# Patient Record
Sex: FEMALE | Race: WHITE | NOT HISPANIC OR LATINO | Employment: OTHER | URBAN - METROPOLITAN AREA
[De-identification: names, ages, dates, MRNs, and addresses within clinical notes are randomized per-mention and may not be internally consistent; named-entity substitution may affect disease eponyms.]

---

## 2017-02-21 ENCOUNTER — HOSPITAL ENCOUNTER (EMERGENCY)
Facility: HOSPITAL | Age: 22
Discharge: HOME | End: 2017-02-21
Attending: EMERGENCY MEDICINE
Payer: MEDICARE

## 2017-02-21 VITALS
SYSTOLIC BLOOD PRESSURE: 147 MMHG | HEART RATE: 86 BPM | OXYGEN SATURATION: 97 % | BODY MASS INDEX: 48.49 KG/M2 | TEMPERATURE: 98 F | WEIGHT: 284 LBS | DIASTOLIC BLOOD PRESSURE: 82 MMHG | HEIGHT: 64 IN | RESPIRATION RATE: 18 BRPM

## 2017-02-21 DIAGNOSIS — R51.9 INTRACTABLE HEADACHE, UNSPECIFIED CHRONICITY PATTERN, UNSPECIFIED HEADACHE TYPE: Primary | ICD-10-CM

## 2017-02-21 PROCEDURE — 99284 EMERGENCY DEPT VISIT MOD MDM: CPT | Mod: ,,, | Performed by: EMERGENCY MEDICINE

## 2017-02-21 PROCEDURE — 99283 EMERGENCY DEPT VISIT LOW MDM: CPT

## 2017-02-21 RX ORDER — LURASIDONE HYDROCHLORIDE 80 MG/1
80 TABLET, FILM COATED ORAL 2 TIMES DAILY
COMMUNITY
End: 2017-10-12

## 2017-02-21 NOTE — ED AVS SNAPSHOT
[facility]  [address]  [facility] [address]               Mary Myles   2017  5:41 PM   ED    Description:  Female : 1995   Department:  [facility] [address]           Your Care was Coordinated By:     Provider Role From To    Juanita Le MD Attending Provider 17 [id number] --    Nora Lopes NP Nurse Practitioner 17 --      Reason for Visit     Migraine           Diagnoses this Visit        Comments    Intractable headache, unspecified chronicity pattern, unspecified headache type    -  Primary       ED Disposition     None           To Do List           Follow-up Information     Follow up with [facility] - Neurology. Schedule an appointment as soon as possible for a visit in 2 days.    Specialty:  Neurology    Why:  Follow up in the neurology clinic within 2 days for further evaluation and treatment. Return to the ER for any changes, worsening or concerns. Continue your medications as prescribed.    Contact information:    [address]  [facility] [address] [phone]    Additional information:    7th Floor - Clinic Tower [facility]     [facility] Nurse Care [facility] -  Assistance  Registered nurses in the [facility] provide clinical advisement, health education, appointment booking, and other advisory services.  Call for this free service at [phone].             Medications           Message regarding Medications     Verify the changes and/or additions to your medication regime listed below are the same as discussed with your clinician today.  If any of these changes or additions are incorrect, please notify your healthcare provider.             Verify that the below list of medications is an accurate representation of the medications you are currently taking.  If none reported, the list may be blank. If incorrect, please contact your healthcare provider. Carry this  "list with you in case of emergency.           Current Medications     lithium (ESKALITH) 300 MG capsule Take 600 mg by mouth 3 (three) times daily.     lurasidone (LATUDA) 80 mg Tab tablet Take 80 mg by mouth once daily.    melatonin 10 mg Tab Take by mouth.    trazodone (DESYREL) 150 MG tablet Take 200 mg by mouth every evening.     benztropine (COGENTIN) 1 MG tablet Take 1 mg by mouth 2 (two) times daily.     haloperidol (HALDOL) 5 MG tablet Take 5 mg by mouth every evening.    haloperidol decanoate (HALDOL DECANOATE) 50 mg/mL injection Inject 100 mg into the muscle every 28 days.    ibuprofen (ADVIL,MOTRIN) 800 MG tablet Take 1 tablet (800 mg total) by mouth every 8 (eight) hours as needed for Pain.           Clinical Reference Information           Your Vitals Were     BP Pulse Temp Resp Height Weight    147/82 86 97.9 °F (36.6 °C) (Oral) 18 5' 4" (1.626 m) 128.8 kg (284 lb)    Last Period SpO2 BMI          02/15/2017 (Exact Date) 97% 48.75 kg/m2        Allergies as of 2/21/2017     No Known Allergies      Immunizations Administered on Date of Encounter - 2/21/2017     None      ED Micro, Lab, POCT     None      ED Imaging Orders     None        Discharge Instructions         Headache, Unspecified    A number of things can cause headaches. The cause of your headache isnt clear. But it doesnt seem to be a sign of any serious illness.  You could have a tension headache or a migraine headache.  Stress can cause a tension headache. This can happen if you tense the muscles of your shoulders, neck, and scalp without knowing it. If this stress lasts long enough, you may develop a tension headache.  It is not clear why migraines occur, but certain things called" triggers" can raise the risk of having a migraine attack. Migraine triggers may include emotional stress or depression, or by hormone changes during the menstrual cycle. Other triggers include birth control pills and other medicines, alcohol or caffeine, foods " with tyramine (such as aged cheese, wine), eyestrain, weather changes, missed meals, and lack of sleep or oversleeping.  Other causes of headache include:  · Viral illness with high fever  · Head injury with concussion  · Sinus, ear, or throat infection  · Dental pain and jaw joint (TMJ) pain  More serious but less common causes of headache include stroke, brain hemorrhage, brain tumor, meningitis, and encephalitis.  Home care  Follow these tips when taking care of yourself at home:  · Dont drive yourself home if you were given pain medicine for your headache. Instead, have someone else drive you home. Try to sleep when you get home. You should feel much better when you wake up.  · Apply heat to the back of your neck to ease a neck muscle spasm. Take care of a migraine headache by putting an ice pack on your forehead or at the base of your skull.  · If you have nausea or vomiting, eat a light diet until your headache eases.  · If you have a migraine headache, use sunglasses when in the daylight or around bright indoor lighting until your symptoms get better. Bright glaring light can make this type of headache worse.  Follow-up care  Follow up with your healthcare provider, or as advised. Talk with your provider if you have frequent headaches. He or she can help figure out a treatment plan. By knowing the earliest signs of headache, and starting treatment right away, you may be able to stop the pain yourself.  When to seek medical advice  Call your healthcare provider right away if any of these occur:  · Your head pain suddenly gets worse after sexual intercourse or strenuous activity  · Your head pain doesnt get better within 24 hours  · You arent able to keep liquids down (repeated vomiting)  · Fever of 100.4ºF (38ºC) or higher, or as directed by your healthcare provider  · Stiff neck  · Extreme drowsiness, confusion, or fainting  · Dizziness or dizziness with spinning sensation (vertigo)  · Weakness in an arm  or leg or one side of your face  · You have trouble talking or seeing  Date Last Reviewed: 8/1/2016  © 2732-2403 [employer]. [address] [place] [address], [place] [address]. All rights reserved. This information is not intended as a substitute for professional medical care. Always follow your healthcare professional's instructions.          [facility] Sign-Up     Activating your [facility] account is as easy as 1-2-3!     1) Visit [email or web address], select Sign Up Now, enter this activation code and your date of birth, then select Next.  [email or web address]  Expires: 4/7/2017  6:36 PM      2) Create a username and password to use when you visit [facility] in the future and select a security question in case you lose your password and select Next.    3) Enter your e-mail address and click Sign Up!    Additional Information  If you have questions, please e-mail [email or web address] or call [phone] to talk to our [facility] staff. Remember, [facility] is NOT to be used for urgent needs. For medical emergencies, dial 911.         Smoking Cessation     If you would like to quit smoking:   You may be eligible for free services if you are a [place] resident and started smoking cigarettes before September 1, 1988.  Call the Smoking Cessation Trust ([facility]) toll free at (279) 256-4628 or (508) 700-0163.   Call 1-800-QUIT-NOW if you do not meet the above criteria.             [facility] complies with applicable Federal civil rights laws and does not discriminate on the basis of race, color, national origin, age, disability, or sex.        Language Assistance Services     ATTENTION: Language assistance services are available, free of charge. Please call [phone].      ATENCIÓN: Si habla kwesi, tiene a sewell disposición servicios gratuitos de asistencia lingüística. Llame al [phone].     CHÚ Ý: N?u b?n nói Ti?ng Vi?t, có các d?ch v? h? tr? ngôn ng? mi?n phí dành cho b?n. G?i s?  [phone].

## 2017-02-22 NOTE — ED PROVIDER NOTES
Encounter Date: 2/21/2017       History     Chief Complaint   Patient presents with    Migraine     for 6 weeks     Review of patient's allergies indicates:  No Known Allergies  HPI Comments: This is a 21 y.o. Female with a past medical history significant for borderline personality disorder, schizoaffective disorder, depression, ADD, chronic headaches, fibromyalgia and PCOS who presents to the ED today for evaluation of a headache. She reports a generalized headache that originates across her forehead and has been constant and sharp for the past 6 weeks. It is associated with intermittent nausea and vomiting, intermittent photophobia and intermittent tinnitus. She denies blurred vision. Denies abdominal pain or diarrhea. Denies head trauma. Denies neck stiffness or fever. She denies weakness, dizziness, numbness, speech difficulty or trouble swallowing.  Reports that she was seen in the ED in [place] last week and was discharged home with rx for phenergan and fioricet which she states is not helping her symptoms. She saw her PCP today who advised that she come to the ED to rule out psuedo tumor cerebri. She denies any other problems or concerns at this time.                         The history is provided by the patient.     Past Medical History   Diagnosis Date    ADD (attention deficit disorder)     Borderline personality disorder     Chronic headache     Depression     Fibromyalgia     GERD (gastroesophageal reflux disease)     PCOS (polycystic ovarian syndrome)     Schizoaffective disorder      No past medical history pertinent negatives.  Past Surgical History   Procedure Laterality Date    Tonsillectomy      [place] tooth extraction       Family History   Problem Relation Age of Onset    Family history unknown: Yes     Social History   Substance Use Topics    Smoking status: Current Every Day Smoker     Packs/day: 0.50     Types: Cigarettes    Smokeless tobacco: Never Used    Alcohol use No      Review of Systems   Constitutional: Negative for chills and fever.   HENT: Negative for congestion and sinus pressure.    Eyes: Negative for visual disturbance. Positive for photophobia  Respiratory: Negative for cough, chest tightness and shortness of breath.    Cardiovascular: Negative for chest pain.   Gastrointestinal: Negative for abdominal pain. Negative for diarrhea. Positive for nausea and vomiting.  Genitourinary: Negative for flank pain. Negative for dysuria.  Musculoskeletal: Negative for arthralgias and myalgias.   Skin: Negative for rash and wound.   Neurological: Negative for dizziness. Negative for weakness and numbness. Positive for headache. Negative for head trauma.  Psychiatric/Behavioral: Negative for confusion.  Denies suicidal or homicidal ideation.    Physical Exam   Initial Vitals   BP Pulse Resp Temp SpO2   02/21/17 1611 02/21/17 1611 02/21/17 1611 02/21/17 1611 02/21/17 1611   147/82 86 18 97.9 °F (36.6 °C) 97 %     Physical Exam   Nursing note and vitals reviewed.  Constitutional: Patient appears well-developed and well-nourished. Not diaphoretic. No distress.   HENT:   Head: Normocephalic and atraumatic. No sinus tenderness. Normal oropharynx.   Eyes: Conjunctivae are normal. No scleral icterus. PERRL. Normal EOMs.  Neck: Normal range of motion. Neck supple. No cervical adenopathy. No nuchal rigidity.  Cardiovascular: Normal rate, regular rhythm and normal heart sounds.   Pulmonary/Chest: Breath sounds normal. No respiratory distress.  Abdominal: The abdomen is soft, obese and non distended. Normal bowel sounds in all 4 quadrants. There is no tenderness to palpation. No rebound or guarding.   Musculoskeletal: Normal range of motion. No edema or tenderness.   Neurological: Alert and oriented to person, place, and time. Normal strength. No sensory deficit.   Skin: Skin is warm and dry. No rash noted. No erythema. No pallor.   Psychiatric: Normal mood and affect. Thought content normal.      ED Course   Procedures  Labs Reviewed - No data to display          Medical Decision Making:   Initial Assessment:   21 y.o. female with a psychiatric history presenting with a 6 week history of constant sharp generalized headache that is associated with nausea, vomiting, photophobia and intermittent tinnitus.  She is currently taking Fioricet and Phenergan per the emergency department in [place] with little improvement.  She was sent to the emergency department by her primary care provider to rule out pseudotumor cerebri.  She denies visual disturbance.  She has been seen in this emergency department previously for headaches in the past.  She is afebrile, nontoxic and in no acute distress.  She has normal neurologic exam.  I think that she is stable for discharge home with follow-up in the neurology clinic.  Return precautions discussed at length with the patient and her family.              Attending Attestation:     Physician Attestation Statement for NP/PA:   I have conducted a face to face encounter with this patient in addition to the NP/PA, due to Medical Complexity    Other NP/PA Attestation Additions:    History of Present Illness: 22 yo f, complicated psych hx, here with HA x 6 weeks, constant, with some tinnitus, no visual changes, no neck pain, + n/v, no fever.  Pt referred from internal medicine for r/o pseudotumor cerebri.  On review of medical records, however, pt with previous HA visits x 2 to ED.  Her neuro exam is completely normal. VA being documented by nurses. Seems more likely that these are chronic HAs x years and without acute neurologic changes, think it would safe for pt to have a semi-urgent eval by neurology as outpatient, including MRI and/or LP if they feel this is necessary.  Pt and family comfortable with this plan.                   ED Course     Clinical Impression:   The encounter diagnosis was Intractable headache, unspecified chronicity pattern, unspecified headache type.           Nora Lopes, NP  02/22/17 0145

## 2017-02-22 NOTE — DISCHARGE INSTRUCTIONS
"  Headache, Unspecified    A number of things can cause headaches. The cause of your headache isnt clear. But it doesnt seem to be a sign of any serious illness.  You could have a tension headache or a migraine headache.  Stress can cause a tension headache. This can happen if you tense the muscles of your shoulders, neck, and scalp without knowing it. If this stress lasts long enough, you may develop a tension headache.  It is not clear why migraines occur, but certain things called" triggers" can raise the risk of having a migraine attack. Migraine triggers may include emotional stress or depression, or by hormone changes during the menstrual cycle. Other triggers include birth control pills and other medicines, alcohol or caffeine, foods with tyramine (such as aged cheese, wine), eyestrain, weather changes, missed meals, and lack of sleep or oversleeping.  Other causes of headache include:  · Viral illness with high fever  · Head injury with concussion  · Sinus, ear, or throat infection  · Dental pain and jaw joint (TMJ) pain  More serious but less common causes of headache include stroke, brain hemorrhage, brain tumor, meningitis, and encephalitis.  Home care  Follow these tips when taking care of yourself at home:  · Dont drive yourself home if you were given pain medicine for your headache. Instead, have someone else drive you home. Try to sleep when you get home. You should feel much better when you wake up.  · Apply heat to the back of your neck to ease a neck muscle spasm. Take care of a migraine headache by putting an ice pack on your forehead or at the base of your skull.  · If you have nausea or vomiting, eat a light diet until your headache eases.  · If you have a migraine headache, use sunglasses when in the daylight or around bright indoor lighting until your symptoms get better. Bright glaring light can make this type of headache worse.  Follow-up care  Follow up with your healthcare provider, or " as advised. Talk with your provider if you have frequent headaches. He or she can help figure out a treatment plan. By knowing the earliest signs of headache, and starting treatment right away, you may be able to stop the pain yourself.  When to seek medical advice  Call your healthcare provider right away if any of these occur:  · Your head pain suddenly gets worse after sexual intercourse or strenuous activity  · Your head pain doesnt get better within 24 hours  · You arent able to keep liquids down (repeated vomiting)  · Fever of 100.4ºF (38ºC) or higher, or as directed by your healthcare provider  · Stiff neck  · Extreme drowsiness, confusion, or fainting  · Dizziness or dizziness with spinning sensation (vertigo)  · Weakness in an arm or leg or one side of your face  · You have trouble talking or seeing  Date Last Reviewed: 8/1/2016  © 6723-7256 [employer]. [address] [place] [address], [place] [address]. All rights reserved. This information is not intended as a substitute for professional medical care. Always follow your healthcare professional's instructions.

## 2017-02-28 ENCOUNTER — HOSPITAL ENCOUNTER (EMERGENCY)
Facility: HOSPITAL | Age: 22
Discharge: HOME | End: 2017-02-28
Attending: EMERGENCY MEDICINE
Payer: MEDICARE

## 2017-02-28 VITALS
SYSTOLIC BLOOD PRESSURE: 115 MMHG | HEIGHT: 64 IN | WEIGHT: 277.31 LBS | HEART RATE: 74 BPM | RESPIRATION RATE: 18 BRPM | BODY MASS INDEX: 47.34 KG/M2 | TEMPERATURE: 99 F | DIASTOLIC BLOOD PRESSURE: 66 MMHG | OXYGEN SATURATION: 98 %

## 2017-02-28 DIAGNOSIS — G93.2 ELEVATED INTRACRANIAL PRESSURE: Primary | ICD-10-CM

## 2017-02-28 DIAGNOSIS — R51.9 INTRACTABLE HEADACHE, UNSPECIFIED CHRONICITY PATTERN, UNSPECIFIED HEADACHE TYPE: ICD-10-CM

## 2017-02-28 LAB
ALBUMIN SERPL BCP-MCNC: 3.9 G/DL
ALP SERPL-CCNC: 77 U/L
ALT SERPL W/O P-5'-P-CCNC: 228 U/L
ANION GAP SERPL CALC-SCNC: 13 MMOL/L
AST SERPL-CCNC: 286 U/L
B-HCG UR QL: NEGATIVE
BASOPHILS # BLD AUTO: 0.1 K/UL
BASOPHILS NFR BLD: 0.6 %
BILIRUB SERPL-MCNC: 0.8 MG/DL
BUN SERPL-MCNC: 5 MG/DL
CALCIUM SERPL-MCNC: 10.8 MG/DL
CHLORIDE SERPL-SCNC: 103 MMOL/L
CLARITY CSF: CLEAR
CO2 SERPL-SCNC: 20 MMOL/L
COLOR CSF: COLORLESS
CREAT SERPL-MCNC: 0.7 MG/DL
CTP QC/QA: YES
DIFFERENTIAL METHOD: ABNORMAL
EOSINOPHIL # BLD AUTO: 0.4 K/UL
EOSINOPHIL NFR BLD: 3.1 %
ERYTHROCYTE [DISTWIDTH] IN BLOOD BY AUTOMATED COUNT: 16.1 %
EST. GFR  (AFRICAN AMERICAN): >60 ML/MIN/1.73 M^2
EST. GFR  (NON AFRICAN AMERICAN): >60 ML/MIN/1.73 M^2
GLUCOSE CSF-MCNC: 62 MG/DL
GLUCOSE SERPL-MCNC: 94 MG/DL
HCT VFR BLD AUTO: 42 %
HGB BLD-MCNC: 13.6 G/DL
INR PPP: 1
LITHIUM SERPL-SCNC: 1 MMOL/L
LYMPHOCYTES # BLD AUTO: 2.1 K/UL
LYMPHOCYTES NFR BLD: 17.4 %
MCH RBC QN AUTO: 27.3 PG
MCHC RBC AUTO-ENTMCNC: 32.5 %
MCV RBC AUTO: 84 FL
MONOCYTES # BLD AUTO: 0.7 K/UL
MONOCYTES NFR BLD: 5.5 %
NEUTROPHILS # BLD AUTO: 9 K/UL
NEUTROPHILS NFR BLD: 73.4 %
PLATELET # BLD AUTO: 447 K/UL
PMV BLD AUTO: 8 FL
POTASSIUM SERPL-SCNC: 4.3 MMOL/L
PROT CSF-MCNC: 24 MG/DL
PROT SERPL-MCNC: 7.9 G/DL
PROTHROMBIN TIME: 10.7 SEC
RBC # BLD AUTO: 5.01 M/UL
RBC # CSF: 1 /CU MM
SODIUM SERPL-SCNC: 136 MMOL/L
SPECIMEN VOL CSF: 10 ML
WBC # BLD AUTO: 12.2 K/UL
WBC # CSF: 0 /CU MM

## 2017-02-28 PROCEDURE — 63600175 PHARM REV CODE 636 W HCPCS: Performed by: EMERGENCY MEDICINE

## 2017-02-28 PROCEDURE — 99285 EMERGENCY DEPT VISIT HI MDM: CPT | Mod: 25

## 2017-02-28 PROCEDURE — 99000 SPECIMEN HANDLING OFFICE-LAB: CPT

## 2017-02-28 PROCEDURE — 96376 TX/PRO/DX INJ SAME DRUG ADON: CPT

## 2017-02-28 PROCEDURE — 89051 BODY FLUID CELL COUNT: CPT

## 2017-02-28 PROCEDURE — 80178 ASSAY OF LITHIUM: CPT

## 2017-02-28 PROCEDURE — 25000003 PHARM REV CODE 250: Performed by: EMERGENCY MEDICINE

## 2017-02-28 PROCEDURE — 85610 PROTHROMBIN TIME: CPT

## 2017-02-28 PROCEDURE — 81025 URINE PREGNANCY TEST: CPT | Performed by: EMERGENCY MEDICINE

## 2017-02-28 PROCEDURE — 36415 COLL VENOUS BLD VENIPUNCTURE: CPT

## 2017-02-28 PROCEDURE — 87070 CULTURE OTHR SPECIMN AEROBIC: CPT

## 2017-02-28 PROCEDURE — 96374 THER/PROPH/DIAG INJ IV PUSH: CPT

## 2017-02-28 PROCEDURE — 84157 ASSAY OF PROTEIN OTHER: CPT

## 2017-02-28 PROCEDURE — 85025 COMPLETE CBC W/AUTO DIFF WBC: CPT

## 2017-02-28 PROCEDURE — 87205 SMEAR GRAM STAIN: CPT

## 2017-02-28 PROCEDURE — 82945 GLUCOSE OTHER FLUID: CPT

## 2017-02-28 PROCEDURE — 80053 COMPREHEN METABOLIC PANEL: CPT

## 2017-02-28 RX ORDER — ONDANSETRON HYDROCHLORIDE 4 MG/5ML
4 SOLUTION ORAL ONCE
Status: COMPLETED | OUTPATIENT
Start: 2017-02-28 | End: 2017-02-28

## 2017-02-28 RX ORDER — ONDANSETRON 4 MG/1
4 TABLET, ORALLY DISINTEGRATING ORAL
Status: COMPLETED | OUTPATIENT
Start: 2017-02-28 | End: 2017-02-28

## 2017-02-28 RX ORDER — LORAZEPAM 1 MG/1
1 TABLET ORAL EVERY 12 HOURS PRN
COMMUNITY
End: 2017-03-14

## 2017-02-28 RX ORDER — BUTALBITAL, ACETAMINOPHEN AND CAFFEINE 50; 325; 40 MG/1; MG/1; MG/1
1 TABLET ORAL EVERY 4 HOURS PRN
COMMUNITY
End: 2017-03-15

## 2017-02-28 RX ORDER — MORPHINE SULFATE 2 MG/ML
6 INJECTION, SOLUTION INTRAMUSCULAR; INTRAVENOUS
Status: COMPLETED | OUTPATIENT
Start: 2017-02-28 | End: 2017-02-28

## 2017-02-28 RX ORDER — NORETHINDRONE ACETATE AND ETHINYL ESTRADIOL 1.5-30(21)
1 KIT ORAL DAILY
COMMUNITY
End: 2017-03-14

## 2017-02-28 RX ORDER — MORPHINE SULFATE 4 MG/ML
8 INJECTION, SOLUTION INTRAMUSCULAR; INTRAVENOUS
Status: COMPLETED | OUTPATIENT
Start: 2017-02-28 | End: 2017-02-28

## 2017-02-28 RX ADMIN — MORPHINE SULFATE 6 MG: 2 INJECTION, SOLUTION INTRAMUSCULAR; INTRAVENOUS at 05:02

## 2017-02-28 RX ADMIN — MORPHINE SULFATE 8 MG: 4 INJECTION INTRAVENOUS at 02:02

## 2017-02-28 RX ADMIN — Medication 4 MG: at 03:02

## 2017-02-28 RX ADMIN — ONDANSETRON 4 MG: 4 TABLET, ORALLY DISINTEGRATING ORAL at 02:02

## 2017-02-28 NOTE — ED AVS SNAPSHOT
[facility] [address] [place] [address]               Mary Myles   2017  1:21 PM   ED    Description:  Female : 1995   Department:  [facility]           Your Care was Coordinated By:     Provider Role From To    Hawk Kilgore MD Attending Provider 17 1331 --      Reason for Visit     Headache     Emesis           Diagnoses this Visit        Comments    Elevated intracranial pressure    -  Primary     Intractable headache, unspecified chronicity pattern, unspecified headache type           ED Disposition     None           To Do List           Follow-up Information     Schedule an appointment as soon as possible for a visit with [facility] - Neurology.    Specialty:  Neurology    Contact information:    [address]  [facility] [address] [phone]    Additional information:    7th Floor - [facility] [place]        Follow up with [facility].    Specialty:  Emergency Medicine    Why:  If symptoms worsen    Contact information:    [address] [place] [id number] [phone]      [facility]     [facility] - 24/7 Assistance  Registered nurses in the [facility] provide clinical advisement, health education, appointment booking, and other advisory services.  Call for this free service at [phone].             Medications           Message regarding Medications     Verify the changes and/or additions to your medication regime listed below are the same as discussed with your clinician today.  If any of these changes or additions are incorrect, please notify your healthcare provider.        These medications were administered today        Dose Freq    morphine injection 8 mg 8 mg ED 1 Time    Sig: Inject 2 mLs (8 mg total) into the vein ED 1 Time.    Class: Normal    Route: Intravenous    ondansetron disintegrating tablet 4 mg 4  mg ED 1 Time    Sig: Take 1 tablet (4 mg total) by mouth ED 1 Time.    Class: Normal    Route: Oral    ondansetron 4 mg/5 mL solution 4 mg 4 mg Once    Sig: Take 5 mLs (4 mg total) by mouth once.    Class: Normal    Route: Oral    morphine injection 6 mg 6 mg ED 1 Time    Sig: Inject 3 mLs (6 mg total) into the vein ED 1 Time.    Class: Normal    Route: Intravenous      STOP taking these medications     benztropine (COGENTIN) 1 MG tablet Take 1 mg by mouth 2 (two) times daily.     haloperidol (HALDOL) 5 MG tablet Take 5 mg by mouth every evening.    haloperidol decanoate (HALDOL DECANOATE) 50 mg/mL injection Inject 100 mg into the muscle every 28 days.           Verify that the below list of medications is an accurate representation of the medications you are currently taking.  If none reported, the list may be blank. If incorrect, please contact your healthcare provider. Carry this list with you in case of emergency.           Current Medications     butalbital-acetaminophen-caffeine -40 mg (FIORICET, ESGIC) -40 mg per tablet Take 1 tablet by mouth every 4 (four) hours as needed for Pain.    ibuprofen (ADVIL,MOTRIN) 800 MG tablet Take 1 tablet (800 mg total) by mouth every 8 (eight) hours as needed for Pain.    lithium (ESKALITH) 300 MG capsule Take 600 mg by mouth 2 (two) times daily.     lorazepam (ATIVAN) 1 MG tablet Take 1 mg by mouth every 12 (twelve) hours as needed for Anxiety.    lurasidone (LATUDA) 80 mg Tab tablet Take 80 mg by mouth every evening.     melatonin 10 mg Tab Take by mouth.    norethindrone-ethinyl estradiol-iron (MICROGESTIN FE1.5/30) 1.5 mg-30 mcg (21)/75 mg (7) tablet Take 1 tablet by mouth once daily.    trazodone (DESYREL) 150 MG tablet Take 200 mg by mouth every evening.            Clinical Reference Information           Your Vitals Were     BP                   115/66           Allergies as of 2/28/2017     No Known Allergies      Immunizations Administered on Date of  "Encounter - 2/28/2017     None      ED Micro, Lab, POCT     Start Ordered       Status Ordering Provider    02/28/17 1737 02/28/17 1737  CSF culture  Once      In process     02/28/17 1401 02/28/17 1400  Glucose, CSF (Tube 1)  STAT      Final result     02/28/17 1401 02/28/17 1400  Protein, CSF (Tube 1)  STAT      Final result     02/28/17 1401 02/28/17 1400    Once,   Status:  Canceled      Canceled     02/28/17 1401 02/28/17 1400  CSF cell count with differential (Tube 4)  Once      Final result     02/28/17 1401 02/28/17 1400  Freeze and Hold,   Once      Collected     02/28/17 1346 02/28/17 1346  Complete Blood Count (CBC)  STAT      Final result     02/28/17 1346 02/28/17 1346  Comprehensive Metabolic Panel (CMP)  STAT      Final result     02/28/17 1346 02/28/17 1346  Protime-INR  STAT      Final result     02/28/17 1346 02/28/17 1346  Lithium level  Once      Final result     02/28/17 1346 02/28/17 1346  POCT urine pregnancy  Once      Final result       ED Imaging Orders     Start Ordered       Status Ordering Provider    02/28/17 1551 02/28/17 1550  FL Lumbar Puncture (xpd)  1 time imaging      Final result     02/28/17 1441 02/28/17 1441    1 time imaging,   Status:  Canceled      Canceled     02/28/17 1349 02/28/17 1349    1 time imaging,   Status:  Canceled      Canceled     02/28/17 1346 02/28/17 1346  CT Head Without Contrast  1 time imaging      Final result         Discharge Instructions         Headache, Unspecified    A number of things can cause headaches. The cause of your headache isnt clear. But it doesnt seem to be a sign of any serious illness.  You could have a tension headache or a migraine headache.  Stress can cause a tension headache. This can happen if you tense the muscles of your shoulders, neck, and scalp without knowing it. If this stress lasts long enough, you may develop a tension headache.  It is not clear why migraines occur, but certain things called" triggers" can raise " the risk of having a migraine attack. Migraine triggers may include emotional stress or depression, or by hormone changes during the menstrual cycle. Other triggers include birth control pills and other medicines, alcohol or caffeine, foods with tyramine (such as aged cheese, wine), eyestrain, weather changes, missed meals, and lack of sleep or oversleeping.  Other causes of headache include:  · Viral illness with high fever  · Head injury with concussion  · Sinus, ear, or throat infection  · Dental pain and jaw joint (TMJ) pain  More serious but less common causes of headache include stroke, brain hemorrhage, brain tumor, meningitis, and encephalitis.  Home care  Follow these tips when taking care of yourself at home:  · Dont drive yourself home if you were given pain medicine for your headache. Instead, have someone else drive you home. Try to sleep when you get home. You should feel much better when you wake up.  · Apply heat to the back of your neck to ease a neck muscle spasm. Take care of a migraine headache by putting an ice pack on your forehead or at the base of your skull.  · If you have nausea or vomiting, eat a light diet until your headache eases.  · If you have a migraine headache, use sunglasses when in the daylight or around bright indoor lighting until your symptoms get better. Bright glaring light can make this type of headache worse.  Follow-up care  Follow up with your healthcare provider, or as advised. Talk with your provider if you have frequent headaches. He or she can help figure out a treatment plan. By knowing the earliest signs of headache, and starting treatment right away, you may be able to stop the pain yourself.  When to seek medical advice  Call your healthcare provider right away if any of these occur:  · Your head pain suddenly gets worse after sexual intercourse or strenuous activity  · Your head pain doesnt get better within 24 hours  · You arent able to keep liquids down  (repeated vomiting)  · Fever of 100.4ºF (38ºC) or higher, or as directed by your healthcare provider  · Stiff neck  · Extreme drowsiness, confusion, or fainting  · Dizziness or dizziness with spinning sensation (vertigo)  · Weakness in an arm or leg or one side of your face  · You have trouble talking or seeing  Date Last Reviewed: 8/1/2016  © 6115-2955 [employer]. [address] [place] [address], [place] [address]. All rights reserved. This information is not intended as a substitute for professional medical care. Always follow your healthcare professional's instructions.          Your Scheduled Appointments     Mar 09, 2017 11:00 AM CST   New Patient with TASHIA Aleman - Neurology ([address] )    [address]  [facility] [phone]              [facility] Sign-Up     Activating your [facility] account is as easy as 1-2-3!     1) Visit [email or web address], select Sign Up Now, enter this activation code and your date of birth, then select Next.  [email or web address]  Expires: 4/7/2017  6:36 PM      2) Create a username and password to use when you visit [facility] in the future and select a security question in case you lose your password and select Next.    3) Enter your e-mail address and click Sign Up!    Additional Information  If you have questions, please e-mail [email or web address] or call [phone] to talk to our [facility] staff. Remember, [facility] is NOT to be used for urgent needs. For medical emergencies, dial 911.         Smoking Cessation     If you would like to quit smoking:   You may be eligible for free services if you are a [place] resident and started smoking cigarettes before September 1, 1988.  Call the Smoking Cessation Trust (SCT) toll free at (286) 996-7099 or (599) 070-1357.   Call 6-800-QUIT-NOW if you do not meet the above criteria.             [facility] complies with applicable Federal civil rights laws and does  not discriminate on the basis of race, color, national origin, age, disability, or sex.        Language Assistance Services     ATTENTION: Language assistance services are available, free of charge. Please call [phone].      ATENCIÓN: Si yashira orosco, tiene a sewell disposición servicios gratuitos de asistencia lingüística. Llame al [phone].     CHÚ Ý: N?u b?n nói Ti?ng Vi?t, có các d?ch v? h? tr? ngôn ng? mi?n phí dành cho b?n. G?i s? [phone].

## 2017-02-28 NOTE — ED NOTES
Pt recently hospitalized for lithium toxicity. Pt reports being seen at multiple hospitals for ha and n/v but without any results or diagnosis.

## 2017-02-28 NOTE — ED PROVIDER NOTES
"Encounter Date: 2/28/2017    SCRIBE #1 NOTE: I, Landen Espinoza, am scribing for, and in the presence of, Dr. Kilgore.       History     Chief Complaint   Patient presents with    Headache     x 7 weeks. D/c'd from [facility] hospitalization for same on 2/25/17.    Emesis     x 7 weeks     Review of patient's allergies indicates:  No Known Allergies  HPI Comments: 02/28/2017  1:40 PM     Chief Complaint: Headache      The patient is a 21 y.o. female with a PMHx of ADD; borderline personality disorder; chronic headache; depression; fibromyalgia; GERD; PCOS; and schizoaffective disorder who is presenting with a persistent migraine headache for the past 7 wks. Pt took Fioricet for her headache and Ativan for nausea today, without any alleviation. She reported a hx of headaches for the past 17 yrs, but her normal headaches last about 1-2 wks. Associated symptoms of no appetite, bilateral eye pain, nausea, vomiting, dizziness, and sleep disturbance due to pain. Pt reported that she cannot keep any food or fluids down. Pt recently seen at [facility] for lithium blood toxicity and pt reported that "they got it back to normal." She stated that her PCP thought she had a pseudotumor and wanted the pt to f/u at [facility]. Pt denied any recent imaging of her head. No chance of pregnancy. Pt has a past surgical history that includes Tonsillectomy and [place] tooth extraction.      The history is provided by the patient.     Past Medical History:   Diagnosis Date    ADD (attention deficit disorder)     Borderline personality disorder     Chronic headache     Depression     Fibromyalgia     GERD (gastroesophageal reflux disease)     PCOS (polycystic ovarian syndrome)     Schizoaffective disorder      Past Surgical History:   Procedure Laterality Date    TONSILLECTOMY      WISDOM TOOTH EXTRACTION       Family History   Problem Relation Age of Onset    Family history unknown: Yes     Social History   Substance Use Topics    Smoking " status: Current Every Day Smoker     Packs/day: 0.50     Types: Cigarettes    Smokeless tobacco: Never Used    Alcohol use No     Review of Systems   Constitutional: Positive for appetite change (No appetite.). Negative for fever.   HENT: Negative for sore throat.    Eyes: Positive for pain (Bilateral eye pain.).   Respiratory: Negative for shortness of breath.    Cardiovascular: Negative for chest pain.   Gastrointestinal: Positive for nausea and vomiting.   Genitourinary: Negative for dysuria.   Musculoskeletal: Negative for back pain.   Skin: Negative for rash.   Neurological: Positive for dizziness and headaches.   Hematological: Does not bruise/bleed easily.   Psychiatric/Behavioral: Positive for sleep disturbance (Sleep disturbance due to pain.). Negative for confusion.       Physical Exam   Initial Vitals   BP Pulse Resp Temp SpO2   02/28/17 1227 02/28/17 1227 02/28/17 1227 02/28/17 1227 02/28/17 1227   136/97 95 16 98.7 °F (37.1 °C) 97 %     Physical Exam    Nursing note and vitals reviewed.  Constitutional:   Obese habitus.   HENT:   Head: Normocephalic and atraumatic.   Mouth/Throat: Oropharynx is clear and moist.   Eyes:   Papilledema cannot be visualized secondary to pt noncompliance with exam.   Neck: Neck supple.   Cardiovascular: Normal rate, regular rhythm, normal heart sounds and intact distal pulses. Exam reveals no gallop and no friction rub.    No murmur heard.  Pulmonary/Chest: Breath sounds normal. She has no wheezes. She has no rhonchi. She has no rales.   Abdominal: Soft. She exhibits no distension. There is no tenderness.   Musculoskeletal: Normal range of motion.   Neurological: She is alert and oriented to person, place, and time.   Non-focal neurological exam.   Skin: No rash noted. No erythema.   Psychiatric: She has a normal mood and affect.         ED Course   Procedures  Labs Reviewed   CBC W/ AUTO DIFFERENTIAL - Abnormal; Notable for the following:        Result Value    RDW 16.1  (*)     Platelets 447 (*)     MPV 8.0 (*)     Gran # 9.0 (*)     Gran% 73.4 (*)     Lymph% 17.4 (*)     All other components within normal limits   COMPREHENSIVE METABOLIC PANEL - Abnormal; Notable for the following:     CO2 20 (*)     BUN, Bld 5 (*)     Calcium 10.8 (*)      (*)      (*)     All other components within normal limits   CSF CELL COUNT WITH DIFFERENTIAL - Abnormal; Notable for the following:     RBC, CSF 1 (*)     All other components within normal limits   CULTURE, CSF  (INCLUDES STAIN)   PROTIME-INR   LITHIUM LEVEL   GLUCOSE, CSF   PROTEIN, CSF   FREEZE AND HOLD -    POCT URINE PREGNANCY             Medical Decision Making:   Initial Assessment:   Patient was interviewed and examined and found to be in some distress secondary to her pain.  She has a nonfocal neurologic exam at this time.  IV was established and she was provided multiple doses of analgesic medication.  Her history, habitus, chronic headache and transient visual symptoms appear reminiscent to symptoms associated with idiopathic intracranial hypertension.  I did speak with the on-call interventional radiologist, Dr Graves, regarding therapeutic and diagnostic lumbar puncture which he did agree to perform.  Differential Diagnosis:   DDX includes, but are not limited to acute intracranial bleed, idiopathic intracranial hypertension, complex migraine syndrome, tension headache, CNS infection, dehydration, lithium toxicity  ED Management:  After speaking with the interventional radiologist, he did confirm that the patient had an elevated opening pressure of 26 cm of water which she drained to 13 cm of water.  She was educated to go home and rest and follow-up with her neurologist with these findings as soon as possible.  She was initially asked to return to the emergency department for any new, concerning, or worsening symptoms.  Patient agreeable with plan for follow-up and she was discharged in stable condition with a  friend as a .            Scribe Attestation:   Scribe #1: I performed the above scribed service and the documentation accurately describes the services I performed. I attest to the accuracy of the note.    Attending Attestation:           Physician Attestation for Scribe:  Physician Attestation Statement for Scribe #1: I, Dr. Kilgore, reviewed documentation, as scribed by Landen Espinoza in my presence, and it is both accurate and complete.                 ED Course     Clinical Impression:   The primary encounter diagnosis was Elevated intracranial pressure. A diagnosis of Intractable headache, unspecified chronicity pattern, unspecified headache type was also pertinent to this visit.      Disposition:   Disposition: Discharged  Condition: Stable       Hawk Kilgore MD  03/01/17 0680

## 2017-02-28 NOTE — H&P
[facility]  History & Physical    Subjective:      Chief Complaint: headache/nausea x 7 weeks    Mary Myles is a 21 y.o. female.    Past Medical History:   Diagnosis Date    ADD (attention deficit disorder)     Borderline personality disorder     Chronic headache     Depression     Fibromyalgia     GERD (gastroesophageal reflux disease)     PCOS (polycystic ovarian syndrome)     Schizoaffective disorder      Past Surgical History:   Procedure Laterality Date    TONSILLECTOMY      WISDOM TOOTH EXTRACTION       Family History   Problem Relation Age of Onset    Family history unknown: Yes     Social History   Substance Use Topics    Smoking status: Current Every Day Smoker     Packs/day: 0.50     Types: Cigarettes    Smokeless tobacco: Never Used    Alcohol use No         (Not in a hospital admission)  Review of patient's allergies indicates:  No Known Allergies     ROS  Headache, nausea x 7 weeks    Objective:      Vital Signs (Most Recent)  Temp: 98.7 °F (37.1 °C) (02/28/17 1227)  Pulse: 82 (02/28/17 1432)  Resp: 18 (02/28/17 1432)  BP: (!) 151/80 (02/28/17 1432)  SpO2: 99 % (02/28/17 1432)    Vital Signs Range (Last 24H):  Temp:  [98.7 °F (37.1 °C)]   Pulse:  [82-95]   Resp:  [16-18]   BP: (136-151)/(80-97)   SpO2:  [97 %-99 %]     Physical Exam     Pt alert and oriented  Lungs CTAB  Heart: RRR  Musc: moves all extremities well      Assessment:      20 yo with chronic headache and nausea of unclear etiology.     Plan:    Fluoroscopically guided lumbar puncture.

## 2017-03-01 NOTE — DISCHARGE INSTRUCTIONS
"  Headache, Unspecified    A number of things can cause headaches. The cause of your headache isnt clear. But it doesnt seem to be a sign of any serious illness.  You could have a tension headache or a migraine headache.  Stress can cause a tension headache. This can happen if you tense the muscles of your shoulders, neck, and scalp without knowing it. If this stress lasts long enough, you may develop a tension headache.  It is not clear why migraines occur, but certain things called" triggers" can raise the risk of having a migraine attack. Migraine triggers may include emotional stress or depression, or by hormone changes during the menstrual cycle. Other triggers include birth control pills and other medicines, alcohol or caffeine, foods with tyramine (such as aged cheese, wine), eyestrain, weather changes, missed meals, and lack of sleep or oversleeping.  Other causes of headache include:  · Viral illness with high fever  · Head injury with concussion  · Sinus, ear, or throat infection  · Dental pain and jaw joint (TMJ) pain  More serious but less common causes of headache include stroke, brain hemorrhage, brain tumor, meningitis, and encephalitis.  Home care  Follow these tips when taking care of yourself at home:  · Dont drive yourself home if you were given pain medicine for your headache. Instead, have someone else drive you home. Try to sleep when you get home. You should feel much better when you wake up.  · Apply heat to the back of your neck to ease a neck muscle spasm. Take care of a migraine headache by putting an ice pack on your forehead or at the base of your skull.  · If you have nausea or vomiting, eat a light diet until your headache eases.  · If you have a migraine headache, use sunglasses when in the daylight or around bright indoor lighting until your symptoms get better. Bright glaring light can make this type of headache worse.  Follow-up care  Follow up with your healthcare provider, or " as advised. Talk with your provider if you have frequent headaches. He or she can help figure out a treatment plan. By knowing the earliest signs of headache, and starting treatment right away, you may be able to stop the pain yourself.  When to seek medical advice  Call your healthcare provider right away if any of these occur:  · Your head pain suddenly gets worse after sexual intercourse or strenuous activity  · Your head pain doesnt get better within 24 hours  · You arent able to keep liquids down (repeated vomiting)  · Fever of 100.4ºF (38ºC) or higher, or as directed by your healthcare provider  · Stiff neck  · Extreme drowsiness, confusion, or fainting  · Dizziness or dizziness with spinning sensation (vertigo)  · Weakness in an arm or leg or one side of your face  · You have trouble talking or seeing  Date Last Reviewed: 8/1/2016  © 3996-5256 [employer]. [address] [place] [address], [place] [address]. All rights reserved. This information is not intended as a substitute for professional medical care. Always follow your healthcare professional's instructions.

## 2017-03-01 NOTE — OP NOTE
Radiology Post-Procedure Note    Pre Op Diagnosis: Headache  Post Op Diagnosis: Same    Procedure: LP fluoro guided    Procedure performed by: Ely  Written Informed Consent Obtained: Yes  Specimen Removed: YES   Estimated Blood Loss: Minimal    Findings:   L2-3 level right paramedian.  9 ML clear CSF obtained.  Opening pressure 26 cm H20, closing 13 cm H20    Patient tolerated procedure well.    @SIG@

## 2017-03-06 LAB
CMV SPEC QL SHELL VIAL CULT: NO GROWTH
GRAM STN SPEC: NORMAL
GRAM STN SPEC: NORMAL

## 2017-03-09 ENCOUNTER — OFFICE VISIT (OUTPATIENT)
Dept: NEUROLOGY | Facility: CLINIC | Age: 22
End: 2017-03-09
Payer: MEDICARE

## 2017-03-09 VITALS
HEIGHT: 64 IN | SYSTOLIC BLOOD PRESSURE: 132 MMHG | HEART RATE: 84 BPM | BODY MASS INDEX: 48.03 KG/M2 | DIASTOLIC BLOOD PRESSURE: 89 MMHG | WEIGHT: 281.31 LBS

## 2017-03-09 DIAGNOSIS — R51.9 CHRONIC INTRACTABLE HEADACHE, UNSPECIFIED HEADACHE TYPE: ICD-10-CM

## 2017-03-09 DIAGNOSIS — G93.2 IIH (IDIOPATHIC INTRACRANIAL HYPERTENSION): Primary | ICD-10-CM

## 2017-03-09 DIAGNOSIS — Z72.0 TOBACCO ABUSE: ICD-10-CM

## 2017-03-09 DIAGNOSIS — H53.9 VISION CHANGES: ICD-10-CM

## 2017-03-09 DIAGNOSIS — Z79.899 LITHIUM USE: ICD-10-CM

## 2017-03-09 DIAGNOSIS — G89.29 CHRONIC INTRACTABLE HEADACHE, UNSPECIFIED HEADACHE TYPE: ICD-10-CM

## 2017-03-09 PROCEDURE — 99205 OFFICE O/P NEW HI 60 MIN: CPT | Mod: 25,S$PBB,, | Performed by: PHYSICIAN ASSISTANT

## 2017-03-09 PROCEDURE — 99999 PR PBB SHADOW E&M-EST. PATIENT-LVL IV: CPT | Mod: PBBFAC,,, | Performed by: PHYSICIAN ASSISTANT

## 2017-03-09 PROCEDURE — 64400 NJX AA&/STRD TRIGEMINAL NRV: CPT | Mod: 50,S$PBB,, | Performed by: PHYSICIAN ASSISTANT

## 2017-03-09 PROCEDURE — 99214 OFFICE O/P EST MOD 30 MIN: CPT | Mod: PBBFAC | Performed by: PHYSICIAN ASSISTANT

## 2017-03-09 PROCEDURE — 64400 NJX AA&/STRD TRIGEMINAL NRV: CPT | Mod: PBBFAC | Performed by: PHYSICIAN ASSISTANT

## 2017-03-09 RX ORDER — LIDOCAINE HYDROCHLORIDE 20 MG/ML
2 INJECTION, SOLUTION INFILTRATION; PERINEURAL
Status: COMPLETED | OUTPATIENT
Start: 2017-03-09 | End: 2017-03-09

## 2017-03-09 RX ADMIN — LIDOCAINE HYDROCHLORIDE 2 ML: 20 INJECTION, SOLUTION INFILTRATION; PERINEURAL at 12:03

## 2017-03-09 NOTE — PROGRESS NOTES
"[facility], Department of Neurology   [address] [place] [address]  Phone:[phone]  Fax: [phone]  New Patient Consultation    Patient Name: Mary Myles  : 1995  MRN:  [id number]    3/9/2017     chief complaint: Consult    PCP: Haley Cohn DO.    Assessment:     ICD-10-CM ICD-9-CM   1. IIH (idiopathic intracranial hypertension) G93.2 348.2   2. Lithium use Z79.899 V58.69   3. Chronic intractable headache, unspecified headache type R51 784.0   4. Vision changes H53.9 368.9   5. Tobacco abuse Z72.0 305.1   6. Hx of bipolar disorder with ED visits from chart review with SI (though currently not experiencing). Hx of PCOS         Scholarly reading on the internet:  "LILI. 1985 May 17;253(19):8039-70.  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  Lalo RF, Rylan ALFARO, Anshu NOE.  Abstract  Three patients were initially seen with headache, blurred vision, and papilledema while taking lithium carbonate for their respective bipolar affective disorder. A diagnosis of pseudotumor cerebri was made in each case when a thorough evaluation revealed only elevated intracranial pressure. Two of the patients had complete resolution of their symptoms and papilledema after discontinuing use of the drug. Increased intracranial pressure with papilledema persisted in the third patient when she failed to adjust psychiatrically, necessitating continuance of the lithium carbonate therapy. A history of lithium carbonate ingestion should be sought in patients with the syndrome of pseudotumor cerebri. All patients receiving this drug should have a regular funduscopic examination."      As pseudotumour cerebri is idiopathic (generally), and she has answered negative to most of the known causes of this condition (sans lithium, as above), and as she states she will "not come off lithium, ever" there is little I can offer from a HA standpoint (diamox, topamax and lasix can all interact with " lithium). She voiced understanding.     Plan:   Discussed realistic goals of care with patient at length. Discussed medication options, need for lifestyle adjustment. Discussed treatment will take time. Goal will be to reduce frequency/intensity/quantity of HA, not to be completely HA free. Gave copy of S triggers for migraine informational sheet, and discussed clinic's non narcotic policy re: HA. Patient voiced understanding and agreement.            -will have patient track HA, discussed terrance for smart phone           -will have patient work on lifestyle, I gave her information about tobacco cessation courses through [facility].            -If worsening or focal changes, we could consider MRI brain,--- noted her CT of the head was recent and reviewed (as below)            -discussed potential for teratogenicity with treatment, if her family planning status should change, discussed I'd like her to let office know immediately. She voiced understanding.     IIH was discussed at length including potential causes and treatments. D/t her use of lithium, I am limited to what I can do to help her. We discussed she may be a candidate for a shunt, I described a bit about the procedure and goals of such, but stated ultimately I would like her to discuss this is a neurosurgeon as they would be the expert. She agreed, order placed.     I did put in a referral to neuro ophthalmology for further monitoring of her condition, to which she agreed.     TNB in office    I will defer management of lithium to her mental health provider     All test results as well as any necessary instructions were reviewed and discussed with patient.    Review:  Orders Placed This Encounter    Ambulatory Referral to Neurosurgery    Ambulatory Referral to Ophthalmology     Orders Placed This Encounter   Procedures    Ambulatory Referral to Neurosurgery    Ambulatory Referral to Ophthalmology     Patient to return to PCP/other specialists for all  "other problems  Patient to continue on all medications as Rx'd  RTO- PRN, can be followed by my colleagues if needed d/t my departure from the practice. She voiced agreement.   The patient indicates understanding of these issues and agrees to the plan.    HPI:   Mary Myles is a 21 y.o. right handed, female. She presents accompanied by her brother for HA.     States no real HA hx (sans occasional HA), until about a month ago. Was having blurred vision, colour vision changes, tinnitus, and pain along her frontalis, retroorbital and periorbital. Was sent by PCP to the ED, LP done under floro (reviewed notes from Dr. Graves 2/28/2017 "L2-3 level right paramedian. 9 ML clear CSF obtained. Opening pressure 26 cm H20, closing 13 cm H20   Patient tolerated procedure well.") and per patient she described being on her side when the LP was performed. When asked about her PMhx, states been on Li for 4 years, has had toxicity once but seems "this is the only thing helps my bipolar, and I have tried a lot of medication." She is followed by Dr. Schumacher at [facility] on [facility].   When asked, never been on steroids, tetracycline products, never had chemotherapy or transplant medications, denies high levels of vitamin A intake (e.g., meals of animal liver), denies acne treatments, denies pregnancy, denies illicit substance use or any other factor. She is taking fioricet or other pain medications on a near daily basis. States she took fioricet just before coming to the office.     States she has daily blurred vision with changing visual fields/rotating vision loss coupled with eye pain and transient diplopia, colour vision changes, tinnitus and photophobia coupled with eye pain. She is currently stating 8/10 pain. She is having trouble sleeping.     She also has a Hx of SI (denies this currently) and FMG.       ED notes reviewed (19 ED visits since 2012).     Medication Reconciliation:   Current " Outpatient Prescriptions   Medication Sig Dispense Refill    butalbital-acetaminophen-caffeine -40 mg (FIORICET, ESGIC) -40 mg per tablet Take 1 tablet by mouth every 4 (four) hours as needed for Pain.      ibuprofen (ADVIL,MOTRIN) 800 MG tablet Take 1 tablet (800 mg total) by mouth every 8 (eight) hours as needed for Pain. 30 tablet 0    lithium (ESKALITH) 300 MG capsule Take 600 mg by mouth 2 (two) times daily.       lorazepam (ATIVAN) 1 MG tablet Take 1 mg by mouth every 12 (twelve) hours as needed for Anxiety.      lurasidone (LATUDA) 80 mg Tab tablet Take 80 mg by mouth every evening.       melatonin 10 mg Tab Take by mouth.      norethindrone-ethinyl estradiol-iron (MICROGESTIN FE1.5/30) 1.5 mg-30 mcg (21)/75 mg (7) tablet Take 1 tablet by mouth once daily.      trazodone (DESYREL) 150 MG tablet Take 200 mg by mouth every evening.        No current facility-administered medications for this visit.      Review of patient's allergies indicates:  No Known Allergies    PMHx:IIH , FMG, PCOS   Past Medical History:   Diagnosis Date    ADD (attention deficit disorder)     Borderline personality disorder     Chronic headache     Depression     Fibromyalgia     GERD (gastroesophageal reflux disease)     PCOS (polycystic ovarian syndrome)     Schizoaffective disorder      Past Surgical History:   Procedure Laterality Date    TONSILLECTOMY      WISDOM TOOTH EXTRACTION         Fhx:  Family History   Problem Relation Age of Onset    Family history unknown: Yes       Shx: 1/2 ppd tobacco, no etoh, no longer cannabis, not driving   Social History     Social History    Marital status: Single     Spouse name: N/A    Number of children: N/A    Years of education: N/A     Occupational History    Not on file.     Social History Main Topics    Smoking status: Current Every Day Smoker     Packs/day: 0.50     Types: Cigarettes    Smokeless tobacco: Never Used    Alcohol use No    Drug use: No       Comment: patient states she quit mojo 3 months ago and marijuana 2 months ago    Sexual activity: No     Other Topics Concern    Not on file     Social History Narrative         Labs:   Results for HUBER GONZALEZ (MRN [id number]) as of 3/9/2017 12:19   Ref. Range 2015 23:09 2015 23:48 2015 00:02 3/21/2016 01:31 3/21/2016 01:39   Alcohol, Medical, Serum Latest Ref Range: <10 mg/dL   <10 <10    Acetaminophen (Tylenol), Serum Latest Ref Range: 10.0 - 20.0 ug/mL   <3.0 (L) <3.0 (L)    Benzodiazepines Unknown Presumptive Positive Negative   Negative   Methadone metabolites Unknown Negative Negative   Negative   Phencyclidine Unknown Negative Negative   Negative   Cocaine (Metab.) Unknown Negative Negative   Negative   Opiate Scrn, Ur Unknown Negative Negative   Negative   Barbiturate Screen, Ur Unknown Presumptive Positive Negative   Negative   Amphetamine Screen, Ur Unknown Negative Negative   Negative   Marijuana (THC) Metabolite Unknown Negative Negative   Negative   Results for HUBER GONZALEZ (MRN [id number]) as of 3/9/2017 12:19   Ref. Range 2017 16:53   Color, CSF Latest Ref Range: Colorless  Colorless   Heme Aliquot Latest Units: mL 10.0   Appearance, CSF Latest Ref Range: Clear  Clear   WBC, CSF Latest Ref Range: 0 - 5 /cu mm 0   RBC, CSF Latest Ref Range: 0 /cu mm 1 (A)   Glucose, CSF Latest Ref Range: 40 - 70 mg/dL 62   Protein, CSF Latest Ref Range: 15 - 40 mg/dL 24     Results for HUBER GONZALEZ (MRN [id number]) as of 3/9/2017 12:19   Ref. Range 2017 14:03   Preg Test, Ur Latest Ref Range: Negative  Negative     Results for HUBER GONZALEZ (MRN [id number]) as of 3/9/2017 12:19   Ref. Range 2015 23:05 2015 00:02 2016 03:38 2017 14:02   Lithium Lvl Latest Ref Range: 0.6 - 1.2 mmol/L 0.5 (L) 0.8 1.0 1.0     No glaring cause of IIH, SMJ PA-C 2017    Imagin2017 CT head (report):   1.  No acute intracranial abnormalities seen.      Note: I have  "independently reviewed any/all imaging/labs/tests and agree with the report (s) as documented.  Any discrepancies will be as noted/demarcated by free text.  JACQUE LAO 3/9/2017            ROS:   Review Of Systems (questions asked, positive or additions in BOLD)  Gen: Weight change, fatigue/malaise, pyrexia   HEENT: Tinnitus, headache,  blurred vision, eye pain, diplopia, photophobia,  nose bleeds, congestion, sore throat, jaw pain, scalp pain, neck stiffness   Card: Palpitations, CP   Pulm: SOB, cough       GI: N/V/D/C, incontinence, hematemesis, hematochezia    : incontinence, hematuria       M/S: Neck pain, myalgia, back pain, joint pain, falls    Neuro: PER HPI   PSY:  depression, anxiety, trouble in sleep           EXAM:   /89  Pulse 84  Ht 5' 4" (1.626 m)  Wt 127.6 kg (281 lb 4.9 oz)  LMP 02/15/2017 (Exact Date)  BMI 48.29 kg/m2   GEN:  Appears in discomfort  MS: kyphotic at baseline   HEENT: NC/AT, Frontalis was TTP, temporalis was TTP nares patent, dentition appropriate  CVS: RRR, no MRG  LUNGS: CTAB, no accessory muscles used for respiration   EXTREM:  2+ radial pulse bilat, no edema present.    NEURO:  Mental Status:  Awake, alert and appropriately oriented to time, place, and person.  Normal attention and concentration.  Speech is fluent and appropriate language function. Anxious    Cranial Nerves:   She asked me to defer use of ophthalmoscope and torch d/t photophobia. Extraocular movements are intact and without nystagmus.  Visual fields with central vision intact states having some trouble with peripheral vision bitemporally.  Colour vision change per patient, "LEFT eye colours are darker".  Facial movement is symmetric.  Facial sensation is intact however "less sensitive" in R V1-V3.  Hearing is normal. Uvula in midline. DROM of neck in all (6) directions, shoulder shrug symmetrical. Tongue in midline without fasiculation.     Motor:  RUE:appropriate against gravity and medium force as " tested 5-/5              LUE: appropriate against gravity and medium force as tested 5-/5              RLE:appropriate against gravity and medium force as tested 5-/5              LLE: appropriate against gravity and medium force as tested 5-/5  Mild asterixis noted bilat UE   strength and finger extension strength was strong bilat  No resting tremor     Sensory:  RUE  intact light touch, proprioception and temperature  LUE intact light touch, proprioception and temperature    RLE intact light touch and temperature  LLE intact light touch and temperature      DTR's:                                            R              L  biceps 2+ 2+        brachioradialis 2+ 2+   Knee jerk 2+ 2+     Coordination:  FTN-WNL.    Gait and Stance: Normal manner of stance and gait function testing.         Procedure note:   Nerve Block ( Trigeminal Nerve/Temporal Auricular Nerve CPT: 46282): After informed consent was obtained (asked office staff to scan into EMR), the patient was asked to remain in a sitting position.The area was prepped using alcohol swabs. 2% lidocaine (2.0 cc)  was drawn up utilizing a 22 gauge needle. A 30 gauge needle was used for the procedure. Three Temperoauricular locations were palpated on the RIGHT side of the head and 0.2 ccs injected into 3 separate sites (1 near the top of the ear and 2 along the parietal region of the head). 2 supraglabellar areas were palpated on the RIGHT, approx 5-6 mm above the orbital ridge and then 5-6 mm lateral along the orbital ridge. 0.2 cc per site for a total of 0.4 cc given. This was repeated all on the LEFT for a total of 1 full cc (5 injections, 0.2 cc a piece). The patient tolerated the procedure well with no active bleeding, erythema, or discharge.  The patient was assessed and allowed to leave after ten minutes.  Findings from repeat exam (10 mins after procedure) showed:    Gen: NAD  Derm: no drainage  Neuro: AOx3, FROM of all extremities, OOC without help    Attending available     Attending, Dr. Michele MD was available during today's encounter. Any change to plan along with cosign to appear in the EMR.         This document has been electronically signed by Brandyn Roberts MPA, PA-C on 3/9/2017, 11:00 AM. I have personally typed this message using the EMR.       CC: Haley Cohn, DO

## 2017-03-14 ENCOUNTER — HOSPITAL ENCOUNTER (EMERGENCY)
Facility: HOSPITAL | Age: 22
Discharge: HOME | End: 2017-03-15
Attending: EMERGENCY MEDICINE
Payer: MEDICARE

## 2017-03-14 ENCOUNTER — TELEPHONE (OUTPATIENT)
Dept: NEUROLOGY | Facility: CLINIC | Age: 22
End: 2017-03-14

## 2017-03-14 VITALS
BODY MASS INDEX: 48.14 KG/M2 | RESPIRATION RATE: 16 BRPM | HEART RATE: 99 BPM | OXYGEN SATURATION: 98 % | DIASTOLIC BLOOD PRESSURE: 92 MMHG | WEIGHT: 282 LBS | HEIGHT: 64 IN | TEMPERATURE: 98 F | SYSTOLIC BLOOD PRESSURE: 169 MMHG

## 2017-03-14 DIAGNOSIS — R51.9 HEADACHE, UNSPECIFIED HEADACHE TYPE: Primary | ICD-10-CM

## 2017-03-14 DIAGNOSIS — R19.7 DIARRHEA, UNSPECIFIED TYPE: ICD-10-CM

## 2017-03-14 DIAGNOSIS — R11.2 NON-INTRACTABLE VOMITING WITH NAUSEA, UNSPECIFIED VOMITING TYPE: ICD-10-CM

## 2017-03-14 PROCEDURE — 99283 EMERGENCY DEPT VISIT LOW MDM: CPT | Mod: 25

## 2017-03-14 NOTE — TELEPHONE ENCOUNTER
----- Message from Moira Kincaid MA sent at 3/13/2017  3:37 PM CDT -----  Contact: Chrissy anthony [facility] [phone] or [employer] @ [phone]      ----- Message -----     From: Ramiro Sloan     Sent: 3/13/2017   3:30 PM       To: [facility]jandra Ahumada Staff    Caller is requesting a return call about a medication change, pls call

## 2017-03-14 NOTE — TELEPHONE ENCOUNTER
Called and spoke to Chrissy GASPAR's nurse, Nicolle. Discussed patient is on Lithium, I can't write medications for her IIH. This is why I am sending her to neurosurgery to discuss a shunt. I also mentioned if the patient signs a letter and wants her records faxed, we will be happy to provider this to Chrissy GASPAR. She voiced agreement. I gave my office call back number if there are further questions.     JACQUE LAO  03/14/2017

## 2017-03-14 NOTE — ED AVS SNAPSHOT
[facility] [address] [place] [address]               Mary Myles   3/14/2017 11:56 PM   ED    Description:  Female : 1995   Department:  [facility]           Your Care was Coordinated By:     Provider Role From To    Dereck Ghotra MD Attending Provider 03/15/17 0012 --      Reason for Visit     Headache           Diagnoses this Visit        Comments    Headache, unspecified headache type    -  Primary     Non-intractable vomiting with nausea, unspecified vomiting type         Diarrhea, unspecified type           ED Disposition     ED Disposition Condition Comment    Discharge             To Do List           Follow-up Information     Follow up with [facility] - Neurology.    Specialty:  Neurology    [facility]:    [address] [facility] [address] [phone]    Additional information:    7th Floor - [facility] [place]        Follow up with Willie Dumont MD On 3/21/2017.    Specialties:  Neurosurgery, Spine Surgery    Why:  1:00pm    Contact information:    [address]  [place] [address]  [phone]          Follow up with [facility].    Specialty:  Emergency Medicine    Why:  If symptoms worsen    Contact information:    [address] [place] [address] [phone]       These Medications        Disp Refills Start End    butalbital-acetaminophen-caffeine -40 mg (FIORICET, ESGIC) -40 mg per tablet 12 tablet 0 3/15/2017     Take 1 tablet by mouth every 4 (four) hours as needed for Pain. - Oral    Pharmacy: [facility] [address] [place] [address] AT [facility] Ph #: [phone]       ondansetron (ZOFRAN-ODT) 8 MG TbDL 20 tablet 0 3/15/2017     Take 1 tablet (8 mg total) by mouth every 8 (eight) hours as needed. - Oral    Pharmacy: [facility] [address] AT [address]ONT  [facility] #: [phone]         [facility] On Call     [facility] On Call Nurse Care Line - 24/7 Assistance  Registered nurses in the [facility] provide clinical advisement, health education, appointment booking, and other advisory services.  Call for this free service at [phone].             Medications           Message regarding Medications     Verify the changes and/or additions to your medication regime listed below are the same as discussed with your clinician today.  If any of these changes or additions are incorrect, please notify your healthcare provider.        START taking these NEW medications        Refills    ondansetron (ZOFRAN-ODT) 8 MG TbDL 0    Sig: Take 1 tablet (8 mg total) by mouth every 8 (eight) hours as needed.    Class: Print    Route: Oral      These medications were administered today        Dose Freq    ondansetron disintegrating tablet 8 mg 8 mg ED 1 Time    Sig: Take 2 tablets (8 mg total) by mouth ED 1 Time.    Class: Normal    Route: Oral    butalbital-acetaminophen-caffeine -40 mg per tablet 1 tablet 1 tablet ED 1 Time    Sig: Take 1 tablet by mouth ED 1 Time.    Class: Normal    Route: Oral      STOP taking these medications     norethindrone-ethinyl estradiol-iron (MICROGESTIN FE1.5/30) 1.5 mg-30 mcg (21)/75 mg (7) tablet Take 1 tablet by mouth once daily.    lorazepam (ATIVAN) 1 MG tablet Take 1 mg by mouth every 12 (twelve) hours as needed for Anxiety.           Verify that the below list of medications is an accurate representation of the medications you are currently taking.  If none reported, the list may be blank. If incorrect, please contact your healthcare provider. Carry this list with you in case of emergency.           Current Medications     lithium (ESKALITH) 300 MG capsule Take 600 mg by mouth 2 (two) times daily.     lurasidone (LATUDA) 80 mg Tab tablet Take 80 mg by mouth every evening.     melatonin 10 mg Tab Take by mouth.     "trazodone (DESYREL) 150 MG tablet Take 200 mg by mouth every evening.     butalbital-acetaminophen-caffeine -40 mg (FIORICET, ESGIC) -40 mg per tablet Take 1 tablet by mouth every 4 (four) hours as needed for Pain.    ibuprofen (ADVIL,MOTRIN) 800 MG tablet Take 1 tablet (800 mg total) by mouth every 8 (eight) hours as needed for Pain.    ondansetron (ZOFRAN-ODT) 8 MG TbDL Take 1 tablet (8 mg total) by mouth every 8 (eight) hours as needed.           Clinical Reference Information           Your Vitals Were     BP Pulse Temp Resp Height Weight    169/92 (BP Location: Right arm, Patient Position: Sitting) 99 98.4 °F (36.9 °C) (Oral) 16 5' 4" (1.626 m) 127.9 kg (282 lb)    Last Period SpO2 BMI          02/15/2017 (Exact Date) 98% 48.41 kg/m2        Allergies as of 3/15/2017     No Known Allergies      Immunizations Administered on Date of Encounter - 3/15/2017     None      ED Micro, Lab, POCT     Start Ordered       Status Ordering Provider    03/15/17 0105 03/15/17 0107  CBC auto differential  STAT      Final result     03/15/17 0105 03/15/17 0107  Comprehensive metabolic panel  STAT      Final result     03/15/17 0000 03/14/17 2359  POCT urine pregnancy  Once      Final result       ED Imaging Orders     None        Discharge Instructions         How to Control Nausea and Vomiting     Taken before meals, medicines can help ease nausea.    Nausea is feeling that you need to throw up. Throwing up occurs when your body forces food that is in your stomach out through your mouth. Nausea and vomiting are symptoms that are caused by many things. They can happen when a condition or disease, medicine, medical treatment, or a poisonous substance affects the area in your brain that controls vomiting. Some conditions or diseases can cause nausea, abdominal pain or cramps, and vomiting. The symptoms can be mild and go away by themselves. Other symptoms can be serious. You will need to see your healthcare provider for " "these.  Nausea and vomiting are common. They can be caused by many things. These include:  · "Stomach flu" (gastroenteritis)  · Food poisoning  · Stomach pain (gastritis)  · Blockages  They can also be caused by a head injury, an infection in the brain or inside the ear, or migraines. Other common causes of nausea and vomiting include:  · Brain tumor  · Brain bruise  · Motion sickness  · Drugs. These include alcohol, pain medicines such as morphine, and cancer medicines.  · Toxins. These are poisonous things like plants or liquids that are swallowed by accident.  · Advanced types of cancer  · Movement problems (psychogenic problems)  · Extra pressure in the fluid that surrounds the brain and spinal cord (elevated intracranial pressure)     Nausea and vomiting are also common side effects of chemotherapy and radiation therapy. Side effects happen when treatment changes some normal cells as well as cancer cells. In this case, the cells lining your stomach and the part of your brain that controls vomiting are affected. Other more serious causes of vomiting may be hard to find early in the illness.     When to seek medical advice  Call your healthcare provider right away if you have the following:  · Nausea or vomiting that lasts 24 hours or more  · Trouble keeping fluids down   Medicines can help  Nausea or vomiting can often be prevented or controlled with medicines (antiemetics). Your doctor may give you antiemetics before or after treatment if you are getting chemotherapy or other medical treatments that cause nausea or vomiting.  Eating tips  · If you have medicines to control nausea, take them before meals as directed.  · Avoid fatty or greasy foods while nauseated.  · Eat small meals slowly throughout the day.  · Ask someone to sit with you while you eat to keep you from thinking about feeling nauseated.  · Eat foods at room temperature or colder to avoid strong smells.  · Eat dry foods, such as toast, crackers, " or pretzels. Also eat cool, light foods, such as applesauce, and bland foods, such as oatmeal or skinned chicken.   Other ways to feel better  · Get a little fresh air. Take a short walk.  · Talk to a friend, listen to music, or watch TV.  · Take a few deep, slow breaths.  · Eat by candlelight or in surroundings that you find relaxing.  · Use a technique, such as guided imagery, to help you relax. Imagine yourself in a beautiful, restful scene. Or daydream about the place youd most like to be.  Date Last Reviewed: 1/6/2016  © 0068-7500 [employer]. [address] [place] [address], [place] [address]. All rights reserved. This information is not intended as a substitute for professional medical care. Always follow your healthcare professional's instructions.          Self-Care for Headaches  Most headaches aren't serious and can be relieved with self-care. But some headaches may be a sign of another health problem like eye trouble or high blood pressure. To find the best treatment, learn what kind of headaches you get. For tension headaches, self-care will usually help. To treat migraines, ask your healthcare provider for advice. It is also possible to get both tension and migraine headaches. Self-care involves relieving the pain and avoiding headache triggers if you can.    Ways to reduce pain and tension  Try these steps:  · Apply a cold compress or ice pack to the pain site.  · Drink fluids. If nausea makes it hard to drink, try sucking on ice.  · Rest. Protect yourself from bright light and loud noises.  · Calm your emotions by imagining a peaceful scene.  · Massage tight neck, shoulder, and head muscles.  · To relax muscles, soak in a hot bath or use a hot shower.  Use medicines  Aspirin or aspirin substitutes, such as ibuprofen and acetaminophen, can relieve headache. Remember: Never give aspirin to anyone 18 years old or younger because of the risk of developing Reye syndrome. Use pain medicines only  "when necessary.  Track your headaches  Keeping a headache diary can help you and your healthcare provider identify what's causing your headaches:  · Note when each headache happens.  · Identify your activities and the foods you've eaten 6 to 8 hours before the headache began.  · Look for any trends or "triggers."  Signs of tension headache  Any of the following can be signs:  · Dull pain or feeling of pressure in a tight band around your head  · Pain in your neck or shoulders  · Headache without a definite beginning or end  · Headache after an activity such as driving or working on a computer  Signs of migraine  Any of the following can be signs:  · Throbbing pain on one or both sides of your head  · Nausea or vomiting  · Extreme sensitivity to light, sound, and smells  · Bright spots, flashes, or other visual changes  · Pain or nausea so severe that you can't continue your daily activities  Call your healthcare provider   If you have any of the following symptoms, contact your healthcare provider:  · A headache that lingers after a recent injury or bump to the head.  · A fever with a stiff neck or pain when you bend your head toward your chest.  · A headache along with slurred speech, changes in your vision, or numbness or weakness in your arms or legs.  · A headache for longer than 3 days.  · Frequent headaches, especially in the morning.  · Headaches with seizures   · Seek immediate medical attention if you have a headache that you would call "the worst headache you have ever had."   Date Last Reviewed: 10/4/2015  © 6392-3708 [employer]. [address] [place] [address], [place] [address]. All rights reserved. This information is not intended as a substitute for professional medical care. Always follow your healthcare professional's instructions.          Your Scheduled Appointments     Mar 15, 2017  1:00 PM CDT   Consult with MD [facility] - Ophthalmology ([address] )    [address]  [facility] [address]   [phone]            Mar 21, 2017  1:00 PM CDT   Consult - Neurosurgery with MD [facility] - Neurosurgery [place] ([address] [address] )    [address]  [facility] [address]   [phone]              [facility] Sign-Up     Activating your [facility] account is as easy as 1-2-3!     1) Visit [email or web address], select Sign Up Now, enter this activation code and your date of birth, then select Next.  [email or web address]  Expires: 4/7/2017  7:36 PM      2) Create a username and password to use when you visit [facility] in the future and select a security question in case you lose your password and select Next.    3) Enter your e-mail address and click Sign Up!    Additional Information  If you have questions, please e-mail [email or web address] or call [phone] to talk to our [facility] staff. Remember, [employer] is NOT to be used for urgent needs. For medical emergencies, dial 911.          [facility] complies with applicable Federal civil rights laws and does not discriminate on the basis of race, color, national origin, age, disability, or sex.        Language Assistance Services     ATTENTION: Language assistance services are available, free of charge. Please call [phone].      ATENCIÓN: Si habla español, tiene a sewell disposición servicios gratuitos de asistencia lingüística. Llame al [phone].     [facility] Ý: N?u b?n nói Ti?ng Vi?t, có các d?ch v? h? tr? ngôn ng? mi?n phí dành cho b?n. G?i s? [phone].

## 2017-03-15 ENCOUNTER — INITIAL CONSULT (OUTPATIENT)
Dept: OPHTHALMOLOGY | Facility: CLINIC | Age: 22
End: 2017-03-15
Payer: MEDICARE

## 2017-03-15 ENCOUNTER — CLINICAL SUPPORT (OUTPATIENT)
Dept: OPHTHALMOLOGY | Facility: CLINIC | Age: 22
End: 2017-03-15
Payer: MEDICARE

## 2017-03-15 DIAGNOSIS — H53.433 ARCUATE SCOTOMA OF BOTH EYES: ICD-10-CM

## 2017-03-15 DIAGNOSIS — G93.2 BENIGN INTRACRANIAL HYPERTENSION: ICD-10-CM

## 2017-03-15 LAB
ALBUMIN SERPL BCP-MCNC: 3.6 G/DL
ALP SERPL-CCNC: 64 U/L
ALT SERPL W/O P-5'-P-CCNC: 165 U/L
ANION GAP SERPL CALC-SCNC: 11 MMOL/L
AST SERPL-CCNC: 157 U/L
B-HCG UR QL: NEGATIVE
BASOPHILS # BLD AUTO: 0.1 K/UL
BASOPHILS NFR BLD: 0.8 %
BILIRUB SERPL-MCNC: 0.3 MG/DL
BUN SERPL-MCNC: 9 MG/DL
CALCIUM SERPL-MCNC: 10 MG/DL
CHLORIDE SERPL-SCNC: 103 MMOL/L
CO2 SERPL-SCNC: 23 MMOL/L
CREAT SERPL-MCNC: 0.7 MG/DL
CTP QC/QA: YES
DIFFERENTIAL METHOD: ABNORMAL
EOSINOPHIL # BLD AUTO: 0.5 K/UL
EOSINOPHIL NFR BLD: 4 %
ERYTHROCYTE [DISTWIDTH] IN BLOOD BY AUTOMATED COUNT: 15.2 %
EST. GFR  (AFRICAN AMERICAN): >60 ML/MIN/1.73 M^2
EST. GFR  (NON AFRICAN AMERICAN): >60 ML/MIN/1.73 M^2
GLUCOSE SERPL-MCNC: 141 MG/DL
HCT VFR BLD AUTO: 38.2 %
HGB BLD-MCNC: 13.1 G/DL
LYMPHOCYTES # BLD AUTO: 2.9 K/UL
LYMPHOCYTES NFR BLD: 23.1 %
MCH RBC QN AUTO: 28.8 PG
MCHC RBC AUTO-ENTMCNC: 34.3 %
MCV RBC AUTO: 84 FL
MONOCYTES # BLD AUTO: 0.9 K/UL
MONOCYTES NFR BLD: 7 %
NEUTROPHILS # BLD AUTO: 8 K/UL
NEUTROPHILS NFR BLD: 65.1 %
PLATELET # BLD AUTO: 405 K/UL
PMV BLD AUTO: 8.2 FL
POTASSIUM SERPL-SCNC: 3.8 MMOL/L
PROT SERPL-MCNC: 7.4 G/DL
RBC # BLD AUTO: 4.54 M/UL
SODIUM SERPL-SCNC: 137 MMOL/L
WBC # BLD AUTO: 12.4 K/UL

## 2017-03-15 PROCEDURE — 80053 COMPREHEN METABOLIC PANEL: CPT

## 2017-03-15 PROCEDURE — 25000003 PHARM REV CODE 250: Performed by: EMERGENCY MEDICINE

## 2017-03-15 PROCEDURE — 99999 PR PBB SHADOW E&M-EST. PATIENT-LVL II: CPT | Mod: PBBFAC,,, | Performed by: OPHTHALMOLOGY

## 2017-03-15 PROCEDURE — 36415 COLL VENOUS BLD VENIPUNCTURE: CPT

## 2017-03-15 PROCEDURE — 81025 URINE PREGNANCY TEST: CPT | Performed by: PHYSICIAN ASSISTANT

## 2017-03-15 PROCEDURE — 92083 EXTENDED VISUAL FIELD XM: CPT | Mod: 26,S$PBB,, | Performed by: OPHTHALMOLOGY

## 2017-03-15 PROCEDURE — 85025 COMPLETE CBC W/AUTO DIFF WBC: CPT

## 2017-03-15 PROCEDURE — 92004 COMPRE OPH EXAM NEW PT 1/>: CPT | Mod: S$PBB,,, | Performed by: OPHTHALMOLOGY

## 2017-03-15 RX ORDER — ONDANSETRON 8 MG/1
8 TABLET, ORALLY DISINTEGRATING ORAL EVERY 8 HOURS PRN
Qty: 20 TABLET | Refills: 0 | Status: SHIPPED | OUTPATIENT
Start: 2017-03-15 | End: 2017-08-08

## 2017-03-15 RX ORDER — ONDANSETRON 4 MG/1
8 TABLET, ORALLY DISINTEGRATING ORAL
Status: COMPLETED | OUTPATIENT
Start: 2017-03-15 | End: 2017-03-15

## 2017-03-15 RX ORDER — BUTALBITAL, ACETAMINOPHEN AND CAFFEINE 50; 325; 40 MG/1; MG/1; MG/1
1 TABLET ORAL EVERY 4 HOURS PRN
Qty: 12 TABLET | Refills: 0 | Status: SHIPPED | OUTPATIENT
Start: 2017-03-15 | End: 2017-05-01

## 2017-03-15 RX ORDER — BUTALBITAL, ACETAMINOPHEN AND CAFFEINE 50; 325; 40 MG/1; MG/1; MG/1
1 TABLET ORAL
Status: COMPLETED | OUTPATIENT
Start: 2017-03-15 | End: 2017-03-15

## 2017-03-15 RX ADMIN — BUTALBITAL, ACETAMINOPHEN, AND CAFFEINE 1 TABLET: 50; 325; 40 TABLET ORAL at 01:03

## 2017-03-15 RX ADMIN — ONDANSETRON 8 MG: 4 TABLET, ORALLY DISINTEGRATING ORAL at 01:03

## 2017-03-15 NOTE — MR AVS SNAPSHOT
[facility] - Ophthalmology  [address]  [facility] [address]  Phone: [phone]  Fax: [phone]                  Mary Myles   3/15/2017 1:00 PM   Initial consult    Description:  Female : 1995   Provider:  Alonso Diaz MD   Department:  [facility] - Ophthalmology           Reason for Visit     Concerns About Ocular Health           Diagnoses this Visit        Comments    Benign intracranial hypertension         Arcuate scotoma of both eyes                To Do List           Future Appointments        Provider Department Dept Phone    3/21/2017 1:00 PM Willie Dumont MD [facility] - Neurosurgery [place] [phone]      Goals (5 Years of Data)     None      Follow-Up and Disposition     Return in about 2 months (around 5/15/2017) for Exam and HVF.      [facility]     [facility] Care [facility] -  Assistance  Registered nurses in the [facility] provide clinical advisement, health education, appointment booking, and other advisory services.  Call for this free service at [phone].             Medications           Message regarding Medications     Verify the changes and/or additions to your medication regime listed below are the same as discussed with your clinician today.  If any of these changes or additions are incorrect, please notify your healthcare provider.             Verify that the below list of medications is an accurate representation of the medications you are currently taking.  If none reported, the list may be blank. If incorrect, please contact your healthcare provider. Carry this list with you in case of emergency.           Current Medications     butalbital-acetaminophen-caffeine -40 mg (FIORICET, ESGIC) -40 mg per tablet Take 1 tablet by mouth every 4 (four) hours as needed for Pain.    ibuprofen (ADVIL,MOTRIN) 800 MG tablet Take 1 tablet (800 mg total) by mouth every 8 (eight) hours as needed for Pain.    JOLIVETTE  0.35 mg tablet TK 1 T PO QD    lithium (ESKALITH) 300 MG capsule Take 600 mg by mouth 2 (two) times daily.     lurasidone (LATUDA) 80 mg Tab tablet Take 80 mg by mouth every evening.     melatonin 10 mg Tab Take by mouth.    ondansetron (ZOFRAN-ODT) 8 MG TbDL Take 1 tablet (8 mg total) by mouth every 8 (eight) hours as needed.    trazodone (DESYREL) 150 MG tablet Take 200 mg by mouth every evening.            Clinical Reference Information           Your Vitals Were     Last Period                   02/15/2017 (Exact Date)           Allergies as of 3/15/2017     No Known Allergies      Immunizations Administered on Date of Encounter - 3/15/2017     None      Orders Placed During Today's Visit      Normal Orders This Visit    Wu Visual Field - OU - Extended - Both Eyes       [facility] Sign-Up     Activating your [facility] account is as easy as 1-2-3!     1) Visit [email or web address], select Sign Up Now, enter this activation code and your date of birth, then select Next.  [email or web address]  Expires: 4/7/2017  7:36 PM      2) Create a username and password to use when you visit [facility] in the future and select a security question in case you lose your password and select Next.    3) Enter your e-mail address and click Sign Up!    Additional Information  If you have questions, please e-mail [email or web address] or call [phone] to talk to our [facility] staff. Remember, [facility] is NOT to be used for urgent needs. For medical emergencies, dial 911.         Instructions    Repeat exam and visual field testing in two months.       Smoking Cessation     If you would like to quit smoking:   You may be eligible for free services if you are a [place] resident and started smoking cigarettes before September 1, 1988.  Call the Smoking Cessation Trust (SCT) toll free at (194) 196-9257 or (358) 332-0629.   Call 5-800-QUIT-NOW if you do not meet the above criteria.            Language Assistance Services      ATTENTION: Language assistance services are available, free of charge. Please call [phone].      ATENCIÓN: Si habla arelyañol, tiene a sewell disposición servicios gratuitos de asistencia lingüística. Llame al [phone].     CHÚ Ý: N?u b?n nói Ti?ng Vi?t, có các d?ch v? h? tr? ngôn ng? mi?n phí dành cho b?n. G?i s? [phone].         [facility] complies with applicable Federal civil rights laws and does not discriminate on the basis of race, color, national origin, age, disability, or sex.

## 2017-03-15 NOTE — PROGRESS NOTES
HPI     Concerns About Ocular Health    Additional comments: IIH(idiopathic intracranial hypertension)           Comments   Referred by Dr.Richard SHOBHA Bautista  Dx w/pseudotumor cerebri recently. Hx of migraines since a child.   States she has daily blurred vision with color changes and double vision.   Recently last pm sharp pain behind the head.  10 on pain scale. OU light sensitivity x 10 weeks  Review HVF    I have personally interviewed the patient, reviewed the history and   examined the patient and agree with the technician's exam.    Intracranial pressure was 25 cm water at recent LP.       Last edited by Alonso Diaz MD on 3/15/2017  2:26 PM. (History)        ROS     Positive for: Gastrointestinal, Neurological, Musculoskeletal    Negative for: Constitutional, Skin, Genitourinary, HENT, Endocrine,   Cardiovascular, Eyes, Respiratory, Psychiatric, Allergic/Imm, Heme/Lymph    Last edited by Alonso Diaz MD on 3/15/2017  2:15 PM. (History)        Assessment /Plan     For exam results, see Encounter Report.    Benign intracranial hypertension  -     Wu Visual Field - OU - Extended - Both Eyes    Arcuate scotoma of both eyes      Ms. Myles does not have papilledema at this time. Her opening pressure of 26 cm water was minimally elevated. She is scheduled for the shunt procedure on March 21, 2016. I will repeat her exam and visual fields a month after her surgery.

## 2017-03-15 NOTE — ED PROVIDER NOTES
"Encounter Date: 3/14/2017    SCRIBE #1 NOTE: I, Landen Espinoza, am scribing for, and in the presence of, Dr. Ghotra.       History     Chief Complaint   Patient presents with    Headache     sharp pain to right side of head with vomiting     Review of patient's allergies indicates:  No Known Allergies  HPI Comments: 03/15/2017  12:59 AM     Chief Complaint: Headache      The patient is a 21 y.o. female with a PMHx of ADD; borderline personality disorder; chronic headache; depression; fibromyalgia; GERD; PCOS; pseudotumor cerebri syndrome; and schizoaffective disorder who is presenting with an acute onset of an intermittent headache that started 2 hrs ago. Pt described her headache as a sharp pain in right side of her head and as "a pulse." She also stated that her current pain is not similar to her migraine headaches. Associated symptoms of nausea and 4 episodes of vomiting. Pt also c/o diarrhea that started today. She denied taking Diamox because her neurologist told her that she cannot take it with her current Lithium prescription. Pt scheduled to have a shunt placed on 3/21/2017. No fever. No visual disturbances today. No abd pain. No recent international travel. Pt denied drinking any lake or stream water. Pt has a past surgical history that includes Tonsillectomy and [place] tooth extraction.      The history is provided by the patient.     Past Medical History:   Diagnosis Date    ADD (attention deficit disorder)     Borderline personality disorder     Chronic headache     Depression     Fibromyalgia     GERD (gastroesophageal reflux disease)     PCOS (polycystic ovarian syndrome)     Pseudotumor cerebri syndrome     Schizoaffective disorder      Past Surgical History:   Procedure Laterality Date    TONSILLECTOMY      WISDOM TOOTH EXTRACTION       Family History   Problem Relation Age of Onset    Family history unknown: Yes     Social History   Substance Use Topics    Smoking status: Current Every " Day Smoker     Packs/day: 0.50     Types: Cigarettes    Smokeless tobacco: Never Used    Alcohol use No     Review of Systems   Constitutional: Negative for fever.   HENT: Negative for sore throat.    Eyes: Negative for visual disturbance (No visual disturbance today.).   Respiratory: Negative for shortness of breath.    Cardiovascular: Negative for chest pain.   Gastrointestinal: Positive for diarrhea, nausea and vomiting (4 episodes of vomiting.). Negative for abdominal pain.   Genitourinary: Negative for dysuria.   Musculoskeletal: Negative for back pain.   Skin: Negative for rash.   Neurological: Positive for headaches. Negative for weakness.   Hematological: Does not bruise/bleed easily.   Psychiatric/Behavioral: Negative for confusion.       Physical Exam   Initial Vitals   BP Pulse Resp Temp SpO2   03/14/17 2350 03/14/17 2350 03/14/17 2350 03/14/17 2350 03/14/17 2350   169/92 99 16 98.4 °F (36.9 °C) 98 %     Physical Exam    Nursing note and vitals reviewed.  Constitutional: She appears well-developed.   HENT:   Head: Normocephalic and atraumatic.   Eyes: Conjunctivae and EOM are normal. Pupils are equal, round, and reactive to light.   No papilledema appreciated on funduscopic examination.   Neck: Normal range of motion. Neck supple.   Cardiovascular: Normal rate, regular rhythm, normal heart sounds and intact distal pulses. Exam reveals no gallop and no friction rub.    No murmur heard.  Pulmonary/Chest: Breath sounds normal. She has no wheezes. She has no rhonchi. She has no rales.   Abdominal: Soft. She exhibits no distension. There is no tenderness.   Musculoskeletal: Normal range of motion.   Neurological: She is alert and oriented to person, place, and time. She has normal strength. She displays normal reflexes. No cranial nerve deficit or sensory deficit.   Normal finger to nose.  No nystagmus.  Normal gait.   Skin: No rash noted. No erythema.   Psychiatric: She has a normal mood and affect.          ED Course   Procedures  Labs Reviewed   CBC W/ AUTO DIFFERENTIAL - Abnormal; Notable for the following:        Result Value    RDW 15.2 (*)     Platelets 405 (*)     MPV 8.2 (*)     Gran # 8.0 (*)     All other components within normal limits   COMPREHENSIVE METABOLIC PANEL - Abnormal; Notable for the following:     Glucose 141 (*)      (*)      (*)     All other components within normal limits   POCT URINE PREGNANCY             Medical Decision Making:   History:   Old Medical Records: I decided to obtain old medical records.  Initial Assessment:   21-year-old woman with a history of pseudotumor cerebri presents for evaluation of sharp pains in her posterior head and episodes of nausea, emesis and diarrhea.  She endorses intermittent diplopia which is not acute and chronic intermittent blurred vision but no acute worsening of her vision.  I do not think she has impending visual loss.  Neurological exam was unremarkable.  She is afebrile and nontoxic appearing.  No meningismus.  She continues to have mild elevation in her transaminases with normal renal function.  Review of her previous records show evaluation by neurology and patient was not a candidate for diuretic therapy since she was on lithium and does not want to come off her lithium.  She has been referred to neurosurgery, Dr. Dumont for evaluation of shunt placement.  I do not think she needs emergent LP at this time.  Her nausea resolved with Zofran and she is tolerating oral intake.  Her headache is significantly improved with Fioricet in the ED.  We'll give her a small prescription for Zofran and Fioricet.  Return precautions were discussed.  She has close follow-up established.            Scribe Attestation:   Scribe #1: I performed the above scribed service and the documentation accurately describes the services I performed. I attest to the accuracy of the note.    Attending Attestation:           Physician Attestation for  Scribe:  Physician Attestation Statement for Scribe #1: I, Dr. Ghotra, reviewed documentation, as scribed by Landen Espinoza in my presence, and it is both accurate and complete.           Future Appointments  Date Time Provider Department Center   3/21/2017 1:00 PM Willie Dumont MD [facility] NEUROSC [facility]   5/15/2017 2:00 PM PERIMETRY, [facility] OPHTHAL [facility]   5/15/2017 2:30 PM Alonso Diaz MD [facility] OPHTHAL [facility]             ED Course     Clinical Impression:   The primary encounter diagnosis was Headache, unspecified headache type. Diagnoses of Non-intractable vomiting with nausea, unspecified vomiting type and Diarrhea, unspecified type were also pertinent to this visit.          Dereck Ghotra MD  03/15/17 1751

## 2017-03-15 NOTE — LETTER
March 15, 2017      Mitesh Bautista MD  [address]  [facility] [address]           [facility] Ophthalmology  [address]  [facility] [address]  Phone: [phone]  Fax: [phone]          Patient: Mary Myles   MR Number: [id number]   YOB: 1995   Date of Visit: 3/15/2017       Dear Dr. Mitesh Bautista:    Thank you for referring Mary Myles to me for evaluation. Attached you will find relevant portions of my assessment and plan of care.    If you have questions, please do not hesitate to call me. I look forward to following Mary Myles along with you.    Sincerely,    Alonso Diaz MD    Enclosure  CC:  Haley Cohn, DO    If you would like to receive this communication electronically, please contact [email or web address] or [phone] to request more information on [employer] Link access.    For providers and/or their staff who would like to refer a patient to [facility], please contact us through our one-stop-shop provider referral line, [facility], at [phone].    If you feel you have received this communication in error or would no longer like to receive these types of communications, please e-mail [email or web address]

## 2017-03-15 NOTE — DISCHARGE INSTRUCTIONS
"  How to Control Nausea and Vomiting     Taken before meals, medicines can help ease nausea.    Nausea is feeling that you need to throw up. Throwing up occurs when your body forces food that is in your stomach out through your mouth. Nausea and vomiting are symptoms that are caused by many things. They can happen when a condition or disease, medicine, medical treatment, or a poisonous substance affects the area in your brain that controls vomiting. Some conditions or diseases can cause nausea, abdominal pain or cramps, and vomiting. The symptoms can be mild and go away by themselves. Other symptoms can be serious. You will need to see your healthcare provider for these.  Nausea and vomiting are common. They can be caused by many things. These include:  · "Stomach flu" (gastroenteritis)  · Food poisoning  · Stomach pain (gastritis)  · Blockages  They can also be caused by a head injury, an infection in the brain or inside the ear, or migraines. Other common causes of nausea and vomiting include:  · Brain tumor  · Brain bruise  · Motion sickness  · Drugs. These include alcohol, pain medicines such as morphine, and cancer medicines.  · Toxins. These are poisonous things like plants or liquids that are swallowed by accident.  · Advanced types of cancer  · Movement problems (psychogenic problems)  · Extra pressure in the fluid that surrounds the brain and spinal cord (elevated intracranial pressure)     Nausea and vomiting are also common side effects of chemotherapy and radiation therapy. Side effects happen when treatment changes some normal cells as well as cancer cells. In this case, the cells lining your stomach and the part of your brain that controls vomiting are affected. Other more serious causes of vomiting may be hard to find early in the illness.     When to seek medical advice  Call your healthcare provider right away if you have the following:  · Nausea or vomiting that lasts 24 hours or more  · Trouble " keeping fluids down   Medicines can help  Nausea or vomiting can often be prevented or controlled with medicines (antiemetics). Your doctor may give you antiemetics before or after treatment if you are getting chemotherapy or other medical treatments that cause nausea or vomiting.  Eating tips  · If you have medicines to control nausea, take them before meals as directed.  · Avoid fatty or greasy foods while nauseated.  · Eat small meals slowly throughout the day.  · Ask someone to sit with you while you eat to keep you from thinking about feeling nauseated.  · Eat foods at room temperature or colder to avoid strong smells.  · Eat dry foods, such as toast, crackers, or pretzels. Also eat cool, light foods, such as applesauce, and bland foods, such as oatmeal or skinned chicken.   Other ways to feel better  · Get a little fresh air. Take a short walk.  · Talk to a friend, listen to music, or watch TV.  · Take a few deep, slow breaths.  · Eat by candlelight or in surroundings that you find relaxing.  · Use a technique, such as guided imagery, to help you relax. Imagine yourself in a beautiful, restful scene. Or daydream about the place youd most like to be.  Date Last Reviewed: 1/6/2016 © 2000-2016 [employer]. [address] [place] [address], [place] [address]. All rights reserved. This information is not intended as a substitute for professional medical care. Always follow your healthcare professional's instructions.          Self-Care for Headaches  Most headaches aren't serious and can be relieved with self-care. But some headaches may be a sign of another health problem like eye trouble or high blood pressure. To find the best treatment, learn what kind of headaches you get. For tension headaches, self-care will usually help. To treat migraines, ask your healthcare provider for advice. It is also possible to get both tension and migraine headaches. Self-care involves relieving the pain and avoiding  "headache triggers if you can.    Ways to reduce pain and tension  Try these steps:  · Apply a cold compress or ice pack to the pain site.  · Drink fluids. If nausea makes it hard to drink, try sucking on ice.  · Rest. Protect yourself from bright light and loud noises.  · Calm your emotions by imagining a peaceful scene.  · Massage tight neck, shoulder, and head muscles.  · To relax muscles, soak in a hot bath or use a hot shower.  Use medicines  Aspirin or aspirin substitutes, such as ibuprofen and acetaminophen, can relieve headache. Remember: Never give aspirin to anyone 18 years old or younger because of the risk of developing Reye syndrome. Use pain medicines only when necessary.  Track your headaches  Keeping a headache diary can help you and your healthcare provider identify what's causing your headaches:  · Note when each headache happens.  · Identify your activities and the foods you've eaten 6 to 8 hours before the headache began.  · Look for any trends or "triggers."  Signs of tension headache  Any of the following can be signs:  · Dull pain or feeling of pressure in a tight band around your head  · Pain in your neck or shoulders  · Headache without a definite beginning or end  · Headache after an activity such as driving or working on a computer  Signs of migraine  Any of the following can be signs:  · Throbbing pain on one or both sides of your head  · Nausea or vomiting  · Extreme sensitivity to light, sound, and smells  · Bright spots, flashes, or other visual changes  · Pain or nausea so severe that you can't continue your daily activities  Call your healthcare provider   If you have any of the following symptoms, contact your healthcare provider:  · A headache that lingers after a recent injury or bump to the head.  · A fever with a stiff neck or pain when you bend your head toward your chest.  · A headache along with slurred speech, changes in your vision, or numbness or weakness in your arms or " "legs.  · A headache for longer than 3 days.  · Frequent headaches, especially in the morning.  · Headaches with seizures   · Seek immediate medical attention if you have a headache that you would call "the worst headache you have ever had."   Date Last Reviewed: 10/4/2015  © 2495-3852 [employer]. [address] [place] [address], [place] [address]. All rights reserved. This information is not intended as a substitute for professional medical care. Always follow your healthcare professional's instructions.        "

## 2017-03-17 ENCOUNTER — TELEPHONE (OUTPATIENT)
Dept: NEUROSURGERY | Facility: CLINIC | Age: 22
End: 2017-03-17

## 2017-03-21 ENCOUNTER — OFFICE VISIT (OUTPATIENT)
Dept: NEUROSURGERY | Facility: CLINIC | Age: 22
End: 2017-03-21
Payer: MEDICARE

## 2017-03-21 DIAGNOSIS — G93.2 BENIGN INTRACRANIAL HYPERTENSION: Primary | ICD-10-CM

## 2017-03-21 DIAGNOSIS — E66.9 OBESITY PEDS (BMI >=95 PERCENTILE): ICD-10-CM

## 2017-03-21 PROCEDURE — 99204 OFFICE O/P NEW MOD 45 MIN: CPT | Mod: S$PBB,,, | Performed by: NEUROLOGICAL SURGERY

## 2017-03-21 PROCEDURE — 99213 OFFICE O/P EST LOW 20 MIN: CPT | Mod: PBBFAC | Performed by: NEUROLOGICAL SURGERY

## 2017-03-21 PROCEDURE — 99999 PR PBB SHADOW E&M-EST. PATIENT-LVL III: CPT | Mod: PBBFAC,,, | Performed by: NEUROLOGICAL SURGERY

## 2017-03-21 NOTE — PATIENT INSTRUCTIONS
She has only minimally elevated opening intracranial pressure at  26 cm water and did not receive relief from the lumbar puncture. Based on this, she is not a good candidate for a  shunt. I recommend that she follow up with ELLIOT Pablo or someone else with neurology for medical management of her headaches. I have also stressed that importance of weight loss to reduce the signs and symptoms of pseudotumor cerebri. I will refer the patient to Bariatric Medicine. She should follow up with me for increasing intracranial pressures or once she receives relief of her symptoms after a lumbar puncture.

## 2017-03-21 NOTE — LETTER
March 22, 2017      Mitesh Bautista MD  [address]  [facility] [address]           [facility]  [address]  [facility] [address]  Phone: [phone]          Patient: Mary Myles   MR Number: [id number]   YOB: 1995   Date of Visit: 3/21/2017       Dear Dr. Mitesh Bautista:    Thank you for referring Mary Myles to me for evaluation. Attached you will find relevant portions of my assessment and plan of care.    If you have questions, please do not hesitate to call me. I look forward to following Mary Myles along with you.    Sincerely,    Willie Dumont MD    Enclosure  CC:  No Recipients    If you would like to receive this communication electronically, please contact [email or web address] or [phone] to request more information on [employer] Link access.    For providers and/or their staff who would like to refer a patient to [facility], please contact us through our one-stop-shop provider referral line, [facility], at [phone].    If you feel you have received this communication in error or would no longer like to receive these types of communications, please e-mail [email or web address]

## 2017-03-21 NOTE — PROGRESS NOTES
Subjective:    I, Judy Rodriguez, am scribing for, and in the presence of, Dr. Willie Dumont.     Patient ID: Mary Myles is a 21 y.o. female.    Chief Complaint: Follow-up    HPI This is a 21-year-old female with benign intracranial hypertension who presents today for consultation. She reports recent diagnosis of pseudotumor cerebri on 2/28/17 in the emergency department with a lumbar puncture. She did not experience relief of the headaches after the LP. The pt has been seen by Dr. Diaz since the ER visit. She reports history of long-standing headaches. She has suffered with these since she was 4 years old. She is followed by ELLIOT Pablo, who manages her headaches. She is on lithium and is unable to start on migraine medicine.    Review of Systems   Constitutional: Negative for activity change, fatigue and fever.   HENT: Negative for facial swelling.    Eyes: Negative.    Respiratory: Negative.    Cardiovascular: Negative.    Gastrointestinal: Negative for diarrhea, nausea and vomiting.   Genitourinary: Negative.    Musculoskeletal: Negative for back pain, joint swelling and myalgias.   Neurological: Positive for headaches. Negative for seizures, weakness and numbness.   Psychiatric/Behavioral: Negative.        Past Medical History:   Diagnosis Date    ADD (attention deficit disorder)     Borderline personality disorder     Chronic headache     Depression     Fibromyalgia     GERD (gastroesophageal reflux disease)     PCOS (polycystic ovarian syndrome)     Pseudotumor cerebri syndrome     Schizoaffective disorder      Objective:     [facility] 02/15/2017 (Exact Date)  Physical Exam   Constitutional: She is oriented to person, place, and time. She appears well-developed and well-nourished.   HENT:   Head: Normocephalic and atraumatic.   Eyes: Pupils are equal, round, and reactive to light.   Neck: Neck supple.   Pulmonary/Chest: Effort normal.   Musculoskeletal: She exhibits no edema.   Neurological:  She is alert and oriented to person, place, and time. No cranial nerve deficit. She displays a negative Romberg sign. GCS eye subscore is 4. GCS verbal subscore is 5. GCS motor subscore is 6.   Skin: Skin is warm, dry and intact.   Psychiatric: She has a normal mood and affect.         I, Dr. Willie Dumont, personally performed the services described in this documentation as scribed by Judy Rodriguez in my presence, and it is both accurate and complete.  Assessment:       1. Benign intracranial hypertension    2. Obesity peds (BMI >=95 percentile)        Plan:   She has only minimally elevated opening intracranial pressure at  26 cm water and did not receive relief from the lumbar puncture. Based on this, she is not a good candidate for a  shunt. I recommend that she follow up with ELLIOT Pablo or someone else with neurology for medical management of her headaches. I have also stressed that importance of weight loss to reduce the signs and symptoms of pseudotumor cerebri. I will refer the patient to Bariatric Medicine. She should follow up with me for increasing intracranial pressures or once she receives relief of her symptoms after a lumbar puncture.

## 2017-03-21 NOTE — MR AVS SNAPSHOT
[facility]  [address]  [facility] [address]  Phone: [phone]                  Mary Myles   3/21/2017 9:00 AM   Office Visit    Description:  Female : 1995   Provider:  Willie Dumont MD   Department:  [facility]           Reason for Visit     Follow-up           Diagnoses this Visit        Comments    Benign intracranial hypertension    -  Primary     Obesity peds (BMI >=95 percentile)                To Do List           Future Appointments        Provider Department Dept Phone    5/15/2017 2:00 PM PERIMETRY, HUMP[facility] [phone]    5/15/2017 2:30 PM Alonso Diaz MD [facility] [phone]      Goals (5 Years of Data)     None      [facility]     [facility] Nurse Care [facility] -  Assistance  Registered nurses in the [facility] Call Center provide clinical advisement, health education, appointment booking, and other advisory services.  Call for this free service at [phone].             Medications           Message regarding Medications     Verify the changes and/or additions to your medication regime listed below are the same as discussed with your clinician today.  If any of these changes or additions are incorrect, please notify your healthcare provider.             Verify that the below list of medications is an accurate representation of the medications you are currently taking.  If none reported, the list may be blank. If incorrect, please contact your healthcare provider. Carry this list with you in case of emergency.           Current Medications     butalbital-acetaminophen-caffeine -40 mg (FIORICET, ESGIC) -40 mg per tablet Take 1 tablet by mouth every 4 (four) hours as needed for Pain.    ibuprofen (ADVIL,MOTRIN) 800 MG tablet Take 1 tablet (800 mg total) by mouth every 8 (eight) hours as needed for Pain.    JOLIVETTE 0.35 mg tablet TK 1 T PO QD    lithium  (ESKALITH) 300 MG capsule Take 600 mg by mouth 2 (two) times daily.     lurasidone (LATUDA) 80 mg Tab tablet Take 80 mg by mouth every evening.     melatonin 10 mg Tab Take by mouth.    ondansetron (ZOFRAN-ODT) 8 MG TbDL Take 1 tablet (8 mg total) by mouth every 8 (eight) hours as needed.    trazodone (DESYREL) 150 MG tablet Take 200 mg by mouth every evening.            Clinical Reference Information           Your Vitals Were     Last Period                   02/15/2017 (Exact Date)           Allergies as of 3/21/2017     No Known Allergies      Immunizations Administered on Date of Encounter - 3/21/2017     None      Orders Placed During Today's Visit      Normal Orders This Visit    Ambulatory consult to Bariatric Medicine       [facility] Sign-Up     Activating your [facility] account is as easy as 1-2-3!     1) Visit [email or web address], select Sign Up Now, enter this activation code and your date of birth, then select Next.  [email or web address]  Expires: 4/7/2017  7:36 PM      2) Create a username and password to use when you visit [facility] in the future and select a security question in case you lose your password and select Next.    3) Enter your e-mail address and click Sign Up!    Additional Information  If you have questions, please e-mail [email or web address] or call [phone] to talk to our [facility] staff. Remember, [facility] is NOT to be used for urgent needs. For medical emergencies, dial 911.         Instructions    She has only minimally elevated opening intracranial pressure at  26 cm water and did not receive relief from the lumbar puncture. Based on this, she is not a good candidate for a  shunt. I recommend that she follow up with ELLIOT Pablo or someone else with neurology for medical management of her headaches. I have also stressed that importance of weight loss to reduce the signs and symptoms of pseudotumor cerebri. I will refer the patient to Bariatric Medicine. She should follow up  with me for increasing intracranial pressures or once she receives relief of her symptoms after a lumbar puncture.       Smoking Cessation     If you would like to quit smoking:   You may be eligible for free services if you are a [place] resident and started smoking cigarettes before September 1, 1988.  Call the Smoking Cessation Trust (SCT) toll free at (009) 906-2540 or (132) 853-6559.   Call 1-800-QUIT-NOW if you do not meet the above criteria.            Language Assistance Services     ATTENTION: Language assistance services are available, free of charge. Please call [phone].      ATENCIÓN: Si habla español, tiene a sewell disposición servicios gratuitos de asistencia lingüística. Llame al [phone].     CHÚ Ý: N?u b?n nói Ti?ng Vi?t, có các d?ch v? h? tr? ngôn ng? mi?n phí dành cho b?n. G?i s? [phone].         [facility] complies with applicable Federal civil rights laws and does not discriminate on the basis of race, color, national origin, age, disability, or sex.

## 2017-03-27 ENCOUNTER — INITIAL CONSULT (OUTPATIENT)
Dept: BARIATRICS | Facility: CLINIC | Age: 22
End: 2017-03-27
Payer: MEDICARE

## 2017-03-27 VITALS
SYSTOLIC BLOOD PRESSURE: 130 MMHG | BODY MASS INDEX: 47.16 KG/M2 | DIASTOLIC BLOOD PRESSURE: 86 MMHG | HEIGHT: 65 IN | HEART RATE: 72 BPM | WEIGHT: 283.06 LBS

## 2017-03-27 DIAGNOSIS — M79.7 FIBROMYALGIA: ICD-10-CM

## 2017-03-27 DIAGNOSIS — G93.2 PSEUDOTUMOR CEREBRI SYNDROME: ICD-10-CM

## 2017-03-27 DIAGNOSIS — K21.9 GASTROESOPHAGEAL REFLUX DISEASE, ESOPHAGITIS PRESENCE NOT SPECIFIED: ICD-10-CM

## 2017-03-27 DIAGNOSIS — E66.01 MORBID OBESITY WITH BMI OF 40.0-44.9, ADULT: Primary | ICD-10-CM

## 2017-03-27 DIAGNOSIS — F32.A DEPRESSION, UNSPECIFIED DEPRESSION TYPE: ICD-10-CM

## 2017-03-27 DIAGNOSIS — R51.9 CHRONIC NONINTRACTABLE HEADACHE, UNSPECIFIED HEADACHE TYPE: ICD-10-CM

## 2017-03-27 DIAGNOSIS — F25.1 SCHIZOAFFECTIVE DISORDER, DEPRESSIVE TYPE: ICD-10-CM

## 2017-03-27 DIAGNOSIS — G89.29 CHRONIC NONINTRACTABLE HEADACHE, UNSPECIFIED HEADACHE TYPE: ICD-10-CM

## 2017-03-27 DIAGNOSIS — E28.2 PCOS (POLYCYSTIC OVARIAN SYNDROME): ICD-10-CM

## 2017-03-27 PROCEDURE — 99999 PR PBB SHADOW E&M-EST. PATIENT-LVL III: CPT | Mod: PBBFAC,,, | Performed by: INTERNAL MEDICINE

## 2017-03-27 PROCEDURE — 99215 OFFICE O/P EST HI 40 MIN: CPT | Mod: S$PBB,,, | Performed by: INTERNAL MEDICINE

## 2017-03-27 PROCEDURE — 99213 OFFICE O/P EST LOW 20 MIN: CPT | Mod: PBBFAC | Performed by: INTERNAL MEDICINE

## 2017-03-27 RX ORDER — METFORMIN HYDROCHLORIDE 500 MG/1
500 TABLET ORAL 2 TIMES DAILY WITH MEALS
Qty: 180 TABLET | Refills: 0 | Status: SHIPPED | OUTPATIENT
Start: 2017-03-27 | End: 2017-05-08 | Stop reason: SDUPTHER

## 2017-03-27 NOTE — LETTER
March 27, 2017      Willie Dumont MD  [address]  [facility] [address]           [facility]  [address]  [facility] [address]  Phone: [phone]  Fax: [phone]          Patient: Mary Myles   MR Number: [id number]   YOB: 1995   Date of Visit: 3/27/2017       Dear Dr. Willie Dumont:    Thank you for referring Mary Myles to me for evaluation. Attached you will find relevant portions of my assessment and plan of care.    If you have questions, please do not hesitate to call me. I look forward to following Mary Myles along with you.    Sincerely,    Valerie Ferrell MD    Enclosure  CC:  No Recipients    If you would like to receive this communication electronically, please contact [email or web address] or [phone] to request more information on [email or web address] Link access.    For providers and/or their staff who would like to refer a patient to [facility], please contact us through our one-stop-shop provider referral line, [facility] [place], at [phone].    If you feel you have received this communication in error or would no longer like to receive these types of communications, please e-mail [email or web address]

## 2017-03-27 NOTE — PROGRESS NOTES
Subjective:       Patient ID: Mary Myles is a 21 y.o. female.    Chief Complaint: Consult    HPI Comments: Current attempts at weight loss: New pt to me,Referred by Willie Dumont MD  [address] [place] [address]   Patient Active Problem List:     Benign intracranial hypertension     Arcuate scotoma of both eyes     Schizoaffective disorder     Pseudotumor cerebri syndrome     PCOS (polycystic ovarian syndrome)     GERD (gastroesophageal reflux disease)     Fibromyalgia     Depression     Chronic headache     Borderline personality disorder     ADD (attention deficit disorder)    States trying to eat healthier. Stretching one time a week.     Previous diet attempts: Tried to be vegan once. Did not stick with it too long.     Heaviest weight: 297#    Lightest weight: 230# Has gained weight gradually. Has been obese since she was a child.     Goal weight: 180#    History of medication for loss:  Denies        Typical eating patterns:   Breakfast:  Yogurt- greek yogurt, flavored. Cereal- WG Cereal with soy milk    Lunch: Usually skips.     Dinner: Pt cooks. Pork chop, chicken, ribs, steak with pasta and frozen vegetables    Snacks: Rare-chocolate or granola bar. Easter candy yesterday     Beverages: Water, Soy milk, cranberry juice, occasional sprite. Diet chocolate soda.      Willingness to change: 8-9/10    EKG:Normal sinus rhythm  Normal ECG  When compared with ECG of 23-MAR-2015 14:46,  No significant change was found      BMR: 2029      Review of Systems   Constitutional: Positive for fatigue. Negative for chills and fever.        Has not felt well rested since she was a kid.    Respiratory: Negative for apnea and shortness of breath.         + snores. No Hx psg   Cardiovascular: Positive for leg swelling. Negative for chest pain.   Gastrointestinal: Positive for constipation and diarrhea.        + constant GERD even with PPI   Genitourinary: Negative for difficulty urinating.        On OCPs  "  Musculoskeletal: Positive for arthralgias and back pain.   Neurological: Positive for light-headedness and headaches. Negative for dizziness.   Psychiatric/Behavioral: Positive for dysphoric mood and hallucinations. The patient is nervous/anxious.         Sees Dr. Deb Schumacher.        Objective:     /86  Pulse 72  Ht 5' 4.5" (1.638 m)  Wt 128.4 kg (283 lb 1.1 oz)  LMP 02/15/2017 (Exact Date)  BMI 47.84 kg/m2    Physical Exam   Constitutional: She is oriented to person, place, and time. She appears well-developed. No distress.   Morbidly obese     HENT:   Head: Normocephalic and atraumatic.   Mouth/Throat: No oropharyngeal exudate.   Eyes: EOM are normal. Pupils are equal, round, and reactive to light. No scleral icterus.   Neck: Normal range of motion. Neck supple. No thyromegaly present.   Cardiovascular: Normal rate and normal heart sounds.  Exam reveals no gallop and no friction rub.    No murmur heard.  Pulmonary/Chest: Effort normal and breath sounds normal. No respiratory distress. She has no wheezes.   Abdominal: Soft. Bowel sounds are normal. She exhibits no distension. There is no tenderness.   Musculoskeletal: Normal range of motion. She exhibits no edema.   Lymphadenopathy:     She has no cervical adenopathy.   Neurological: She is alert and oriented to person, place, and time. No cranial nerve deficit.   Skin: Skin is warm and dry. No erythema.   + hirsute     Psychiatric: She has a normal mood and affect. Her behavior is normal. Judgment normal.   Vitals reviewed.      Assessment:       1. Morbid obesity with BMI of 40.0-44.9, adult    2. Pseudotumor cerebri syndrome    3. PCOS (polycystic ovarian syndrome)    4. Gastroesophageal reflux disease, esophagitis presence not specified    5. Fibromyalgia    6. Depression, unspecified depression type    7. Chronic nonintractable headache, unspecified headache type    8. Schizoaffective disorder, depressive type        Plan:         1. Pseudotumor " cerebri syndrome  Expect improvement with weight loss      2. PCOS (polycystic ovarian syndrome)    - metformin (GLUCOPHAGE) 500 MG tablet; Take 1 tablet (500 mg total) by mouth 2 (two) times daily with meals.  Dispense: 180 tablet; Refill: 0    3. Gastroesophageal reflux disease, esophagitis presence not specified  Expect improvement with weight loss      4. Fibromyalgia  Expect improvement with weight loss  Increase exercise    5. Depression, unspecified depression type  The current medical regimen is effective;  continue follow up with psych. Interactions checked    6. Chronic nonintractable headache, unspecified headache type  Expect improvement with weight loss      7.Schizoaffective disorder  Contraindication to weight loss surgery.     8.  Morbid obesity with BMI of 40.0-44.9, adult        Start metformin with dinner for 2 weeks, then breakfast and dinner.  Always take with food.  No Alcohol.     No soda, sweet tea, juices or lemonade    3 meals a day made up of the following:  Unlimited green vegetables, tomatoes, mushrooms, spaghetti squash, cauliflower, meat, poultry, seafood, eggs and hard cheeses.   Milk and plain yogurt  Dressings, seasonings, condiments, etc should have less than 2 g sugars.   beans or nuts can have 1 x a day.   1-2 servings of citrus fruits, berries, pineapple or melon a day (1/2 cup)  Avoid fried foods    No grains, rice, pasta, potatoes, bread    [email or web address] for recipes.     Exercise 30 min 3 times a week.      Return in about 6 weeks (

## 2017-03-27 NOTE — MR AVS SNAPSHOT
[facility] - [facility]  [address]  [facility] [address]  Phone: [phone]  Fax: [phone]                  Mary Myles   3/27/2017 8:00 AM   Initial consult    Description:  Female : 1995   Provider:  Valerie Ferrell MD   Department:  [facility] - Bariatric Surgery           Reason for Visit     Consult           Diagnoses this Visit        Comments    Pseudotumor cerebri syndrome         PCOS (polycystic ovarian syndrome)         Gastroesophageal reflux disease, esophagitis presence not specified         Fibromyalgia         Depression, unspecified depression type         Chronic nonintractable headache, unspecified headache type         Borderline personality disorder         ADD (attention deficit disorder)                To Do List           Future Appointments        Provider Department Dept Phone    2017 11:00 AM Brandyn Roberts PA-C [facility] [phone]    5/15/2017 2:00 PM PERIMETRY, LANEYPH [facility] [phone]    5/15/2017 2:30 PM Alonso Diaz MD [facility] [phone]      Goals (5 Years of Data)     None      Follow-Up and Disposition     Return in about 6 weeks (around 2017).       These Medications        Disp Refills Start End    metformin (GLUCOPHAGE) 500 MG tablet 180 tablet 0 3/27/2017 2017    Take 1 tablet (500 mg total) by mouth 2 (two) times daily with meals. - Oral    Pharmacy: [facility] [address] [place] [address] Ph #: [phone]         [facility] On [facility]     [facility] On Call Nurse Care Line -  Assistance  Registered nurses in the [facility] On Call Center provide clinical advisement, health education, appointment booking, and other advisory services.  Call for this free service at [phone].             Medications           Message regarding Medications     Verify the changes and/or additions to your medication regime  "listed below are the same as discussed with your clinician today.  If any of these changes or additions are incorrect, please notify your healthcare provider.        START taking these NEW medications        Refills    metformin (GLUCOPHAGE) 500 MG tablet 0    Sig: Take 1 tablet (500 mg total) by mouth 2 (two) times daily with meals.    Class: Normal    Route: Oral           Verify that the below list of medications is an accurate representation of the medications you are currently taking.  If none reported, the list may be blank. If incorrect, please contact your healthcare provider. Carry this list with you in case of emergency.           Current Medications     butalbital-acetaminophen-caffeine -40 mg (FIORICET, ESGIC) -40 mg per tablet Take 1 tablet by mouth every 4 (four) hours as needed for Pain.    ibuprofen (ADVIL,MOTRIN) 800 MG tablet Take 1 tablet (800 mg total) by mouth every 8 (eight) hours as needed for Pain.    JOLIVETTE 0.35 mg tablet TK 1 T PO QD    lithium (ESKALITH) 300 MG capsule Take 600 mg by mouth 2 (two) times daily.     lurasidone (LATUDA) 80 mg Tab tablet Take 80 mg by mouth every evening.     melatonin 10 mg Tab Take by mouth.    ondansetron (ZOFRAN-ODT) 8 MG TbDL Take 1 tablet (8 mg total) by mouth every 8 (eight) hours as needed.    trazodone (DESYREL) 150 MG tablet Take 200 mg by mouth every evening.     metformin (GLUCOPHAGE) 500 MG tablet Take 1 tablet (500 mg total) by mouth 2 (two) times daily with meals.           Clinical Reference Information           Your Vitals Were     BP Pulse Height Weight Last Period BMI    130/86 72 5' 4.5" (1.638 m) 128.4 kg (283 lb 1.1 oz) 02/15/2017 (Exact Date) 47.84 kg/m2      Blood Pressure          Most Recent Value    BP  130/86      Allergies as of 3/27/2017     No Known Allergies      Immunizations Administered on Date of Encounter - 3/27/2017     None      [facility] Sign-Up     Activating your [facility] account is as easy as 1-2-3! "     1) Visit [email or web address], select Sign Up Now, enter this activation code and your date of birth, then select Next.  [email or web address]  Expires: 4/7/2017  7:36 PM      2) Create a username and password to use when you visit [facility] in the future and select a security question in case you lose your password and select Next.    3) Enter your e-mail address and click Sign Up!    Additional Information  If you have questions, please e-mail [email or web address] or call [phone] to talk to our [facility] staff. Remember, [facility] is NOT to be used for urgent needs. For medical emergencies, dial 911.         Instructions    Start metformin with dinner for 2 weeks, then breakfast and dinner.  Always take with food.  No Alcohol.     No soda, sweet tea, juices or lemonade    3 meals a day made up of the following:  Unlimited green vegetables, tomatoes, mushrooms, spaghetti squash, cauliflower, meat, poultry, seafood, eggs and hard cheeses.   Milk and plain yogurt  Dressings, seasonings, condiments, etc should have less than 2 g sugars.   beans or nuts can have 1 x a day.   1-2 servings of citrus fruits, berries, pineapple or melon a day (1/2 cup)  Avoid fried foods    No grains, rice, pasta, potatoes, bread    [email or web address] for recipes.     Exercise 30 min 3 times a week.      Return in about 6 weeks (around 5/8/2017).         Smoking Cessation     If you would like to quit smoking:   You may be eligible for free services if you are a [place] resident and started smoking cigarettes before September 1, 1988.  Call the Smoking Cessation Trust (SCT) toll free at (134) 727-7603 or (815) 842-5266.   Call 1-800-QUIT-NOW if you do not meet the above criteria.            Language Assistance Services     ATTENTION: Language assistance services are available, free of charge. Please call [phone].      ATENCIÓN: Si habla español, tiene a sewell disposición servicios gratuitos de asistencia lingüística. Llame al  [phone].     PRISCILLA Ý: N?u b?n nói Ti?ng Vi?t, có các d?ch v? h? tr? ngôn ng? mi?n phí dành cho b?n. G?i s? [phone].         [facility] complies with applicable Federal civil rights laws and does not discriminate on the basis of race, color, national origin, age, disability, or sex.

## 2017-03-27 NOTE — PATIENT INSTRUCTIONS
Start metformin with dinner for 2 weeks, then breakfast and dinner.  Always take with food.  No Alcohol.     No soda, sweet tea, juices or lemonade    3 meals a day made up of the following:  Unlimited green vegetables, tomatoes, mushrooms, spaghetti squash, cauliflower, meat, poultry, seafood, eggs and hard cheeses.   Milk and plain yogurt  Dressings, seasonings, condiments, etc should have less than 2 g sugars.   beans or nuts can have 1 x a day.   1-2 servings of citrus fruits, berries, pineapple or melon a day (1/2 cup)  Avoid fried foods    No grains, rice, pasta, potatoes, bread    [email or web address] for recipes.     Exercise 30 min 3 times a week.      Return in about 6 weeks (around 5/8/2017).

## 2017-05-01 ENCOUNTER — OFFICE VISIT (OUTPATIENT)
Dept: NEUROLOGY | Facility: CLINIC | Age: 22
End: 2017-05-01
Payer: MEDICARE

## 2017-05-01 VITALS
SYSTOLIC BLOOD PRESSURE: 121 MMHG | DIASTOLIC BLOOD PRESSURE: 73 MMHG | WEIGHT: 268.06 LBS | BODY MASS INDEX: 44.66 KG/M2 | HEART RATE: 72 BPM | HEIGHT: 65 IN

## 2017-05-01 DIAGNOSIS — F32.A DEPRESSION, UNSPECIFIED DEPRESSION TYPE: ICD-10-CM

## 2017-05-01 DIAGNOSIS — G93.2 IIH (IDIOPATHIC INTRACRANIAL HYPERTENSION): Primary | ICD-10-CM

## 2017-05-01 DIAGNOSIS — G43.719 INTRACTABLE CHRONIC MIGRAINE WITHOUT AURA AND WITHOUT STATUS MIGRAINOSUS: ICD-10-CM

## 2017-05-01 DIAGNOSIS — G47.9 TROUBLE IN SLEEPING: ICD-10-CM

## 2017-05-01 DIAGNOSIS — R06.83 SNORING: ICD-10-CM

## 2017-05-01 DIAGNOSIS — G93.2 BENIGN INTRACRANIAL HYPERTENSION: ICD-10-CM

## 2017-05-01 DIAGNOSIS — E66.01 OBESITY, MORBID, BMI 40.0-49.9: ICD-10-CM

## 2017-05-01 DIAGNOSIS — Z79.899 LITHIUM USE: ICD-10-CM

## 2017-05-01 PROCEDURE — 99215 OFFICE O/P EST HI 40 MIN: CPT | Mod: S$PBB,,, | Performed by: NURSE PRACTITIONER

## 2017-05-01 PROCEDURE — 99213 OFFICE O/P EST LOW 20 MIN: CPT | Mod: PBBFAC | Performed by: NURSE PRACTITIONER

## 2017-05-01 PROCEDURE — 99999 PR PBB SHADOW E&M-EST. PATIENT-LVL III: CPT | Mod: PBBFAC,,, | Performed by: NURSE PRACTITIONER

## 2017-05-01 RX ORDER — PERMETHRIN 50 MG/G
CREAM TOPICAL
Refills: 0 | COMMUNITY
Start: 2017-04-25 | End: 2017-05-01 | Stop reason: ALTCHOICE

## 2017-05-01 RX ORDER — HYDROXYZINE PAMOATE 50 MG/1
CAPSULE ORAL
Refills: 0 | COMMUNITY
Start: 2017-04-25 | End: 2017-05-01 | Stop reason: ALTCHOICE

## 2017-05-01 RX ORDER — AMOXICILLIN AND CLAVULANATE POTASSIUM 875; 125 MG/1; MG/1
TABLET, FILM COATED ORAL
Refills: 0 | COMMUNITY
Start: 2017-04-22 | End: 2017-05-01 | Stop reason: ALTCHOICE

## 2017-05-01 RX ORDER — ERYTHROMYCIN 5 MG/G
OINTMENT OPHTHALMIC
Refills: 0 | COMMUNITY
Start: 2017-04-15 | End: 2017-05-01 | Stop reason: ALTCHOICE

## 2017-05-01 NOTE — PROGRESS NOTES
"  Subjective:    Patient ID: Mary Myles   MRN: [id number]  Date: 05/01/2017    Referred By: Dr. Haley Cohn    Chief Complaint: Follow-up and Headache     History of Present Illness:   21 y.o. female with history of bipolar disorder, migraines, and pseudotumor cerebri diagnosed in February 2017 based on elevated opening pressure from LP. She presents with her brother, who provides some history, for headache management.      She is a poor historian, brother provides more clear details of migraine history.     States she began having migraines when she was 4 years old and has been suffering from them ever since.  These migraines usually lasted 1-2 weeks and then would gradually subside.  Currently she has been experiencing a constant headache "for the last 20 weeks", states she has not experienced one minute without pain in the last 20 weeks, even feels pain in her sleep.  Pain is bitemporal, in her neck, crown of her head and middle of forehead and rated anywhere from a 6-10 out of 10 and gets to a 10 about twice per week.    3/15 - Dr. Alonso Diaz (Neurophthalmology) - who did not visualize papilledema on exam   3/21 - Consult with Dr. Dumont - referred her to bariatric medicine because opening pressure was only minimally elevated at 26 cm water and she did not receive relief from lumbar puncture.  3/27 - Initial visit with Bariatric medicine- started on Metformin and given an exercise & diet regimen to follow  Over the last month, states the symptoms associated with her migraine have gotten better, most notably nausea, vomiting and double vision, but the head pain has not subsided.  Does not feel with weight loss or metformin her migraine has gotten better, brother present states it has gotten better because she is not vomiting any more.  Endorses double vision and vomiting stopped about 1 month ago (corresponds with timing of start of metformin).      Associated symptoms - nausea, vomiting, photophobia, " phonophobia, blurred vision, double vision (went away last month), neck pain, shoulder pain      Aggravating - lights, menstrual cycle, caffeine, when its about to rain      Alleviating - excedrin     Sleep- sometimes good, sometimes bad, last night only slept 2 hours.  She does snore    Treatments Tried and Response  TNB - no help  Lamictal - unsure  Topiramate - unsure  Gabapentin- helped in past  Depakote - unsure  Fioricet - no help  Vistaril - no help  Ibuprofen - no help  Trazodone - doesn't really help migraine, helps her sleep  Excedrin - makes headache tolerable    Family Hx of Migraines (mother, brother), aneurysms, brain tumors     Social History  Etoh - no  Smoke - cigarettes, daily 0.5 ppd   Recreational Drug Use - no (used to smoke Marijuana and Mojo)    Current Medications:    Current Outpatient Prescriptions:     ibuprofen (ADVIL,MOTRIN) 800 MG tablet, Take 1 tablet (800 mg total) by mouth every 8 (eight) hours as needed for Pain., Disp: 30 tablet, Rfl: 0    JOLIVETTE 0.35 mg tablet, TK 1 T PO QD, Disp: , Rfl: 11    lithium (ESKALITH) 300 MG capsule, Take 600 mg by mouth 2 (two) times daily. , Disp: , Rfl:     lurasidone (LATUDA) 80 mg Tab tablet, Take 80 mg by mouth 2 (two) times daily. , Disp: , Rfl:     melatonin 10 mg Tab, Take 5 mg by mouth. , Disp: , Rfl:     ondansetron (ZOFRAN-ODT) 8 MG TbDL, Take 1 tablet (8 mg total) by mouth every 8 (eight) hours as needed., Disp: 20 tablet, Rfl: 0    trazodone (DESYREL) 150 MG tablet, Take 200 mg by mouth every evening. , Disp: , Rfl:     metformin (GLUCOPHAGE) 500 MG tablet, Take 1 tablet (500 mg total) by mouth 2 (two) times daily with meals., Disp: 180 tablet, Rfl: 0    Review of Systems   Review of Systems   Constitutional: Positive for activity change, appetite change and fatigue. Negative for chills, diaphoresis and fever.   HENT: Positive for tinnitus. Negative for congestion, ear pain, facial swelling, hearing loss, rhinorrhea, sinus  "pressure and voice change.    Eyes: Positive for photophobia, pain (especially when she is in bright lights) and visual disturbance (double and blurred vision especially in periphery). Negative for discharge and redness.   Respiratory: Negative for chest tightness and shortness of breath.    Cardiovascular: Negative for chest pain and palpitations.   Gastrointestinal: Positive for nausea. Negative for vomiting (stopped one month ago).   Genitourinary: Negative for difficulty urinating and dysuria.   Musculoskeletal: Positive for neck pain and neck stiffness. Negative for back pain, gait problem and myalgias.   Skin: Negative for pallor.   Allergic/Immunologic: Negative for immunocompromised state.   Neurological: Positive for headaches. Negative for dizziness, syncope, facial asymmetry, speech difficulty, weakness, light-headedness and numbness.   Psychiatric/Behavioral: Positive for decreased concentration and sleep disturbance. Negative for agitation, behavioral problems, confusion, dysphoric mood and hallucinations. The patient is not nervous/anxious and is not hyperactive.      Objective:    Vitals:  /73  Pulse 72  Ht 5' 4.5" (1.638 m)  Wt 121.6 kg (268 lb 1.3 oz)  BMI 45.31 kg/m2    Physical Exam     Constitutional:   She appears well-developed and well-nourished.   HENT:  NC/AT, Frontalis was TTP, temporalis was TTP. Oral and nasal mucosa intact  Eyes: Conjunctivae and EOM are normal. Pupils are equal, round, and reactive to light   Neck: Neck supple. No carotid bruit heard bilaterally.  Cardiovascular: Normal rate and normal heart sounds. No murmur heard.  Pulmonary/Chest: Effort normal and breath sounds normal  Musculoskeletal: Kyphotic  Skin: Skin is warm and dry.     Neuro exam:    Mental status:  The patient is awake, alert, and oriented to person, place and time.  Normal attention and concentration.  Speech is fluent.  Language appropriate.    Anxious    Cranial Nerves:  She requested I defer " "opthalmoscope exam d/t increased sensitivity to light.  Extraocular movements are intact and without nystagmus.    Central vision intact, endorses difficulty with peripheral vision.  No color vision change per patient.  Facial movement is symmetric.  Facial sensation is intact.    Hearing is normal.   Uvula in midline. Tongue in midline without fasiculation  FROM of neck in all (6) directions, shoulder shrug symmetrical.    Motor:  Normal muscle bulk and symmetry. No fasciculations were noted. No pronator drift    strength strong bilaterally  RUE:appropriate against gravity and medium force as tested 5/5  LUE: appropriate against gravity and medium force as tested 5/5  RLE:appropriate against gravity and medium force as tested 5/5              LLE: appropriate against gravity and medium force as tested 5/5    Reflexes:  Right Brachioradialis 2+  Left Brachioradialis 2+  Right Biceps 2+  Left Biceps 2+  Right Patellar 2+  Left Patellar 2+    Sensory:   Light touch - intact   Temperature - intact    Gait:   Romberg - negative   Normal gait  Tandem, Heel, and Toe walk - normal     Review of Data:   Labs:  17- Lithium Level, CSF Glucose, CSF Protein, CSF cell count with differential - unremarkable  3/15/17 - CBC with elevated platelet count, CMP elevated glucose and LFTs     Imagin17 - CT Head without contrast- Radiologist Impression- "No acute intracranial abnormalities seen" Electronically signed by: Rut Graves MD.  Date 17. Time: 14:38    Note: I have independently reviewed any/all imaging/labs/tests and agree with the report (s) as documented.  Any discrepancies will be as noted/demarcated by free text.  DIANA NP 2017      Assessment & Plan   Mary Myles is a 21 y.o. female with history of IIH, bipolar depression, PCOS, and chronic migraine.  One month ago was seen by Bariatric medicine who started her on metformin and gave her a diet and exercise regimen.  Although she states she " cheats on her diet and exercise, she has lost 15 pounds since then and feels the symptoms associated with her migraine (double vision and vomiting) have gotten better.  Encouraged Mary to continue following this regimen.  IIH diagnosed based on slightly elevated opening pressure on LP, however no papilledema seen and she did not receive any relief from the lumbar puncture.  Will order an MRI of Brain and repeat LP.    Mary has many risk factors for MALCOLM including obesity, smoker, memory disturbances, snores, and poor sleep at night.  Would like her to see Sleep medicine for a potential sleep study.   She is currently on lithium for bipolar depression, managed by Psychiatrist, which complicates treatment of migraines as many medication options have drug interactions with lithium.  Would recommend starting a low dose of Topiramate 25 mg to be titrated to 25mg BID or Gabapentin to help with migraine pain.  She is also requesting a mild sedative for the MRI, instructed her to contact psychiatrist regarding a prescription to enable her to complete MRI.  For medication management of migraines, will defer to psychiatry.     Diagnoses and all orders for this visit:    IIH (idiopathic intracranial hypertension)  -     MRI Brain Without Contrast; Future  -     FL Lumbar Puncture (xpd); Future  -     CSF cell count with differential; Future  -     Glucose, CSF; Future  -     Protein, CSF; Future  -     CULTURE, CSF  (INCLUDES STAIN); Future    Intractable chronic migraine without aura and without status migrainosus  -     MRI Brain Without Contrast; Future  -     FL Lumbar Puncture (xpd); Future  -     CSF cell count with differential; Future  -     Glucose, CSF; Future  -     Protein, CSF; Future  -     CULTURE, CSF  (INCLUDES STAIN); Future  Patient instructed to contact psychiatrist regarding medication management for migraines as well as mild sedative for MRI Brain.  Recommendations include Topamax 25mg titrated to 25mg  BID or Gabapentin, but will defer due to possibly interactions for lithium.    Lithium use    Snoring  -     Ambulatory consult to Sleep Disorders    Trouble in sleeping  -     Ambulatory consult to Sleep Disorders    Depression, unspecified depression type    - defer to Psychiatry     Obesity, morbid, BMI 40.0-49.9  - Continue diet and exercise regimen  - Continue regular follow-up with Bariatric medicine    I have discussed realistic goals of care with patient at length as well as medication options, and need for lifestyle adjustment. I have explained that treatment will take time. We have agreed that the goal will be to reduce frequency/intensity/quantity of HA, not to be completely HA free. I have explained my non narcotic policy regarding headache treatment.    Patient to track frequency of headaches.  Patient agreeable to work on lifestyle adjustments.    All questions and concerns addressed.  Patient verbalizes understanding and is agreeable to the plan.     Dr. Houston was directly involved in this patient's care.  He was briefed, then directly conducted an independent H&P based on the above findings. His input to follow through the EMR.     Counseling:  More than 50% of the 45 minute encounter was spent face to face counseling the patient regarding current status and future plan of care as well as side of the medications. All questions were answered to patient's satisfaction    CC: Haley Cohn DO , Deb Schumacher, CHASITY Power, FNP-C  [facility]  [phone]    CC: Haley Cohn DO

## 2017-05-01 NOTE — MR AVS SNAPSHOT
[facility]rology  [address]  [facility] [address]  Phone: [phone]  Fax: [phone]                  Mary Myles   2017 1:00 PM   Office Visit    Description:  Female : 1995   Provider:  SEVERIANO Diaz   Department:  [facility]           Reason for Visit     Follow-up           Diagnoses this Visit        Comments    Intractable chronic migraine without aura and without status migrainosus    -  Primary     Benign intracranial hypertension         Lithium use         Snoring         Trouble in sleeping                To Do List           Future Appointments        Provider Department Dept Phone    2017 1:00 PM [facility] MRI1 [address] LIMIT [facility] [phone]    2017 1:30 PM Valerie Ferrell MD [facility] - Bariatric Surgery [phone]    5/15/2017 2:00 PM PERIMETRY, [facility] [phone]    5/15/2017 2:30 PM Alonso Diaz MD [facility] - Ophthalmology [phone]    6/15/2017 1:20 PM Violet Dee MD [facility] [phone]      Goals (5 Years of Data)     None      Follow-Up and Disposition     Return in about 4 weeks (around 2017).      [facility]     [facility] Nurse Care Line - 24/ Assistance  Unless otherwise directed by your provider, please contact [facility], our nurse care line that is available for / assistance.     Registered nurses in the [facility] provide: appointment scheduling, clinical advisement, health education, and other advisory services.  Call: [phone] (toll free)               Medications           Message regarding Medications     Verify the changes and/or additions to your medication regime listed below are the same as discussed with your clinician today.  If any of these changes or additions are incorrect, please notify your healthcare provider.        STOP taking these medications     amoxicillin-clavulanate  "875-125mg (AUGMENTIN) 875-125 mg per tablet TK 1 T PO  Q 12 H FOR 10 DAYS    butalbital-acetaminophen-caffeine -40 mg (FIORICET, ESGIC) -40 mg per tablet Take 1 tablet by mouth every 4 (four) hours as needed for Pain.    erythromycin (ROMYCIN) ophthalmic ointment APPLY 1 INCH RIBBON TO AFFECTED EYE Q 8 H    hydrOXYzine pamoate (VISTARIL) 50 MG Cap TK 1 C PO QID PRF ITCHING    permethrin (ELIMITE) 5 % cream APPLY ONE TIME AS DIRECTED THEN WASH OFF AFTER 12 H           Verify that the below list of medications is an accurate representation of the medications you are currently taking.  If none reported, the list may be blank. If incorrect, please contact your healthcare provider. Carry this list with you in case of emergency.           Current Medications     ibuprofen (ADVIL,MOTRIN) 800 MG tablet Take 1 tablet (800 mg total) by mouth every 8 (eight) hours as needed for Pain.    JOLIVETTE 0.35 mg tablet TK 1 T PO QD    lithium (ESKALITH) 300 MG capsule Take 600 mg by mouth 2 (two) times daily.     lurasidone (LATUDA) 80 mg Tab tablet Take 80 mg by mouth 2 (two) times daily.     melatonin 10 mg Tab Take 5 mg by mouth.     ondansetron (ZOFRAN-ODT) 8 MG TbDL Take 1 tablet (8 mg total) by mouth every 8 (eight) hours as needed.    trazodone (DESYREL) 150 MG tablet Take 200 mg by mouth every evening.     metformin (GLUCOPHAGE) 500 MG tablet Take 1 tablet (500 mg total) by mouth 2 (two) times daily with meals.           Clinical Reference Information           Your Vitals Were     BP Pulse Height Weight BMI    121/73 72 5' 4.5" (1.638 m) 121.6 kg (268 lb 1.3 oz) 45.31 kg/m2      Blood Pressure          Most Recent Value    BP  121/73      Allergies as of 5/1/2017     No Known Allergies      Immunizations Administered on Date of Encounter - 5/1/2017     None      Orders Placed During Today's Visit      Normal Orders This Visit    Ambulatory consult to Sleep Disorders     Future Labs/Procedures Expected by Expires "    MRI Brain Without Contrast  5/1/2017 5/1/2018      [facility] Sign-Up     Activating your [facility] account is as easy as 1-2-3!     1) Visit [email or web address], select Sign Up Now, enter this activation code and your date of birth, then select Next.  [email or web address]  Expires: 6/15/2017  2:21 PM      2) Create a username and password to use when you visit [facility] in the future and select a security question in case you lose your password and select Next.    3) Enter your e-mail address and click Sign Up!    Additional Information  If you have questions, please e-mail [email or web address] or call [phone] to talk to our [facility] staff. Remember, [facility] is NOT to be used for urgent needs. For medical emergencies, dial 911.         Smoking Cessation     If you would like to quit smoking:   You may be eligible for free services if you are a [place] resident and started smoking cigarettes before September 1, 1988.  Call the Smoking Cessation Trust ([facility]) toll free at [phone] or [phone].   Call 2-099-QUIT-NOW if you do not meet the above criteria.   Contact us via email: [email or web address]   View our website for more information: [email or web address]        Language Assistance Services     ATTENTION: Language assistance services are available, free of charge. Please call [phone].      ATENCIÓN: Si habla español, tiene a sewell disposición servicios gratuitos de asistencia lingüística. Llame al [phone].     CHÚ Ý: N?u b?n nói Ti?ng Vi?t, có các d?ch v? h? tr? ngôn ng? mi?n phí dành cho b?n. G?i s? [phone].         [facility] complies with applicable Federal civil rights laws and does not discriminate on the basis of race, color, national origin, age, disability, or sex.

## 2017-05-01 NOTE — LETTER
May 3, 2017      Haley Cohn DO  [address] [facility] [address]           [facility]  [address]  [place] [address]  Phone: [phone]  Fax: [phone]          Patient: Mary Myles   MR Number: [id number]   YOB: 1995   Date of Visit: 5/1/2017       Dear Dr. Haley Cohn:    Thank you for referring Mary Myles to me for evaluation. Attached you will find relevant portions of my assessment and plan of care.    If you have questions, please do not hesitate to call me. I look forward to following Mary Myles along with you.    Sincerely,    Terri Power, [facility]    Enclosure  CC:  No Recipients    If you would like to receive this communication electronically, please contact [email or web address] or [phone] to request more information on [facility] access.    For providers and/or their staff who would like to refer a patient to [facility], please contact us through our one-stop-shop provider referral line, [facility], at [phone].    If you feel you have received this communication in error or would no longer like to receive these types of communications, please e-mail [email or web address]

## 2017-05-03 ENCOUNTER — TELEPHONE (OUTPATIENT)
Dept: NEUROLOGY | Facility: CLINIC | Age: 22
End: 2017-05-03

## 2017-05-03 NOTE — TELEPHONE ENCOUNTER
Spoke to Pt she states her psychiatrist is out of the office for two weeks she states she will try the MRI without meds.        --- Message from SEVERIANO Diaz sent at 5/3/2017  1:46 PM CDT -----  Contact: Self [phone]      ----- Message -----     From: Moira Kincaid MA     Sent: 5/3/2017   1:27 PM       To: SEVERIANO Diaz        ----- Message -----     From: Ramiro Sloan     Sent: 5/2/2017   1:54 PM       To: [facility] Staff    Patient is requesting a return call about her MRI sedation, pls call

## 2017-05-05 ENCOUNTER — HOSPITAL ENCOUNTER (OUTPATIENT)
Dept: RADIOLOGY | Facility: HOSPITAL | Age: 22
Discharge: HOME | End: 2017-05-05
Attending: NURSE PRACTITIONER
Payer: MEDICARE

## 2017-05-05 DIAGNOSIS — G43.719 INTRACTABLE CHRONIC MIGRAINE WITHOUT AURA AND WITHOUT STATUS MIGRAINOSUS: ICD-10-CM

## 2017-05-05 DIAGNOSIS — G93.2 BENIGN INTRACRANIAL HYPERTENSION: ICD-10-CM

## 2017-05-05 PROCEDURE — 70551 MRI BRAIN STEM W/O DYE: CPT | Mod: 26,,, | Performed by: RADIOLOGY

## 2017-05-05 PROCEDURE — 70551 MRI BRAIN STEM W/O DYE: CPT | Mod: TC

## 2017-05-08 ENCOUNTER — OFFICE VISIT (OUTPATIENT)
Dept: BARIATRICS | Facility: CLINIC | Age: 22
End: 2017-05-08
Payer: MEDICARE

## 2017-05-08 VITALS
WEIGHT: 264.56 LBS | DIASTOLIC BLOOD PRESSURE: 80 MMHG | HEART RATE: 76 BPM | BODY MASS INDEX: 44.08 KG/M2 | SYSTOLIC BLOOD PRESSURE: 118 MMHG | HEIGHT: 65 IN

## 2017-05-08 DIAGNOSIS — E28.2 PCOS (POLYCYSTIC OVARIAN SYNDROME): ICD-10-CM

## 2017-05-08 DIAGNOSIS — G93.2 PSEUDOTUMOR CEREBRI SYNDROME: Primary | ICD-10-CM

## 2017-05-08 DIAGNOSIS — E66.01 MORBID OBESITY WITH BMI OF 40.0-44.9, ADULT: ICD-10-CM

## 2017-05-08 PROCEDURE — 99999 PR PBB SHADOW E&M-EST. PATIENT-LVL III: CPT | Mod: PBBFAC,,, | Performed by: INTERNAL MEDICINE

## 2017-05-08 PROCEDURE — 99213 OFFICE O/P EST LOW 20 MIN: CPT | Mod: PBBFAC | Performed by: INTERNAL MEDICINE

## 2017-05-08 PROCEDURE — 99213 OFFICE O/P EST LOW 20 MIN: CPT | Mod: S$PBB,,, | Performed by: INTERNAL MEDICINE

## 2017-05-08 RX ORDER — METFORMIN HYDROCHLORIDE 500 MG/1
500 TABLET ORAL 2 TIMES DAILY WITH MEALS
Qty: 180 TABLET | Refills: 0 | Status: SHIPPED | OUTPATIENT
Start: 2017-05-08 | End: 2017-07-05 | Stop reason: SDUPTHER

## 2017-05-08 NOTE — PROGRESS NOTES
"Subjective:       Patient ID: Mary Myles is a 21 y.o. female.    Chief Complaint: Follow-up    HPI Comments: Pt here today for follow up. She has lost 19 lbs. Started metformin with LCHF diet. Walking up to 2 miles at a time. For a week she was depressed and was not eating on plan.  States it was not out of hunger. She does sometimes, feel hungry or has cravings, but has been able to resist          Review of Systems   Constitutional: Positive for fatigue. Negative for chills and fever.        Has not felt well rested since she was a kid.    Respiratory: Negative for apnea and shortness of breath.         + snores. No Hx psg   Cardiovascular: Positive for leg swelling. Negative for chest pain.   Gastrointestinal: Positive for constipation and diarrhea.        + constant GERD even with PPI   Genitourinary: Negative for difficulty urinating.        On OCPs   Musculoskeletal: Positive for arthralgias and back pain.   Neurological: Positive for light-headedness and headaches. Negative for dizziness.   Psychiatric/Behavioral: Positive for dysphoric mood and hallucinations. The patient is nervous/anxious.         Sees Dr. Deb Schumacher.        Objective:     /80  Pulse 76  Ht 5' 4.5" (1.638 m)  Wt 120 kg (264 lb 8.8 oz)  BMI 44.71 kg/m2    Physical Exam   Constitutional: She is oriented to person, place, and time. She appears well-developed. No distress.   Morbidly obese     HENT:   Head: Normocephalic and atraumatic.   Eyes: EOM are normal. Pupils are equal, round, and reactive to light. No scleral icterus.   Neck: Normal range of motion. Neck supple.   Cardiovascular: Normal rate.    Pulmonary/Chest: Effort normal.   Abdominal: Soft. Bowel sounds are normal.   Musculoskeletal: Normal range of motion. She exhibits no edema.   Neurological: She is alert and oriented to person, place, and time. No cranial nerve deficit.   Skin: Skin is warm and dry. No erythema.   + hirsute     Psychiatric: She has a normal " mood and affect. Her behavior is normal. Judgment normal.   Vitals reviewed.      Assessment:       1. Pseudotumor cerebri syndrome    2. PCOS (polycystic ovarian syndrome)        Plan:         1. Pseudotumor cerebri syndrome  Expect improvement with weight loss  She has upcoming eye exam.     2. PCOS (polycystic ovarian syndrome)  The current medical regimen is effective;  continue present plan and medications.   - metformin (GLUCOPHAGE) 500 MG tablet; Take 1 tablet (500 mg total) by mouth 2 (two) times daily with meals.  Dispense: 180 tablet; Refill: 0    3. Morbid obesity with BMI of 40.0-44.9, adult  Doing well.  Discussed developing new, healthier coping mechanisms.     No soda, sweet tea, juices or lemonade    3 meals a day made up of the following:  Unlimited green vegetables, tomatoes, mushrooms, spaghetti squash, cauliflower, meat, poultry, seafood, eggs and hard cheeses.   Milk and plain yogurt  Dressings, seasonings, condiments, etc should have less than 2 g sugars.   beans or nuts can have 1 x a day.   1-2 servings of citrus fruits, berries, pineapple or melon a day (1/2 cup)  Avoid fried foods    No grains, rice, pasta, potatoes, bread    [email or web address].dietdoctor.com for recipes.     Exercise 30 min 3 times a week.      Return in about 6 weeks (

## 2017-05-08 NOTE — MR AVS SNAPSHOT
[facility] - Bariatric Surgery  [address]  [facility] [address]  Phone: [phone]  Fax: [phone]                  Mary Myles   2017 1:30 PM   Office Visit    Description:  Female : 1995   Provider:  Valerie Ferrell MD   Department:  [facility] - Bariatric Surgery           Reason for Visit     Follow-up           Diagnoses this Visit        Comments    Pseudotumor cerebri syndrome    -  Primary     PCOS (polycystic ovarian syndrome)         Morbid obesity with BMI of 40.0-44.9, adult                To Do List           Future Appointments        Provider Department Dept Phone    5/15/2017 2:00 PM PERIMETRY, HUMPH [facility] Ophthalmology [phone]    5/15/2017 2:30 PM Alonso Diaz MD [facility] Ophthalmology [phone]    2017 2:00 PM Terri Power, FNP [facility] Neurology [phone]    6/15/2017 1:20 PM Violet Dee MD [facility] [place] [facility] [phone]    2017 1:30 PM Valerie Ferrell MD [facility] Bariatric Surgery [phone]      Goals (5 Years of Data)     None      Follow-Up and Disposition     Return in about 6 weeks (around 2017).       These Medications        Disp Refills Start End    metformin (GLUCOPHAGE) 500 MG tablet 180 tablet 0 2017    Take 1 tablet (500 mg total) by mouth 2 (two) times daily with meals. - Oral    Pharmacy: [facility] [address] [place] [address] Ph #: [phone]         [facility] On Call     [facility] On Call Nurse Care Line -  Assistance  Unless otherwise directed by your provider, please contact [facility], our nurse care line that is available for  assistance.     Registered nurses in the [facility] provide: appointment scheduling, clinical advisement, health education, and other advisory services.  Call: [phone] (toll free)               Medications           Message regarding Medications      "Verify the changes and/or additions to your medication regime listed below are the same as discussed with your clinician today.  If any of these changes or additions are incorrect, please notify your healthcare provider.             Verify that the below list of medications is an accurate representation of the medications you are currently taking.  If none reported, the list may be blank. If incorrect, please contact your healthcare provider. Carry this list with you in case of emergency.           Current Medications     ibuprofen (ADVIL,MOTRIN) 800 MG tablet Take 1 tablet (800 mg total) by mouth every 8 (eight) hours as needed for Pain.    JOLIVETTE 0.35 mg tablet TK 1 T PO QD    lithium (ESKALITH) 300 MG capsule Take 600 mg by mouth 2 (two) times daily.     lurasidone (LATUDA) 80 mg Tab tablet Take 80 mg by mouth 2 (two) times daily.     melatonin 10 mg Tab Take 5 mg by mouth.     metformin (GLUCOPHAGE) 500 MG tablet Take 1 tablet (500 mg total) by mouth 2 (two) times daily with meals.    ondansetron (ZOFRAN-ODT) 8 MG TbDL Take 1 tablet (8 mg total) by mouth every 8 (eight) hours as needed.    trazodone (DESYREL) 150 MG tablet Take 200 mg by mouth every evening.            Clinical Reference Information           Your Vitals Were     BP Pulse Height Weight BMI    118/80 76 5' 4.5" (1.638 m) 120 kg (264 lb 8.8 oz) 44.71 kg/m2      Blood Pressure          Most Recent Value    BP  118/80      Allergies as of 5/8/2017     No Known Allergies      Immunizations Administered on Date of Encounter - 5/8/2017     None      [facility] Sign-Up     Activating your [facility] account is as easy as 1-2-3!     1) Visit [email or web address], select Sign Up Now, enter this activation code and your date of birth, then select Next.  [email or web address]  Expires: 6/15/2017  2:21 PM      2) Create a username and password to use when you visit [facility] in the future and select a security question in case you lose your password and select " Next.    3) Enter your e-mail address and click Sign Up!    Additional Information  If you have questions, please e-mail [email or web address] or call [phone] to talk to our [facility] staff. Remember, [facility] is NOT to be used for urgent needs. For medical emergencies, dial 911.         Instructions    3 meals a day made up of the following:  Unlimited green vegetables, tomatoes, mushrooms, spaghetti squash, cauliflower, meat, poultry, seafood, eggs and hard cheeses.   Milk and plain yogurt  Dressings, seasonings, condiments, etc should have less than 2 g sugars.   beans or nuts can have 1 x a day.   1-2 servings of citrus fruits, berries, pineapple or melon a day (1/2 cup)  Avoid fried foods    No grains, rice, pasta, potatoes or bread    No soda, sweet tea, juices or lemonade    [email or web address] for recipes.    Meal Ideas  *Recipes and products available at [email or web address].com      Breakfast: (15-20g protein)    - Egg white omelet: 2 egg whites or ½ cup Egg Beaters. (Optional proteins: cheese, shrimp, black beans, chicken, sliced turkey) (Optional veggies: tomatoes, salsa, spinach, mushrooms, onions, green peppers, or small slice avocado)     - Egg and sausage: 1 egg or ¼ cup Egg Beaters (any variety), with 1 segun or 2 links of Turkey sausage or Veggie breakfast sausage ([place] or Ahometo)    - Crust-less breakfast quiche: To make a glass pie dish, mix 4oz part skim Ricotta, 1 cup skim milk, and 2 eggs as your base. Add protein: shredded cheese, sliced lean ham or turkey, turkey villalpando/sausage. Add veggies: tomato, onion, green onion, mushroom, green pepper, spinach, etc.    - Yogurt parfait: Mix 1 - 6oz container Dannon Light N Fit vanilla yogurt, with ¼ cup Kashi Go Lean cereal    - Cottage cheese and fruit: ½ cup part-skim cottage cheese or ricotta cheese topped with fresh fruit or sugar free preserves     - Hanna Medrano's Uzma Egg custard* (add 2 Tbsp instant coffee granules to make  Cappuccino Custard*)    - Hi-Protein café latte (skim milk, decaf coffee, 1 scoop protein powder). Optional to add Sugar free syrup or extract flavoring.    Lunch: (20-30g protein)    - ½ cup Black bean soup (Homemade or Progresso), with ¼ cup shredded low-fat cheese. Top with chopped tomato or fresh salsa.     - Lean deli turkey breast and low-fat sliced cheese, mustard or light pemberton to moisten, rolled up together, or wrapped in a Beto lettuce leaf    - Chicken salad made from dinner leftovers, moisten with low-fat salad dressing or light pemberton. Also try leftover salmon, shrimp, tuna or boiled eggs. Serve ½ cup over dark green salad    - Fat-free canned refried beans, topped with ¼ cup shredded low-fat cheese. Top with chopped tomato or fresh salsa.     - Greek salad: Top mixed greens with 1-2oz grilled chicken, tomatoes, red onions, 2-3 kalamata olives, and sprinkle lightly with feta cheese. Spritz with Balsamic vinegar to taste.     - Crust-less lunch quiche: To make a glass pie dish, mix 4oz part skim Ricotta, 1 cup skim milk, and 2 eggs as your base. Add protein: shredded cheese, sliced lean ham or turkey, shrimp, chicken. Add veggies: tomato, onion, green onion, mushroom, green pepper, spinach, artichoke, broccoli, etc.    - Pizza bake: tomato sauce, low-fat shredded mozzarella and turkey pepperoni or [place] villalpando. Add any veggies.    - Cucumber crab bites: Spread ¼ cup crab dip (lump crabmeat + light cream cheese and green onions) over sliced cucumber.     - Chicken with light spinach and artichoke dip*: Puree in : 6oz cooked and drained spinach, 2 cloves garlic, 1 can cannelloni beans, ½ cup chopped green onions, 1 can drained artichoke hearts (not marinated in oil), lemon juice and basil. Mix in 2oz chopped up chicken.    Supper: (20-30g protein)    - Serve grilled fish over dark green salad tossed with low-fat dressing, served with grilled asparagus davidson     - Rotisserie chicken salad:  served with sliced strawberries, walnuts, fat-free feta cheese crumbles and 1 tbsp Zuritas Own Light Raspberry [place] Vinaigrette    - Shrimp cocktail: Dip cold boiled shrimp in homemade low-sugar cocktail sauce (1/2 cup Florence One Carb ketchup, 2 tbsp horseradish, 1/4 tsp hot sauce, 1 tsp Worcestershire sauce, 1 tbsp freshly-squeezed lemon juice). Serve with dark green salad, walnuts, and crumbled blue cheese drizzled with olive oil and Balsamic vinegar    - Tuna Melt: Spread tuna salad onto 2 thick slices of tomato. Top with low-fat cheese and broil until cheese is melted. May also be made with chicken salad of shrimp salad. [place] with different types of cheeses.    - Homemade low-fat Chili using extra lean ground beef or ground turkey. Top with shredded cheese and salsa as desired. May add dollop fat-free sour cream if desired    - Dinner Omelet with shrimp or chicken and onion, green peppers and chives.    - No noodle lasagna: Use sliced zucchini or eggplant in place of noodles.  Layer with part skim ricotta cheese and low sugar meat sauce (use very lean ground beef or ground turkey).    - Mexican chicken bake: Bake chunks of chicken breast or thigh with taco seasoning, Pace brand enchilada sauce, green onions and low-fat cheese. Serve with ¼ cup black beans or fat free refried beans topped with chopped tomatoes or salsa.    - Manuel frozen meatballs, simmered in Classico Marinara sauce. Different flavors of salsa or spaghetti sauce create different dishes! Sprinkle with parmesan cheese. Serve with grilled or steamed veggies, or a dark green salad.    - Simmer boneless skinless chicken thigh chunks in Classico Marinara sauce or roasted salsa until tender with chopped onion, bell pepper, garlic, mushrooms, spinach, etc.     - Hamburger, without the bun, dressed the way you like. Served with grilled or steamed veggies.    - Eggplant parmesan: Bake slices of eggplant at 350 degrees for 15 minutes. Layer  tomato sauce, sliced eggplant and low-fat mozzarella cheese in a baking dish and cover with foil. Bake 30-40 more minutes or until bubbly. Uncover and bake at 400 degrees for about 15 more minutes, or until top is slightly crisp.    - Fish tacos: grilled/baked white fish, wrapped in Beto lettuce leaf, topped with salsa, shredded low-fat cheese, and light coleslaw.    Snacks: (100-200 calories; >5g protein)    - 1 low-fat cheese stick with 8 cherry tomatoes or 1 serving fresh fruit  - 4 thin slices fat-free turkey breast and 1 slice low-fat cheese  - 4 thin slices fat-free honey ham with wedge of melon  - 1/4 cup unsalted nuts with ½ cup fruit  - 6-oz container Dannon Light n Fit vanilla yogurt, topped with 1oz unsalted nuts         - apple, celery or baby carrots spread with 2 Tbsp natural peanut butter or almond butter   - apple slices with 1 oz slice low-fat cheese  - celery, cucumber, bell pepper or baby carrots dipped in ¼ cup hummus bean spread or light spinach and artichoke dip (*recipe in lunch section)  - 100 calorie bag microwave light popcorn with 3 tbsp grated parmesan cheese  - Geronimo Links Beef Steak - 14g protein! (similar to beef jerky)  - 2 wedges [place] - Light Herb & Garlic Cheese with sliced cucumber or green bell pepper  - 1/2 cup low-fat cottage cheese with ¼ cup fruit or ¼ cup salsa  - RTD Protein drinks: Atkins, Low Carb Slim Fast, EAS light, Muscle Milk Light, etc.  - Homemade Protein drinks: GNC Soy95, Isopure, Nectar, UNJURY, Whey Gourmet, etc. Mix 1 scoop powder with 8oz skim/1% milk or light soymilk.  - Protein bars: Atkins, EAS, Pure Protein, Think Thin, Detour, etc. Must have 0-4 grams sugar - Read the label.    Takeout Options: No more than twice/week  Deli - Salads (no pasta or rice), meats, cheeses. Roasted chicken. Lox (salmon)    Mexican - Platters which don't include tortillas, chips, or rice. Go easy on the beans. Example: Fajitas without the tortillas. Ask the   not to bring chips to the table if they are too tempting.    Greek - Meat or fish and vegetable, but no bread or rice. Including hummus, baba ganoush, etc, is OK. Most sit-down Greek restaurants can provide you with cucumber slices for dipping instead of ede bread.    Fast Food (Avoid as much as possible) - Salads (no croutons and limit salad dressing to 2 tbsp), grilled chicken sandwich without the bun and ask for no pemberton. Chiquitas low fat chili or Taco Bell pintos and cheese.    BBQ - The meats are fine if you ask for sauces on the side, but most of the traditional side dishes are loaded with carbs. Sang slaw, baked beans and BBQ sauce are typically made with sugar.    Chinese - Nothing deep-fried, no rice or noodles. Many Chinese sauces have starch and sugar in them, so you'll have to use your judgement. If you find that these sauces trigger cravings, or cause Dumping, you can ask for the sauce to be made without sugar or just use soy sauce.  Return in about 6 weeks            Smoking Cessation     If you would like to quit smoking:   You may be eligible for free services if you are a [place] resident and started smoking cigarettes before September 1, 1988.  Call the Smoking Cessation Trust ([facility]) toll free at (588) 918-7152 or (020) 112-6736.   Call 1-800-QUIT-NOW if you do not meet the above criteria.   Contact us via email: [email or web address]   View our website for more information: [email or web address]        Language Assistance Services     ATTENTION: Language assistance services are available, free of charge. Please call [phone].      ATENCIÓN: Si habla español, tiene a sewell disposición servicios gratuitos de asistencia lingüística. Llame al [phone].     CHÚ Ý: N?u b?n nói Ti?ng Vi?t, có các d?ch v? h? tr? ngôn ng? mi?n phí dành cho b?n. G?i s? [phone].         [facility] complies with applicable Federal civil rights laws and does not discriminate on the  basis of race, color, national origin, age, disability, or sex.

## 2017-05-15 ENCOUNTER — CLINICAL SUPPORT (OUTPATIENT)
Dept: OPHTHALMOLOGY | Facility: CLINIC | Age: 22
End: 2017-05-15
Payer: MEDICARE

## 2017-05-15 ENCOUNTER — OFFICE VISIT (OUTPATIENT)
Dept: OPHTHALMOLOGY | Facility: CLINIC | Age: 22
End: 2017-05-15
Payer: MEDICARE

## 2017-05-15 DIAGNOSIS — H53.433 ARCUATE SCOTOMA OF BOTH EYES: ICD-10-CM

## 2017-05-15 DIAGNOSIS — G93.2 BENIGN INTRACRANIAL HYPERTENSION: Primary | ICD-10-CM

## 2017-05-15 PROCEDURE — 99999 PR PBB SHADOW E&M-EST. PATIENT-LVL II: CPT | Mod: PBBFAC,,, | Performed by: OPHTHALMOLOGY

## 2017-05-15 PROCEDURE — 99213 OFFICE O/P EST LOW 20 MIN: CPT | Mod: S$PBB,,, | Performed by: OPHTHALMOLOGY

## 2017-05-15 PROCEDURE — 99212 OFFICE O/P EST SF 10 MIN: CPT | Mod: PBBFAC | Performed by: OPHTHALMOLOGY

## 2017-05-15 PROCEDURE — 92083 EXTENDED VISUAL FIELD XM: CPT | Mod: PBBFAC | Performed by: OPHTHALMOLOGY

## 2017-05-15 RX ORDER — LORAZEPAM 1 MG/1
TABLET ORAL
Refills: 0 | COMMUNITY
Start: 2017-05-04 | End: 2017-08-08

## 2017-05-15 NOTE — PROGRESS NOTES
HPI     Concerns About Ocular Health    Additional comments: Benign intracranial hypertension            Comments   DLS:03/15/2017 Emily  Patient here for 1 month follow up and Review HVF  Pt states headaches has improved some, but not much. OU vision blurry.   Seeing double vision(top and bottom)sometimes.  Pt was a candidate for  shunt. Have to lost weight, 19 pounds lost thus   far     I have personally interviewed the patient, reviewed the history and   examined the patient and agree with the technician's &/or resident's exam,   assessment and plan.       Last edited by Alonso Diaz MD on 5/15/2017  3:13 PM. (History)        ROS     Positive for: Gastrointestinal, Neurological, Musculoskeletal, Endocrine    Negative for: Constitutional, Skin, Genitourinary, HENT, Cardiovascular,   Eyes, Respiratory, Psychiatric, Allergic/Imm, Heme/Lymph    Last edited by Alonso Diaz MD on 5/15/2017  3:13 PM. (History)        Assessment /Plan     For exam results, see Encounter Report.    Benign intracranial hypertension  -     Wu Visual Field - OU - Extended - Both Eyes    Arcuate scotoma of both eyes  -     Wu Visual Field - OU - Extended - Both Eyes      Ms. Myles is doing well. I provided a prescription for eyeglasses. I will repeat her exam and visual field testing in three months.

## 2017-05-15 NOTE — MR AVS SNAPSHOT
[facility]  [address]  [facility] [address]  Phone: [phone]  Fax: [phone]                  Mary Myles   5/15/2017 2:30 PM   Office Visit    Description:  Female : 1995   Provider:  Alonso Diaz MD   Department:  [facility]           Reason for Visit     Concerns About Ocular Health           Diagnoses this Visit        Comments    Benign intracranial hypertension    -  Primary     Arcuate scotoma of both eyes                To Do List           Future Appointments        Provider Department Dept Phone    2017 2:00 PM [facility]rology [phone]    6/15/2017 1:20 PM Violet Dee MD [facility] - Sleep Clinic [phone]    2017 1:30 PM MD [facility] - Bariatric Surgery [phone]      Goals (5 Years of Data)     None      Follow-Up and Disposition     Return in about 3 months (around 8/15/2017) for Exam and HVF.      [facility]     [facility] Nurse Care Line -  Assistance  Unless otherwise directed by your provider, please contact [facility], our nurse care line that is available for  assistance.     Registered nurses in the [facility] Center provide: appointment scheduling, clinical advisement, health education, and other advisory services.  Call: [phone] (toll free)               Medications           Message regarding Medications     Verify the changes and/or additions to your medication regime listed below are the same as discussed with your clinician today.  If any of these changes or additions are incorrect, please notify your healthcare provider.             Verify that the below list of medications is an accurate representation of the medications you are currently taking.  If none reported, the list may be blank. If incorrect, please contact your healthcare provider. Carry this list with you in case of emergency.           Current  Medications     ibuprofen (ADVIL,MOTRIN) 800 MG tablet Take 1 tablet (800 mg total) by mouth every 8 (eight) hours as needed for Pain.    JOLIVETTE 0.35 mg tablet TK 1 T PO QD    lithium (ESKALITH) 300 MG capsule Take 600 mg by mouth 2 (two) times daily.     lorazepam (ATIVAN) 1 MG tablet TK 1 T PO 30 MINUTES BEFORE MRI FOR ANXIETY    lurasidone (LATUDA) 80 mg Tab tablet Take 80 mg by mouth 2 (two) times daily.     melatonin 10 mg Tab Take 5 mg by mouth.     metformin (GLUCOPHAGE) 500 MG tablet Take 1 tablet (500 mg total) by mouth 2 (two) times daily with meals.    ondansetron (ZOFRAN-ODT) 8 MG TbDL Take 1 tablet (8 mg total) by mouth every 8 (eight) hours as needed.    trazodone (DESYREL) 150 MG tablet Take 200 mg by mouth every evening.            Clinical Reference Information           Allergies as of 5/15/2017     No Known Allergies      Immunizations Administered on Date of Encounter - 5/15/2017     None      Orders Placed During Today's Visit      Normal Orders This Visit    Wu Visual Field - OU - Extended - Both Eyes       [facility] Sign-Up     Activating your [facility] account is as easy as 1-2-3!     1) Visit [email or web address], select Sign Up Now, enter this activation code and your date of birth, then select Next.  [email or web address]  Expires: 6/15/2017  2:21 PM      2) Create a username and password to use when you visit [facility] in the future and select a security question in case you lose your password and select Next.    3) Enter your e-mail address and click Sign Up!    Additional Information  If you have questions, please e-mail [email or web address] or call [phone] to talk to our [facility] staff. Remember, [facility] is NOT to be used for urgent needs. For medical emergencies, dial 911.         Instructions    Repeat exam and visual field testing in three months.  Prescription for eyeglasses.       Smoking Cessation     If you would like to quit smoking:   You may be eligible for free  services if you are a [place] resident and started smoking cigarettes before September 1, 1988.  Call the Smoking Cessation Trust ([facility]) toll free at (029) 258-3212 or (114) 640-5949.   Call 1-800-QUIT-NOW if you do not meet the above criteria.   Contact us via email: [email or web address]   View our website for more information: [email or web address]        Language Assistance Services     ATTENTION: Language assistance services are available, free of charge. Please call [phone].      ATENCIÓN: Si habla español, tiene a sewell disposición servicios gratuitos de asistencia lingüística. Llame al [phone].     CHÚ Ý: N?u b?n nói Ti?ng Vi?t, có các d?ch v? h? tr? ngôn ng? mi?n phí dành cho b?n. G?i s? [phone].         [facility] complies with applicable Federal civil rights laws and does not discriminate on the basis of race, color, national origin, age, disability, or sex.

## 2017-06-02 ENCOUNTER — OFFICE VISIT (OUTPATIENT)
Dept: NEUROLOGY | Facility: CLINIC | Age: 22
End: 2017-06-02
Payer: MEDICARE

## 2017-06-02 VITALS
SYSTOLIC BLOOD PRESSURE: 123 MMHG | BODY MASS INDEX: 44.11 KG/M2 | WEIGHT: 264.75 LBS | HEIGHT: 65 IN | HEART RATE: 76 BPM | DIASTOLIC BLOOD PRESSURE: 86 MMHG

## 2017-06-02 DIAGNOSIS — F60.3 BORDERLINE PERSONALITY DISORDER: ICD-10-CM

## 2017-06-02 DIAGNOSIS — H53.9 VISION CHANGES: ICD-10-CM

## 2017-06-02 DIAGNOSIS — Z72.0 TOBACCO ABUSE: ICD-10-CM

## 2017-06-02 DIAGNOSIS — G93.2 BENIGN INTRACRANIAL HYPERTENSION: ICD-10-CM

## 2017-06-02 DIAGNOSIS — Z79.899 LITHIUM USE: ICD-10-CM

## 2017-06-02 DIAGNOSIS — G89.29 CHRONIC INTRACTABLE HEADACHE, UNSPECIFIED HEADACHE TYPE: Primary | ICD-10-CM

## 2017-06-02 DIAGNOSIS — E66.01 MORBID OBESITY WITH BMI OF 40.0-44.9, ADULT: ICD-10-CM

## 2017-06-02 DIAGNOSIS — R51.9 CHRONIC INTRACTABLE HEADACHE, UNSPECIFIED HEADACHE TYPE: Primary | ICD-10-CM

## 2017-06-02 PROCEDURE — 99213 OFFICE O/P EST LOW 20 MIN: CPT | Mod: S$PBB,,, | Performed by: NURSE PRACTITIONER

## 2017-06-02 PROCEDURE — 99999 PR PBB SHADOW E&M-EST. PATIENT-LVL III: CPT | Mod: PBBFAC,,, | Performed by: NURSE PRACTITIONER

## 2017-06-02 PROCEDURE — 99213 OFFICE O/P EST LOW 20 MIN: CPT | Mod: PBBFAC | Performed by: NURSE PRACTITIONER

## 2017-06-02 RX ORDER — TOPIRAMATE 25 MG/1
25 TABLET ORAL 2 TIMES DAILY
COMMUNITY
End: 2018-01-30 | Stop reason: ALTCHOICE

## 2017-06-02 NOTE — PROGRESS NOTES
"[facility], Department of Neurology  [facility] [place] [address]  Phone # [phone]  Fax # [phone]    Subjective:    Patient ID: Mary Myles is a 21 y.o. female.    Chief Complaint: Headache    HPI:  Mary Myles is a 22 yo female with a history of Bipolar depression, suicide attempts, suspected IIH, obesity, and migraines presents to clinic with her brother for follow-up of headaches.    History is provided by Mary and her brother    6/2/17 - Interval History:  - Was started on Topamax 25 mg daily two weeks ago by Psychiatry Deb Schumacher NP ,has noticed a slight decrease in the intensity of her migraines since starting medication   - Had a 6 day psychiatric hospital admission due to suicidal thoughts because her relationship with her fiance ended, states she "wanted to cut" but was stopped by her brother and brought to the ED   Denies SI/HI at this time   - Next appt with Deb Schumacher NP 6/19/17   - States she is still having migraines all day, everyday, but has an occasional break in pains  - She is continued to exercise, states she has been doing a lot of walking    - Admits she has been cheating on her diet quite a lot recently   - Seen by Dr. Diaz on 5/15 - prescription given for eyeglasses - is nearsighted    Has not found a place to bring her glasses prescription that will accept her insurances (has both medicare and medicaid) - everywhere she has gone has been too expensive   - MRI Brain done - unremarkable   - LP not done yet, she does not want to have it done as this was very uncomfortable for her the last time   - Has continued regular follow-up with Bariatric medicine   - Has an appt with Sleep Medicine in 2 weeks     Treatments Tried and Response  TNB - no help  Lamictal - unsure  Topiramate - unsure  Gabapentin- helped in past  Depakote - unsure  Fioricet - no help  Vistaril - no help  Ibuprofen - no help  Trazodone - doesn't really help migraine, helps " "her sleep  Excedrin - makes headache tolerable  Topamax 25 mg Qd - slightly decrease in intensity of migraines      5/1/17 - Recommendations at last OV:  - MRI Brain W/O Contrast  - Repeat LP    - opening pressure in left lateral position   - CSF for cell count, glucose, protein, and a culture   - Referral to Sleep medicine for possible MALCOLM  - Continue regular follow-up with Bariatric medicine as weight loss will help with IIH   - Medication Management for Migraines deferred to psychiatry as she is prescribed a high dose of lithium for her bipolar depression and many migraine medications may cause potential drug interactions with lithium. Recommendations included - Slow titration of Topamax 25 mg to 25 mg BID or Gabapentin    5/1/17 - Initial HPI  21 y.o. female with history of bipolar disorder, migraines, and pseudotumor cerebri diagnosed in February 2017 based on elevated opening pressure from LP. She presents with her brother, who provides some history, for headache management.    She is a poor historian, brother provides more clear details of migraine history.   States she began having migraines when she was 4 years old and has been suffering from them ever since.  These migraines usually lasted 1-2 weeks and then would gradually subside.  Currently she has been experiencing a constant headache "for the last 20 weeks", states she has not experienced one minute without pain in the last 20 weeks, even feels pain in her sleep.  Pain is bitemporal, in her neck, crown of her head and middle of forehead and rated anywhere from a 6-10 out of 10 and gets to a 10 about twice per week.  3/15 - Dr. Alonso Diaz (Neurophthalmology) - who did not visualize papilledema on exam   3/21 - Consult with Dr. Dumont - referred her to bariatric medicine because opening pressure was only minimally elevated at 26 cm water and she did not receive relief from lumbar puncture.  3/27 - Initial visit with Bariatric medicine- started on " Metformin and given an exercise & diet regimen to follow  Over the last month, states the symptoms associated with her migraine have gotten better, most notably nausea, vomiting and double vision, but the head pain has not subsided.  Does not feel with weight loss or metformin her migraine has gotten better, brother present states it has gotten better because she is not vomiting any more.  Endorses double vision and vomiting stopped about 1 month ago (corresponds with timing of start of metformin).    Associated symptoms - nausea, vomiting, photophobia, phonophobia, blurred vision, double vision (went away last month), neck pain, shoulder pain    Aggravating - lights, menstrual cycle, caffeine, when its about to rain    Alleviating - excedrin   Sleep- sometimes good, sometimes bad, last night only slept 2 hours.  She does snore    Family Hx of Migraines (mother, brother), aneurysms, brain tumors     Social History  Etoh - no  Smoke - cigarettes, daily 0.5 ppd   Recreational Drug Use - no (used to smoke Marijuana and Mojo)    Current Medications:    topiramate (TOPAMAX) 25 MG tablet, Take 25 mg by mouth 2 (two) times daily., Disp: , Rfl:     ibuprofen (ADVIL,MOTRIN) 800 MG tablet, Take 1 tablet (800 mg total) by mouth every 8 (eight) hours as needed for Pain., Disp: 30 tablet, Rfl: 0    JOLIVETTE 0.35 mg tablet, TK 1 T PO QD, Disp: , Rfl: 11    lithium (ESKALITH) 300 MG capsule, Take 600 mg by mouth 2 (two) times daily. , Disp: , Rfl:     lorazepam (ATIVAN) 1 MG tablet, TK 1 T PO 30 MINUTES BEFORE MRI FOR ANXIETY, Disp: , Rfl: 0    lurasidone (LATUDA) 80 mg Tab tablet, Take 80 mg by mouth 2 (two) times daily. , Disp: , Rfl:     melatonin 10 mg Tab, Take 5 mg by mouth. , Disp: , Rfl:     metformin (GLUCOPHAGE) 500 MG tablet, Take 1 tablet (500 mg total) by mouth 2 (two) times daily with meals., Disp: 180 tablet, Rfl: 0    ondansetron (ZOFRAN-ODT) 8 MG TbDL, Take 1 tablet (8 mg total) by mouth every 8 (eight)  "hours as needed., Disp: 20 tablet, Rfl: 0    trazodone (DESYREL) 150 MG tablet, Take 200 mg by mouth every evening. , Disp: , Rfl:     Objective:    Vitals:  /86 (BP Location: Right arm, Patient Position: Sitting)   Pulse 76   Ht 5' 4.5" (1.638 m)   Wt 120.1 kg (264 lb 12.4 oz)   BMI 44.75 kg/m²     Physical Exam:  Patient is awake, alert and oriented to person, place and time.  Moves all 4 extremities against gravity.  Muscle strength symmetric bilaterally in all extremities.  Gait and station within normal limits.  Cranial Nerves II through XII without focal deficit.     Review of Data:   Labs:  17- Lithium Level, CSF Glucose, CSF Protein, CSF cell count with differential - unremarkable  3/15/17 - CBC with elevated platelet count, CMP elevated glucose and LFTs     Imagin17 - MRI Brain W/O Contrast  Radiologist Impression - "Normal brain MRI.Electronically signed by: Mitesh Contreras MD. Date: 17. Time: 14:59"     17 - CT Head without contrast-   Radiologist Impression- "No acute intracranial abnormalities seen" Electronically signed by: Rut Graves MD.  Date 17. Time: 14:38    Note: I have independently reviewed any/all imaging/labs/tests and agree with the report (s) as documented.  Any discrepancies will be as noted/demarcated by free text.  CHERYL MARIN 2017    Assessment:    1. Chronic intractable headache, unspecified headache type    2. Benign intracranial hypertension    3. Vision changes    4. Morbid obesity with BMI of 40.0-44.9, adult    5. Borderline personality disorder    6. Lithium use    7. Tobacco abuse      Plan:  - Continue Topamax 25 mg daily, defer to Psychiatry for further titration or adjustments of meds for migraines   - Excedrin migraine as needed for rescue   - She does not want to have LP that was ordered at last visit completed as this was very uncomfortable for her the last time   As her headaches have gotten slightly better since starting " Topamax, will cancel repeat LP   Discussed should headaches worsen or change in anyway, we will have to repeat the LP which she is agreeable to   - Strongly encouraged Mary get her glasses prescription filled    Recommended she call the number on the back of her insurance card to ask where she can bring a prescription for reading glasses to be filled near her that accepts her insurance  - Continue regular f/up with Dr. Diaz in Opthalmology   - Continue regular exercise   - Stressed importance of healthy diet as when she initially eliminated grains from her diet she had significant improvement in the intensity of her migraines (used to vomit with each HA, no longer does since before mentioned diet change)  - Continue regular follow-up with psychiatry   - Appt to be seen in Sleep Medicine clinic in 2 weeks  - Encouraged Mary to slowly begin cutting back on cigarettes, states she is not ready to stop or cut back on smoking  - Start tracking headaches   - Discussed goals of therapy are to decrease the frequency, intensity, and duration of headaches  - Follow up in 3 months or sooner if needed     Discussed potential for teratogenicity with treatment, patient understands if her family planning status should change she will contact office immediately and we will safely adjust medications as needed.   The patient verbalizes understanding and agreement with the treatment plan. Questions were sought and answered to her stated verbal satisfaction.    Dr. Houston was available during today's encounter. Any change to plan will appear in the EMR     CC: Haley Cohn DO & CHASITY Fitzgerald, FNP-C  [facility] [place]   [phone]      Focused examination was undertaken today. Over 50% of the 20 minute visit time was spent reviewing results from diagnostic studies, counseling regarding her symptoms, and building her treatment plan based on the combination of results and symptoms. Risks,  benefits and alternatives were discussed at great length. Questions were sought and answered to her stated verbal satisfaction.

## 2017-06-15 ENCOUNTER — OFFICE VISIT (OUTPATIENT)
Dept: SLEEP MEDICINE | Facility: CLINIC | Age: 22
End: 2017-06-15
Payer: MEDICARE

## 2017-06-15 VITALS
DIASTOLIC BLOOD PRESSURE: 77 MMHG | HEART RATE: 75 BPM | HEIGHT: 65 IN | SYSTOLIC BLOOD PRESSURE: 124 MMHG | WEIGHT: 270.06 LBS | BODY MASS INDEX: 45 KG/M2

## 2017-06-15 DIAGNOSIS — G47.30 SLEEP APNEA, UNSPECIFIED TYPE: Primary | ICD-10-CM

## 2017-06-15 PROCEDURE — 99213 OFFICE O/P EST LOW 20 MIN: CPT | Mod: PBBFAC | Performed by: PSYCHIATRY & NEUROLOGY

## 2017-06-15 PROCEDURE — 99204 OFFICE O/P NEW MOD 45 MIN: CPT | Mod: S$PBB,,, | Performed by: PSYCHIATRY & NEUROLOGY

## 2017-06-15 PROCEDURE — 99999 PR PBB SHADOW E&M-EST. PATIENT-LVL III: CPT | Mod: PBBFAC,,, | Performed by: PSYCHIATRY & NEUROLOGY

## 2017-06-15 NOTE — PROGRESS NOTES
Mary Myles  was seen at the request of  eTrri Power FNP for sleep evaluation.    06/15/2017 INITIAL HISTORY OF PRESENT ILLNESS:  Mary Myles is a 21 y.o. female is here to be evaluated for a sleep disorder.       CHIEF COMPLAINT:      The patient's complaints include excessive daytime sleepiness, excessive daytime fatigue, snoring and interrupted sleep; restless sleep  since  Many years ago. Not sure of gasping/cholking/apneas..    Reports  dry mouth and sore throat  Reports nasal congestion   Reports  morning headaches  Reports  interrupted sleep  Reports frequent leg movements  Reports symptoms concerning for parasomnia- nightmares - for many years now - dreaming of being trapped.     The ESS ([place] Sleepiness Score) taken on initial visit is 8 /24    The patient never had tonsillectomy, adenoidectomy or UPPP      SLEEP ROUTINE AND LIFESTYLE 06/15/2017 :    Occupation:on disability    Bed partner: pamela - sound sleeper     Time to bed - wake up time on a workday : 11 pm to 11 AM  Sleep onset latency: 10-15 min  Disruptions or awakenings: 1-2  Time to fall back into sleep: 10-15 min   Perceived sleep quality: 3/5  Perceived total sleep time:  12  hours.  Daytime naps: 0    Exercise routine: no  Caffeine:  Coffee   Day -not after 6 PM, cokes  Coke 0   Taking Melatonin - still does not feel rested     PREVIOUS SLEEP STUDIES:     none      DME:       PAST MEDICAL HISTORY:    Active Ambulatory Problems     Diagnosis Date Noted    Benign intracranial hypertension 03/15/2017    Schizoaffective disorder     Pseudotumor cerebri syndrome     PCOS (polycystic ovarian syndrome)     GERD (gastroesophageal reflux disease)     Fibromyalgia     Depression     Chronic headache     Borderline personality disorder     ADD (attention deficit disorder)      Resolved Ambulatory Problems     Diagnosis Date Noted    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     Past Medical History:   Diagnosis Date     ADD (attention deficit disorder)     Borderline personality disorder     Chronic headache     Depression     Fibromyalgia     GERD (gastroesophageal reflux disease)     PCOS (polycystic ovarian syndrome)     Pseudotumor cerebri syndrome     Schizoaffective disorder                 PAST SURGICAL HISTORY:    Past Surgical History:   Procedure Laterality Date    TONSILLECTOMY      WISDOM TOOTH EXTRACTION           FAMILY HISTORY:                Family History   Problem Relation Age of Onset    Seizures Mother     Migraines Mother     Migraines Brother        SOCIAL HISTORY:          Tobacco:   History   Smoking Status    Current Every Day Smoker    Packs/day: 0.50    Types: Cigarettes   Smokeless Tobacco    Never Used       alcohol use:    History   Alcohol Use No                   ALLERGIES:  Review of patient's allergies indicates:  No Known Allergies    CURRENT MEDICATIONS:    Current Outpatient Prescriptions   Medication Sig Dispense Refill    ibuprofen (ADVIL,MOTRIN) 800 MG tablet Take 1 tablet (800 mg total) by mouth every 8 (eight) hours as needed for Pain. 30 tablet 0    JOLIVETTE 0.35 mg tablet TK 1 T PO QD  11    lithium (ESKALITH) 300 MG capsule Take 600 mg by mouth 2 (two) times daily.       lorazepam (ATIVAN) 1 MG tablet TK 1 T PO 30 MINUTES BEFORE MRI FOR ANXIETY  0    lurasidone (LATUDA) 80 mg Tab tablet Take 80 mg by mouth 2 (two) times daily.       melatonin 10 mg Tab Take 5 mg by mouth.       ondansetron (ZOFRAN-ODT) 8 MG TbDL Take 1 tablet (8 mg total) by mouth every 8 (eight) hours as needed. 20 tablet 0    topiramate (TOPAMAX) 25 MG tablet Take 25 mg by mouth 2 (two) times daily.      trazodone (DESYREL) 150 MG tablet Take 200 mg by mouth every evening.       metformin (GLUCOPHAGE) 500 MG tablet Take 1 tablet (500 mg total) by mouth 2 (two) times daily with meals. 180 tablet 0     No current facility-administered medications for this visit.                       REVIEW OF  "SYSTEMS:   Sleep related symptoms as per HPI    reports weight gain - lost 20 lbs  Denies dyspnea  Denies palpitations  Reports acid reflux - not on meds  Denies polyuria  Denies  mood diturbance  Denies  anemia  Denies  muscle pain  Denies  Gait imbalance    Otherwise, a balance of 10 systems reviewed is negative.    PHYSICAL EXAM:  /77 (BP Location: Left arm, Patient Position: Sitting, BP Method: Automatic)   Pulse 75   Ht 5' 4.5" (1.638 m)   Wt 122.5 kg (270 lb 1 oz)   BMI 45.64 kg/m²   GENERAL: Normal development, well groomed.  HEENT:   HEENT:  Conjunctivae are non-erythematous; Pupils equal, round, and reactive to light; Nose is symmetrical; Nasal mucosa is pink and moist; Septum is midline; Inferior turbinates are normal; Nasal airflow is diminished on the right; Posterior pharynx is pink; Modified Mallampati:III-IV; Posterior palate is low; Tonsils not visualized; Uvula is wide and elongated;Tongue is enlarged; Dentition is fair; No TMJ tenderness; Jaw opening and protrusion without click and without discomfort.  NECK: Supple. Neck circumference is 16 inches. No thyromegaly. No palpable nodes.     SKIN: On face and neck: No abrasions, no rashes, no lesions.  No subcutaneous nodules are palpable.  RESPIRATORY: Chest is clear to auscultation.  Normal chest expansion and non-labored breathing at rest.  CARDIOVASCULAR: Normal S1, S2.  No murmurs, gallops or rubs. No carotid bruits bilaterally.  No edema. No clubbing. No cyanosis.    NEURO: Oriented to time, place and person. Normal attention span and concentration. Gait normal.    PSYCH: Affect is full. Mood is normal  MUSCULOSKELETAL: Moves 4 extremities. Gait normal.         Using [facility]: no      ASSESSMENT:    1. Sleep Apnea NEC. The patient symptomatically has  excessive daytime sleepiness, snoring,  witnessed breathing pauses, excessive daytime fatigue, gasping for air in sleep and interrupted sleep  with exam findings of "a crowded oral " airway and elevated body mass index. The patient has medical co-morbidities of headache, fibromyalgia and ADD,  which can be worsened by MALCOLM. This warrants further investigation for possible obstructive sleep apnea.          PLAN:    Diagnostic: Polysomnogram in lab. The nature of this procedure and its indication was discussed with the patient. she would  like to come discuss PSG results.    Weight loss strategies were discussed in detail           More than 25 minutes of this 45 minutes visit was spent in counseling: during our discussion today, we talked about the etiology of  MALCOLM as well as the potential ramifications of untreated sleep apnea, which could include daytime sleepiness, hypertension, heart disease and/or stroke.  We discussed potential treatment options, which could include weight loss, body positioning, continuous positive airway pressure (CPAP), or referral for surgical consideration. Meanwhile, she  is urged to avoid supine sleep, weight gain and alcoholic beverages since all of these can worsen MALCOLM.     Precautions: The patient was advised to abstain from driving should he feel sleepy or drowsy.    Follow up: MD/NP  after the sleep study has been completed.     Thank you for allowing me the opportunity to participate in the care of your patient.    This visit summary will be sent to referring provider via inbasket

## 2017-06-15 NOTE — PATIENT INSTRUCTIONS
SLEEP LAB ([facility] or [facility]) will contact you to schedulethe sleep study. Their number is [phone] (ext 1). Please call them if you do not hear from them in 10 business days from now.  The [facility] is located on 7th floor of the [facility]; [place] lab is located in [facility].    SLEEP CLINIC (my assistant) will call you when the sleep study results are ready - if you have not heard from us by 2 weeks from the date of the study, please call [phone] (ext 2) or you can use [facility] to contact me.    You are advised to abstain from driving should you feel sleepy or drowsy.

## 2017-06-19 ENCOUNTER — TELEPHONE (OUTPATIENT)
Dept: SLEEP MEDICINE | Facility: OTHER | Age: 22
End: 2017-06-19

## 2017-06-20 ENCOUNTER — OFFICE VISIT (OUTPATIENT)
Dept: BARIATRICS | Facility: CLINIC | Age: 22
End: 2017-06-20
Payer: MEDICARE

## 2017-06-20 VITALS
BODY MASS INDEX: 43.27 KG/M2 | DIASTOLIC BLOOD PRESSURE: 76 MMHG | WEIGHT: 259.69 LBS | SYSTOLIC BLOOD PRESSURE: 110 MMHG | HEART RATE: 76 BPM | HEIGHT: 65 IN

## 2017-06-20 DIAGNOSIS — E66.01 MORBID OBESITY WITH BMI OF 40.0-44.9, ADULT: Primary | ICD-10-CM

## 2017-06-20 PROCEDURE — 99213 OFFICE O/P EST LOW 20 MIN: CPT | Mod: PBBFAC | Performed by: INTERNAL MEDICINE

## 2017-06-20 PROCEDURE — 99213 OFFICE O/P EST LOW 20 MIN: CPT | Mod: S$PBB,,, | Performed by: INTERNAL MEDICINE

## 2017-06-20 PROCEDURE — 99999 PR PBB SHADOW E&M-EST. PATIENT-LVL III: CPT | Mod: PBBFAC,,, | Performed by: INTERNAL MEDICINE

## 2017-06-20 NOTE — PATIENT INSTRUCTIONS
"No soda, sweet tea, juices or lemonade    3 meals a day made up of the following:  Unlimited green vegetables, tomatoes, mushrooms, spaghetti squash, cauliflower, meat, poultry, seafood, eggs and hard cheeses.   Milk and plain yogurt  Dressings, seasonings, condiments, etc should have less than 2 g sugars.   beans or nuts can have 1 x a day.   1-2 servings of citrus fruits, berries, pineapple or melon a day (1/2 cup)  Avoid fried foods    No grains, rice, pasta, potatoes, bread    [email or web address].dietdoctor.[employer] for recipes.     Exercise 30 min 5 times a week. Add some type of resistance training 2-3 days a week. These can be body weight exercises, light weight or elastic bands. [employer] and [employer] are great sources for free work out plans and videos. Search "home workout without equipment beginners"        Return in about 6-8 weeks     Meal Idea  *Recipes and products available at www.bariatriceating.com      Breakfast: (15-20g protein)    - Egg white omelet: 2 egg whites or ½ cup Egg Beaters. (Optional proteins: cheese, shrimp, black beans, chicken, sliced turkey) (Optional veggies: tomatoes, salsa, spinach, mushrooms, onions, green peppers, or small slice avocado)     - Egg and sausage: 1 egg or ¼ cup Egg Beaters (any variety), with 1 segun or 2 links of Turkey sausage or Veggie breakfast sausage (iyzico or Smartdate)    - Crust-less breakfast quiche: To make a glass pie dish, mix 4oz part skim Ricotta, 1 cup skim milk, and 2 eggs as your base. Add protein: shredded cheese, sliced lean ham or turkey, turkey villalpando/sausage. Add veggies: tomato, onion, green onion, mushroom, green pepper, spinach, etc.    - Yogurt parfait: Mix 1 - 6oz container Dannon Light N Fit vanilla yogurt, with ¼ cup Kashi Go Lean cereal    - Cottage cheese and fruit: ½ cup part-skim cottage cheese or ricotta cheese topped with fresh fruit or sugar free preserves     - Hanna Medrano's Vanilla Egg custard* (add 2 Tbsp instant coffee granules to make " Cappuccino Custard*)    - Hi-Protein café latte (skim milk, decaf coffee, 1 scoop protein powder). Optional to add Sugar free syrup or extract flavoring.    Lunch: (20-30g protein)    - ½ cup Black bean soup (Homemade or Progresso), with ¼ cup shredded low-fat cheese. Top with chopped tomato or fresh salsa.     - Lean deli turkey breast and low-fat sliced cheese, mustard or light pemberton to moisten, rolled up together, or wrapped in a Beto lettuce leaf    - Chicken salad made from dinner leftovers, moisten with low-fat salad dressing or light pemberton. Also try leftover salmon, shrimp, tuna or boiled eggs. Serve ½ cup over dark green salad    - Fat-free canned refried beans, topped with ¼ cup shredded low-fat cheese. Top with chopped tomato or fresh salsa.     - Greek salad: Top mixed greens with 1-2oz grilled chicken, tomatoes, red onions, 2-3 kalamata olives, and sprinkle lightly with feta cheese. Spritz with Balsamic vinegar to taste.     - Crust-less lunch quiche: To make a glass pie dish, mix 4oz part skim Ricotta, 1 cup skim milk, and 2 eggs as your base. Add protein: shredded cheese, sliced lean ham or turkey, shrimp, chicken. Add veggies: tomato, onion, green onion, mushroom, green pepper, spinach, artichoke, broccoli, etc.    - Pizza bake: tomato sauce, low-fat shredded mozzarella and turkey pepperoni or Cross villalpando. Add any veggies.    - Cucumber crab bites: Spread ¼ cup crab dip (lump crabmeat + light cream cheese and green onions) over sliced cucumber.     - Chicken with light spinach and artichoke dip*: Puree in : 6oz cooked and drained spinach, 2 cloves garlic, 1 can cannelloni beans, ½ cup chopped green onions, 1 can drained artichoke hearts (not marinated in oil), lemon juice and basil. Mix in 2oz chopped up chicken.    Supper: (20-30g protein)    - Serve grilled fish over dark green salad tossed with low-fat dressing, served with grilled asparagus davidson     - Rotisserie chicken salad:  served with sliced strawberries, walnuts, fat-free feta cheese crumbles and 1 tbsp Zuritas Own Light Raspberry [place]amie Vinaigrette    - Shrimp cocktail: Dip cold boiled shrimp in homemade low-sugar cocktail sauce (1/2 cup Florence One Carb ketchup, 2 tbsp horseradish, 1/4 tsp hot sauce, 1 tsp Worcestershire sauce, 1 tbsp freshly-squeezed lemon juice). Serve with dark green salad, walnuts, and crumbled blue cheese drizzled with olive oil and Balsamic vinegar    - Tuna Melt: Spread tuna salad onto 2 thick slices of tomato. Top with low-fat cheese and broil until cheese is melted. May also be made with chicken salad of shrimp salad. Idana with different types of cheeses.    - Homemade low-fat Chili using extra lean ground beef or ground turkey. Top with shredded cheese and salsa as desired. May add dollop fat-free sour cream if desired    - Dinner Omelet with shrimp or chicken and onion, green peppers and chives.    - No noodle lasagna: Use sliced zucchini or eggplant in place of noodles.  Layer with part skim ricotta cheese and low sugar meat sauce (use very lean ground beef or ground turkey).    - Mexican chicken bake: Bake chunks of chicken breast or thigh with taco seasoning, Pace brand enchilada sauce, green onions and low-fat cheese. Serve with ¼ cup black beans or fat free refried beans topped with chopped tomatoes or salsa.    - Manuel frozen meatballs, simmered in Classico Marinara sauce. Different flavors of salsa or spaghetti sauce create different dishes! Sprinkle with parmesan cheese. Serve with grilled or steamed veggies, or a dark green salad.    - Simmer boneless skinless chicken thigh chunks in Classico Marinara sauce or roasted salsa until tender with chopped onion, bell pepper, garlic, mushrooms, spinach, etc.     - Hamburger, without the bun, dressed the way you like. Served with grilled or steamed veggies.    - Eggplant parmesan: Bake slices of eggplant at 350 degrees for 15 minutes. Layer  tomato sauce, sliced eggplant and low-fat mozzarella cheese in a baking dish and cover with foil. Bake 30-40 more minutes or until bubbly. Uncover and bake at 400 degrees for about 15 more minutes, or until top is slightly crisp.    - Fish tacos: grilled/baked white fish, wrapped in Beto lettuce leaf, topped with salsa, shredded low-fat cheese, and light coleslaw.    Snacks: (100-200 calories; >5g protein)    - 1 low-fat cheese stick with 8 cherry tomatoes or 1 serving fresh fruit  - 4 thin slices fat-free turkey breast and 1 slice low-fat cheese  - 4 thin slices fat-free honey ham with wedge of melon  - 1/4 cup unsalted nuts with ½ cup fruit  - 6-oz container Dannon Light n Fit vanilla yogurt, topped with 1oz unsalted nuts         - apple, celery or baby carrots spread with 2 Tbsp natural peanut butter or almond butter   - apple slices with 1 oz slice low-fat cheese  - celery, cucumber, bell pepper or baby carrots dipped in ¼ cup hummus bean spread or light spinach and artichoke dip (*recipe in lunch section)  - 100 calorie bag microwave light popcorn with 3 tbsp grated parmesan cheese  - Geronimo Links Beef Steak - 14g protein! (similar to beef jerky)  - 2 wedges Laughing Cow - Light Herb & Garlic Cheese with sliced cucumber or green bell pepper  - 1/2 cup low-fat cottage cheese with ¼ cup fruit or ¼ cup salsa  - RTD Protein drinks: Atkins, Low Carb Slim Fast, EAS light, Muscle Milk Light, etc.  - Homemade Protein drinks: GNC Soy95, Isopure, Nectar, UNJURY, Whey Gourmet, etc. Mix 1 scoop powder with 8oz skim/1% milk or light soymilk.  - Protein bars: Atkins, EAS, Pure Protein, Think Thin, Detour, etc. Must have 0-4 grams sugar - Read the label.    Takeout Options: No more than twice/week  Deli - Salads (no pasta or rice), meats, cheeses. Roasted chicken. Lox (salmon)    Mexican - Platters which don't include tortillas, chips, or rice. Go easy on the beans. Example: Fajitas without the tortillas. Ask the   not to bring chips to the table if they are too tempting.    Greek - Meat or fish and vegetable, but no bread or rice. Including hummus, baba ganoush, etc, is OK. Most sit-down Greek restaurants can provide you with cucumber slices for dipping instead of ede bread.    Fast Food (Avoid as much as possible) - Salads (no croutons and limit salad dressing to 2 tbsp), grilled chicken sandwich without the bun and ask for no pemberton. Chiquitas low fat chili or Taco Bell pintos and cheese.    BBQ - The meats are fine if you ask for sauces on the side, but most of the traditional side dishes are loaded with carbs. Sang slaw, baked beans and BBQ sauce are typically made with sugar.    Chinese - Nothing deep-fried, no rice or noodles. Many Chinese sauces have starch and sugar in them, so you'll have to use your judgement. If you find that these sauces trigger cravings, or cause Dumping, you can ask for the sauce to be made without sugar or just use soy sauce.

## 2017-06-20 NOTE — PROGRESS NOTES
"Subjective:       Patient ID: Mary Myles is a 22 y.o. female.    Chief Complaint: Follow-up    Pt here today for follow up. She has lost 5 more lbs, 24 lbs in total. Started metformin with LCHF diet. Walking up to 2 miles at a time. For a week she was depressed and was not eating on plan.  States it was not out of hunger. She does sometimes, feel hungry or has cravings, but has been able to resist. She did drink ETOH yesterday and had upset stomach. She is walking.             Review of Systems   Constitutional: Positive for fatigue. Negative for chills and fever.        Has not felt well rested since she was a kid.    Respiratory: Negative for apnea and shortness of breath.         + snores. No Hx psg   Cardiovascular: Positive for leg swelling. Negative for chest pain.   Gastrointestinal: Positive for constipation and diarrhea.        + constant GERD even with PPI   Genitourinary: Negative for difficulty urinating.        On OCPs   Musculoskeletal: Positive for arthralgias and back pain.   Neurological: Positive for light-headedness and headaches. Negative for dizziness.   Psychiatric/Behavioral: Positive for dysphoric mood and hallucinations. The patient is nervous/anxious.         Sees Dr. Deb Schumacher.        Objective:     /76   Pulse 76   Ht 5' 4.5" (1.638 m)   Wt 117.8 kg (259 lb 11.2 oz)   BMI 43.89 kg/m²     Physical Exam   Constitutional: She is oriented to person, place, and time. She appears well-developed. No distress.   Morbidly obese     HENT:   Head: Normocephalic and atraumatic.   Eyes: EOM are normal. Pupils are equal, round, and reactive to light. No scleral icterus.   Neck: Normal range of motion. Neck supple.   Cardiovascular: Normal rate.    Pulmonary/Chest: Effort normal.   Abdominal: Soft. Bowel sounds are normal.   Musculoskeletal: Normal range of motion. She exhibits no edema.   Neurological: She is alert and oriented to person, place, and time. No cranial nerve deficit. "   Skin: Skin is warm and dry. No erythema.   + hirsute     Psychiatric: She has a normal mood and affect. Her behavior is normal. Judgment normal.   Vitals reviewed.      Assessment:       1. Morbid obesity with BMI of 40.0-44.9, adult        Plan:         1.Morbid obesity with BMI of 40.0-44.9, adult  Doing well.  Discussed developing new, healthier coping mechanisms.     No soda, sweet tea, juices or lemonade    3 meals a day made up of the following:  Unlimited green vegetables, tomatoes, mushrooms, spaghetti squash, cauliflower, meat, poultry, seafood, eggs and hard cheeses.   Milk and plain yogurt  Dressings, seasonings, condiments, etc should have less than 2 g sugars.   beans or nuts can have 1 x a day.   1-2 servings of citrus fruits, berries, pineapple or melon a day (1/2 cup)  Avoid fried foods    No grains, rice, pasta, potatoes, bread    [email or web address] for recipes.     Exercise 30 min 3 times a week.      Return in about 6 weeks (

## 2017-06-24 ENCOUNTER — HOSPITAL ENCOUNTER (OUTPATIENT)
Dept: SLEEP MEDICINE | Facility: OTHER | Age: 22
Discharge: HOME | End: 2017-06-24
Attending: PSYCHIATRY & NEUROLOGY
Payer: MEDICARE

## 2017-06-24 DIAGNOSIS — G47.30 SLEEP APNEA, UNSPECIFIED TYPE: ICD-10-CM

## 2017-06-24 DIAGNOSIS — G47.33 OSA (OBSTRUCTIVE SLEEP APNEA): ICD-10-CM

## 2017-06-24 PROCEDURE — 95810 POLYSOM 6/> YRS 4/> PARAM: CPT

## 2017-06-24 PROCEDURE — 95810 POLYSOM 6/> YRS 4/> PARAM: CPT | Mod: 26,,, | Performed by: PSYCHIATRY & NEUROLOGY

## 2017-06-25 NOTE — PROGRESS NOTES
Mary Myles was here at [facility], for an overnight sleep study 06/24/2017.  Pt education/cpap explanation given prior to study.      Pt did not meet Medicare criteria for cpap.  Most significant events are noted with supine REM sleep. Soft to moderate snore also noted.      EKG- Lead 2 appears in NSR.  Low saturation observed 75%.      Technical difficulties: loss of chin signal. Leads rereferenced to maintain signal.

## 2017-07-05 DIAGNOSIS — E28.2 PCOS (POLYCYSTIC OVARIAN SYNDROME): ICD-10-CM

## 2017-07-05 NOTE — TELEPHONE ENCOUNTER
----- Message from Yaya Espinoza sent at 7/5/2017  3:41 PM CDT -----  Pt said she needs a refill on Metformin. Pt pharmacy [facility] in [place]. Pt can be reached at [phone]

## 2017-07-06 RX ORDER — METFORMIN HYDROCHLORIDE 500 MG/1
500 TABLET ORAL 2 TIMES DAILY WITH MEALS
Qty: 180 TABLET | Refills: 0 | Status: SHIPPED | OUTPATIENT
Start: 2017-07-06 | End: 2018-01-12

## 2017-07-19 ENCOUNTER — TELEPHONE (OUTPATIENT)
Dept: SLEEP MEDICINE | Facility: CLINIC | Age: 22
End: 2017-07-19

## 2017-07-19 NOTE — TELEPHONE ENCOUNTER
Please schedule for the follow up with MD / NP  to review sleep study results.  Positive for mild yet  significant   sleep apnea.    Thanks!

## 2017-07-20 ENCOUNTER — TELEPHONE (OUTPATIENT)
Dept: SLEEP MEDICINE | Facility: CLINIC | Age: 22
End: 2017-07-20

## 2017-07-20 NOTE — TELEPHONE ENCOUNTER
----- Message from Juanita Sanchez sent at 7/20/2017  8:16 AM CDT -----  Contact: HUBER GONZALEZ [[id number]]  x_  1st Request  _  2nd Request  _  3rd Request        Who: HUBER GONZALEZ [[id number]]    Why: returning call    What Number to Call Back: [phone]    When to Expect a call back: (With in 24 hours)

## 2017-08-08 ENCOUNTER — OFFICE VISIT (OUTPATIENT)
Dept: BARIATRICS | Facility: CLINIC | Age: 22
End: 2017-08-08
Payer: MEDICARE

## 2017-08-08 VITALS — WEIGHT: 254.63 LBS | BODY MASS INDEX: 43.47 KG/M2 | HEIGHT: 64 IN

## 2017-08-08 DIAGNOSIS — E66.01 MORBID OBESITY WITH BMI OF 40.0-44.9, ADULT: Primary | ICD-10-CM

## 2017-08-08 DIAGNOSIS — R10.13 DYSPEPSIA: ICD-10-CM

## 2017-08-08 PROCEDURE — 3008F BODY MASS INDEX DOCD: CPT | Mod: ,,, | Performed by: INTERNAL MEDICINE

## 2017-08-08 PROCEDURE — 99213 OFFICE O/P EST LOW 20 MIN: CPT | Mod: PBBFAC | Performed by: INTERNAL MEDICINE

## 2017-08-08 PROCEDURE — 99999 PR PBB SHADOW E&M-EST. PATIENT-LVL III: CPT | Mod: PBBFAC,,, | Performed by: INTERNAL MEDICINE

## 2017-08-08 PROCEDURE — 99213 OFFICE O/P EST LOW 20 MIN: CPT | Mod: S$PBB,,, | Performed by: INTERNAL MEDICINE

## 2017-08-08 RX ORDER — SULFAMETHOXAZOLE AND TRIMETHOPRIM 800; 160 MG/1; MG/1
TABLET ORAL
COMMUNITY
Start: 2017-08-02 | End: 2017-10-12 | Stop reason: ALTCHOICE

## 2017-08-08 RX ORDER — CARIPRAZINE 3 MG/1
4.5 CAPSULE, GELATIN COATED ORAL DAILY
COMMUNITY
Start: 2017-07-24 | End: 2018-01-30 | Stop reason: ALTCHOICE

## 2017-08-08 RX ORDER — TRAZODONE HYDROCHLORIDE 100 MG/1
TABLET ORAL
COMMUNITY
Start: 2017-07-19 | End: 2017-08-08

## 2017-08-08 RX ORDER — LITHIUM CARBONATE 600 MG/1
CAPSULE ORAL
COMMUNITY
Start: 2017-07-19 | End: 2017-10-12

## 2017-08-08 RX ORDER — LURASIDONE HYDROCHLORIDE 60 MG/1
TABLET, FILM COATED ORAL
COMMUNITY
Start: 2017-07-19 | End: 2017-08-08

## 2017-08-08 RX ORDER — BUSPIRONE HYDROCHLORIDE 10 MG/1
TABLET ORAL
COMMUNITY
Start: 2017-07-19 | End: 2018-01-20

## 2017-08-08 RX ORDER — VARENICLINE TARTRATE 0.5 (11)-1
KIT ORAL
COMMUNITY
Start: 2017-07-19 | End: 2017-08-08

## 2017-08-08 RX ORDER — MUPIROCIN 20 MG/G
OINTMENT TOPICAL
COMMUNITY
Start: 2017-08-02 | End: 2017-10-12

## 2017-08-08 RX ORDER — LURASIDONE HYDROCHLORIDE 120 MG/1
TABLET, FILM COATED ORAL
COMMUNITY
Start: 2017-06-16 | End: 2017-08-08

## 2017-08-08 RX ORDER — CLONAZEPAM 0.5 MG/1
TABLET ORAL
COMMUNITY
Start: 2017-05-22 | End: 2017-08-08

## 2017-08-08 RX ORDER — ALPRAZOLAM 0.5 MG/1
TABLET ORAL
COMMUNITY
Start: 2017-06-20 | End: 2017-10-12

## 2017-08-08 NOTE — PROGRESS NOTES
"Subjective:       Patient ID: Mary Myles is a 22 y.o. female.    Chief Complaint: Follow-up    Pt here today for follow up. She has lost 5 more lbs, 29 lbs in total. Started metformin with LCHF diet. Walking up to 2 miles at a time. Some of her psych meds have been changed. She has joined a gym. She did try a youtube work out video, but found it too difficult. She has recently quit smoking. She had some nausea before she started chantix for smoking cessation. The nausea was so bad she stopped the chantix after 2 weeks. She has quit smoking as well. The nausea has continued. She used to take tums frequently for heart burn, but has not needed that for a while.         Review of Systems   Constitutional: Positive for fatigue. Negative for chills and fever.        Has not felt well rested since she was a kid.    Respiratory: Negative for apnea and shortness of breath.         + snores. No Hx psg   Cardiovascular: Positive for leg swelling. Negative for chest pain.   Gastrointestinal: Positive for constipation, diarrhea and nausea.        Gerd improving.    Genitourinary: Negative for difficulty urinating.        On OCPs   Musculoskeletal: Positive for arthralgias and back pain.   Neurological: Positive for light-headedness and headaches. Negative for dizziness.   Psychiatric/Behavioral: Positive for dysphoric mood and hallucinations. The patient is nervous/anxious.         Sees Dr. Deb Schumacher.        Objective:     Ht 5' 4" (1.626 m)   Wt 115.5 kg (254 lb 10.1 oz)   BMI 43.71 kg/m²     Physical Exam   Constitutional: She is oriented to person, place, and time. She appears well-developed. No distress.   Morbidly obese     HENT:   Head: Normocephalic and atraumatic.   Eyes: EOM are normal. Pupils are equal, round, and reactive to light. No scleral icterus.   Neck: Normal range of motion. Neck supple.   Cardiovascular: Normal rate.    Pulmonary/Chest: Effort normal.   Abdominal: Soft. Bowel sounds are normal. "   Musculoskeletal: Normal range of motion. She exhibits no edema.   Neurological: She is alert and oriented to person, place, and time. No cranial nerve deficit.   Skin: Skin is warm and dry. No erythema.   + hirsute     Psychiatric: She has a normal mood and affect. Her behavior is normal. Judgment normal.   Vitals reviewed.      Assessment:       1. Morbid obesity with BMI of 40.0-44.9, adult    2. Dyspepsia        Plan:           1. Morbid obesity with BMI of 40.0-44.9, adult    Doing well.  Discussed doing a combination of cardio and weights, and some resources for that . APplauded pt for stopping smoking and still managing to lose some weight.   2. Dyspepsia    - ranitidine (ZANTAC) 150 MG tablet; Take 1 tablet (150 mg total) by mouth 2 (two) times daily as needed (upset stomach).  Dispense: 60 tablet; Refill: 3        Comfort food recipes given.     No soda, sweet tea, juices or lemonade    3 meals a day made up of the following:  Unlimited green vegetables, tomatoes, mushrooms, spaghetti squash, cauliflower, meat, poultry, seafood, eggs and hard cheeses.   Milk and plain yogurt  Dressings, seasonings, condiments, etc should have less than 2 g sugars.   beans or nuts can have 1 x a day.   1-2 servings of citrus fruits, berries, pineapple or melon a day (1/2 cup)  Avoid fried foods    No grains, rice, pasta, potatoes, bread    [email or web address] for recipes.     Exercise 45 min 3 times a week.      Return in about 6 weeks (

## 2017-08-08 NOTE — PATIENT INSTRUCTIONS
No soda, sweet tea, juices or lemonade    3 meals a day made up of the following:  Unlimited green vegetables, tomatoes, mushrooms, spaghetti squash, cauliflower, meat, poultry, seafood, eggs and hard cheeses.   Milk and plain yogurt  Dressings, seasonings, condiments, etc should have less than 2 g sugars.   beans or nuts can have 1 x a day.   1-2 servings of citrus fruits, berries, pineapple or melon a day (1/2 cup)  Avoid fried foods    No grains, rice, pasta, potatoes, bread    [email or web address] for recipes.     Exercise 45 min 3 times a week    Lower Carb Comfort Food Dupes      Skinny Bell Pepper Kd Boats  Yields: 18 boats  Servin boats  Calories: 145  Total Fat: 9g  Saturated Fat: 4g  Trans Fat: 0g  Cholesterol: 50mg  Sodium: 293mg  Carbohydrates: 4g  Fiber: 1g  Sugars: 2g  Protein: 13g  SmartPoints: 4     Ingredients   1 pound lean ground turkey   1 teaspoons chili powder   1 teaspoon cumin   1/2 teaspoon black pepper   1/4 teaspoon kosher or sea salt   3/4 cup salsa, no sugar added   1 cup grated cheddar cheese, reduced-fat   3 bell peppers  Directions  Remove seeds, core, and membrane from bell peppers then slice each one into 6 verticle pieces where they dip down. Set sliced bell peppers aside.  Cook ground turkey over medium-high heat, breaking up as it cooks. Cook until the turkey loses it's pink color and is cooked through. Drain off any fat.  Preheat oven to 375 degrees.  Combine cooked turkey with spices and salsa. Evenly distribute mixture into the bell pepper boats, top with cheese. Bake on a parchment lined baking sheet for 10 minutes or until cheese is melted and peppers are hot.  NOTE: If you prefer much softer bell peppers, add a few tablespoons water to the bottom of a large casserole dish, add filled nachos, cover tightly with foil and bake 15 minutes.  Remove from the oven and add additional toppings, If desired.  Optional ingredients: sliced Jalepeno peppers,  diced avocado, fat-free Greek yogurt or sour cream, or sliced green onions.    [place] Chicken Spaghetti Squash  Yields: 4 servings  Calories: 457  Total Fat: 23g  Saturated Fat: 8g  Trans Fat: 0g  Cholesterol: 201mg  Sodium: 1146mg  Carbohydrates: 19g  Fiber: 4g  Sugar: 9g  Protein: 44g  SmartPoints: 13    Ingredients   1 large spaghetti squash   1 small onion, diced   2 medium carrots, diced   2 celery stalks, diced   1 pound cooked chicken, shredded   1/2 cup hot sauce   1/4 cup Homemade Ranch dressing   1/2 teaspoon garlic powder   salt and pepper to taste   1 cup low-fat shredded cheddar cheese   2 eggs   1/4 cup green onion, chopped  Directions  Preheat oven to 400 degrees F and spray a baking sheet with cooking spray.  Slice your spaghetti squash in half lengthwise and scoop out the seeds, then spray the cut side of the squash with a little olive oil cooking spray and place cut side down on the baking pan.  Roast spaghetti squash for 30-45 minutes or until it is tender.  While the squash is cooking, sauté the onion, celery, and carrots until softened and mostly cooked through.  In a large bowl, combine the sauteed vegetables, chicken, hot sauce, ranch dressing, garlic powder, salt, pepper, eggs, and cheese.  Once the squash is cooked through, allow to cool slightly then use a fork to scrape the insides into your chicken mixture, making sure not to tear the skins.  Make sure that the spaghetti squash is well incorporated with the other ingredients, then divide the mixture between the spaghetti squash halves.  Bake at 350 degrees for 30-35 minutes, or until hot and bubbly.  Remove from the oven and garnish with the green onions and additional ranch or hot sauce if desired.    Lasagna Zucchini Boats  Servings: 8 zucchini boats  Ingredients   Report this ad    4 medium zucchini (2 1/2 lbs), sliced into halves through the length*   1 cup (8.6 oz) part-skim ricotta cheese   1 large  egg   1 1/2 Tbsp chopped fresh parsley , plus more for garnish   1 1/4 cups (5 oz) shredded mozzarella cheese   1/2 cup (2 oz) finely shredded parmesan cheese   8 oz 93% lean ground beef or lean ground turkey   4 tsp olive oil , divided   Salt and freshly ground black pepper   1 3/4 cup roasted garlic marinara sauce (low sugar)   1 Tbsp chopped fresh basil , plus more for garnish  Instructions  1. Preheat oven to 400 degrees. Using a spoon, scoop centers from zucchini while leaving a 1/4-inch rim to create boats. Set aside.  2. In a mixing bowl stir together ricotta cheese, egg and 1 1/2 Tbsp of the parsley. Season lightly with salt and pepper. Stir in 1/2 cup of the mozzarella cheese and the parmesan cheese. Set aside.  3. Heat 2 tsp of the olive oil in a large non-stick skillet over medium-high heat. Crumble beef into pan, season with salt and pepper and cook, stirring occasionally and breaking up beef when stirring, until browned (there shouldn't be any excess fat but if you happened to use a fattier beef then just drain excess rendered fat). Stir in marinara sauce and 1 Tbsp of the basil, remove from heat.  4. To assemble boats, brush both sides of of zucchini lightly with remaining 2 tsp olive oil and place in two baking pans (I used a 13 by 9 and a 9 by 9). Divide cheese mixture among zucchini spooning about 2 1/2 Tbsp into each, then spread cheese mixture into and even layer. Divide sauce among zucchini adding a few heaping spoonfuls to each. Cover baking dishes with foil and place in oven side by side and bake in preheated oven 30 minutes. Remove from oven, sprinkle tops with remaining 3/4 cup mozzarella, return to oven and bake until cheese has melted and zucchini is tender, about 5 minutes. Sprinkle tops with with fresh basil and parsley and serve warm.  5. *Look for zucchini that is wider and more uniform in width. The skinnier zucchini won't fit much filling.  6. Recipe source: Cooking  "Classy    Chicken Avocado Lime Soup  Prep Time: 15 minutes  Cook Time: 20 minutes  Ingredients   Report this ad    1 1/2 lbs boneless skinless chicken breasts*   1 Tbsp olive oil   1 cup chopped green onions (including whites, mince the whites)   2 jalapeños , seeded and minced (leave seeds if you want soup spicy, omit if you don't like heat)   2 cloves garlic , minced   4 (14.5 oz) cans low-sodium chicken broth   2 Radha tomatoes , seeded and diced   1/2 tsp ground cumin   Salt and freshly ground black pepper   1/3 cup chopped cilantro   3 Tbsp fresh lime juice   3 medium avocados , peeled, cored and diced   Tortilla chips , loreto judy cheese, sour cream for serving (optional)    Instructions  1. In a large pot heat 1 Tbsp olive oil over medium heat. Once hot, add green onions and jalapenos and saute until tender, about 2 minutes, adding garlic during last 30 seconds of sauteing. Add chicken broth, tomatoes, cumin, season with salt and pepper to taste and add chicken breasts. Bring mixture to a boil over medium-high heat. Then reduce heat to medium, cover with lid and allow to cook, stirring occasionally, until chicken has cooked through 10 - 15 minutes (cook time will vary based on thickness of chicken breasts). Reduce burner to warm heat, remove chicken from pan and let rest on a cutting board 5 minutes, then shred chicken and return to soup. Stir in cilantro and lime juice. Add avocados to soup just before serving (if you don't plan on serving the soup right away, I would recommend adding the avocados to each bowl individually, about 1/2 an avocado per serving). Serve with tortilla chips, cheese and sour cream if desired.  2. *For thicker chicken breasts, cut breasts in half through the length (thickness) of the breasts, they will cook faster and more evenly.  3. Recipe Source: adapted slightly from [facility]        CAULIFLOWER "MAC" AND CHEESE    INGREDIENTS   1 small head cauliflower cut into " small florets about 5-6 cups   1/2 small onion, diced   1 teaspoon olive oil   Kosher salt   Freshly ground black pepper   2 tablespoons parsley   1 teaspoon paprika   2 tablespoons butter   2 tablespoons flour   1 1/4 cups milk, (whole milk or coconut is best)   1/2 teaspoon granulated garlic   1 teaspoon mustard (optional)   1/2 teaspoon paprika   2 1/2 cups grated extra sharp cheddar cheese (reserve 1/2 cup)     PREPARATION  1. Preheat oven to 400°F. Place cauliflower florets on a baking sheet, mix with diced onion and oil, sprinkle with salt and pepper. Roast for 20-25 minutes tossing half way through until browned.  2. Warm the milk in the microwave, so it is just heated through (this helps prevent the cheese sauce from clumping). Heat a medium saucepan over medium med-high heat. Add 2 tablespoons butter and flour then whisk for 2 minutes (until a smooth andre is made), add milk and continue whisking until sauce thickens. Once smooth add salt and pepper and other spices. Then add the cheese reserving 1/2 cup. Mix with spatula and add cauliflower, stir gently. Now add the reserved cheese, mix just enough to evenly distribute.   4. Place mixture into a 8x8 inch greased casserole dish and cover with paprika and parsley. Bake in oven for 20 minutes until toasty and bubbly.

## 2017-08-10 ENCOUNTER — OFFICE VISIT (OUTPATIENT)
Dept: SLEEP MEDICINE | Facility: CLINIC | Age: 22
End: 2017-08-10
Payer: MEDICARE

## 2017-08-10 VITALS
WEIGHT: 251.75 LBS | DIASTOLIC BLOOD PRESSURE: 70 MMHG | HEIGHT: 64 IN | HEART RATE: 72 BPM | SYSTOLIC BLOOD PRESSURE: 100 MMHG | BODY MASS INDEX: 42.98 KG/M2

## 2017-08-10 DIAGNOSIS — G47.33 OBSTRUCTIVE SLEEP APNEA: Primary | ICD-10-CM

## 2017-08-10 PROCEDURE — 99214 OFFICE O/P EST MOD 30 MIN: CPT | Mod: S$PBB,,, | Performed by: NURSE PRACTITIONER

## 2017-08-10 PROCEDURE — 99999 PR PBB SHADOW E&M-EST. PATIENT-LVL IV: CPT | Mod: PBBFAC,,, | Performed by: NURSE PRACTITIONER

## 2017-08-10 PROCEDURE — 99214 OFFICE O/P EST MOD 30 MIN: CPT | Mod: PBBFAC | Performed by: NURSE PRACTITIONER

## 2017-08-15 ENCOUNTER — CLINICAL SUPPORT (OUTPATIENT)
Dept: OPHTHALMOLOGY | Facility: CLINIC | Age: 22
End: 2017-08-15
Payer: MEDICARE

## 2017-08-15 ENCOUNTER — OFFICE VISIT (OUTPATIENT)
Dept: OPHTHALMOLOGY | Facility: CLINIC | Age: 22
End: 2017-08-15
Payer: MEDICARE

## 2017-08-15 DIAGNOSIS — G93.2 BENIGN INTRACRANIAL HYPERTENSION: Primary | ICD-10-CM

## 2017-08-15 PROCEDURE — 99213 OFFICE O/P EST LOW 20 MIN: CPT | Mod: PBBFAC | Performed by: OPHTHALMOLOGY

## 2017-08-15 PROCEDURE — 92083 EXTENDED VISUAL FIELD XM: CPT | Mod: PBBFAC | Performed by: OPHTHALMOLOGY

## 2017-08-15 PROCEDURE — 92012 INTRM OPH EXAM EST PATIENT: CPT | Mod: S$PBB,,, | Performed by: OPHTHALMOLOGY

## 2017-08-15 PROCEDURE — 99999 PR PBB SHADOW E&M-EST. PATIENT-LVL III: CPT | Mod: PBBFAC,,, | Performed by: OPHTHALMOLOGY

## 2017-08-15 NOTE — PROGRESS NOTES
HPI     Concerns About Ocular Health    Additional comments: Benign intracranial hypertension           Comments   DLS:03/15/2017 Emily  Patient here 3 months follow up and Review HVF.  Pt states no change in vision since last visit. Pt states OU vision still   blurry.  Pain comes and goes.  Migraine today-8 on pain scale.    I have personally interviewed the patient, reviewed the history and   examined the patient and agree with the technician's exam.       Last edited by Alonso Diaz MD on 8/15/2017  2:00 PM. (History)            Assessment /Plan     For exam results, see Encounter Report.    Benign intracranial hypertension  -     Wu Visual Field - OU - Extended - Both Eyes      Ms. Myles has no papilledema at this time. I will repeat her exam and visual field testing in four months.

## 2017-08-17 ENCOUNTER — OFFICE VISIT (OUTPATIENT)
Dept: RHEUMATOLOGY | Facility: CLINIC | Age: 22
End: 2017-08-17
Payer: MEDICARE

## 2017-08-17 ENCOUNTER — HOSPITAL ENCOUNTER (OUTPATIENT)
Dept: RADIOLOGY | Facility: HOSPITAL | Age: 22
Discharge: HOME | End: 2017-08-17
Attending: INTERNAL MEDICINE
Payer: MEDICARE

## 2017-08-17 VITALS
TEMPERATURE: 99 F | HEIGHT: 64 IN | BODY MASS INDEX: 43.28 KG/M2 | SYSTOLIC BLOOD PRESSURE: 119 MMHG | WEIGHT: 253.5 LBS | DIASTOLIC BLOOD PRESSURE: 79 MMHG | HEART RATE: 79 BPM

## 2017-08-17 DIAGNOSIS — M25.50 POLYARTHRALGIA: ICD-10-CM

## 2017-08-17 DIAGNOSIS — E55.9 HYPOVITAMINOSIS D: ICD-10-CM

## 2017-08-17 DIAGNOSIS — M89.9 DISORDER OF BONE AND CARTILAGE: ICD-10-CM

## 2017-08-17 DIAGNOSIS — M94.9 DISORDER OF BONE AND CARTILAGE: ICD-10-CM

## 2017-08-17 DIAGNOSIS — M79.7 FIBROMYALGIA: ICD-10-CM

## 2017-08-17 DIAGNOSIS — R53.83 FATIGUE, UNSPECIFIED TYPE: ICD-10-CM

## 2017-08-17 DIAGNOSIS — M25.50 POLYARTHRALGIA: Primary | ICD-10-CM

## 2017-08-17 DIAGNOSIS — M79.10 MYALGIA: ICD-10-CM

## 2017-08-17 PROCEDURE — 77077 JOINT SURVEY SINGLE VIEW: CPT | Mod: TC

## 2017-08-17 PROCEDURE — 99213 OFFICE O/P EST LOW 20 MIN: CPT | Mod: PBBFAC,25,PO | Performed by: INTERNAL MEDICINE

## 2017-08-17 PROCEDURE — 99999 PR PBB SHADOW E&M-EST. PATIENT-LVL III: CPT | Mod: PBBFAC,,, | Performed by: INTERNAL MEDICINE

## 2017-08-17 PROCEDURE — 77077 JOINT SURVEY SINGLE VIEW: CPT | Mod: 26,,, | Performed by: RADIOLOGY

## 2017-08-17 PROCEDURE — 99205 OFFICE O/P NEW HI 60 MIN: CPT | Mod: S$PBB,,, | Performed by: INTERNAL MEDICINE

## 2017-08-17 PROCEDURE — 3008F BODY MASS INDEX DOCD: CPT | Mod: ,,, | Performed by: INTERNAL MEDICINE

## 2017-08-17 ASSESSMENT — ROUTINE ASSESSMENT OF PATIENT INDEX DATA (RAPID3)
PATIENT GLOBAL ASSESSMENT SCORE: 5
PAIN SCORE: 7.5
MDHAQ FUNCTION SCORE: 1.6
TOTAL RAPID3 SCORE: 5.94
PSYCHOLOGICAL DISTRESS SCORE: 9.9

## 2017-08-17 NOTE — PROGRESS NOTES
Subjective:       Patient ID: Mary Myles is a 22 y.o. female.    Chief Complaint: Fibromyalgia (Dx at [facility]. by Dr. Montemayor at age 13)    HPI 23 yo female with MALCOLM and Schizoaffective disorder is seen in consultation for pain. Follows with Dr Gonzalez She c/o myalgia and polyarthralgia for the last 10 years.  Pain is aching, intermittent, worse with mensuration, worse at the end of the day, better with rest. No joint effusion. Early morning stiffness lasts for 1 hr  Follows with Dr Schumacher from Psychiatry                                 Widespread pain index 16  Note the areas which the patient has had pain over the last week:                   Shoulder-girdle, left               Shoulder-girdle, right                         Upper arm left                       Upper arm right                         Lower arm left                       Lower arm right    Hip (buttock, trochanter) left  Hip (buttock, trochanter) right                           Upper leg, left                         Upper leg, right                           Lower leg, left                         Lower leg, right                                     Jaw, left                                   Jaw, right                                        Chest                                  Abdomen                               Upper back                              Lower back                                        Neck  Score will be from 0-19:                                         Symptom severity score 10   Fatigue   Waking Unrefreshed  Cognitive Symptoms   0 = no problem, 1=slight or mild problem 2= moderate; considerable problems often present and/or at a moderate level, 3 = severe, pervasive, continuous, life disturbing problem  For each of the 3 symptoms, indicate the level of severity over the past week using the Scale.  The symptom severity score is the sum of the severity of the 3 symptoms (fatigue, waking unrefreshed, and cognitive  "symptoms) plus the number of the following symptoms occurring during the previous 6 months:   Headaches  Pain or cramps in the lower abdomen  Depression  The final score is between 0 and 12                                          Criteria  Patient has fibromyalgia if the following 3 conditions are met:  1.  Widespread pain index greater than or equal to 7 and symptom severity score greater than or equal to 5 or widespread pain index between 3- 6, and symptom severity score greater than or equal to 9.    2.  Symptoms have been present in a similar level for at least 3 months  3.  The patient does not have a disorder that would otherwise sufficiently explain the pain    Review of Systems   Constitutional: Positive for fatigue. Negative for fever.   HENT: Negative for ear discharge and ear pain.    Eyes: Negative for pain and redness.   Respiratory: Negative for cough and shortness of breath.    Cardiovascular: Negative for chest pain and palpitations.   Gastrointestinal: Negative for abdominal distention and abdominal pain.   Genitourinary: Negative for genital sores and hematuria.   Musculoskeletal: Positive for myalgias.   Neurological: Negative for tremors and seizures.   Psychiatric/Behavioral: Negative for agitation and hallucinations.         Objective:   /79   Pulse 79   Temp 98.6 °F (37 °C)   Ht 5' 4" (1.626 m)   Wt 115 kg (253 lb 8 oz)   BMI 43.51 kg/m²      Physical Exam   Nursing note and vitals reviewed.  Constitutional: She is oriented to person, place, and time and well-developed, well-nourished, and in no distress.   HENT:   Head: Normocephalic and atraumatic.   Eyes: Conjunctivae and EOM are normal. Pupils are equal, round, and reactive to light.   Neck: Normal range of motion. Neck supple. No thyromegaly present.   Cardiovascular: Normal rate and regular rhythm.  Exam reveals no friction rub.    Pulmonary/Chest: Effort normal and breath sounds normal.   Abdominal: Soft. Bowel sounds are " normal. She exhibits no mass.   Neurological: She is alert and oriented to person, place, and time. She exhibits normal muscle tone.   Skin: Skin is warm and dry.     Psychiatric: Memory and affect normal.   Musculoskeletal: She exhibits no edema.   ROM is within normal limits in all joints including shoulders, elbows, wrists, hands, hips, knees, ankles, feet and spine  Patient is diffusely tender in almost all joints including 18 x 18 fibromyalgia tender points  No joint effusion  Strength is 5/5 in all ext, reflexes are 2+b/l               Obtain records from Dr. Gonzalez  Assessment:   Polyarthralgia- Rule out inflammatory arthritis  Diffused myalgia-Rule out myositis  Fibromyalgia WPI 16 SSS10   Fatigue-Rule out disorders of bone and cartilage/vitamin d deficiency      Plan:     Obtain records from Dr. Gonzalez  Check TALIB, SSA, RF, CCP,  Arthritis survey  Check CPK, aldolase,, 25 hydroxy Vit D   -The patient was educated on fibromyalgia.  Handout provided  -Symptoms/presentations, non-pharmacologic and pharmacologic treatments and their efficacies were reviewed.   -Non-pharmacologic approaches were stressed.   -Regular/daily exercise was especially emphasized. Strategies for success were offered.   -Cognitive behavioral therapy is very helpful  Return to clinic after lab    Addendum- Low vitamin D. Prescribed vitamin D 50,000 a week for 12 weeks    -Patient seen face to face for 60 minutes and greater than 50% spent in counseling regarding fibromyalgia and chart review   .

## 2017-08-17 NOTE — LETTER
August 18, 2017      Haley Cohn DO  [address] [facility] [address]           [facility] - Rheumatology  [address] [facility] [address]  [facility] [address]  Phone: [phone]  Fax: [phone]          Patient: Mary Myles   MR Number: [id number]   YOB: 1995   Date of Visit: 8/17/2017       Dear Dr. Haley Cohn:    Thank you for referring Mary Myles to me for evaluation. Attached you will find relevant portions of my assessment and plan of care.    If you have questions, please do not hesitate to call me. I look forward to following Mary Myles along with you.    Sincerely,    Jade Jones MD    Enclosure  CC:  No Recipients    If you would like to receive this communication electronically, please contact [email or web address] or [phone] to request more information on [employer] access.    For providers and/or their staff who would like to refer a patient to [facility], please contact us through our one-stop-shop provider referral line, [facility], at [phone].    If you feel you have received this communication in error or would no longer like to receive these types of communications, please e-mail [email or web address]

## 2017-08-18 RX ORDER — ERGOCALCIFEROL 1.25 MG/1
50000 CAPSULE ORAL
Qty: 12 CAPSULE | Refills: 0 | Status: SHIPPED | OUTPATIENT
Start: 2017-08-18 | End: 2018-01-20

## 2017-08-21 ENCOUNTER — TELEPHONE (OUTPATIENT)
Dept: RHEUMATOLOGY | Facility: CLINIC | Age: 22
End: 2017-08-21

## 2017-08-21 NOTE — TELEPHONE ENCOUNTER
Nivia,   Please let the patient know that I have reviewed results and there is no evidence of inflammatory arthritis. X rays show evidence of degenerative arthritis.  Low vitamin D. Prescribed vitamin D 50,000 a week for 12 weeks.  She should continue to follow with primary care physician for pain management.   Thanks, SS

## 2017-08-21 NOTE — TELEPHONE ENCOUNTER
----- Message from Krupa Pemberton RT sent at 8/21/2017 11:50 AM CDT -----  Contact: pt    Called pod pt , returned missed call, thanks.

## 2017-08-21 NOTE — TELEPHONE ENCOUNTER
Returned pt call. Pt inquiring on follow up appointment with Dr Jones. Nurse reviewed last note again with pt and informed to follow up with PCP for pain management. Advised to contact office if needed. Pt verbalized understanding.

## 2017-08-21 NOTE — TELEPHONE ENCOUNTER
Patient informed no evidence of inflammatory arthritis.  X ray showed degenerative arthritis.  Vitamin D level low. Prescribed Vitamin D 50,000u a week x12 weeks.  Follow up with PCP for pain management.  Voice understanding.

## 2017-08-25 ENCOUNTER — OFFICE VISIT (OUTPATIENT)
Dept: NEUROLOGY | Facility: CLINIC | Age: 22
End: 2017-08-25
Payer: MEDICARE

## 2017-08-25 VITALS
DIASTOLIC BLOOD PRESSURE: 67 MMHG | WEIGHT: 251.31 LBS | SYSTOLIC BLOOD PRESSURE: 111 MMHG | HEIGHT: 64 IN | BODY MASS INDEX: 42.9 KG/M2 | HEART RATE: 76 BPM

## 2017-08-25 DIAGNOSIS — G43.719 INTRACTABLE CHRONIC MIGRAINE WITHOUT AURA AND WITHOUT STATUS MIGRAINOSUS: Primary | ICD-10-CM

## 2017-08-25 DIAGNOSIS — Z72.0 TOBACCO USE: ICD-10-CM

## 2017-08-25 DIAGNOSIS — Z79.899 LITHIUM USE: ICD-10-CM

## 2017-08-25 DIAGNOSIS — F25.1 SCHIZOAFFECTIVE DISORDER, DEPRESSIVE TYPE: ICD-10-CM

## 2017-08-25 DIAGNOSIS — G93.2 BENIGN INTRACRANIAL HYPERTENSION: ICD-10-CM

## 2017-08-25 DIAGNOSIS — F60.3 BORDERLINE PERSONALITY DISORDER: ICD-10-CM

## 2017-08-25 PROCEDURE — 99214 OFFICE O/P EST MOD 30 MIN: CPT | Mod: S$PBB,,, | Performed by: NURSE PRACTITIONER

## 2017-08-25 PROCEDURE — 99213 OFFICE O/P EST LOW 20 MIN: CPT | Mod: PBBFAC | Performed by: NURSE PRACTITIONER

## 2017-08-25 PROCEDURE — 99999 PR PBB SHADOW E&M-EST. PATIENT-LVL III: CPT | Mod: PBBFAC,,, | Performed by: NURSE PRACTITIONER

## 2017-08-25 NOTE — PROGRESS NOTES
Established Patient   SUBJECTIVE:  Patient ID: Mary Myles is a 22 y.o. female.  Chief Complaint: Follow-up    History of Present Illness:  Mary Myles is a 22 y.o. female with history of IIH, borderline personality disorder, MALCOLM, ADD, PCOS, fibromyalgia, depression, schizoaffective disorder, migraines, and history of suicide attempt, who presents to the clinic with her significant other for follow-up of headaches.     Recommendations made at last Office Visit on 6/2/2017:  - Continue Topamax 25 mg daily, defer to Psychiatry for further titration or adjustments of meds for migraines   - Excedrin migraine as needed for rescue   - She does not want to have LP that was ordered at last visit completed as this was very uncomfortable for her the last time                     - Strongly encouraged Mary get her glasses prescription filled as it was recommended to her to wear glasses and she has not been   - Continue regular f/up with Dr. Diaz in Opthalmology   - Continue regular exercise   - Stressed importance of healthy diet as when she initially eliminated grains from her diet she had significant improvement in the intensity of her migraines (used to vomit with each HA, no longer does since before mentioned diet change)  - Continue regular follow-up with psychiatry   - Appt to be seen in Sleep Medicine clinic in 2 weeks  - Encouraged Mary to slowly begin cutting back on cigarettes, states she is not ready to stop or cut back on smoking  - Start tracking headaches   - Discussed goals of therapy are to decrease the frequency, intensity, and duration of headaches  - Follow up in 3 months or sooner if needed     08/25/2017 - Interval History:  - Seen by Dr. Diaz on 8/15 - no papilledema found   - Was seen by Sleep medicine who did find her to have mild sleep apnea - CPAP ordered  - Disclosed she has had tics since childhood, but they are becoming worse and becoming more violent, states at some point she  "was put on clonidine for them, but has since run out of the medicine and no longer takes it, was given this medication by her old PCP, she cannot remember how long she has been out of this medication, but feels this tic has been getting worse and worse over the last month.  Tic is located in her neck and she feels her head always goes to the right side during this tic, states this is how this has always been.  She is unsure why this symptom has gotten worse over the last month.  Denies change in medication within this timeframe   - Has been under increased stress recently, as she is to be tested for ASD, this testing is to be done by a psychologist in [place], she has continued regular follow-up with her Mental Health provider.     - "Headaches are bad", they are not better, is taking Topamax 25 mg BID, takes excedrin migraine at least once per day for her headaches, still has a headache all day, everyday, she feels it in her sleep (when she does sleep), however she states she has not been sleeping and when she does sleep, its not for very long   - Never got a CPAP machine, was never contacted by DME - suggested she f/up with sleep medicine or contact them with regards to getting machine  - Has now lost 30 pounds since March 6/2/17 - Interval History:  - Was started on Topamax 25 mg daily two weeks ago by Psychiatry Deb Schumacher NP ,has noticed a slight decrease in the intensity of her migraines since starting medication   - Had a 6 day psychiatric hospital admission due to suicidal thoughts because her relationship with her fiance ended, states she "wanted to cut" but was stopped by her brother and brought to the ED   Denies SI/HI at this time   - Next appt with Deb Schumacher NP 6/19/17   - States she is still having migraines all day, everyday, but has an occasional break in pains  - She is continued to exercise, states she has been doing a lot of walking    - Admits she has been cheating on her diet quite a " "lot recently   - Seen by Dr. Diaz on 5/15 - prescription given for eyeglasses - is nearsighted    Has not found a place to bring her glasses prescription that will accept her insurances (has both medicare and medicaid) - everywhere she has gone has been too expensive   - MRI Brain done - unremarkable   - LP not done yet, she does not want to have it done as this was very uncomfortable for her the last time   - Has continued regular follow-up with Bariatric medicine   - Has an appt with Sleep Medicine in 2 weeks      Recommendations at last OV on 5/1/2017:  - MRI Brain W/O Contrast  - Repeat LP    - opening pressure in left lateral position   - CSF for cell count, glucose, protein, and a culture   - Referral to Sleep medicine for possible MALCOLM  - Continue regular follow-up with Bariatric medicine as weight loss will help with IIH   - Medication Management for Migraines deferred to psychiatry as she is prescribed a high dose of lithium for her bipolar depression and many migraine medications may cause potential drug interactions with lithium. Recommendations included - Slow titration of Topamax 25 mg to 25 mg BID or Gabapentin    5/1/17 - Interval History   21 y.o. female with history of bipolar disorder, migraines, and pseudotumor cerebri diagnosed in February 2017 based on elevated opening pressure from LP. She presents with her brother, who provides some history, for headache management.    She is a poor historian, brother provides more clear details of migraine history.   States she began having migraines when she was 4 years old and has been suffering from them ever since.  These migraines usually lasted 1-2 weeks and then would gradually subside.  Currently she has been experiencing a constant headache "for the last 20 weeks", states she has not experienced one minute without pain in the last 20 weeks, even feels pain in her sleep.  Pain is bitemporal, in her neck, crown of her head and middle of forehead and " "rated anywhere from a 6-10 out of 10 and gets to a 10 about twice per week.  3/15 - Dr. Alonso Diaz (Neurophthalmology) - who did not visualize papilledema on exam   3/21 - Consult with Dr. Dumont - referred her to bariatric medicine because opening pressure was only minimally elevated at 26 cm water and she did not receive relief from lumbar puncture.  3/27 - Initial visit with Bariatric medicine- started on Metformin and given an exercise & diet regimen to follow  Over the last month, states the symptoms associated with her migraine have gotten better, most notably nausea, vomiting and double vision, but the head pain has not subsided.  Does not feel with weight loss or metformin her migraine has gotten better, brother present states it has gotten better because she is not vomiting any more.  Endorses double vision and vomiting stopped about 1 month ago (corresponds with timing of start of metformin).    Associated symptoms - nausea, vomiting, photophobia, phonophobia, blurred vision, double vision (went away last month), neck pain, shoulder pain    Aggravating - lights, menstrual cycle, caffeine, when its about to rain    Alleviating - excedrin   Sleep- sometimes good, sometimes bad, last night only slept 2 hours.  She does snore  Family Hx of Migraines (mother, brother), aneurysms, brain tumors     3/9/2017 - Initial AL[facility]O HPI per Amauri Roberts PA-C  States no real HA hx (sans occasional HA), until about a month ago. Was having blurred vision, colour vision changes, tinnitus, and pain along her frontalis, retroorbital and periorbital. Was sent by PCP to the ED, LP done under floro (reviewed notes from Dr. Graves 2/28/2017 "L2-3 level right paramedian. 9 ML clear CSF obtained. Opening pressure 26 cm H20, closing 13 cm H20   Patient tolerated procedure well.") and per patient she described being on her side when the LP was performed. When asked about her PMhx, states been on Li for 4 years, has had toxicity once but " "seems "this is the only thing helps my bipolar, and I have tried a lot of medication." She is followed by Dr. Schumacher at [facility].   When asked, never been on steroids, tetracycline products, never had chemotherapy or transplant medications, denies high levels of vitamin A intake (e.g., meals of animal liver), denies acne treatments, denies pregnancy, denies illicit substance use or any other factor. She is taking fioricet or other pain medications on a near daily basis. States she took fioricet just before coming to the office.   States she has daily blurred vision with changing visual fields/rotating vision loss coupled with eye pain and transient diplopia, colour vision changes, tinnitus and photophobia coupled with eye pain. She is currently stating 8/10 pain. She is having trouble sleeping.   She also has a Hx of SI (denies this currently) and FMG.     Treatments Tried and Response  TNB - no help  Lamictal - unsure  Topiramate - unsure  Gabapentin- helped in past  Depakote - unsure  Fioricet - no help  Vistaril - no help  Ibuprofen - no help  Trazodone - doesn't really help migraine, helps her sleep  Excedrin - makes headache tolerable  Topamax 25 mg Qd - slightly decrease in intensity of migraines     Current Medications:    alprazolam (XANAX) 0.5 MG tablet, , Disp: , Rfl:     busPIRone (BUSPAR) 10 MG tablet, , Disp: , Rfl:     ergocalciferol (ERGOCALCIFEROL) 50,000 unit Cap, Take 1 capsule (50,000 Units total) by mouth every 7 days., Disp: 12 capsule, Rfl: 0    ibuprofen (ADVIL,MOTRIN) 800 MG tablet, Take 1 tablet (800 mg total) by mouth every 8 (eight) hours as needed for Pain., Disp: 30 tablet, Rfl: 0    JOLIVETTE 0.35 mg tablet, TK 1 T PO QD, Disp: , Rfl: 11    lithium (ESKALITH) 300 MG capsule, Take 600 mg by mouth 2 (two) times daily. , Disp: , Rfl:     lithium 600 MG capsule, , Disp: , Rfl:     lurasidone (LATUDA) 80 mg Tab tablet, Take 80 mg by mouth 2 (two) " "times daily. , Disp: , Rfl:     melatonin 10 mg Tab, Take 5 mg by mouth. , Disp: , Rfl:     mupirocin (BACTROBAN) 2 % ointment, , Disp: , Rfl:     sulfamethoxazole-trimethoprim 800-160mg (BACTRIM DS) 800-160 mg Tab, , Disp: , Rfl:     topiramate (TOPAMAX) 25 MG tablet, Take 25 mg by mouth 2 (two) times daily., Disp: , Rfl:     VRAYLAR 3 mg Cap, , Disp: , Rfl:     metformin (GLUCOPHAGE) 500 MG tablet, Take 1 tablet (500 mg total) by mouth 2 (two) times daily with meals., Disp: 180 tablet, Rfl: 0    OBJECTIVE:  Vitals:  /67   Pulse 76   Ht 5' 4" (1.626 m)   Wt 114 kg (251 lb 5.2 oz)   BMI 43.14 kg/m²     Physical Exam:  Constitutional: She appears well-developed and well-nourished. She is well groomed. NAD. Obese   HENT:    Head: Normocephalic and atraumatic, oral and nasal mucosa intact.  Frontalis was NTTP, temporalis was NTTP   Eyes: Conjunctivae and EOM are normal. Pupils are equal, round, and reactive to light   Neck: Neck supple. Occiput and trapezius NTTP.     Musculoskeletal: Normal range of motion. No joint stiffness.   Skin: Skin is warm and dry.  Psychiatric: Normal mood and affect.     Neuro exam:    Mental status:  The patient is awake, alert, and oriented to person, place and time.  Language is intact and fluent  Remote and recent memory are intact  Normal attention and concentration  Mood is stable    Cranial Nerves:  Fundoscopic examination does not reveal any occult papilledema.    Pupils are equal and reactive to light.    Extraocular movements are intact and without nystagmus.    Visual fields are full to confrontation testing.   Facial movement is symmetric.  Facial sensation is intact.      Motor:  Normal muscle bulk and symmetry. No fasciculations were noted.   Tremor not apparent   No tic apparent in neck during OV  RUE:appropriate against gravity and medium force as tested 5-/5  LUE: appropriate against gravity and medium force as tested 5-/5  RLE:appropriate against gravity and " medium force as tested 5-/5              LLE: appropriate against gravity and medium force as tested 5-/5    Reflexes:  Right Brachioradialis 2+  Left Brachioradialis 2+  Right Biceps 2+  Left Biceps 2+  Right Patellar2+  Left Patellar 2+    Sensory:  RUE  intact light touch and temperature  LUE intact light touch and temperature    RLE intact light touch  LLE intact light touch    Gait Normal     Review of Data:   Notes from Amauri Roberts PA-C, Dr. Jones (Rheumatology),  Dr. Diaz, Micaela Terrazas NP (Sleep Medicine) and Bariatrics   Labs:  Results for HUBER GONZALEZ (MRN [id number]) as of 8/28/2017 23:32   Ref. Range 3/15/2017 01:28   WBC Latest Ref Range: 3.90 - 12.70 K/uL 12.40   RBC Latest Ref Range: 4.00 - 5.40 M/uL 4.54   Hemoglobin Latest Ref Range: 12.0 - 16.0 g/dL 13.1   Hematocrit Latest Ref Range: 37.0 - 48.5 % 38.2   MCV Latest Ref Range: 82 - 98 fL 84   MCH Latest Ref Range: 27.0 - 31.0 pg 28.8   MCHC Latest Ref Range: 32.0 - 36.0 % 34.3   RDW Latest Ref Range: 11.5 - 14.5 % 15.2 (H)   Platelets Latest Ref Range: 150 - 350 K/uL 405 (H)   MPV Latest Ref Range: 9.2 - 12.9 fL 8.2 (L)   Gran% Latest Ref Range: 38.0 - 73.0 % 65.1   Gran # Latest Ref Range: 1.8 - 7.7 K/uL 8.0 (H)   Lymph% Latest Ref Range: 18.0 - 48.0 % 23.1   Lymph # Latest Ref Range: 1.0 - 4.8 K/uL 2.9   Mono% Latest Ref Range: 4.0 - 15.0 % 7.0   Mono # Latest Ref Range: 0.3 - 1.0 K/uL 0.9   Eosinophil% Latest Ref Range: 0.0 - 8.0 % 4.0   Eos # Latest Ref Range: 0.0 - 0.5 K/uL 0.5   Basophil% Latest Ref Range: 0.0 - 1.9 % 0.8   Baso # Latest Ref Range: 0.00 - 0.20 K/uL 0.10   Sodium Latest Ref Range: 136 - 145 mmol/L 137   Potassium Latest Ref Range: 3.5 - 5.1 mmol/L 3.8   Chloride Latest Ref Range: 95 - 110 mmol/L 103   CO2 Latest Ref Range: 23 - 29 mmol/L 23   Anion Gap Latest Ref Range: 8 - 16 mmol/L 11   BUN, Bld Latest Ref Range: 6 - 20 mg/dL 9   Creatinine Latest Ref Range: 0.5 - 1.4 mg/dL 0.7   eGFR if non African American  Latest Ref Range: >60 mL/min/1.73 m^2 >60   eGFR if  Latest Ref Range: >60 mL/min/1.73 m^2 >60   Glucose Latest Ref Range: 70 - 110 mg/dL 141 (H)   Calcium Latest Ref Range: 8.7 - 10.5 mg/dL 10.0   Alkaline Phosphatase Latest Ref Range: 55 - 135 U/L 64   Total Protein Latest Ref Range: 6.0 - 8.4 g/dL 7.4   Albumin Latest Ref Range: 3.5 - 5.2 g/dL 3.6   Total Bilirubin Latest Ref Range: 0.1 - 1.0 mg/dL 0.3   AST Latest Ref Range: 10 - 40 U/L 157 (H)   ALT Latest Ref Range: 10 - 44 U/L 165 (H)   Differential Method Unknown Automated     Imagin2017 - MRI Brain W/O Contrast   FINDINGS: The brain and shows normal signal characteristics.  There is no mass.  The ventricles are normal in size.  There is no congenital anomaly.  The corpus callosum is well formed.  There is no hemorrhage or restricted diffusion.  The cerebellum and brainstem are normal.  The major intercranial flow voids are patent.   Impression       Normal brain MRI.  Electronically signed by: Mitesh Contreras MD  Date: 17  Time: 14:59      Note: I have independently reviewed any/all imaging/labs/tests and agree with the report (s) as documented.  Any discrepancies will be as noted/demarcated by free text.  CHERYL MARIN 2017    Focused examination was undertaken today.     ASSESSMENT:  1. Intractable chronic migraine without aura and without status migrainosus    2. Lithium use    3. Tobacco use    4. Borderline personality disorder    5. Schizoaffective disorder, depressive type    6. Benign intracranial hypertension      Medical Decision Making:  BOTOX  The patient has chronic migraines (G43.719) and suffers from headaches more than 15 days a month lasting more than 4 hours a day with no relief of symptoms despite trying multiple medications including topamax, lamictal, depakote, melatonin, trazodone. Botox treatment was approved for chronic migraines in 2010. The patient will be an ideal candidate for Botox. We  are planning for 3 treatments 3 months apart and aiming for at least 50% improvement in the symptoms. If we see no improvement after 3 treatments, we will discontinue the injections.    PLAN:  - Referral to movement clinic for evaluation of neck tic (requests appointment on [facility] as she lives in [place])  - Offered Nerve Block - refused   - Discussed given numerous co-morbid conditions which are hindering treatment options of headaches, will seek approval for Botox injections for Chronic migraine which she is agreeable to   - Discussed increasing dose of Topamax for headaches, she will discuss with her mental health provider at next appointment as this may also provide further benefit of headaches  - May consider initiation of B-blocker at next OV  - Continue regular follow-up with mental health provider  - Avoid overuse of Excedrin - no more than 2-3 times per week   - Stressed importance of good sleep hygiene, discussed sleep hygiene techniques   - Praised her for weight loss progress, and encouraged further weight loss and exercise   - Continue tracking headaches   - Discussed goals of therapy are to decrease the frequency, intensity, and duration of headaches  - Follow up in 1 month for initiation of Botox injections for Chronic migraine      Discussed potential for teratogenicity with treatment, patient understands if her family planning status should change she will contact office immediately and we will safely adjust medications as needed.     I spent 25 minutes face-to-face with the patient with >50% of the time spent with counseling and education regarding:  - results of data, diagnosis, and recommendations stated above  - future treatment options including Botox injections and nerve blocks, discussed limitations to treatment of her headaches due to use of lithium   - risks and benefits of Botox for chronic daily headache   - importance of healthy diet, regular exercise and sleep hygiene in the  treatment of headaches      The patient verbalizes understanding and agreement with the treatment plan. Questions were sought and answered to her stated verbal satisfaction.    CC: DO Terri Woods, FNP-C  [facility]   [phone]    Dr. Houston was available during today's encounter.

## 2017-08-25 NOTE — LETTER
August 28, 2017      Haley Cohn DO  [address] [facility] [address]           [facility]  [address]  [place] [address]  Phone: [phone]  Fax: [phone]          Patient: Mary Myles   MR Number: [id number]   YOB: 1995   Date of Visit: 8/25/2017       Dear Dr. Haley Cohn:    Thank you for referring Mary Myles to me for evaluation. Attached you will find relevant portions of my assessment and plan of care.    If you have questions, please do not hesitate to call me. I look forward to following Mary Myles along with you.    Sincerely,    Ivon Olivia  CC:  No Recipients    If you would like to receive this communication electronically, please contact [email or web address] or [phone] to request more information on [employer] Link access.    For providers and/or their staff who would like to refer a patient to [facility], please contact us through our one-stop-shop provider referral line, [facility], at [phone].    If you feel you have received this communication in error or would no longer like to receive these types of communications, please e-mail [email or web address]

## 2017-08-29 DIAGNOSIS — G43.709 CHRONIC MIGRAINE WITHOUT AURA, WITHOUT MENTION OF INTRACTABLE MIGRAINE WITHOUT MENTION OF STATUS MIGRAINOSUS: Primary | ICD-10-CM

## 2017-08-30 ENCOUNTER — OFFICE VISIT (OUTPATIENT)
Dept: OPTOMETRY | Facility: CLINIC | Age: 22
End: 2017-08-30

## 2017-08-30 ENCOUNTER — OFFICE VISIT (OUTPATIENT)
Dept: OPTOMETRY | Facility: CLINIC | Age: 22
End: 2017-08-30
Payer: MEDICARE

## 2017-08-30 DIAGNOSIS — H52.13 MYOPIA OF BOTH EYES WITH REGULAR ASTIGMATISM: ICD-10-CM

## 2017-08-30 DIAGNOSIS — R51.9 HEADACHE ABOVE THE EYE REGION: Primary | ICD-10-CM

## 2017-08-30 DIAGNOSIS — Z46.0 FITTING AND ADJUSTMENT OF SPECTACLES AND CONTACT LENSES: Primary | ICD-10-CM

## 2017-08-30 DIAGNOSIS — H52.223 MYOPIA OF BOTH EYES WITH REGULAR ASTIGMATISM: ICD-10-CM

## 2017-08-30 PROCEDURE — 92014 COMPRE OPH EXAM EST PT 1/>: CPT | Mod: S$GLB,,, | Performed by: OPTOMETRIST

## 2017-08-30 PROCEDURE — 99999 PR PBB SHADOW E&M-EST. PATIENT-LVL II: CPT | Mod: PBBFAC,,, | Performed by: OPTOMETRIST

## 2017-08-30 PROCEDURE — 92015 DETERMINE REFRACTIVE STATE: CPT | Mod: S$GLB,,, | Performed by: OPTOMETRIST

## 2017-08-30 PROCEDURE — 92310 CONTACT LENS FITTING OU: CPT | Mod: ,,, | Performed by: OPTOMETRIST

## 2017-08-30 NOTE — PROGRESS NOTES
HPI     Mrs. Randall is here today for a contact lens fitting.   Saw Emily for Ocular Migraines and has Neuro eval.  Frontal HAs     (+)Flashes OU (+)Floaters OU   (-)Itch, (+)tear, (+)burn, (-)Dryness. (-) OTC Drops   (-)Photophobia  (-)Glare (-)diplopia (+) headaches chronic migraines           Last edited by Heriberto Alexander, OD on 8/30/2017  2:38 PM. (History)            Assessment /Plan     For exam results, see Encounter Report.    Headache above the eye region  -Pt over-minused by 1D OU  -Possible source of frontal afternoon HAs    Myopia of both eyes with regular astigmatism  Eyeglass Final Rx     Eyeglass Final Rx       Sphere Cylinder Axis Dist VA    Right -1.75 +0.75 115 20/20    Left -2.00 +0.75 095 20/40    Type:  SVL    Expiration Date:  8/31/2018              Contact Lens Final Rx     Final Contact Lens Rx       Brand Base Curve Diameter Sphere Cylinder Addl. Specs    Right Air Optix Aqua 8.60 14.2 -1.25 Sphere Any Color    Left Air Optix Aqua 8.60 14.2 -1.75 Sphere Any Color    Expiration Date:  8/31/2018    Replacement:  Monthly    Solutions:  OptiFree PureMoist    Wearing Schedule:  Daily wear                  RTC 1 yr Cls, OMD as scheduled

## 2017-08-30 NOTE — LETTER
August 30, 2017      Alonso Diaz MD  [address]  [facility] [address]           [facility]  [address]  [facility] [address]  Phone: [phone]  Fax: [phone]          Patient: Mary Myles   MR Number: [id number]   YOB: 1995   Date of Visit: 8/30/2017       Dear Dr. Alonso Diaz:    Thank you for referring Mary Myles to me for evaluation. Attached you will find relevant portions of my assessment and plan of care.    If you have questions, please do not hesitate to call me. I look forward to following Mary Myles along with you.    Sincerely,    Heriberto Alexander, OD    Enclosure  CC:  No Recipients    If you would like to receive this communication electronically, please contact [email or web address] or [phone] to request more information on [employer] Link access.    For providers and/or their staff who would like to refer a patient to [facility], please contact us through our one-stop-shop provider referral line, [facility], at [phone].    If you feel you have received this communication in error or would no longer like to receive these types of communications, please e-mail [email or web address]

## 2017-08-30 NOTE — LETTER
August 30, 2017      Alonso Diaz MD  [address]  [facility] [address]           [facility]  [address]  [facility] [address]  Phone: [phone]  Fax: [phone]          Patient: Mary Myles   MR Number: [id number]   YOB: 1995   Date of Visit: 8/30/2017       Dear Dr. Alonso Diaz:    Thank you for referring Mary Myles to me for evaluation. Attached you will find relevant portions of my assessment and plan of care.    If you have questions, please do not hesitate to call me. I look forward to following Mary Myles along with you.    Sincerely,    Heriberto Alexander, OD    Enclosure  CC:  No Recipients    If you would like to receive this communication electronically, please contact [email or web address] or [phone] to request more information on [facility] Link access.    For providers and/or their staff who would like to refer a patient to [facility], please contact us through our one-stop-shop provider referral line, [facility], at [phone].    If you feel you have received this communication in error or would no longer like to receive these types of communications, please e-mail [email or web address]

## 2017-09-14 ENCOUNTER — TELEPHONE (OUTPATIENT)
Dept: NEUROLOGY | Facility: CLINIC | Age: 22
End: 2017-09-14

## 2017-09-20 ENCOUNTER — PROCEDURE VISIT (OUTPATIENT)
Dept: NEUROLOGY | Facility: CLINIC | Age: 22
End: 2017-09-20
Payer: MEDICARE

## 2017-09-20 VITALS
HEIGHT: 64 IN | HEART RATE: 74 BPM | DIASTOLIC BLOOD PRESSURE: 91 MMHG | SYSTOLIC BLOOD PRESSURE: 131 MMHG | WEIGHT: 239.63 LBS | BODY MASS INDEX: 40.91 KG/M2

## 2017-09-20 PROCEDURE — 64615 CHEMODENERV MUSC MIGRAINE: CPT | Mod: PBBFAC | Performed by: NURSE PRACTITIONER

## 2017-09-20 PROCEDURE — 64615 CHEMODENERV MUSC MIGRAINE: CPT | Mod: S$PBB,,, | Performed by: NURSE PRACTITIONER

## 2017-09-20 RX ADMIN — ONABOTULINUMTOXINA 200 UNITS: 100 INJECTION, POWDER, LYOPHILIZED, FOR SOLUTION INTRADERMAL; INTRAMUSCULAR at 11:09

## 2017-09-20 NOTE — PROCEDURES
SUBJECTIVE/OBJECTIVE:  Patient ID: Mary Myles  Chief Complaint: Botulinum Toxin Injection    History of Present Illness:  22 y.o. female with history of MALCOLM, Borderline personality disorder, Bipolar disorder, GERD, PCOS, Fibromyalgia, and chronic migraines, presents to clinic alone for initiation of Botox injections for Chronic migraine.     Interval History:  - Patient continues to have a headache near daily despite multiple pharmacologic therapies     Risks, Benefits, and potential side effects of Botox discussed with patient.  Alternative treatments offered.  After thorough discussed regarding the procedure, patient has decided to move forward with initiating Botox injections for Chronic Migraine.  Patient understands we will complete 3 rounds of injections, if after 3 rounds, we do not see a 50% reduction in headaches, we will discontinue Botox injections.     Current Medications:    alprazolam (XANAX) 0.5 MG tablet, , Disp: , Rfl:     busPIRone (BUSPAR) 10 MG tablet, , Disp: , Rfl:     ergocalciferol (ERGOCALCIFEROL) 50,000 unit Cap, Take 1 capsule (50,000 Units total) by mouth every 7 days., Disp: 12 capsule, Rfl: 0    ibuprofen (ADVIL,MOTRIN) 800 MG tablet, Take 1 tablet (800 mg total) by mouth every 8 (eight) hours as needed for Pain., Disp: 30 tablet, Rfl: 0    JOLIVETTE 0.35 mg tablet, TK 1 T PO QD, Disp: , Rfl: 11    lithium (ESKALITH) 300 MG capsule, Take 600 mg by mouth 2 (two) times daily. , Disp: , Rfl:     lithium 600 MG capsule, , Disp: , Rfl:     lurasidone (LATUDA) 80 mg Tab tablet, Take 80 mg by mouth 2 (two) times daily. , Disp: , Rfl:     melatonin 10 mg Tab, Take 5 mg by mouth. , Disp: , Rfl:     mupirocin (BACTROBAN) 2 % ointment, , Disp: , Rfl:     sulfamethoxazole-trimethoprim 800-160mg (BACTRIM DS) 800-160 mg Tab, , Disp: , Rfl:     topiramate (TOPAMAX) 25 MG tablet, Take 25 mg by mouth 2 (two) times daily., Disp: , Rfl:     VRAYLAR 3 mg Cap, , Disp: , Rfl:      "metformin (GLUCOPHAGE) 500 MG tablet, Take 1 tablet (500 mg total) by mouth 2 (two) times daily with meals., Disp: 180 tablet, Rfl: 0    Current Facility-Administered Medications:     onabotulinumtoxina injection 200 Units, 200 Units, Intramuscular, Q90 Days, Terri PowerSEVERIANO    Vitals:  BP (!) 131/91   Pulse 74   Ht 5' 4" (1.626 m)   Wt 108.7 kg (239 lb 10.2 oz)   BMI 41.13 kg/m²     Dates of Prior Botox Injections:  9/20/2017    BOTOX was performed as an indicated therapy for intractable chronic migraine headaches given that the patient failed more than 2 headache medications    Two patient identifiers were confirmed with the patient prior to performing this procedure. A time out to determine correct patient and and agreement on procedure performed was conducted prior to the procedure.      Botulinum Toxin Injection Procedure   Procedure: Chemical neurolysis   After risks and benefits were explained including bleeding, infection, worsening of pain, damage to the areas being injected, weakness of muscles, loss of muscle control, dysphagia if injecting the head or neck, facial droop if injecting the facial area, painful injection, allergic or other reaction to the medications being injected, and the failure of the procedure to help the problem, a signed consent was obtained.   The patient was placed in a comfortable area and the sites to be treated were identified.The area to be treated was prepped three times with alcohol and the alcohol allowed to dry. Next, a 30 gauge needle was used to inject the medication in the area to be treated.      Total Botox used: 155 Units   Botox wastage: 45 Units     Injection sites:    muscle bilaterally ( a total of 10 units divided into 2 sites)   Procerus muscle (5 units)   Frontalis muscle bilaterally (a total of 20 units divided into 4 sites)   Temporalis muscle bilaterally (a total of 40 units divided into 8 sites)   Occipitalis muscle bilaterally (a " total of 30 units divided into 6 sites)   Cervical paraspinal muscles (a total of 20 units divided into 4 sites)   Trapezius muscle bilaterally (a total of 30 units divided into 6 sites)   Complications: none   RTC for the next Botox injection: 3 months     ASSESSMENT:  1. Chronic migraine      PLAN:  - Botox administered today, she tolerated procedure well  - Continue all other medications as directed  - No refills needed at this time  - F/up in 3 months for next Botox injections or sooner if needed    All questions and concerns addressed.  Patient verbalizes understanding and is agreeable with the above stated treatment plan.      CC: DO Terri Woods, SEVERIANO-MAMIE  [facility] Department of Neurology   [phone]

## 2017-10-12 ENCOUNTER — OFFICE VISIT (OUTPATIENT)
Dept: BARIATRICS | Facility: CLINIC | Age: 22
End: 2017-10-12
Payer: MEDICARE

## 2017-10-12 VITALS
DIASTOLIC BLOOD PRESSURE: 84 MMHG | SYSTOLIC BLOOD PRESSURE: 130 MMHG | BODY MASS INDEX: 41.97 KG/M2 | HEIGHT: 64 IN | WEIGHT: 245.81 LBS | HEART RATE: 76 BPM

## 2017-10-12 DIAGNOSIS — E66.01 MORBID OBESITY WITH BMI OF 40.0-44.9, ADULT: Primary | ICD-10-CM

## 2017-10-12 PROCEDURE — 99213 OFFICE O/P EST LOW 20 MIN: CPT | Mod: S$PBB,,, | Performed by: INTERNAL MEDICINE

## 2017-10-12 PROCEDURE — 99213 OFFICE O/P EST LOW 20 MIN: CPT | Mod: PBBFAC | Performed by: INTERNAL MEDICINE

## 2017-10-12 PROCEDURE — 99999 PR PBB SHADOW E&M-EST. PATIENT-LVL III: CPT | Mod: PBBFAC,,, | Performed by: INTERNAL MEDICINE

## 2017-10-12 RX ORDER — NORETHINDRONE ACETATE AND ETHINYL ESTRADIOL AND FERROUS FUMARATE 1.5-30(21)
1 KIT ORAL DAILY
Refills: 11 | COMMUNITY
Start: 2017-09-23 | End: 2018-03-08

## 2017-10-12 NOTE — PATIENT INSTRUCTIONS
Healthy all day tips.     One meal a week have whatever you want     No soda, sweet tea, juices or lemonade    3 meals a day made up of the following:  Unlimited green vegetables, tomatoes, mushrooms, spaghetti squash, cauliflower, meat, poultry, seafood, eggs and hard cheeses.   Milk and plain yogurt  Dressings, seasonings, condiments, etc should have less than 2 g sugars.   beans or nuts can have 1 x a day.   1-2 servings of citrus fruits, berries, pineapple or melon a day (1/2 cup)  Avoid fried foods    No grains, rice, pasta, potatoes, bread    Www.dietdoctor.Red e App for recipes.     Exercise 45 min 5 times a week.        Eating well to be healthy and lose weight does not have to be hard. It also does not have to be time consuming or expensive. There a lots of ways you can work in healthy choices into your day. Many of these are easy, quick and even family friendly!    Homemade hazelnut au lait  Brew your favorite brand of hazelnut flavored coffee (I-Pulse makes a good one). Microwave 1/2 cup of milk that fits your eating plan (whole, skim or sugar-free almond milk can all work). Add half to 1 oz sugar free hazelnut syrup.     Quick and easy breakfast  1-2 boiled eggs or mini-frittatas with a tangerine. The boiled eggs and mini-frittatas can both be made ahead and last for up to 4 days in the refrigerator. Bonus if you portion them out in ready to go containers or zipper bags.     Breakfast Egg Muffins with Villalpando and Spinach  Makes 12 muffins  Ingredients    6 eggs  ¼ cup milk  ¼ teaspoon salt  2 cups grated cheddar cheese  3/4 cup spinach, cooked and drained (about 8 oz fresh spinach)  6 villalpando slices, cooked, drained of fat, and chopped  1/2 cup grated Parmesan cheese (optional)    Instructions      Preheat oven to 350 degrees. Use a regular 12-cup muffin pan. Spray the muffin pan with non-stick cooking spray.  In a large bowl, beat eggs until smooth. Add milk, salt, Cheddar cheese and mix. Stir spinach, cooked  villalpando into the egg mixture. Ladle the egg mixture into greased muffin cups ¾ full.  Top each muffin cup with grated Parmesan cheese.  Bake for 25 minutes. Remove from the oven, let the muffins cool for 30 minutes before removing them from the pan.      Be a brown bagger! When you make dinner, plan for an extra helping. When you serve your plate for dinner, serve an additional helping into a container that you can take with you the next day. If you don't have a refrigerator available during the day, an insulated lunch bag and ice packs will help you safely store you lunch.     Cold Brewed Iced tea. Fill a pitcher with 64 oz filtered water. Add either 4 regular tea bags of your choice or a large iced tea bag. Refrigerate over night then remove the tea bags. The tea will not be bitter and is super flavorful. Get creative! Try combinations like green tea and hibiscus tea or black tea with lemon zinger. Add orange or lemon slices for even more flavor.     Snack wisely. Protein filled snacks will fill you up, allowing you to get by with fewer calories. String cheese, pork skins (chicharrones), turkey pepperoni, or celery with cream cheese will all fit the bill.       Ditch the take out. Turkey tacos (with or without a low carb tortilla), burgers (without the bun), or fun stir fries are all quick and easy. The whole family will be happy, and you can save calories and money.      Orange Chicken Stir colón with asparagus   Makes 6 servings  Ingredients:    1.5 lbs boneless skinless chicken breast/tenders, diced into 1-inch pieces  1 Tbsp extra virgin olive or avocado oil, divided  2 lb asparagus, end portions trimmed and remainder diced into 1 1/2-inch pieces  1 small yellow onion, sliced into thin strips  8 oz button mushrooms, sliced  1 Tbsp peeled and finely grated fresh jesse  4 cloves garlic, minced  1/2 cup low-sodium chicken broth  Juice of 2 fresh oranges  2 Tbsp low sodium soy sauce  2 Tbsp cornstarch  Sea salt and  freshly ground black pepper    Directions:    In a 12-inch non-stick wok, heat 1/2 oil over moderately high heat. Once oil is hot, add diced chicken and season lightly with salt and pepper. Sauté until cooked through, tossing occasionally, about 5-6 minutes.  Place chicken on a large plate and set aside. Return wok, reduce to medium-high heat, add remaining oil.  Once oil is hot, add asparagus, yellow onion and mushrooms, and sauté until tender-crisp, about 4 - 5 minutes, adding in garlic and jesse during the last 1 minute of sautéing.  Meanwhile, in a mixing bowl whisk together chicken broth, orange juice, soy sauce and cornstarch until well blended.  Pour chicken broth mixture into skillet with veggies, season with salt and pepper to taste, and bring mixture to a light boil, stirring constantly. Allow mixture to gently boil, stirring constantly, until thickened, about 1 minute.  Toss chicken into mixture and serve immediately over cauliflower rice or Shirataki noodles.      Skinny Chicken Tortilla Soup  Makes 7 servings    2 teaspoons olive oil  1 cup onion, chopped (about 1 small)  2 cups celery, sliced (about 4 medium stalks)  4 garlic cloves, minced  4 medium tomatoes, chopped  2 cups water  4 cups low-sodium organic chicken broth  3 cups chopped and/or shredded rotisserie chicken, skinless  2 cups sliced carrots (about 3 medium)  1 teaspoon dried oregano leaves  2 teaspoons chili powder  1 teaspoon garlic powder  2 teaspoons cumin  ½ teaspoon cayenne pepper (add less or omit, if you don't want a spicy soup)  ½ teaspoon sea salt + more to taste  ½ teaspoon pepper + more to taste    Directions:   Put all ingredients into a large crock pot. Cook on low for 5-6 hours.     Optional garnish with chopped avocado, chopped fresh cilantro, crumbled Cotija cheese, sour cream, Greek yogurt, your favorite hot sauce.           Vegan Avocado Banana Chocolate Pudding  Makes 4 servings  Ingredients    1 1/2 ripe avocados  2  ripe bananas  6 tbsp raw cacao powder or unsweetened cocoa powder  2-3 tbsp maple syrup (or calorie free sweetener)  1/4 cup almond milk  Instructions    Blend everything together in a  until the consistency is smooth and velvety. Taste and see if more sweetener is needed and stir to make sure everything is evenly mixed. Blend a second time if needed.  Top with banana slices, raw cacao nibs, almond butter, or any other toppings and enjoy!

## 2017-10-12 NOTE — PROGRESS NOTES
"Subjective:       Patient ID: Mary Myles is a 22 y.o. female.    Chief Complaint: Follow-up    Pt here today for follow up. She has lost 9 more lbs, 38 lbs in total. Started metformin with LCHF diet. Walking up to 2 miles at a time. Some of her psych meds have been changed. She was a little lower, but she was moving and has been doing as well with gym ad diet.She is sometimes  Hungry and finds carbs hard to pass up. Pt does ask about surgery today. She has dx of schizoaffective disorder. She states she is currently getting eval for Autism, as her psychiatrist feels that she may have been misdiagnosed. She states she is being tapered off med.       Review of Systems   Constitutional: Positive for fatigue. Negative for chills and fever.        Has not felt well rested since she was a kid.    Respiratory: Negative for apnea and shortness of breath.         + snores. No Hx psg   Cardiovascular: Positive for leg swelling. Negative for chest pain.   Gastrointestinal: Positive for constipation, diarrhea and nausea.        Gerd improving.    Genitourinary: Negative for difficulty urinating.        On OCPs   Musculoskeletal: Positive for arthralgias and back pain.   Neurological: Positive for light-headedness and headaches. Negative for dizziness.   Psychiatric/Behavioral: Positive for dysphoric mood and hallucinations. The patient is nervous/anxious.         Sees Dr. Deb Schumacher.        Objective:     /84   Pulse 76   Ht 5' 4" (1.626 m)   Wt 111.5 kg (245 lb 13 oz)   BMI 42.19 kg/m²     Physical Exam   Constitutional: She is oriented to person, place, and time. She appears well-developed. No distress.   Morbidly obese     HENT:   Head: Normocephalic and atraumatic.   Eyes: EOM are normal. Pupils are equal, round, and reactive to light. No scleral icterus.   Neck: Normal range of motion. Neck supple.   Cardiovascular: Normal rate.    Pulmonary/Chest: Effort normal.   Musculoskeletal: Normal range of " motion. She exhibits no edema.   Neurological: She is alert and oriented to person, place, and time. No cranial nerve deficit.   Skin: Skin is warm and dry. No erythema.   + hirsute     Psychiatric: She has a normal mood and affect. Her behavior is normal. Judgment normal.   Vitals reviewed.      Assessment:       1. Morbid obesity with BMI of 40.0-44.9, adult        Plan:           1. Morbid obesity with BMI of 40.0-44.9, adult  Doing well. I did tell her that schizoaffective is a contraindication to weight loss surgery. If she was misdiagnosed, then we would need documentation from psych about that, and she would need to be stable with any meds or off meds for 6 months.     SHe is doing well with lifestyle changes. .         Healthy all day tips.     One meal a week have whatever you want. This should help her to feel less deprived.     No soda, sweet tea, juices or lemonade    3 meals a day made up of the following:  Unlimited green vegetables, tomatoes, mushrooms, spaghetti squash, cauliflower, meat, poultry, seafood, eggs and hard cheeses.   Milk and plain yogurt  Dressings, seasonings, condiments, etc should have less than 2 g sugars.   beans or nuts can have 1 x a day.   1-2 servings of citrus fruits, berries, pineapple or melon a day (1/2 cup)  Avoid fried foods    No grains, rice, pasta, potatoes, bread    [email or web address].dietdoctor.com for recipes.     Exercise 45 min 5 times a week.

## 2017-10-24 ENCOUNTER — HOSPITAL ENCOUNTER (EMERGENCY)
Facility: HOSPITAL | Age: 22
Discharge: HOME | End: 2017-10-24
Attending: EMERGENCY MEDICINE
Payer: MEDICARE

## 2017-10-24 VITALS
OXYGEN SATURATION: 98 % | WEIGHT: 243 LBS | BODY MASS INDEX: 41.48 KG/M2 | RESPIRATION RATE: 18 BRPM | DIASTOLIC BLOOD PRESSURE: 87 MMHG | SYSTOLIC BLOOD PRESSURE: 143 MMHG | TEMPERATURE: 98 F | HEART RATE: 89 BPM | HEIGHT: 64 IN

## 2017-10-24 DIAGNOSIS — S93.401A MILD ANKLE SPRAIN, RIGHT, INITIAL ENCOUNTER: Primary | ICD-10-CM

## 2017-10-24 DIAGNOSIS — S99.911A RIGHT ANKLE INJURY, INITIAL ENCOUNTER: ICD-10-CM

## 2017-10-24 LAB
B-HCG UR QL: NEGATIVE
CTP QC/QA: YES

## 2017-10-24 PROCEDURE — 81025 URINE PREGNANCY TEST: CPT | Performed by: EMERGENCY MEDICINE

## 2017-10-24 PROCEDURE — 99283 EMERGENCY DEPT VISIT LOW MDM: CPT | Mod: 25

## 2017-10-24 NOTE — ED PROVIDER NOTES
Encounter Date: 10/24/2017       History     Chief Complaint   Patient presents with    Ankle Pain     swelling and bruising to the right ankle. Fall 2 days ago     HPI patient is 22-year-old woman who presents emergency department complaining of acute onset of severe, constant, right ankle pain that occurred when she twisted her right ankle in an inversion mechanism 2 days ago.  She has been taking NSAIDs and apply ice with mild relief.  Review of patient's allergies indicates:  No Known Allergies  Past Medical History:   Diagnosis Date    ADD (attention deficit disorder)     Borderline personality disorder     Chronic headache     Depression     Fibromyalgia     GERD (gastroesophageal reflux disease)     PCOS (polycystic ovarian syndrome)     Pseudotumor cerebri syndrome     Schizoaffective disorder      Past Surgical History:   Procedure Laterality Date    TONSILLECTOMY      WISDOM TOOTH EXTRACTION       Family History   Problem Relation Age of Onset    Seizures Mother     Migraines Mother     Migraines Brother      Social History   Substance Use Topics    Smoking status: Former Smoker     Packs/day: 0.50     Types: Cigarettes    Smokeless tobacco: Never Used    Alcohol use No     Review of Systems  REVIEW OF SYSTEMS  CONSTITUTIONAL: Negative for fever.  HEENT:  Negative for sore throat.   HEART:   Negative for chest pain..  LUNG:  Negative for shortness of breath.  ABDOMEN:  Negative for nausea.   :  No discharge, dysuria  EXTREMITIES: Positive for right ankle swelling and bruising  NEURO:  Negative for weakness.   SKIN:  Negative for rash.  Psych: No depression  HEME: Does not bruise/bleed easily.           Physical Exam     Initial Vitals [10/24/17 0254]   BP Pulse Resp Temp SpO2   (!) 143/87 89 18 98.3 °F (36.8 °C) 98 %      MAP       105.67         Physical Exam  Physical Exam   Nursing note and vitals reviewed.   Constitutional: Oriented to person, place, and time. Appears well-developed  and well-nourished.   HENT:   Head: Normocephalic and atraumatic.   Eyes: EOM are normal.   Neck: Normal range of motion. Neck supple.   Cardiovascular: Normal rate.   Pulmonary/Chest: Effort normal. Clear BS b/l   Abdominal: Soft, Non tender, no distension.   Musculoskeletal: There is swelling of the right ankle with bruising just inferior to the right lateral malleolus.  There is no tenderness on the posterior malleolus.  No tenderness of the base of the fifth metatarsal.  She does have tenderness over the anterior lateral malleolus.  Skin is intact.  Pulses are 2+.  Normal range of motion.  Normal sensation.  Normal strength.    Neurological:Alert and oriented to person, place, and time.   Psychiatric: Normal mood and affect. Behavior is normal.         ED Course   Procedures  Labs Reviewed   POCT URINE PREGNANCY          X-Rays:   Independently Interpreted Readings:   Other Readings:  X-ray right ankle: No acute fracture or dislocation.    Medical Decision Making:   History:   Old Medical Records: I decided to obtain old medical records.  Initial Assessment:   This is an emergent evaluation for a patient complaining of ankle pain.    The patient appears to have an ankle sprain.   The patient's xrays show no signs of fracture, dislocation, or subluxation.  The patient could have a ligamentous injury, but the ankle doesn't appear to be unstable.   They will be treated with supportive care with an Ace wrap. The patient may take NSAIDs for her pain.The patient will be discharged home to follow up with their physician or the doctor provided.     The results and physical exam findings were reviewed with the patient. Pt agrees with assessment, disposition and treatment plan and has no further questions or complaints at this time.    Dereck Ghotra M.D. 4:47 AM 10/24/2017                           ED Course      Clinical Impression:   The primary encounter diagnosis was Mild ankle sprain, right, initial encounter. A  diagnosis of Right ankle injury, initial encounter was also pertinent to this visit.                           Dereck Ghotra MD  10/24/17 0440

## 2017-11-01 ENCOUNTER — OFFICE VISIT (OUTPATIENT)
Dept: NEUROLOGY | Facility: CLINIC | Age: 22
End: 2017-11-01
Payer: MEDICARE

## 2017-11-01 VITALS
HEIGHT: 64 IN | HEART RATE: 84 BPM | WEIGHT: 239.63 LBS | SYSTOLIC BLOOD PRESSURE: 121 MMHG | BODY MASS INDEX: 40.91 KG/M2 | DIASTOLIC BLOOD PRESSURE: 80 MMHG

## 2017-11-01 DIAGNOSIS — F95.2 TOURETTES SYNDROME: Primary | ICD-10-CM

## 2017-11-01 DIAGNOSIS — F50.81 BINGE EATING DISORDER: ICD-10-CM

## 2017-11-01 DIAGNOSIS — R46.81 OBSESSIVE-COMPULSIVE BEHAVIOR: ICD-10-CM

## 2017-11-01 DIAGNOSIS — F33.41 RECURRENT MAJOR DEPRESSIVE DISORDER, IN PARTIAL REMISSION: ICD-10-CM

## 2017-11-01 DIAGNOSIS — G25.9 EXTRAPYRAMIDAL AND MOVEMENT DISORDER: ICD-10-CM

## 2017-11-01 DIAGNOSIS — F31.62 BIPOLAR 1 DISORDER, MIXED, MODERATE: ICD-10-CM

## 2017-11-01 PROBLEM — F95.1 CHRONIC MOTOR TIC: Status: ACTIVE | Noted: 2017-11-01

## 2017-11-01 PROBLEM — F50.819 BINGE EATING DISORDER: Status: ACTIVE | Noted: 2017-11-01

## 2017-11-01 PROCEDURE — 99214 OFFICE O/P EST MOD 30 MIN: CPT | Mod: S$PBB,,, | Performed by: PSYCHIATRY & NEUROLOGY

## 2017-11-01 PROCEDURE — 99213 OFFICE O/P EST LOW 20 MIN: CPT | Mod: PBBFAC | Performed by: PSYCHIATRY & NEUROLOGY

## 2017-11-01 PROCEDURE — 99999 PR PBB SHADOW E&M-EST. PATIENT-LVL III: CPT | Mod: PBBFAC,,, | Performed by: PSYCHIATRY & NEUROLOGY

## 2017-11-01 RX ORDER — CLONIDINE HYDROCHLORIDE 0.1 MG/1
0.1 TABLET ORAL 2 TIMES DAILY
Qty: 60 TABLET | Refills: 11 | Status: SHIPPED | OUTPATIENT
Start: 2017-11-01 | End: 2018-11-01

## 2017-11-01 NOTE — PROGRESS NOTES
"Mary Myles "sanjay-iman"  I. Chief Complaints during this visit:  New Patient visit for  tic    Terri Tono, FNP  [address] [place] [address]    Psychiatrist:  MEENA Fitzgerald-BC  Fax [phone] (M-T, [place] office)      Primary Care Physician  Haley Cohn DO  [address] [facility] [address]      History of present illness:   22 y.o.  W seen in consultation at the request of  Dr. Power for evaluation of tics.  Has had a tic toward her right (predominate) since childhood.  Was treated with adderall at age 4, but taken off at 6.  No hospitalizations until age 15.  Occasionally to left.  Eye blinking, grunts.  Was treated by her PCP with clonidine    ADD at age 4.  Currently getting work up for autistic spectrum disorder by Dr. Thomas ([place].)  KEVIN FitzgeraldBC   is treating her for generalized anxiety disorder, schizoaffective disorder- recently changed to "undetermined."  On lithium for bipolar disorder since 17, she does not think it helps and Endy is trying to wean off.  Also binge-eating disorder.    Must line things up, in alphabetical order.  This has been since childhood.    Multiple psych admissions for depression, suicide attempts.  Has tried geodon, abilify, haldol, risperdal and many others.  Currently on Vraylar which helps with mental clarity to some extent.  Lamictal not effective.    Chronic migraines.      II.  Review of systems:  As in HPI,  otherwise, balance 10 systems reviewed and are negative.    III.  Past Medical History:   Diagnosis Date    ADD (attention deficit disorder)     Borderline personality disorder     Chronic headache     Depression     Fibromyalgia     GERD (gastroesophageal reflux disease)     PCOS (polycystic ovarian syndrome)     Pseudotumor cerebri syndrome     Schizoaffective disorder      Family History   Problem Relation Age of Onset    Seizures Mother     Migraines Mother     Migraines " "Brother      Social History     Social History    Marital status: Single     Spouse name: N/A    Number of children: N/A    Years of education: N/A     Social History Main Topics    Smoking status: Former Smoker     Packs/day: 0.50     Types: Cigarettes    Smokeless tobacco: Never Used    Alcohol use No    Drug use: No      Comment: patient states she quit mojo 3 months ago and marijuana 2 months ago    Sexual activity: No     Other Topics Concern    None     Social History Narrative    None         Current Outpatient Prescriptions on File Prior to Visit   Medication Sig Dispense Refill    busPIRone (BUSPAR) 10 MG tablet       ergocalciferol (ERGOCALCIFEROL) 50,000 unit Cap Take 1 capsule (50,000 Units total) by mouth every 7 days. 12 capsule 0    ibuprofen (ADVIL,MOTRIN) 800 MG tablet Take 1 tablet (800 mg total) by mouth every 8 (eight) hours as needed for Pain. 30 tablet 0    lithium (ESKALITH) 300 MG capsule Take 300 mg by mouth 2 (two) times daily.       MICROGESTIN FE 1.5/30, 28, 1.5 mg-30 mcg (21)/75 mg (7) tablet Take 1 tablet by mouth once daily.  11    topiramate (TOPAMAX) 25 MG tablet Take 25 mg by mouth 2 (two) times daily.      VRAYLAR 3 mg Cap Take 4.5 mg by mouth once daily.       metformin (GLUCOPHAGE) 500 MG tablet Take 1 tablet (500 mg total) by mouth 2 (two) times daily with meals. 180 tablet 0     Current Facility-Administered Medications on File Prior to Visit   Medication Dose Route Frequency Provider Last Rate Last Dose    onabotulinumtoxina injection 200 Units  200 Units Intramuscular Q90 Days SVEERIANO Diaz   200 Units at 09/20/17 1121       Review of patient's allergies indicates:  No Known Allergies    IV. Physical Exam    Vitals:    11/01/17 0957   BP: 121/80   Pulse: 84   Weight: 108.7 kg (239 lb 10.2 oz)   Height: 5' 4" (1.626 m)     General appearance: Well nourished, well developed, no acute distress.         Cardiovascular:  pedal pulses 2, no edema or " cyanosis, heart regular rate and rhythym, no carotid bruits.         -------------------------------------------------------------  Facial Expression: normal       Affect: full       Orientation to time & place:  Oriented to time, place, person and situation       Attention & concentration:  Normal attention span and concentration       Memory:  Recent and remote memory intact  Language: Spontaneous, fluent; able to repeat and name objects        Fund of knowledge:  Aware of current events        Speech:  normal (not dysarthric)  -------------------------------------------------------  Cranial nerves: normal visual acuity, visual fields full, optic discs not visualized, pupils equal round and reactive, extraocular movements intact,       facial sensation intact, face symmetrical, hearing intact to whisper, palate raises midline, shoulder shrug strength normal, tongue protrudes midline.        -------------------------------------------------------  Musculoskeletal  Muscle tone: all 4 extremities normal        Muscle Bulk: all 4 extremities normal        Muscle strength:  5/5 in all 4 extremities        No pronator drift  Sensation: Intact to light touch in all extremities        Deep tendon Reflexes: 2 bilateral biceps, triceps, patella and ankles        --------------------------------------------------------------  Cerebellar and Coordination  Gait:  normal      Finger-nose: no dysmetria       Rapid Alternating Movements (pronation/supination):  R normal; L normal  --------------------------------------------------------------  MOVEMENT DISORDERS FOCUSED EXAM  Abnormality of movement (bradykinesia, hyperkinesia) present? Yes, several leftward or rightward rotations of neck during visit.    Tremor present?   No   Posture:  normal  Postural stability:  no Del[place]    V.  Laboratory/ Radiological Data: (Personally reviewed images)         No results found for: FERRITIN  No results found for: [id number]      VI.  Assessment and Plan            Problem List Items Addressed This Visit        1 - High    Tourettes syndrome - Primary    Overview     neck         Relevant Medications    cloNIDine (CATAPRES) 0.1 MG tablet    Other Relevant Orders    LITHIUM LEVEL    FERRITIN    IRON AND TIBC    VITAMIN B12    VITAMIN B1    Ceruloplasmin    STREPTOCOCCAL ANTIBODIES (ASO)    Obsessive-compulsive behavior    Overview     Organizational obsessions, must alphabetize items.            2     Bipolar 1 disorder, mixed, moderate    Current Assessment & Plan     Currently on Lithium, but has had many medication trials in her past.         Recurrent major depressive disorder, in partial remission    Overview     Suicide attempts, multiple psych admissions         Binge eating disorder    Relevant Orders    FERRITIN    IRON AND TIBC      Other Visit Diagnoses     Extrapyramidal and movement disorder         Relevant Orders    VITAMIN B12                Return in about 3 months (around 2/1/2018) for Tourette's syndrome.       ___________________________________________________________    I appreciate the opportunity to participate in the care of this patient and will communicate my assessment and plan back to the referring physician via copy of this note.

## 2017-11-01 NOTE — Clinical Note
November 1, 2017      Terri Power, FN  [address]  [facility] [address]           [facility]  [address]  [facility] [address]  Phone: [phone]  Fax: [phone]          Patient: Mary Myles   MR Number: [id number]   YOB: 1995   Date of Visit: 11/1/2017       Dear Terri Power:    Thank you for referring Mary Myles to me for evaluation. Attached you will find relevant portions of my assessment and plan of care.    If you have questions, please do not hesitate to call me. I look forward to following Mary Myles along with you.    Sincerely,    Susie Slaughter MD    Enclosure  CC:  No Recipients    If you would like to receive this communication electronically, please contact [email or web address] or [phone] to request more information on [employer] Link access.    For providers and/or their staff who would like to refer a patient to [facility], please contact us through our one-stop-shop provider referral line, [facility], at [phone].    If you feel you have received this communication in error or would no longer like to receive these types of communications, please e-mail [email or web address]

## 2017-11-02 ENCOUNTER — HOSPITAL ENCOUNTER (EMERGENCY)
Facility: HOSPITAL | Age: 22
Discharge: HOME | End: 2017-11-02
Attending: EMERGENCY MEDICINE
Payer: MEDICARE

## 2017-11-02 ENCOUNTER — TELEPHONE (OUTPATIENT)
Dept: NEUROLOGY | Facility: CLINIC | Age: 22
End: 2017-11-02

## 2017-11-02 VITALS
TEMPERATURE: 98 F | RESPIRATION RATE: 16 BRPM | BODY MASS INDEX: 41.21 KG/M2 | HEIGHT: 64 IN | OXYGEN SATURATION: 96 % | SYSTOLIC BLOOD PRESSURE: 125 MMHG | WEIGHT: 241.38 LBS | DIASTOLIC BLOOD PRESSURE: 76 MMHG | HEART RATE: 68 BPM

## 2017-11-02 DIAGNOSIS — G43.809 OTHER MIGRAINE WITHOUT STATUS MIGRAINOSUS, NOT INTRACTABLE: Primary | ICD-10-CM

## 2017-11-02 DIAGNOSIS — J06.9 UPPER RESPIRATORY TRACT INFECTION, UNSPECIFIED TYPE: ICD-10-CM

## 2017-11-02 DIAGNOSIS — R79.0 DECREASED FERRITIN: ICD-10-CM

## 2017-11-02 DIAGNOSIS — R76.8 ELEVATED ANTI-STREPTOLYSIN O ANTIBODIES: ICD-10-CM

## 2017-11-02 LAB
B-HCG UR QL: NEGATIVE
CTP QC/QA: YES

## 2017-11-02 PROCEDURE — 99284 EMERGENCY DEPT VISIT MOD MDM: CPT | Mod: 25

## 2017-11-02 PROCEDURE — 63600175 PHARM REV CODE 636 W HCPCS: Performed by: EMERGENCY MEDICINE

## 2017-11-02 PROCEDURE — 96372 THER/PROPH/DIAG INJ SC/IM: CPT

## 2017-11-02 PROCEDURE — 81025 URINE PREGNANCY TEST: CPT | Performed by: EMERGENCY MEDICINE

## 2017-11-02 PROCEDURE — 25000003 PHARM REV CODE 250: Performed by: EMERGENCY MEDICINE

## 2017-11-02 RX ORDER — KETOROLAC TROMETHAMINE 30 MG/ML
15 INJECTION, SOLUTION INTRAMUSCULAR; INTRAVENOUS
Status: COMPLETED | OUTPATIENT
Start: 2017-11-02 | End: 2017-11-02

## 2017-11-02 RX ORDER — DIPHENHYDRAMINE HCL 50 MG
50 CAPSULE ORAL
Status: COMPLETED | OUTPATIENT
Start: 2017-11-02 | End: 2017-11-02

## 2017-11-02 RX ORDER — SODIUM CHLORIDE 0.9 % (FLUSH) 0.9 %
10 SYRINGE (ML) INJECTION
Status: CANCELLED | OUTPATIENT
Start: 2017-11-06

## 2017-11-02 RX ORDER — HYDROMORPHONE HYDROCHLORIDE 1 MG/ML
1 INJECTION, SOLUTION INTRAMUSCULAR; INTRAVENOUS; SUBCUTANEOUS
Status: COMPLETED | OUTPATIENT
Start: 2017-11-02 | End: 2017-11-02

## 2017-11-02 RX ORDER — PROCHLORPERAZINE EDISYLATE 5 MG/ML
10 INJECTION INTRAMUSCULAR; INTRAVENOUS
Status: COMPLETED | OUTPATIENT
Start: 2017-11-02 | End: 2017-11-02

## 2017-11-02 RX ORDER — METHYLPREDNISOLONE SOD SUCC 125 MG
125 VIAL (EA) INJECTION
Status: COMPLETED | OUTPATIENT
Start: 2017-11-02 | End: 2017-11-02

## 2017-11-02 RX ORDER — HEPARIN 100 UNIT/ML
500 SYRINGE INTRAVENOUS
Status: CANCELLED | OUTPATIENT
Start: 2017-11-06

## 2017-11-02 RX ADMIN — PROCHLORPERAZINE EDISYLATE 10 MG: 5 INJECTION INTRAMUSCULAR; INTRAVENOUS at 10:11

## 2017-11-02 RX ADMIN — METHYLPREDNISOLONE SODIUM SUCCINATE 125 MG: 125 INJECTION, POWDER, FOR SOLUTION INTRAMUSCULAR; INTRAVENOUS at 10:11

## 2017-11-02 RX ADMIN — HYDROMORPHONE HYDROCHLORIDE 1 MG: 1 INJECTION, SOLUTION INTRAMUSCULAR; INTRAVENOUS; SUBCUTANEOUS at 09:11

## 2017-11-02 RX ADMIN — KETOROLAC TROMETHAMINE 15 MG: 30 INJECTION, SOLUTION INTRAMUSCULAR at 10:11

## 2017-11-02 RX ADMIN — DIPHENHYDRAMINE HYDROCHLORIDE 50 MG: 50 CAPSULE ORAL at 10:11

## 2017-11-03 NOTE — ED PROVIDER NOTES
Encounter Date: 11/2/2017    SCRIBE #1 NOTE: ITraci, am scribing for, and in the presence of, Dr. Ghotra .       History     Chief Complaint   Patient presents with    Migraine     x 2 days with nausea and photophobia; c/o posterior neck pain; rakesh       11/02/2017 9:14 PM     Chief complaint: Migraines       Mary Myles is a 22 y.o. female with a hx of Tourette's syndrome, Depression, ADD, Fibromyalgia, GERD, Schizoaffective disorder, PCOS, Borderline personality who presents to the ED with complaints of HA and posterior neck pain associated with photophobia, neck stiffness, fatigue, nausea, and general body aches x 2 days. She also reports cough and diarrhea. Pt reports sick contact with sx of cough and 1 episode of vomiting. Pt differentiates the current migraine from her chronic HA as a diffuse HA. She was seen by neurologist, Dr. Slaughter, yesterday and dx with Tourette's syndrome. She was informed this afternoon of anemia and discussed the possibility of receiving iron injections. Pt has NKDA.       The history is provided by the patient.     Review of patient's allergies indicates:  No Known Allergies  Past Medical History:   Diagnosis Date    ADD (attention deficit disorder)     Borderline personality disorder     Chronic headache     Depression     Fibromyalgia     GERD (gastroesophageal reflux disease)     PCOS (polycystic ovarian syndrome)     Pseudotumor cerebri syndrome     Schizoaffective disorder     Tourette's syndrome      Past Surgical History:   Procedure Laterality Date    TONSILLECTOMY      WISDOM TOOTH EXTRACTION       Family History   Problem Relation Age of Onset    Seizures Mother     Migraines Mother     Migraines Brother      Social History   Substance Use Topics    Smoking status: Former Smoker     Packs/day: 0.50     Types: Cigarettes    Smokeless tobacco: Never Used    Alcohol use No     Review of Systems   Constitutional: Positive for fatigue. Negative  for fever.   HENT: Negative for sore throat.    Eyes: Positive for photophobia. Negative for redness.   Respiratory: Positive for cough. Negative for shortness of breath.    Cardiovascular: Negative for chest pain.   Gastrointestinal: Positive for diarrhea and nausea.   Genitourinary: Negative for dysuria.   Musculoskeletal: Positive for myalgias and neck pain. Negative for back pain.   Skin: Negative for rash.   Neurological: Positive for headaches. Negative for weakness.   Hematological: Does not bruise/bleed easily.       Physical Exam     Initial Vitals [11/02/17 2013]   BP Pulse Resp Temp SpO2   (!) 154/95 96 16 97.9 °F (36.6 °C) 100 %      MAP       114.67         Physical Exam    Constitutional: She appears well-developed and well-nourished.  Non-toxic appearance. No distress.   HENT:   Head: Normocephalic and atraumatic.   Eyes: EOM are normal. Pupils are equal, round, and reactive to light.   Neck: Normal range of motion. Neck supple. No neck rigidity. No Brudzinski's sign and no Kernig's sign noted. No JVD present.   No meningeals.    Cardiovascular: Normal rate, regular rhythm, normal heart sounds and intact distal pulses.   Abdominal: Soft. Bowel sounds are normal. She exhibits no distension. There is no tenderness. There is no rigidity, no rebound and no guarding.   Musculoskeletal: Normal range of motion.   Neurological: She is alert and oriented to person, place, and time. She has normal strength and normal reflexes. No cranial nerve deficit or sensory deficit. She exhibits normal muscle tone. Coordination normal. GCS eye subscore is 4. GCS verbal subscore is 5. GCS motor subscore is 6.   Skin: Skin is warm and dry.   Psychiatric: She has a normal mood and affect. Her speech is normal and behavior is normal. She is not actively hallucinating.         ED Course   Procedures  Labs Reviewed   POCT URINE PREGNANCY          X-Rays:   Independently Interpreted Readings:   Other Readings:  I independently  viewed and interpreted the chest xray as normal appearing cardiac and mediastinal sizes and contours. There are no intrapulmonary masses, infiltrates, pneumothorax or pleural effusions seen. The soft tissues and bony structures appear unremarkable.  My overall impression is no acute cardiopulmonary process.      Medical Decision Making:   Initial Assessment:   This is a 21 yo woman who presents with headache. I believe the patient has a migraine headache based upon the history and physical exam and pt course in the ED. The patient's headaches are similar to their prior headaches for which they have been diagnosed as migraines in the past.  Patient has had a negative CT. They have no focal weakness, numbness, meningismus, other focal neurologic deficit, history of trauma, fevers, elevated blood pressure to suggest meningitis, subarachnoid hemorrhage, TIA, stroke, mass, or hypertensive urgency. Patient improved after supportive therapy in the emergency department. Patient is stable for discharge at this time. Pt to call for follow up with PCP or referred physician in 2-3 days. Pt is to return to the ED immediately for any new or worsening symptoms, including but not limited to: fever, chills, worsening pain, nausea/vomiting, weakness/fatigue, or for any other concerns.       Dereck Ghotra M.D.                 Scribe Attestation:   Scribe #1: I performed the above scribed service and the documentation accurately describes the services I performed. I attest to the accuracy of the note.    I, Brandin Hardy, personally performed the services described in this documentation. All medical record entries made by the scribe were at my direction and in my presence.  I have reviewed the chart and agree that the record reflects my personal performance and is accurate and complete. Dereck Ghotra MD.  7:40 AM 11/06/2017          ED Course      Clinical Impression:     1. Other migraine without status migrainosus, not  intractable    2. Upper respiratory tract infection, unspecified type                               Dereck Ghotra MD  11/06/17 7229

## 2017-11-06 NOTE — TELEPHONE ENCOUNTER
Contacted infusion center on [facility] and they need the order to say referred to [facility]fusion[facility] infusion. This is so the orders can fall in workqueue and be worked on for auth.  Patient has been schedule for 11/17 bat 11:30am. Will contact if she needs to come back. I contacted patient and gave the information above.

## 2017-11-14 ENCOUNTER — OFFICE VISIT (OUTPATIENT)
Dept: NEUROLOGY | Facility: CLINIC | Age: 22
End: 2017-11-14
Payer: MEDICARE

## 2017-11-14 VITALS
WEIGHT: 238.13 LBS | DIASTOLIC BLOOD PRESSURE: 77 MMHG | BODY MASS INDEX: 40.65 KG/M2 | HEIGHT: 64 IN | HEART RATE: 69 BPM | SYSTOLIC BLOOD PRESSURE: 125 MMHG

## 2017-11-14 DIAGNOSIS — F95.2 TOURETTES SYNDROME: ICD-10-CM

## 2017-11-14 DIAGNOSIS — M79.7 FIBROMYALGIA: ICD-10-CM

## 2017-11-14 DIAGNOSIS — R79.0 DECREASED FERRITIN: ICD-10-CM

## 2017-11-14 DIAGNOSIS — G93.2 BENIGN INTRACRANIAL HYPERTENSION: ICD-10-CM

## 2017-11-14 DIAGNOSIS — R46.81 OBSESSIVE-COMPULSIVE BEHAVIOR: ICD-10-CM

## 2017-11-14 DIAGNOSIS — R76.8 ELEVATED ANTI-STREPTOLYSIN O ANTIBODIES: Primary | ICD-10-CM

## 2017-11-14 PROCEDURE — 99214 OFFICE O/P EST MOD 30 MIN: CPT | Mod: S$PBB,,, | Performed by: PSYCHIATRY & NEUROLOGY

## 2017-11-14 PROCEDURE — 99999 PR PBB SHADOW E&M-EST. PATIENT-LVL III: CPT | Mod: PBBFAC,,, | Performed by: PSYCHIATRY & NEUROLOGY

## 2017-11-14 PROCEDURE — 99213 OFFICE O/P EST LOW 20 MIN: CPT | Mod: PBBFAC | Performed by: PSYCHIATRY & NEUROLOGY

## 2017-11-14 RX ORDER — AZITHROMYCIN 500 MG/1
500 TABLET, FILM COATED ORAL DAILY
Qty: 14 TABLET | Refills: 0 | Status: SHIPPED | OUTPATIENT
Start: 2017-11-14 | End: 2017-11-18

## 2017-11-14 NOTE — ASSESSMENT & PLAN NOTE
Low ferritin can worsen/cause RLS-type symptoms, cramping and muscle aches.  I suspect the deficiency is contributing to her fibromyalgia pain.   -> iron infusions pending   -> will need recheck in January

## 2017-11-14 NOTE — PROGRESS NOTES
"Mary Myles "filipeh-iman"  I. Chief Complaints during this visit:  f/u Patient visit for  Tic, ferritin def and elevated ASO    Haley Cohn DO  [address] [place] [address]    Psychiatrist:  MEENA Fitzgerald-BC  Fax [phone] (M-T, [place] office)      Primary Care Physician  Haley Cohn DO  [address] [facility] [address]      History of present illness:   22 y.o.  W seen in followup of recent lab findings.  Clonidine has not reduced tics, though she is not sure if we are at the same dose that she was in prior years.    Has infusions for ferritin set up next week.    Interval history 11/1/17:  ...consultation at the request of  Dr. Power for evaluation of tics.  Has had a tic toward her right (predominate) since childhood.  Was treated with adderall at age 4, but taken off at 6.  No hospitalizations until age 15.  Occasionally to left.  Eye blinking, grunts.  Was treated by her PCP with clonidine  ADD at age 4.  Currently getting work up for autistic spectrum disorder by Dr. Thomas ([place].)  MEENA Fitzgerald-BC   is treating her for generalized anxiety disorder, schizoaffective disorder- recently changed to "undetermined."  On lithium for bipolar disorder since 17, she does not think it helps and Endy is trying to wean off.  Also binge-eating disorder.  Must line things up, in alphabetical order.  This has been since childhood.  Multiple psych admissions for depression, suicide attempts.  Has tried geodon, abilify, haldol, risperdal and many others.  Currently on Vraylar which helps with mental clarity to some extent.  Lamictal not effective.  Chronic migraines.      II.  Review of systems:  As in HPI,  otherwise, balance 3 systems reviewed and are negative.    III.  Past Medical History:   Diagnosis Date    ADD (attention deficit disorder)     Borderline personality disorder     Chronic headache     Depression     " Fibromyalgia     GERD (gastroesophageal reflux disease)     PCOS (polycystic ovarian syndrome)     Pseudotumor cerebri syndrome     Schizoaffective disorder     Tourette's syndrome      Family History   Problem Relation Age of Onset    Seizures Mother     Migraines Mother     Migraines Brother      Social History     Social History    Marital status: Single     Spouse name: N/A    Number of children: N/A    Years of education: N/A     Social History Main Topics    Smoking status: Former Smoker     Packs/day: 0.50     Types: Cigarettes    Smokeless tobacco: Never Used    Alcohol use No    Drug use: No      Comment: patient states she quit mojo 3 months ago and marijuana 2 months ago    Sexual activity: No     Other Topics Concern    None     Social History Narrative    None         Current Outpatient Prescriptions on File Prior to Visit   Medication Sig Dispense Refill    busPIRone (BUSPAR) 10 MG tablet       cloNIDine (CATAPRES) 0.1 MG tablet Take 1 tablet (0.1 mg total) by mouth 2 (two) times daily. 60 tablet 11    ergocalciferol (ERGOCALCIFEROL) 50,000 unit Cap Take 1 capsule (50,000 Units total) by mouth every 7 days. 12 capsule 0    ibuprofen (ADVIL,MOTRIN) 800 MG tablet Take 1 tablet (800 mg total) by mouth every 8 (eight) hours as needed for Pain. 30 tablet 0    lithium (ESKALITH) 300 MG capsule Take 300 mg by mouth 2 (two) times daily.       MICROGESTIN FE 1.5/30, 28, 1.5 mg-30 mcg (21)/75 mg (7) tablet Take 1 tablet by mouth once daily.  11    topiramate (TOPAMAX) 25 MG tablet Take 25 mg by mouth 2 (two) times daily.      VRAYLAR 3 mg Cap Take 4.5 mg by mouth once daily.       metformin (GLUCOPHAGE) 500 MG tablet Take 1 tablet (500 mg total) by mouth 2 (two) times daily with meals. 180 tablet 0     Current Facility-Administered Medications on File Prior to Visit   Medication Dose Route Frequency Provider Last Rate Last Dose    onabotulinumtoxina injection 200 Units  200 Units  "Intramuscular Q90 Days Terri Power, FNP   200 Units at 09/20/17 1121       Review of patient's allergies indicates:  No Known Allergies    IV. Physical Exam    Vitals:    11/14/17 1229   BP: 125/77   Pulse: 69   Weight: 108 kg (238 lb 1.6 oz)   Height: 5' 4" (1.626 m)     General appearance: Well nourished, well developed, no acute distress.         Cardiovascular:  pedal pulses 2, no edema or cyanosis, heart regular rate and rhythym, no carotid bruits.         -------------------------------------------------------------  Facial Expression: normal       Affect: full       Orientation to time & place:  Oriented to time, place, person and situation       Attention & concentration:  Normal attention span and concentration       Memory:  Recent and remote memory intact  Language: Spontaneous, fluent; able to repeat and name objects        Fund of knowledge:  Aware of current events        Speech:  normal (not dysarthric)  -------------------------------------------------------  Cranial nerves: normal visual acuity, visual fields full, optic discs not visualized, pupils equal round and reactive, extraocular movements intact,       facial sensation intact, face symmetrical, hearing intact to whisper, palate raises midline, shoulder shrug strength normal, tongue protrudes midline.        -------------------------------------------------------  Musculoskeletal  Muscle tone: all 4 extremities normal        Muscle Bulk: all 4 extremities normal        Muscle strength:  5/5 in all 4 extremities        No pronator drift  Sensation: Intact to light touch in all extremities        Deep tendon Reflexes: 2 bilateral biceps, triceps, patella and ankles        --------------------------------------------------------------  Cerebellar and Coordination  Gait:  normal      Finger-nose: no dysmetria       Rapid Alternating Movements (pronation/supination):  R normal; L " normal  --------------------------------------------------------------  MOVEMENT DISORDERS FOCUSED EXAM  Abnormality of movement (bradykinesia, hyperkinesia) present? Yes, several leftward or rightward rotations of neck during visit.    Tremor present?   No   Posture:  normal  Postural stability:  no [place]omberg    V.  Laboratory/ Radiological Data:          Lab Results   Component Value Date    FERRITIN 11 (L) 11/01/2017     Lab Results   Component Value Date    COBIBFWX29 313 11/01/2017     Lab Results   Component Value Date    CREATININE 0.7 03/15/2017        11/1/17   ASO titer 420H  Ceruloplasmin nml  B1 46  Lithium <0.1      MRI brain 5/5/17: normal      VI. 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   -> trial of azithromycin for 2 weeks   -> recheck antibodies   -> consider IVIG, though my experience with similar patients is that it can be very difficult to get approved for this indication.   -> consider increase in clonidine         Obsessive-compulsive behavior    Overview     Organizational obsessions, must alphabetize items.         Elevated anti-streptolysin O antibodies - Primary    Overview     11/2/17-          Relevant Orders    STREPTOCOCCAL ANTIBODIES (ASO)    VITAMIN B6       2     Fibromyalgia    Decreased ferritin    Overview     Lab Results   Component Value Date    FERRITIN 11 (L) 11/01/2017              Current Assessment & Plan     Low ferritin can worsen/cause RLS-type symptoms, cramping and muscle aches.  I suspect the deficiency is contributing to her fibromyalgia pain.   -> iron infusions pending   -> will need recheck in  January            3     Benign intracranial hypertension    Overview     2/28/17 LP opening pressure 26 cm h2o; declines further LPs  5/2017- no papilledema (yearly check in Ophthalmology)                     Return in about 7 weeks (around 1/1/2018).       ___________________________________________________________    I appreciate the opportunity to participate in the care of this patient and will communicate my assessment and plan back to the referring physician via copy of this note.

## 2017-11-14 NOTE — ASSESSMENT & PLAN NOTE
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   -> trial of azithromycin for 2 weeks   -> recheck antibodies   -> consider IVIG, though my experience with similar patients is that it can be very difficult to get approved for this indication.   -> consider increase in clonidine

## 2017-11-14 NOTE — LETTER
November 14, 2017      Haley Cohn DO  [address] [facility] [address]           [facility]  [address]  [place] [address]  Phone: [phone]  Fax: [phone]          Patient: Mary Myles   MR Number: [id number]   YOB: 1995   Date of Visit: 11/14/2017       Dear Dr. Haley Cohn:    Thank you for referring Mary Myles to me for evaluation. Attached you will find relevant portions of my assessment and plan of care.    If you have questions, please do not hesitate to call me. I look forward to following Mary Myles along with you.    Sincerely,    Susie Slaughter MD    Enclosure  CC:  Deb Schumacher    If you would like to receive this communication electronically, please contact [email or web address] or [phone] to request more information on [employer] access.    For providers and/or their staff who would like to refer a patient to [facility], please contact us through our one-stop-shop provider referral line, [facility], at [phone].    If you feel you have received this communication in error or would no longer like to receive these types of communications, please e-mail [email or web address]

## 2017-11-14 NOTE — PATIENT INSTRUCTIONS
1.  Start azithromycin, one a day for 2 weeks.  Let me know if there is any benefit in reducing your tics or ocd.    2.  Get iron infusions      We'll follow-up in January.  At that point, I'll repeat your blood tests for iron and the strep antibody (ASO).    Let me know after the antibiotics if the tics are still going on as I can increase the clonidine.

## 2017-11-17 ENCOUNTER — INFUSION (OUTPATIENT)
Dept: INFUSION THERAPY | Facility: HOSPITAL | Age: 22
End: 2017-11-17
Attending: PSYCHIATRY & NEUROLOGY
Payer: MEDICARE

## 2017-11-17 VITALS
WEIGHT: 238.13 LBS | HEIGHT: 64 IN | DIASTOLIC BLOOD PRESSURE: 73 MMHG | TEMPERATURE: 98 F | BODY MASS INDEX: 40.65 KG/M2 | RESPIRATION RATE: 16 BRPM | SYSTOLIC BLOOD PRESSURE: 108 MMHG | HEART RATE: 61 BPM

## 2017-11-17 DIAGNOSIS — R79.0 DECREASED FERRITIN: Primary | ICD-10-CM

## 2017-11-17 PROCEDURE — A4216 STERILE WATER/SALINE, 10 ML: HCPCS | Mod: PN | Performed by: PSYCHIATRY & NEUROLOGY

## 2017-11-17 PROCEDURE — 25000003 PHARM REV CODE 250: Mod: PN | Performed by: PSYCHIATRY & NEUROLOGY

## 2017-11-17 PROCEDURE — 63600175 PHARM REV CODE 636 W HCPCS: Mod: PN | Performed by: PSYCHIATRY & NEUROLOGY

## 2017-11-17 PROCEDURE — 96365 THER/PROPH/DIAG IV INF INIT: CPT | Mod: PN

## 2017-11-17 RX ORDER — CARIPRAZINE 4.5 MG/1
CAPSULE, GELATIN COATED ORAL
COMMUNITY
Start: 2017-10-23 | End: 2018-01-30 | Stop reason: ALTCHOICE

## 2017-11-17 RX ORDER — SODIUM CHLORIDE 0.9 % (FLUSH) 0.9 %
10 SYRINGE (ML) INJECTION
Status: CANCELLED | OUTPATIENT
Start: 2017-11-17

## 2017-11-17 RX ORDER — VARENICLINE TARTRATE 0.5 (11)-1
KIT ORAL
COMMUNITY
Start: 2017-11-09 | End: 2018-01-20

## 2017-11-17 RX ORDER — HEPARIN 100 UNIT/ML
500 SYRINGE INTRAVENOUS
Status: CANCELLED | OUTPATIENT
Start: 2017-11-17

## 2017-11-17 RX ORDER — TOPIRAMATE 50 MG/1
TABLET, FILM COATED ORAL
COMMUNITY
Start: 2017-10-23 | End: 2018-01-30 | Stop reason: ALTCHOICE

## 2017-11-17 RX ORDER — SODIUM CHLORIDE 0.9 % (FLUSH) 0.9 %
10 SYRINGE (ML) INJECTION
Status: DISCONTINUED | OUTPATIENT
Start: 2017-11-17 | End: 2017-11-17 | Stop reason: HOSPADM

## 2017-11-17 RX ADMIN — SODIUM CHLORIDE 125 MG: 9 INJECTION, SOLUTION INTRAVENOUS at 11:11

## 2017-11-17 RX ADMIN — SODIUM CHLORIDE, PRESERVATIVE FREE 10 ML: 5 INJECTION INTRAVENOUS at 01:11

## 2017-11-17 RX ADMIN — SODIUM CHLORIDE, PRESERVATIVE FREE 10 ML: 5 INJECTION INTRAVENOUS at 11:11

## 2017-11-17 NOTE — PLAN OF CARE
Problem: Patient Care Overview  Goal: Plan of Care Review  Outcome: Ongoing (interventions implemented as appropriate)  Pt here for iron infusion due to anemia

## 2017-11-18 ENCOUNTER — HOSPITAL ENCOUNTER (EMERGENCY)
Facility: HOSPITAL | Age: 22
Discharge: HOME | End: 2017-11-18
Attending: EMERGENCY MEDICINE
Payer: MEDICARE

## 2017-11-18 VITALS
DIASTOLIC BLOOD PRESSURE: 76 MMHG | HEART RATE: 76 BPM | OXYGEN SATURATION: 100 % | RESPIRATION RATE: 16 BRPM | HEIGHT: 64 IN | WEIGHT: 238 LBS | BODY MASS INDEX: 40.63 KG/M2 | TEMPERATURE: 98 F | SYSTOLIC BLOOD PRESSURE: 138 MMHG

## 2017-11-18 DIAGNOSIS — R10.2 PELVIC PAIN: Primary | ICD-10-CM

## 2017-11-18 DIAGNOSIS — N94.6 DYSMENORRHEA: ICD-10-CM

## 2017-11-18 DIAGNOSIS — N93.8 DYSFUNCTIONAL UTERINE BLEEDING: ICD-10-CM

## 2017-11-18 LAB
B-HCG UR QL: NEGATIVE
BACTERIA GENITAL QL WET PREP: ABNORMAL
BILIRUB UR QL STRIP: NEGATIVE
CLARITY UR: ABNORMAL
CLUE CELLS VAG QL WET PREP: ABNORMAL
COLOR UR: YELLOW
CTP QC/QA: YES
FILAMENT FUNGI VAG WET PREP-#/AREA: ABNORMAL
GLUCOSE UR QL STRIP: NEGATIVE
HGB UR QL STRIP: ABNORMAL
KETONES UR QL STRIP: NEGATIVE
LEUKOCYTE ESTERASE UR QL STRIP: NEGATIVE
NITRITE UR QL STRIP: NEGATIVE
PH UR STRIP: 7 [PH] (ref 5–8)
PROT UR QL STRIP: ABNORMAL
SP GR UR STRIP: 1.02 (ref 1–1.03)
SPECIMEN SOURCE: ABNORMAL
T VAGINALIS GENITAL QL WET PREP: ABNORMAL
URN SPEC COLLECT METH UR: ABNORMAL
UROBILINOGEN UR STRIP-ACNC: ABNORMAL EU/DL
WBC #/AREA VAG WET PREP: ABNORMAL
YEAST GENITAL QL WET PREP: ABNORMAL

## 2017-11-18 PROCEDURE — 87591 N.GONORRHOEAE DNA AMP PROB: CPT

## 2017-11-18 PROCEDURE — 63600175 PHARM REV CODE 636 W HCPCS: Performed by: PHYSICIAN ASSISTANT

## 2017-11-18 PROCEDURE — 87210 SMEAR WET MOUNT SALINE/INK: CPT

## 2017-11-18 PROCEDURE — 25000003 PHARM REV CODE 250: Performed by: PHYSICIAN ASSISTANT

## 2017-11-18 PROCEDURE — 81025 URINE PREGNANCY TEST: CPT | Performed by: PHYSICIAN ASSISTANT

## 2017-11-18 PROCEDURE — 81003 URINALYSIS AUTO W/O SCOPE: CPT

## 2017-11-18 PROCEDURE — 96372 THER/PROPH/DIAG INJ SC/IM: CPT

## 2017-11-18 PROCEDURE — 99284 EMERGENCY DEPT VISIT MOD MDM: CPT | Mod: 25

## 2017-11-18 RX ORDER — IBUPROFEN 800 MG/1
800 TABLET ORAL EVERY 6 HOURS PRN
Qty: 20 TABLET | Refills: 0 | Status: SHIPPED | OUTPATIENT
Start: 2017-11-18 | End: 2018-02-27

## 2017-11-18 RX ORDER — ONDANSETRON 4 MG/1
8 TABLET, ORALLY DISINTEGRATING ORAL
Status: COMPLETED | OUTPATIENT
Start: 2017-11-18 | End: 2017-11-18

## 2017-11-18 RX ORDER — PROMETHAZINE HYDROCHLORIDE 25 MG/1
25 TABLET ORAL EVERY 6 HOURS PRN
Qty: 20 TABLET | Refills: 0 | Status: SHIPPED | OUTPATIENT
Start: 2017-11-18 | End: 2018-01-20

## 2017-11-18 RX ORDER — KETOROLAC TROMETHAMINE 30 MG/ML
30 INJECTION, SOLUTION INTRAMUSCULAR; INTRAVENOUS
Status: COMPLETED | OUTPATIENT
Start: 2017-11-18 | End: 2017-11-18

## 2017-11-18 RX ADMIN — KETOROLAC TROMETHAMINE 30 MG: 30 INJECTION, SOLUTION INTRAMUSCULAR at 09:11

## 2017-11-18 RX ADMIN — ONDANSETRON 8 MG: 4 TABLET, ORALLY DISINTEGRATING ORAL at 10:11

## 2017-11-19 LAB
C TRACH DNA SPEC QL NAA+PROBE: NOT DETECTED
N GONORRHOEA DNA SPEC QL NAA+PROBE: NOT DETECTED

## 2017-11-19 NOTE — ED PROVIDER NOTES
Encounter Date: 11/18/2017    SCRIBE #1 NOTE: I, Haylie Lynn, am scribing for, and in the presence of, Clover Foley PA-C.       History     Chief Complaint   Patient presents with    Pelvic Pain     Painful periods over past several months.         11/18/2017  8:38 PM    Chief Complaint: Pelvic Pain      The patient is a 22 y.o. female who presents with constant pelvic pain x a few weeks. Pt reports her period never stopped and is dark brown. Pt reports the bleeding is waxing and waning in heaviness. She had a pap smear done 2 months ago by her OBGYN, Kindra Nelson. Dr. Nelson believes the pain is from starting birth control (microgestin). Pt reports her US was negative. Pt reports taking Naproxen without alleviation of her symptoms. PMHx of ADD, borderline personality disorder; chronic headache; depression; fibromyalgia; GERD; PCOS; pseudotumor cerebri syndrome; schizoaffective disorder; and tourette's syndrome. No pertinent surgical history.        The history is provided by the patient.     Review of patient's allergies indicates:  No Known Allergies  Past Medical History:   Diagnosis Date    ADD (attention deficit disorder)     Borderline personality disorder     Chronic headache     Depression     Fibromyalgia     GERD (gastroesophageal reflux disease)     PCOS (polycystic ovarian syndrome)     Pseudotumor cerebri syndrome     Schizoaffective disorder     Tourette's syndrome      Past Surgical History:   Procedure Laterality Date    TONSILLECTOMY      WISDOM TOOTH EXTRACTION       Family History   Problem Relation Age of Onset    Seizures Mother     Migraines Mother     Migraines Brother      Social History   Substance Use Topics    Smoking status: Former Smoker     Packs/day: 0.50     Types: Cigarettes    Smokeless tobacco: Never Used    Alcohol use No     Review of Systems   Constitutional: Negative for chills and fever.   HENT: Negative for facial swelling and trouble swallowing.     Eyes: Negative for discharge.   Respiratory: Negative for cough, chest tightness, shortness of breath and wheezing.    Cardiovascular: Negative for chest pain and palpitations.   Gastrointestinal: Negative for abdominal pain, diarrhea, nausea and vomiting.   Genitourinary: Positive for menstrual problem and pelvic pain. Negative for dysuria and hematuria.   Musculoskeletal: Negative for arthralgias, back pain, joint swelling, myalgias, neck pain and neck stiffness.   Skin: Negative for color change, pallor, rash and wound.   Neurological: Negative for dizziness, syncope, weakness, light-headedness, numbness and headaches.   Hematological: Does not bruise/bleed easily.   Psychiatric/Behavioral: The patient is not nervous/anxious.        Physical Exam     Initial Vitals [11/18/17 1902]   BP Pulse Resp Temp SpO2   138/76 76 16 98.3 °F (36.8 °C) 100 %      MAP       96.67         Physical Exam    Nursing note and vitals reviewed.  Constitutional: She appears well-developed and well-nourished. She is not diaphoretic. No distress.   HENT:   Head: Normocephalic and atraumatic.   Eyes: Conjunctivae are normal.   Cardiovascular: Normal rate, regular rhythm, normal heart sounds and intact distal pulses. Exam reveals no gallop and no friction rub.    No murmur heard.  Pulmonary/Chest: Breath sounds normal. No respiratory distress. She has no wheezes. She has no rhonchi. She has no rales.   Abdominal: Soft. She exhibits no distension and no mass. There is no tenderness.   No palpable abdominal tenderness noted.   Genitourinary: Pelvic exam was performed with patient supine. There is no rash, tenderness, lesion or injury on the right labia. There is no rash, tenderness, lesion or injury on the left labia. Uterus is not enlarged and not tender. Cervix exhibits no motion tenderness, no discharge and no friability. Right adnexum displays tenderness. Right adnexum displays no mass and no fullness. Left adnexum displays tenderness.  Left adnexum displays no mass and no fullness. There is bleeding in the vagina. No erythema or tenderness in the vagina. No foreign body in the vagina. No signs of injury around the vagina. Vaginal discharge found.   Genitourinary Comments: Small amount of vaginal bleeding and brownish discharge, without mucopurulent discharge from cervix.  Adnexal tenderness noted bilaterally, without masses.   Musculoskeletal: Normal range of motion. She exhibits no edema or tenderness.   Neurological: She is alert and oriented to person, place, and time. She has normal strength. No sensory deficit.   Skin: Skin is warm and dry. No rash and no abscess noted. No erythema.   Psychiatric: She has a normal mood and affect.         ED Course   Procedures  Labs Reviewed   URINALYSIS   POCT URINE PREGNANCY         Imaging Results    None              Medical Decision Making:   History:   Old Medical Records: I decided to obtain old medical records.  Differential Diagnosis:   TOA  Ovarian cysts  Ovarian torsion  UTI  Dysmenorrhea  Dysfunctional uterine bleeding  Clinical Tests:   Lab Tests: Ordered and Reviewed  Radiological Study: Ordered and Reviewed       APC / Resident Notes:   Vitals are stable.  Urinalysis shows no evidence for infection.  UPT is negative.  Wet prep is negative.  GC chlamydia cultures are sent, but not suspected and no empiric treatment is given today.  Pelvic ultrasound shows no acute abnormalities.  She is given a dose of IM Toradol and Zofran here in the emergency department.  We do not feel any further imaging or testing is necessary at this time.  Patient had an iron infusion performed on an outpatient basis yesterday.  She'll be discharged home to follow-up with her OB/GYN for reevaluation of further treatment options.  She is given a prescription for Motrin and Phenergan.  She is given specific return precautions.       Scribe Attestation:   Scribe #1: I performed the above scribed service and the  documentation accurately describes the services I performed. I attest to the accuracy of the note.    I, Clover Foley PA-C, personally performed the services described in this documentation. All medical record entries made by the scribe were at my direction and in my presence.  I have reviewed the chart and agree that the record reflects my personal performance and is accurate and complete. Clover Foley PA-C.  10:34 PM 11/18/2017          ED Course      Clinical Impression:   No diagnosis found.                             Clover Foley PA-C  11/18/17 6445

## 2017-11-24 ENCOUNTER — INFUSION (OUTPATIENT)
Dept: INFUSION THERAPY | Facility: HOSPITAL | Age: 22
End: 2017-11-24
Attending: PSYCHIATRY & NEUROLOGY
Payer: MEDICARE

## 2017-11-24 VITALS
BODY MASS INDEX: 40.4 KG/M2 | HEART RATE: 78 BPM | DIASTOLIC BLOOD PRESSURE: 89 MMHG | SYSTOLIC BLOOD PRESSURE: 128 MMHG | TEMPERATURE: 98 F | HEIGHT: 64 IN | WEIGHT: 236.63 LBS | RESPIRATION RATE: 20 BRPM

## 2017-11-24 DIAGNOSIS — R79.0 DECREASED FERRITIN: Primary | ICD-10-CM

## 2017-11-24 PROCEDURE — 25000003 PHARM REV CODE 250: Mod: PN | Performed by: PSYCHIATRY & NEUROLOGY

## 2017-11-24 PROCEDURE — 96365 THER/PROPH/DIAG IV INF INIT: CPT | Mod: PN

## 2017-11-24 PROCEDURE — 63600175 PHARM REV CODE 636 W HCPCS: Mod: PN | Performed by: PSYCHIATRY & NEUROLOGY

## 2017-11-24 RX ORDER — SODIUM CHLORIDE 0.9 % (FLUSH) 0.9 %
10 SYRINGE (ML) INJECTION
Status: CANCELLED | OUTPATIENT
Start: 2017-11-24

## 2017-11-24 RX ORDER — HEPARIN 100 UNIT/ML
500 SYRINGE INTRAVENOUS
Status: CANCELLED | OUTPATIENT
Start: 2017-11-24

## 2017-11-24 RX ORDER — SODIUM CHLORIDE 0.9 % (FLUSH) 0.9 %
10 SYRINGE (ML) INJECTION
Status: DISCONTINUED | OUTPATIENT
Start: 2017-11-24 | End: 2017-11-24 | Stop reason: HOSPADM

## 2017-11-24 RX ADMIN — SODIUM CHLORIDE 125 MG: 900 INJECTION, SOLUTION INTRAVENOUS at 01:11

## 2017-11-24 RX ADMIN — SODIUM CHLORIDE: 900 INJECTION, SOLUTION INTRAVENOUS at 12:11

## 2017-11-30 ENCOUNTER — TELEPHONE (OUTPATIENT)
Dept: DERMATOLOGY | Facility: CLINIC | Age: 22
End: 2017-11-30

## 2017-11-30 NOTE — TELEPHONE ENCOUNTER
----- Message from Maida Anderson sent at 11/30/2017 10:42 AM CST -----  Contact: [email or web address]  Appointment Request From: Mary Myles    With Provider: Other - (see comments) [oth]    Would Accept With:Request to see a new provider    Preferred Date Range: From 11/30/2017 To 12/12/2017    Preferred Times: Monday Afternoon, Tuesday Afternoon, Wednesday Afternoon, Thursday Afternoon, Friday Afternoon    Reason for visit: Request an Appt    Comments:  I need to see dermatology, for acne, reoccurring boils, and rapid hair growth. I have PCOS.

## 2017-12-01 ENCOUNTER — INFUSION (OUTPATIENT)
Dept: INFUSION THERAPY | Facility: HOSPITAL | Age: 22
End: 2017-12-01
Attending: PSYCHIATRY & NEUROLOGY
Payer: MEDICARE

## 2017-12-01 VITALS
TEMPERATURE: 98 F | BODY MASS INDEX: 41.01 KG/M2 | WEIGHT: 240.19 LBS | HEIGHT: 64 IN | RESPIRATION RATE: 16 BRPM | SYSTOLIC BLOOD PRESSURE: 128 MMHG | DIASTOLIC BLOOD PRESSURE: 82 MMHG | HEART RATE: 81 BPM

## 2017-12-01 DIAGNOSIS — R79.0 DECREASED FERRITIN: Primary | ICD-10-CM

## 2017-12-01 PROCEDURE — 25000003 PHARM REV CODE 250: Mod: PN | Performed by: PSYCHIATRY & NEUROLOGY

## 2017-12-01 PROCEDURE — 63600175 PHARM REV CODE 636 W HCPCS: Mod: PN | Performed by: PSYCHIATRY & NEUROLOGY

## 2017-12-01 PROCEDURE — A4216 STERILE WATER/SALINE, 10 ML: HCPCS | Mod: PN | Performed by: PSYCHIATRY & NEUROLOGY

## 2017-12-01 PROCEDURE — 96365 THER/PROPH/DIAG IV INF INIT: CPT | Mod: PN

## 2017-12-01 RX ORDER — SODIUM CHLORIDE 0.9 % (FLUSH) 0.9 %
10 SYRINGE (ML) INJECTION
Status: DISCONTINUED | OUTPATIENT
Start: 2017-12-01 | End: 2017-12-01 | Stop reason: HOSPADM

## 2017-12-01 RX ORDER — SODIUM CHLORIDE 0.9 % (FLUSH) 0.9 %
10 SYRINGE (ML) INJECTION
Status: CANCELLED | OUTPATIENT
Start: 2017-12-01

## 2017-12-01 RX ORDER — HEPARIN 100 UNIT/ML
500 SYRINGE INTRAVENOUS
Status: CANCELLED | OUTPATIENT
Start: 2017-12-01

## 2017-12-01 RX ADMIN — SODIUM CHLORIDE 125 MG: 9 INJECTION, SOLUTION INTRAVENOUS at 01:12

## 2017-12-01 RX ADMIN — SODIUM CHLORIDE, PRESERVATIVE FREE 10 ML: 5 INJECTION INTRAVENOUS at 01:12

## 2017-12-12 DIAGNOSIS — G43.709 CHRONIC MIGRAINE W/O AURA W/O STATUS MIGRAINOSUS, NOT INTRACTABLE: Primary | ICD-10-CM

## 2017-12-15 ENCOUNTER — OFFICE VISIT (OUTPATIENT)
Dept: OPHTHALMOLOGY | Facility: CLINIC | Age: 22
End: 2017-12-15
Payer: MEDICARE

## 2017-12-15 ENCOUNTER — CLINICAL SUPPORT (OUTPATIENT)
Dept: OPHTHALMOLOGY | Facility: CLINIC | Age: 22
End: 2017-12-15
Payer: MEDICARE

## 2017-12-15 DIAGNOSIS — G93.2 BENIGN INTRACRANIAL HYPERTENSION: Primary | ICD-10-CM

## 2017-12-15 PROCEDURE — 92012 INTRM OPH EXAM EST PATIENT: CPT | Mod: S$PBB,,, | Performed by: OPHTHALMOLOGY

## 2017-12-15 PROCEDURE — 92083 EXTENDED VISUAL FIELD XM: CPT | Mod: PBBFAC | Performed by: OPHTHALMOLOGY

## 2017-12-15 PROCEDURE — 99213 OFFICE O/P EST LOW 20 MIN: CPT | Mod: PBBFAC | Performed by: OPHTHALMOLOGY

## 2017-12-15 PROCEDURE — 99999 PR PBB SHADOW E&M-EST. PATIENT-LVL III: CPT | Mod: PBBFAC,,, | Performed by: OPHTHALMOLOGY

## 2017-12-15 NOTE — PROGRESS NOTES
HPI     Concerns About Ocular Health    Additional comments: Benign intracranial hypertension           Comments   DLS:08/15/2017 Emily  Patient here for 4 month follow up and Review HVF.  Pt states no noticeable change in vision.  Pt states d/c all her medication x month and half ago.    7 on pain scale(having a migraine today)    I have personally interviewed the patient, reviewed the history and   examined the patient and agree with the technician's exam.       Last edited by Alonso Diaz MD on 12/15/2017  1:41 PM. (History)            Assessment /Plan     For exam results, see Encounter Report.    Benign intracranial hypertension  -     Wu Visual Field - OU - Extended - Both Eyes      Ms. Myles is doing well. She is off medications and still does not have recurrent papilledema. I will repeat her exam and visual field testing in one year.

## 2017-12-20 ENCOUNTER — PROCEDURE VISIT (OUTPATIENT)
Dept: NEUROLOGY | Facility: CLINIC | Age: 22
End: 2017-12-20
Payer: MEDICARE

## 2017-12-20 VITALS
HEART RATE: 92 BPM | HEIGHT: 64 IN | DIASTOLIC BLOOD PRESSURE: 84 MMHG | BODY MASS INDEX: 44.41 KG/M2 | SYSTOLIC BLOOD PRESSURE: 128 MMHG | WEIGHT: 260.13 LBS

## 2017-12-20 DIAGNOSIS — F95.2 TOURETTES SYNDROME: ICD-10-CM

## 2017-12-20 DIAGNOSIS — G47.33 OSA (OBSTRUCTIVE SLEEP APNEA): ICD-10-CM

## 2017-12-20 DIAGNOSIS — F31.62 BIPOLAR 1 DISORDER, MIXED, MODERATE: ICD-10-CM

## 2017-12-20 DIAGNOSIS — E66.01 MORBID OBESITY WITH BMI OF 40.0-44.9, ADULT: ICD-10-CM

## 2017-12-20 DIAGNOSIS — G93.2 BENIGN INTRACRANIAL HYPERTENSION: ICD-10-CM

## 2017-12-20 PROCEDURE — 64615 CHEMODENERV MUSC MIGRAINE: CPT | Mod: PBBFAC | Performed by: NURSE PRACTITIONER

## 2017-12-20 PROCEDURE — 64615 CHEMODENERV MUSC MIGRAINE: CPT | Mod: S$PBB,,, | Performed by: NURSE PRACTITIONER

## 2017-12-20 RX ADMIN — ONABOTULINUMTOXINA 200 UNITS: 100 INJECTION, POWDER, LYOPHILIZED, FOR SOLUTION INTRADERMAL; INTRAMUSCULAR at 11:12

## 2017-12-20 NOTE — PROCEDURES
"SUBJECTIVE/OBJECTIVE:  Patient ID: Mary Myles  Chief Complaint: Headache; Botulinum Toxin Injection; and Follow-up    History of Present Illness:  22 y.o. female with Chronic migraine, Tourettes, Bipolar disorder, PCOS, GERD, obesity, depression, anxiety, and insomnia, who presents to clinic alone for follow-up of headaches and repeat Botox injections.     Interval History:  She continues to experience headaches nearly everyday, she has not noticed any difference since Botox injections were started.  However, since last office visit 3 months ago she has discontinued taking all of her medication as she does not feel it is helping.  She states her psychiatrist is aware that she has discontinued her medications and is okay with this.  Of note, she remained on the phone with [employer] for the entirety of the visit trying to fix a situation with packages she sent to her boyfriend in [facility].  She does want to continue with the Botox injections as she has been told by friends that it can take up to 3 rounds before the results set in and she is optimistic that she is going to see results after this round.  She has also gained 20 pounds since last visit, states she has not been keeping with her healthy diet and exercise regimen recently.  Has appointment with mental health in the next two weeks for management of Bipolar depression.  Discussed weight gain can worsen sleep apnea as well as increased intracranial pressure.      Will leave medications in her chart, as its possible she may restart her medications.        Current Medications:  Current Facility-Administered Medications:     onabotulinumtoxina injection 200 Units, 200 Units, Intramuscular, Q90 Days, SEVERIANO Diaz, 200 Units at 12/20/17 1126    Vitals:  /84 (BP Location: Right arm, Patient Position: Sitting, BP Method: X-Large (Automatic))   Pulse 92   Ht 5' 4" (1.626 m)   Wt 118 kg (260 lb 2.3 oz)   BMI 44.65 kg/m²     BOTOX " was performed as an indicated therapy for intractable chronic migraine headaches given that the patient failed more than 2 headache medications    Two patient identifiers were confirmed with the patient prior to performing this procedure. A time out to determine correct patient and and agreement on procedure performed was conducted prior to the procedure.      Botulinum Toxin Injection Procedure   Procedure: Chemical neurolysis   After risks and benefits were explained including bleeding, infection, worsening of pain, damage to the areas being injected, weakness of muscles, loss of muscle control, dysphagia if injecting the head or neck, facial droop if injecting the facial area, painful injection, allergic or other reaction to the medications being injected, and the failure of the procedure to help the problem, a signed consent was obtained.   The patient was placed in a comfortable area and the sites to be treated were identified.The area to be treated was prepped three times with alcohol and the alcohol allowed to dry. Next, a 30 gauge needle was used to inject the medication in the area to be treated.      Total Botox used: 155 Units   Botox wastage: 45 Units     Injection sites:    muscle bilaterally ( a total of 10 units divided into 2 sites)   Procerus muscle (5 units)   Frontalis muscle bilaterally (a total of 20 units divided into 4 sites)   Temporalis muscle bilaterally (a total of 40 units divided into 8 sites)   Occipitalis muscle bilaterally (a total of 30 units divided into 6 sites)   Cervical paraspinal muscles (a total of 20 units divided into 4 sites)   Trapezius muscle bilaterally (a total of 30 units divided into 6 sites)   Complications: none   RTC for the next Botox injection: 3 months     ASSESSMENT:  1. Chronic migraine    2. Bipolar 1 disorder, mixed, moderate    3. Tourettes syndrome    4. MALCOLM (obstructive sleep apnea)    5. Morbid obesity with BMI of 40.0-44.9, adult    6. Benign  intracranial hypertension      PLAN:  - Botox administered in clinic for Chronic Migraine (see above)   - Encouraged Mary to follow healthy diet and restart her exercise regimen   - Advised she keep f/up appointment with mental health to discuss medication changes she has made, which she is agreeable to   - RTC in 3 months for repeat Botox injections or sooner if needed     All questions and concerns addressed.  Patient verbalizes understanding and is agreeable with the above stated treatment plan.      CC: DO Terri Woods FNP-MAMIE  [facility] Department of Neurology   [phone]

## 2017-12-27 ENCOUNTER — OFFICE VISIT (OUTPATIENT)
Dept: UROLOGY | Facility: CLINIC | Age: 22
End: 2017-12-27
Payer: MEDICARE

## 2017-12-27 VITALS
WEIGHT: 243.19 LBS | SYSTOLIC BLOOD PRESSURE: 114 MMHG | DIASTOLIC BLOOD PRESSURE: 63 MMHG | HEIGHT: 64 IN | HEART RATE: 90 BPM | BODY MASS INDEX: 41.52 KG/M2

## 2017-12-27 DIAGNOSIS — R10.2 PELVIC PAIN: Primary | ICD-10-CM

## 2017-12-27 DIAGNOSIS — R39.14 FEELING OF INCOMPLETE BLADDER EMPTYING: ICD-10-CM

## 2017-12-27 LAB
BILIRUB SERPL-MCNC: NORMAL MG/DL
BLOOD URINE, POC: NORMAL
COLOR, POC UA: NORMAL
GLUCOSE UR QL STRIP: NORMAL
KETONES UR QL STRIP: NORMAL
LEUKOCYTE ESTERASE URINE, POC: NORMAL
NITRITE, POC UA: NORMAL
PH, POC UA: 6
PROTEIN, POC: NORMAL
SPECIFIC GRAVITY, POC UA: 1
UROBILINOGEN, POC UA: NORMAL

## 2017-12-27 PROCEDURE — 87086 URINE CULTURE/COLONY COUNT: CPT

## 2017-12-27 PROCEDURE — 81002 URINALYSIS NONAUTO W/O SCOPE: CPT | Mod: PBBFAC | Performed by: NURSE PRACTITIONER

## 2017-12-27 PROCEDURE — 51701 INSERT BLADDER CATHETER: CPT | Mod: PBBFAC | Performed by: NURSE PRACTITIONER

## 2017-12-27 PROCEDURE — 99214 OFFICE O/P EST MOD 30 MIN: CPT | Mod: S$PBB,,, | Performed by: NURSE PRACTITIONER

## 2017-12-27 PROCEDURE — 99999 PR PBB SHADOW E&M-EST. PATIENT-LVL V: CPT | Mod: PBBFAC,,, | Performed by: NURSE PRACTITIONER

## 2017-12-27 PROCEDURE — 99215 OFFICE O/P EST HI 40 MIN: CPT | Mod: PBBFAC | Performed by: NURSE PRACTITIONER

## 2017-12-27 NOTE — PROGRESS NOTES
CHIEF COMPLAINT:    Mrs. Myles is a 22 y.o. female presenting for possible painful bladder syndrome.  PRESENTING ILLNESS:    Mary Myles is a 22 y.o. female with a PMH of ADD, borderline personality disorder; chronic headache; depression; fibromyalgia; GERD; PCOS; pseudotumor cerebri syndrome; schizoaffective disorder; and tourette's syndrome who presents for possible painful bladder syndrome.  She was referred by her OBGYN Dr. Kindra Nelson.     She presented to the ED on 11/18/17 with constant pelvic pain x a few weeks. Pt reported her period never stopped and is dark brown. Pt reports the bleeding is waxing and waning in heaviness. She had a pap smear done 2 months ago by her OBGYN, Kindra Nelson. Dr. Nelson believes the pain is from starting birth control (microgestin). Pt reports her US was negative.No pertinent surgical history.    Today she reports for constant,  pelvic pain is worse before and after urinating. She describes the pain as a sharp pressure. She states nothing improves the pain. She states the pain is worse before and after she urinates. She rates it a constant 7/10. It worsens to a 9/10. She reports vomiting d/t the pain. She states she took ibuprofen BID for 1 week and had no relief of pain. She reports incomplete bladder emptying and urgency. Denies frequency, dysuria, and hematuria. She does not think any foods or stress worsen the pain. She drinks water and green tea.   Denies new sexual partners.  Reports a hx of UTIs as a child but none recently.    No hx of kidney stones.     REVIEW OF SYSTEMS:    Review of Systems    Constitutional: Negative for fever and chills.   HENT: Negative for hearing loss.   Eyes: Negative for visual disturbance.   Respiratory: Negative for shortness of breath.   Cardiovascular: Negative for chest pain.   Gastrointestinal: see above  Negative for nausea, vomiting, and constipation.   Genitourinary: Neurological: Negative for dizziness.   Hematological:  Does not bruise/bleed easily.   Psychiatric/Behavioral: Negative for confusion.     PATIENT HISTORY:    Past Medical History:   Diagnosis Date    ADD (attention deficit disorder)     Borderline personality disorder     Chronic headache     Depression     Fibromyalgia     GERD (gastroesophageal reflux disease)     PCOS (polycystic ovarian syndrome)     Pseudotumor cerebri syndrome     Schizoaffective disorder     Tourette's syndrome        Past Surgical History:   Procedure Laterality Date    TONSILLECTOMY      WISDOM TOOTH EXTRACTION         Family History   Problem Relation Age of Onset    Seizures Mother     Migraines Mother     Migraines Brother        Social History     Social History    Marital status: Single     Spouse name: N/A    Number of children: N/A    Years of education: N/A     Occupational History    Not on file.     Social History Main Topics    Smoking status: Former Smoker     Packs/day: 0.50     Types: Cigarettes    Smokeless tobacco: Never Used    Alcohol use No    Drug use: No      Comment: patient states she quit mojo 3 months ago and marijuana 2 months ago    Sexual activity: No     Other Topics Concern    Not on file     Social History Narrative    No narrative on file       Allergies:  Patient has no known allergies.    Medications:    Current Outpatient Prescriptions:     busPIRone (BUSPAR) 10 MG tablet, , Disp: , Rfl:     CHANTIX STARTING MONTH BOX 0.5 mg (11)- 1 mg (42) tablet, , Disp: , Rfl:     cloNIDine (CATAPRES) 0.1 MG tablet, Take 1 tablet (0.1 mg total) by mouth 2 (two) times daily., Disp: 60 tablet, Rfl: 11    ergocalciferol (ERGOCALCIFEROL) 50,000 unit Cap, Take 1 capsule (50,000 Units total) by mouth every 7 days., Disp: 12 capsule, Rfl: 0    ibuprofen (ADVIL,MOTRIN) 800 MG tablet, Take 1 tablet (800 mg total) by mouth every 6 (six) hours as needed for Pain., Disp: 20 tablet, Rfl: 0    lithium (ESKALITH) 300 MG capsule, Take 300 mg by mouth 2  (two) times daily. , Disp: , Rfl:     metformin (GLUCOPHAGE) 500 MG tablet, Take 1 tablet (500 mg total) by mouth 2 (two) times daily with meals., Disp: 180 tablet, Rfl: 0    MICROGESTIN FE 1.5/30, 28, 1.5 mg-30 mcg (21)/75 mg (7) tablet, Take 1 tablet by mouth once daily., Disp: , Rfl: 11    promethazine (PHENERGAN) 25 MG tablet, Take 1 tablet (25 mg total) by mouth every 6 (six) hours as needed for Nausea., Disp: 20 tablet, Rfl: 0    tamsulosin (FLOMAX) 0.4 mg Cp24, Take 1 capsule (0.4 mg total) by mouth once daily., Disp: 30 capsule, Rfl: 3    topiramate (TOPAMAX) 25 MG tablet, Take 25 mg by mouth 2 (two) times daily., Disp: , Rfl:     topiramate (TOPAMAX) 50 MG tablet, , Disp: , Rfl:     VRAYLAR 3 mg Cap, Take 4.5 mg by mouth once daily. , Disp: , Rfl:     VRAYLAR 4.5 mg Cap, , Disp: , Rfl:     Current Facility-Administered Medications:     onabotulinumtoxina injection 200 Units, 200 Units, Intramuscular, Q90 Days, SEVERIANO Diaz, 200 Units at 12/20/17 1126    PHYSICAL EXAMINATION:  Constitutional: She is oriented to person, place, and time. She appears well-developed and well-nourished.  She is in no apparent distress.    Neck: No tracheal deviation present.     Cardiovascular: Normal rate.      Pulmonary/Chest: Effort normal. No respiratory distress.     Abdominal:  She exhibits no distension.  There is no CVA tenderness.     Lymphadenopathy:          Right: No supraclavicular adenopathy present.        Left: No supraclavicular adenopathy present.     Neurological: She is alert and oriented to person, place, and time.     Skin: Skin is warm and dry.     Extremities: No pitting edema noted in lower extremities bilaterally    Psych: Cooperative with normal affect.    Genitourinary: Normal external female genitalia  Urethral meatus is normal  Urethra and bladder are tender to bimanual exam  Vaginal trigger points tender to palpation  Well supported anteriorly and posteriorly   In and out cath  was 40 cc of clear yellow urine. Verbal consent received.     Physical Exam      LABS:    U/a today showed trace leuk and no bacteria. Spec grav 1.000 and ph 6.     IMPRESSION:    Encounter Diagnoses   Name Primary?    Pelvic pain Yes    Feeling of incomplete bladder emptying        PLAN:  -Referral to PT for pelvic floor dysfunction  -IC diet recommendations  -Voiding diary for 3 days  -UC from cath urine sent to the lab. Will notify with results.   -Referral to Dr. Mann in 3 months    I encouraged her or any of her family members to call or email me with questions and/or concerns.      CHERYL Gómez

## 2017-12-28 ENCOUNTER — TELEPHONE (OUTPATIENT)
Dept: UROLOGY | Facility: CLINIC | Age: 22
End: 2017-12-28

## 2017-12-28 DIAGNOSIS — R10.2 PELVIC PAIN IN FEMALE: ICD-10-CM

## 2017-12-28 DIAGNOSIS — R33.9 INCOMPLETE EMPTYING OF BLADDER: Primary | ICD-10-CM

## 2017-12-28 LAB — BACTERIA UR CULT: NO GROWTH

## 2017-12-28 RX ORDER — TAMSULOSIN HYDROCHLORIDE 0.4 MG/1
0.4 CAPSULE ORAL DAILY
Qty: 30 CAPSULE | Refills: 3 | Status: SHIPPED | OUTPATIENT
Start: 2017-12-28 | End: 2018-01-20

## 2017-12-29 NOTE — TELEPHONE ENCOUNTER
Explained that I spoke to Dr. Mann. Explained to continue Pelvic floor PT and start flomax to see if improvement occurs. Discussed side effects, indications, and MOA for flomax. Prescription sent to the pharmacy.Continue IC diet and do voiding diary. RTC in 3 months to reassess symptoms or prn.  Pt verbalized understanding.

## 2018-01-02 ENCOUNTER — HOSPITAL ENCOUNTER (OUTPATIENT)
Dept: RADIOLOGY | Facility: HOSPITAL | Age: 23
Discharge: HOME | End: 2018-01-02
Attending: ORTHOPAEDIC SURGERY
Payer: MEDICARE

## 2018-01-02 ENCOUNTER — OFFICE VISIT (OUTPATIENT)
Dept: ORTHOPEDICS | Facility: CLINIC | Age: 23
End: 2018-01-02
Payer: MEDICARE

## 2018-01-02 VITALS
BODY MASS INDEX: 41.52 KG/M2 | DIASTOLIC BLOOD PRESSURE: 85 MMHG | SYSTOLIC BLOOD PRESSURE: 139 MMHG | WEIGHT: 243.19 LBS | HEIGHT: 64 IN | HEART RATE: 86 BPM

## 2018-01-02 DIAGNOSIS — M25.561 RIGHT KNEE PAIN, UNSPECIFIED CHRONICITY: Primary | ICD-10-CM

## 2018-01-02 DIAGNOSIS — M25.561 ACUTE PAIN OF RIGHT KNEE: Primary | ICD-10-CM

## 2018-01-02 DIAGNOSIS — M25.561 RIGHT KNEE PAIN, UNSPECIFIED CHRONICITY: ICD-10-CM

## 2018-01-02 PROCEDURE — 73562 X-RAY EXAM OF KNEE 3: CPT | Mod: 26,59,LT, | Performed by: RADIOLOGY

## 2018-01-02 PROCEDURE — 73562 X-RAY EXAM OF KNEE 3: CPT | Mod: TC,PN,LT,59

## 2018-01-02 PROCEDURE — 99213 OFFICE O/P EST LOW 20 MIN: CPT | Mod: PBBFAC,25,PN | Performed by: ORTHOPAEDIC SURGERY

## 2018-01-02 PROCEDURE — 99999 PR PBB SHADOW E&M-EST. PATIENT-LVL III: CPT | Mod: PBBFAC,,, | Performed by: ORTHOPAEDIC SURGERY

## 2018-01-02 PROCEDURE — 99203 OFFICE O/P NEW LOW 30 MIN: CPT | Mod: S$PBB,,, | Performed by: ORTHOPAEDIC SURGERY

## 2018-01-02 PROCEDURE — 73564 X-RAY EXAM KNEE 4 OR MORE: CPT | Mod: 26,RT,, | Performed by: RADIOLOGY

## 2018-01-04 ENCOUNTER — HOSPITAL ENCOUNTER (OUTPATIENT)
Dept: RADIOLOGY | Facility: HOSPITAL | Age: 23
Discharge: HOME | End: 2018-01-04
Attending: ORTHOPAEDIC SURGERY
Payer: MEDICARE

## 2018-01-04 DIAGNOSIS — M25.561 RIGHT KNEE PAIN, UNSPECIFIED CHRONICITY: ICD-10-CM

## 2018-01-04 PROCEDURE — 73721 MRI JNT OF LWR EXTRE W/O DYE: CPT | Mod: 26,RT,, | Performed by: RADIOLOGY

## 2018-01-04 PROCEDURE — 73721 MRI JNT OF LWR EXTRE W/O DYE: CPT | Mod: TC,RT

## 2018-01-05 NOTE — PROGRESS NOTES
Past Medical History:   Diagnosis Date    ADD (attention deficit disorder)     Borderline personality disorder     Chronic headache     Depression     Fibromyalgia     GERD (gastroesophageal reflux disease)     PCOS (polycystic ovarian syndrome)     Pseudotumor cerebri syndrome     Schizoaffective disorder     Tourette's syndrome        Past Surgical History:   Procedure Laterality Date    TONSILLECTOMY      WISDOM TOOTH EXTRACTION         Current Outpatient Prescriptions   Medication Sig    busPIRone (BUSPAR) 10 MG tablet     CHANTIX STARTING MONTH BOX 0.5 mg (11)- 1 mg (42) tablet     cloNIDine (CATAPRES) 0.1 MG tablet Take 1 tablet (0.1 mg total) by mouth 2 (two) times daily.    ergocalciferol (ERGOCALCIFEROL) 50,000 unit Cap Take 1 capsule (50,000 Units total) by mouth every 7 days.    ibuprofen (ADVIL,MOTRIN) 800 MG tablet Take 1 tablet (800 mg total) by mouth every 6 (six) hours as needed for Pain.    lithium (ESKALITH) 300 MG capsule Take 300 mg by mouth 2 (two) times daily.     MICROGESTIN FE 1.5/30, 28, 1.5 mg-30 mcg (21)/75 mg (7) tablet Take 1 tablet by mouth once daily.    promethazine (PHENERGAN) 25 MG tablet Take 1 tablet (25 mg total) by mouth every 6 (six) hours as needed for Nausea.    tamsulosin (FLOMAX) 0.4 mg Cp24 Take 1 capsule (0.4 mg total) by mouth once daily.    topiramate (TOPAMAX) 25 MG tablet Take 25 mg by mouth 2 (two) times daily.    topiramate (TOPAMAX) 50 MG tablet     VRAYLAR 3 mg Cap Take 4.5 mg by mouth once daily.     VRAYLAR 4.5 mg Cap     metformin (GLUCOPHAGE) 500 MG tablet Take 1 tablet (500 mg total) by mouth 2 (two) times daily with meals.     Current Facility-Administered Medications   Medication    onabotulinumtoxina injection 200 Units       Review of patient's allergies indicates:  No Known Allergies    Family History   Problem Relation Age of Onset    Seizures Mother     Migraines Mother     Migraines Brother        Social History  "    Social History    Marital status: Single     Spouse name: N/A    Number of children: N/A    Years of education: N/A     Occupational History    Not on file.     Social History Main Topics    Smoking status: Former Smoker     Packs/day: 0.50     Types: Cigarettes    Smokeless tobacco: Never Used    Alcohol use No    Drug use: No      Comment: patient states she quit mojo 3 months ago and marijuana 2 months ago    Sexual activity: No     Other Topics Concern    Not on file     Social History Narrative    No narrative on file       Chief Complaint:   Chief Complaint   Patient presents with    Right Knee - Pain       Consulting Physician: Self, Aaareferral    History of present illness:    This is a 22 y.o. female who complains of right knee pain since fall in October. Pain 10/10 and getting worse. Worse with use.    Review of Systems:    Constitution: Denies chills, fever, and sweats.  HENT: Denies headaches or blurry vision.  Cardiovascular: Denies chest pain or irregular heart beat.  Respiratory: Denies cough or shortness of breath.  Gastrointestinal: Denies abdominal pain, nausea, or vomiting.  Musculoskeletal:  Denies muscle cramps.  Neurological: Denies dizziness or focal weakness.  Psychiatric/Behavioral: Normal mental status.  Hematologic/Lymphatic: Denies bleeding problem or easy bruising/bleeding.  Skin: Denies rash or suspicious lesions.    Examination:    Vital Signs:    Vitals:    01/02/18 1421   BP: 139/85   Pulse: 86   Weight: 110.3 kg (243 lb 2.7 oz)   Height: 5' 4" (1.626 m)   PainSc: 10-Worst pain ever   PainLoc: Knee       Body mass index is 41.74 kg/m².    This a well-developed, well nourished patient in no acute distress.    Alert and oriented x 3 and cooperative to examination.       Physical Exam: Right Knee Exam    Gait   Normal    Skin  Rash:   None  Scars:   None    Inspection  Erythema:  None  Bruising:  None  Effusion:  None  Masses:  None  Lymphadenopathy: None    Range of " Motion: 0 to 130° with pain    Medial Joint : y  Lateral Joint : n    Patellofemoral Tenderness: None  Patellofemoral Crepitus: None    Lachman:  Normal  Anterior Drawer: Normal  Posterior Drawer: Normal    Rose Marie's:  neg  Apley's:  neg    Varus Stress:  Stable  Valgus Stress:  Stable    Strength:  5/5    Pulses:  Palpable distal    Sensation:  Intact          Imaging: X-rays ordered and reviewed today personally of the right knee appear normal        Assessment: Acute pain of right knee        Plan:  Her pain is constant but worse with stairs. Will brace and order MRI for possible meniscal tear. Ibu rx.      DISCLAIMER: This note may have been dictated using voice recognition software and may contain grammatical errors.     NOTE: Consult report sent to referring provider via [facility] EMR.

## 2018-01-09 ENCOUNTER — OFFICE VISIT (OUTPATIENT)
Dept: ORTHOPEDICS | Facility: CLINIC | Age: 23
End: 2018-01-09
Payer: MEDICARE

## 2018-01-09 VITALS
HEART RATE: 86 BPM | BODY MASS INDEX: 41.52 KG/M2 | HEIGHT: 64 IN | WEIGHT: 243.19 LBS | SYSTOLIC BLOOD PRESSURE: 105 MMHG | DIASTOLIC BLOOD PRESSURE: 66 MMHG

## 2018-01-09 DIAGNOSIS — M25.561 RIGHT KNEE PAIN, UNSPECIFIED CHRONICITY: Primary | ICD-10-CM

## 2018-01-09 PROCEDURE — 99213 OFFICE O/P EST LOW 20 MIN: CPT | Mod: PBBFAC,PN | Performed by: ORTHOPAEDIC SURGERY

## 2018-01-09 PROCEDURE — 20610 DRAIN/INJ JOINT/BURSA W/O US: CPT | Mod: PBBFAC,PN | Performed by: ORTHOPAEDIC SURGERY

## 2018-01-09 PROCEDURE — 99214 OFFICE O/P EST MOD 30 MIN: CPT | Mod: S$PBB,,, | Performed by: ORTHOPAEDIC SURGERY

## 2018-01-09 PROCEDURE — 99999 PR PBB SHADOW E&M-EST. PATIENT-LVL III: CPT | Mod: PBBFAC,,, | Performed by: ORTHOPAEDIC SURGERY

## 2018-01-11 ENCOUNTER — CLINICAL SUPPORT (OUTPATIENT)
Dept: REHABILITATION | Facility: HOSPITAL | Age: 23
End: 2018-01-11
Payer: MEDICARE

## 2018-01-11 DIAGNOSIS — R10.2 PELVIC PAIN: Primary | ICD-10-CM

## 2018-01-11 NOTE — PROGRESS NOTES
Pt will require pelvic floor, women's health PT.  Pt was given information to contact [facility] and/or [facility].

## 2018-01-12 ENCOUNTER — OFFICE VISIT (OUTPATIENT)
Dept: BARIATRICS | Facility: CLINIC | Age: 23
End: 2018-01-12
Payer: MEDICARE

## 2018-01-12 VITALS
HEART RATE: 79 BPM | HEIGHT: 64 IN | BODY MASS INDEX: 43.25 KG/M2 | DIASTOLIC BLOOD PRESSURE: 80 MMHG | SYSTOLIC BLOOD PRESSURE: 126 MMHG | WEIGHT: 253.31 LBS

## 2018-01-12 DIAGNOSIS — F95.2 TOURETTES SYNDROME: ICD-10-CM

## 2018-01-12 DIAGNOSIS — F50.81 BINGE EATING DISORDER: ICD-10-CM

## 2018-01-12 DIAGNOSIS — E66.01 MORBID OBESITY WITH BMI OF 40.0-44.9, ADULT: ICD-10-CM

## 2018-01-12 PROCEDURE — 99999 PR PBB SHADOW E&M-EST. PATIENT-LVL III: CPT | Mod: PBBFAC,,, | Performed by: INTERNAL MEDICINE

## 2018-01-12 PROCEDURE — 99213 OFFICE O/P EST LOW 20 MIN: CPT | Mod: S$PBB,,, | Performed by: INTERNAL MEDICINE

## 2018-01-12 PROCEDURE — 99213 OFFICE O/P EST LOW 20 MIN: CPT | Mod: PBBFAC | Performed by: INTERNAL MEDICINE

## 2018-01-12 NOTE — PROGRESS NOTES
"Subjective:       Patient ID: Mary Myles is a 22 y.o. female.    Chief Complaint: Follow-up    Pt here today for follow up. She has has gained 8 lbs more lbs, net neg 30 lbs in total. Started metformin with LCHF diet. Walking up to 2 miles at a time. She is on metformin and topiramate, Has been diagnosed with binge eating disorder with her psychiatrist. She does state she has now been diagnosed with Aspergers and not schizoaffective disorder. Does states her topiramate has been increased.       Review of Systems   Constitutional: Positive for fatigue. Negative for chills and fever.        Has not felt well rested since she was a kid.    Respiratory: Negative for apnea and shortness of breath.         + snores. No Hx psg   Cardiovascular: Positive for leg swelling. Negative for chest pain.   Gastrointestinal: Positive for constipation, diarrhea and nausea.        Gerd improving.    Genitourinary: Negative for difficulty urinating.        On OCPs   Musculoskeletal: Positive for arthralgias and back pain.   Neurological: Positive for light-headedness and headaches. Negative for dizziness.   Psychiatric/Behavioral: Positive for dysphoric mood and hallucinations. The patient is nervous/anxious.         Sees Dr. Deb Schumacher.        Objective:     /80   Pulse 79   Ht 5' 4" (1.626 m)   Wt 114.9 kg (253 lb 4.9 oz)   BMI 43.48 kg/m²     Physical Exam   Constitutional: She is oriented to person, place, and time. She appears well-developed. No distress.   Morbidly obese     HENT:   Head: Normocephalic and atraumatic.   Eyes: EOM are normal. Pupils are equal, round, and reactive to light. No scleral icterus.   Neck: Normal range of motion. Neck supple.   Cardiovascular: Normal rate.    Pulmonary/Chest: Effort normal.   Musculoskeletal: Normal range of motion. She exhibits no edema.   Neurological: She is alert and oriented to person, place, and time. No cranial nerve deficit.   Skin: Skin is warm and dry. No " erythema.   + hirsute     Psychiatric: She has a normal mood and affect. Her behavior is normal. Judgment normal.   Vitals reviewed.      Assessment:       1. Morbid obesity with BMI of 40.0-44.9, adult    2. Binge eating disorder    3. Tourettes syndrome        Plan:       1. Morbid obesity with BMI of 40.0-44.9, adult  Explained to pt that it will be important to get a better handle on the binge eating to better address her weight. She signed DENNY for psych records today. We can get those and update her chart appropriately.     2. Binge eating disorder  See #1. Did discuss with her that we would generally use vyvanse  For this, but stimulants generally worsen tics, so I would think it may worsen Tourrette's as well,and I would not start med unless Dr. Slaughter said it was ok. Pt understands this. Another option would be to continue to increase topiramate. Explained that counseling and CBT are integral to treatment.     3. Tourettes syndrome   See #1              One meal a week have whatever you want. This should help her to feel less deprived.     No soda, sweet tea, juices or lemonade    3 meals a day made up of the following:  Unlimited green vegetables, tomatoes, mushrooms, spaghetti squash, cauliflower, meat, poultry, seafood, eggs and hard cheeses.   Milk and plain yogurt  Dressings, seasonings, condiments, etc should have less than 2 g sugars.   beans or nuts can have 1 x a day.   1-2 servings of citrus fruits, berries, pineapple or melon a day (1/2 cup)  Avoid fried foods    No grains, rice, pasta, potatoes, bread    [email or web address].dietdoctor.com for recipes.     Exercise 45 min 5 times a week.

## 2018-01-12 NOTE — PATIENT INSTRUCTIONS
One meal a week have whatever you want. This should help her to feel less deprived.     No soda, sweet tea, juices or lemonade    3 meals a day made up of the following:  Unlimited green vegetables, tomatoes, mushrooms, spaghetti squash, cauliflower, meat, poultry, seafood, eggs and hard cheeses.   Milk and plain yogurt  Dressings, seasonings, condiments, etc should have less than 2 g sugars.   beans or nuts can have 1 x a day.   1-2 servings of citrus fruits, berries, pineapple or melon a day (1/2 cup)  Avoid fried foods    No grains, rice, pasta, potatoes, bread    [email or web address] for recipes.     Exercise 45 min 5 times a week.    Dinner in Under 30 minutes and 400 calories.     Baked Chicken breast with Broccoli Cheese Casserole  2-3 chicken breast (approximately 6-8 oz each)    2 tsp each garlic and herb Mrs. Dash seasoning   2 tsp your favorite creole seasoning  2 tablespoons of balsamic vinegar  2 - 10 oz packages of frozen broccoli  1 egg   ½ cup skim milk  ½ tsp paprika   ½ tsp cayenne pepper   1 can fat free cream of mushroom soup.   1 .5 cups of shredded cheddar cheese    Pre-heat oven to 450 degrees. Toss 2-3 chicken breast (approximately 6-8 oz each) in 2 tsp each garlic and herb Mrs. Dash seasoning, 2 tsp your favorite creole seasoning, and 2 tablespoons of balsamic vinegar. This can also be done up to 24 hours ahead of time, and the chicken can be left in the refrigerator to marinate. Place on a baking sheet sprayed with non-stick cooking spray and bake on 450 degrees for 10 min, then reduce heat to 350 degrees and cook an addition 10-15 minutes until chicken is cooked through.   While chicken is baking, microwave safe dish, thaw 2 - 10 oz packages of frozen broccoli. Drain any excess water, season with salt, pepper, all-purpose seasoning of your choice. In another bowl, whisk together 1 egg, ½ cup skim milk, ½ tsp paprika, ½ tsp cayenne pepper and 1 can fat free cream of mushroom soup. Combine  broccoli, soup mixture, 1 cup of shredded cheddar cheese in baking dish sprayed with cooking spray. Top with remaining cheese. Bake in the oven with chicken for about 20 min or until set cheese is melted and thurman.     Makes 4 servings. 389 calories per serving.10 g fat, 13 g carbs, 54g protein     Sheet Pan Bay Shrimp  1 pound large shrimp, shelled, peeled and deveined.   2 tablespoon olive oil  The zest and the juice of 1 lime  ½ tsp chili powder  ½-1 tsp cayenne pepper  1 tsp cumin  ½ tsp dry oregano  1 red bell pepper  1 green bell pepper  1 red onion  2 cups mushrooms, quartered  1 avocado  Whole micki lettuce leaves  Preheat oven to 375 degrees.  In a bowl combine shrimp, lime juice, lime zest, cumin, cayenne pepper, chili powder, and a pinch of salt. Set aside.   Slice peppers and onions into thin strips. Toss in 1 tablespoon of olive oil. Sprinkle with salt and pepper and arrange in a single layer over 1/3 of a large sheet pan  Toss mushrooms with remaining olive oil, oregano, salt and pepper. Arrange in single layer on sheet pan. Place sheet pan in oven for about 15-20 minutes until vegetables are softened, cooked about ¾ of the way through. Remove the sheet pan from oven.  Change setting on oven to low broil.  If needed, rearrange vegetables to make room for shrimp. Arrange the shrimp in a single layer on the sheet pan and return pan to oven. After 5 minutes, flip shrimp. Cook 3-5 additional minutes, or until  Shrimp are opaque.   Serve shrimp and cooked vegetables on lettuce leaves with sliced avocado.   Makes 4 servings. Calories per servin; 23g fat, 19 g carbohydrates, 27g protein.    Zoodles Primavera with Seasoned Ricotta  8 cups zucchini noodles. These can be purchased already prepared, or you can make them on your own with whole zucchini and a vegetable spiralizer.   1.5 cups cherry tomatoes, quartered  2 cups sliced mushrooms  1 cup chopped fresh broccoli  1 cup frozen peas and  carrots  2 cloves garlic, finely chopped  16 oz part skim ricotta cheese  2 oz Neufchatel cream cream cheese, cut into small cubes  Juice and zest of 1 lemon  1 pinch red pepper flakes    2 tsp Italian seasoning  4-5 leaves fresh basil, thinly sliced  1 tablespoon of olive oil  Parmesan cheese for topping (optional)     In a bowl, combine ricotta cheese, 1 tsp Italian seasoning, ½ teaspoon lemon zest. Season with salt and pepper to taste. Mix well. Fold in half of fresh basil. Set aside.   Heat a large skillet to medium high. Add olive oil. Sautee mushrooms until starting to become tender. Season them with salt and pepper while cooking.  Add broccoli and peas and carrots. Reduce heat to medium. Cover pan and cook for 4-5 minutes until broccoli is tender and peas and carrots are warmed through. Add Neufchatel cheese, Italian seasoning, red pepper flakes, 1 tsp lemon zest and lemon juice. Stir until a smooth sauce is formed. Add zucchini noodles (zoodles) to skillet and toss in sauce, then add cherry tomatoes.  Separate zoodle and vegetables into 4 equal portions. Serve each with a ¼ cup serving of seasoned ricotta cheese. Sprinkle with grated parmesan cheese or fresh basil,  if desired.   Makes 4 servings. Calories per servin calories, 32 g carbs, 33 g protein, 18 g fat

## 2018-01-15 PROCEDURE — 20610 DRAIN/INJ JOINT/BURSA W/O US: CPT | Mod: S$PBB,,, | Performed by: ORTHOPAEDIC SURGERY

## 2018-01-15 RX ORDER — TRIAMCINOLONE ACETONIDE 40 MG/ML
40 INJECTION, SUSPENSION INTRA-ARTICULAR; INTRAMUSCULAR
Status: DISCONTINUED | OUTPATIENT
Start: 2018-01-15 | End: 2018-01-15 | Stop reason: HOSPADM

## 2018-01-15 RX ADMIN — TRIAMCINOLONE ACETONIDE 40 MG: 40 INJECTION, SUSPENSION INTRA-ARTICULAR; INTRAMUSCULAR at 03:01

## 2018-01-15 NOTE — PROCEDURES
Large Joint Aspiration/Injection  Date/Time: 1/15/2018 3:34 PM  Performed by: VU MA  Authorized by: VU MA     Consent Done?:  Yes (Verbal)  Indications:  Pain  Timeout: Prior to procedure the correct patient, procedure, and site was verified      Location:  Knee  Site:  R knee  Prep: Patient was prepped and draped in usual sterile fashion    Approach:  Anteromedial  Medications:  40 mg triamcinolone acetonide 40 mg/mL

## 2018-01-15 NOTE — PROGRESS NOTES
Past Medical History:   Diagnosis Date    ADD (attention deficit disorder)     Borderline personality disorder     Chronic headache     Depression     Fibromyalgia     GERD (gastroesophageal reflux disease)     PCOS (polycystic ovarian syndrome)     Pseudotumor cerebri syndrome     Schizoaffective disorder     Tourette's syndrome        Past Surgical History:   Procedure Laterality Date    TONSILLECTOMY      WISDOM TOOTH EXTRACTION         Current Outpatient Prescriptions   Medication Sig    busPIRone (BUSPAR) 10 MG tablet     CHANTIX STARTING MONTH BOX 0.5 mg (11)- 1 mg (42) tablet     cloNIDine (CATAPRES) 0.1 MG tablet Take 1 tablet (0.1 mg total) by mouth 2 (two) times daily.    ergocalciferol (ERGOCALCIFEROL) 50,000 unit Cap Take 1 capsule (50,000 Units total) by mouth every 7 days.    ibuprofen (ADVIL,MOTRIN) 800 MG tablet Take 1 tablet (800 mg total) by mouth every 6 (six) hours as needed for Pain.    lithium (ESKALITH) 300 MG capsule Take 300 mg by mouth 2 (two) times daily.     MICROGESTIN FE 1.5/30, 28, 1.5 mg-30 mcg (21)/75 mg (7) tablet Take 1 tablet by mouth once daily.    promethazine (PHENERGAN) 25 MG tablet Take 1 tablet (25 mg total) by mouth every 6 (six) hours as needed for Nausea.    tamsulosin (FLOMAX) 0.4 mg Cp24 Take 1 capsule (0.4 mg total) by mouth once daily.    topiramate (TOPAMAX) 25 MG tablet Take 25 mg by mouth 2 (two) times daily.    topiramate (TOPAMAX) 50 MG tablet     VRAYLAR 3 mg Cap Take 4.5 mg by mouth once daily.     VRAYLAR 4.5 mg Cap     metformin (GLUCOPHAGE) 500 MG tablet Take 1 tablet (500 mg total) by mouth 2 (two) times daily with meals.     Current Facility-Administered Medications   Medication    onabotulinumtoxina injection 200 Units       Review of patient's allergies indicates:  No Known Allergies    Family History   Problem Relation Age of Onset    Seizures Mother     Migraines Mother     Migraines Brother        Social History  "    Social History    Marital status: Single     Spouse name: N/A    Number of children: N/A    Years of education: N/A     Occupational History    Not on file.     Social History Main Topics    Smoking status: Former Smoker     Packs/day: 0.50     Types: Cigarettes    Smokeless tobacco: Never Used    Alcohol use No    Drug use: No      Comment: patient states she quit mojo 3 months ago and marijuana 2 months ago    Sexual activity: No     Other Topics Concern    Not on file     Social History Narrative    No narrative on file       Chief Complaint:   Chief Complaint   Patient presents with    Right Knee - Pain       Consulting Physician: No ref. provider found    History of present illness:    This is a 22 y.o. female who complains of right knee pain since fall in October. Pain 10/10 and getting worse. Worse with use and stairs. Brace not helping.    Review of Systems:    Constitution: Denies chills, fever, and sweats.  HENT: Denies headaches or blurry vision.  Cardiovascular: Denies chest pain or irregular heart beat.  Respiratory: Denies cough or shortness of breath.  Gastrointestinal: Denies abdominal pain, nausea, or vomiting.  Musculoskeletal:  Denies muscle cramps.  Neurological: Denies dizziness or focal weakness.  Psychiatric/Behavioral: Normal mental status.  Hematologic/Lymphatic: Denies bleeding problem or easy bruising/bleeding.  Skin: Denies rash or suspicious lesions.    Examination:    Vital Signs:    Vitals:    01/09/18 1340   BP: 105/66   Pulse: 86   Weight: 110.3 kg (243 lb 2.7 oz)   Height: 5' 4" (1.626 m)   PainSc:   8   PainLoc: Knee       Body mass index is 41.74 kg/m².    This a well-developed, well nourished patient in no acute distress.    Alert and oriented x 3 and cooperative to examination.       Physical Exam: Right Knee " Exam    Gait   Normal    Skin  Rash:   None  Scars:   None    Inspection  Erythema:  None  Bruising:  None  Effusion:  None  Masses:  None  Lymphadenopathy: None    Range of Motion: 0 to 130° with pain    Medial Joint : y  Lateral Joint : n    Patellofemoral Tenderness: None  Patellofemoral Crepitus: None    Lachman:  Normal  Anterior Drawer: Normal  Posterior Drawer: Normal    Rose Marie's:  neg  Apley's:  neg    Varus Stress:  Stable  Valgus Stress:  Stable    Strength:  5/5    Pulses:  Palpable distal    Sensation:  Intact          Imaging: X-rays of the right knee appear normal. MRI reviewed today with pt shows what appears to be an old MPFL injury.       Assessment: Right knee pain, unspecified chronicity  -     Large Joint Aspiration/Injection        Plan:  We discussed MRI and treatment options today. Her pain is constant but worse with stairs. Will do injection to see if pain improves.    DISCLAIMER: This note may have been dictated using voice recognition software and may contain grammatical errors.     NOTE: Consult report sent to referring provider via [employer] EMR.

## 2018-01-16 ENCOUNTER — TELEPHONE (OUTPATIENT)
Dept: UROLOGY | Facility: CLINIC | Age: 23
End: 2018-01-16

## 2018-01-16 NOTE — TELEPHONE ENCOUNTER
Kindra Nelson MD faxed records. She had negative vaginosis swab and christie and chl from 12/18/17. She had UC that showed mixed kinga 10,000-25,000 (12/18/17) and a negative UC (9/29/17). Records scanned in media.

## 2018-01-20 ENCOUNTER — HOSPITAL ENCOUNTER (EMERGENCY)
Facility: HOSPITAL | Age: 23
Discharge: HOME | End: 2018-01-20
Attending: EMERGENCY MEDICINE
Payer: MEDICARE

## 2018-01-20 VITALS
HEART RATE: 87 BPM | OXYGEN SATURATION: 97 % | DIASTOLIC BLOOD PRESSURE: 77 MMHG | WEIGHT: 254 LBS | BODY MASS INDEX: 43.36 KG/M2 | SYSTOLIC BLOOD PRESSURE: 140 MMHG | TEMPERATURE: 98 F | HEIGHT: 64 IN | RESPIRATION RATE: 16 BRPM

## 2018-01-20 DIAGNOSIS — R10.2 SUPRAPUBIC PAIN: Primary | ICD-10-CM

## 2018-01-20 LAB
B-HCG UR QL: NEGATIVE
BACTERIA #/AREA URNS HPF: ABNORMAL /HPF
BILIRUB UR QL STRIP: NEGATIVE
CLARITY UR: CLEAR
COLOR UR: YELLOW
CTP QC/QA: YES
GLUCOSE UR QL STRIP: NEGATIVE
HGB UR QL STRIP: ABNORMAL
KETONES UR QL STRIP: NEGATIVE
LEUKOCYTE ESTERASE UR QL STRIP: NEGATIVE
MICROSCOPIC COMMENT: ABNORMAL
NITRITE UR QL STRIP: NEGATIVE
PH UR STRIP: 6 [PH] (ref 5–8)
PROT UR QL STRIP: ABNORMAL
RBC #/AREA URNS HPF: 10 /HPF (ref 0–4)
SP GR UR STRIP: >=1.03 (ref 1–1.03)
SQUAMOUS #/AREA URNS HPF: 4 /HPF
URN SPEC COLLECT METH UR: ABNORMAL
UROBILINOGEN UR STRIP-ACNC: NEGATIVE EU/DL
WBC #/AREA URNS HPF: 2 /HPF (ref 0–5)

## 2018-01-20 PROCEDURE — 81025 URINE PREGNANCY TEST: CPT | Performed by: EMERGENCY MEDICINE

## 2018-01-20 PROCEDURE — 25000003 PHARM REV CODE 250: Performed by: EMERGENCY MEDICINE

## 2018-01-20 PROCEDURE — 99283 EMERGENCY DEPT VISIT LOW MDM: CPT

## 2018-01-20 PROCEDURE — 81000 URINALYSIS NONAUTO W/SCOPE: CPT

## 2018-01-20 RX ORDER — ONDANSETRON 4 MG/1
4 TABLET, FILM COATED ORAL EVERY 6 HOURS
Qty: 12 TABLET | Refills: 1 | Status: SHIPPED | OUTPATIENT
Start: 2018-01-20 | End: 2018-01-30 | Stop reason: ALTCHOICE

## 2018-01-20 RX ORDER — NAPROXEN 500 MG/1
500 TABLET ORAL 2 TIMES DAILY WITH MEALS
Qty: 30 TABLET | Refills: 1 | Status: SHIPPED | OUTPATIENT
Start: 2018-01-20 | End: 2018-02-04

## 2018-01-20 RX ORDER — ONDANSETRON 4 MG/1
4 TABLET, ORALLY DISINTEGRATING ORAL
Status: COMPLETED | OUTPATIENT
Start: 2018-01-20 | End: 2018-01-20

## 2018-01-20 RX ORDER — NAPROXEN 250 MG/1
500 TABLET ORAL
Status: COMPLETED | OUTPATIENT
Start: 2018-01-20 | End: 2018-01-20

## 2018-01-20 RX ADMIN — ONDANSETRON 4 MG: 4 TABLET, ORALLY DISINTEGRATING ORAL at 01:01

## 2018-01-20 RX ADMIN — NAPROXEN 500 MG: 250 TABLET ORAL at 01:01

## 2018-01-20 NOTE — ED PROVIDER NOTES
"Encounter Date: 1/20/2018    SCRIBE #1 NOTE: I, Traci Cosby, am scribing for, and in the presence of, Dr. Kilgore.       History     Chief Complaint   Patient presents with    Abdominal Pain     Bladder pain x several months        01/20/2018 12:58 AM     Chief complaint: Abd pain      Mary Myles is a 22 y.o. female with PCOS, GERD, Fibromyalgia, Borderline personality disorder, and Schizoaffective disorder who presents to the ED with complaints of worsened chronic suprapubic pain x 4 months associated with nausea. She saw her gynecologist who reportedly thought the pain was secondary to a bladder issue and advised she follow up with urology. Pt was seen by urology a few weeks ago and reports no relief of pain. She has not taken any medication for the pain. Her LMP started approximately 4 days ago which is "pretty heavy." She states the pain is exacerbated when she is on her menstrual cycle. She denies vomiting and hx of Kidney stone. NKDA noted.       The history is provided by the patient.     Review of patient's allergies indicates:  No Known Allergies  Past Medical History:   Diagnosis Date    ADD (attention deficit disorder)     Borderline personality disorder     Chronic headache     Depression     Fibromyalgia     GERD (gastroesophageal reflux disease)     PCOS (polycystic ovarian syndrome)     Pseudotumor cerebri syndrome     Schizoaffective disorder     Tourette's syndrome      Past Surgical History:   Procedure Laterality Date    TONSILLECTOMY      WISDOM TOOTH EXTRACTION       Family History   Problem Relation Age of Onset    Seizures Mother     Migraines Mother     Migraines Brother      Social History   Substance Use Topics    Smoking status: Former Smoker     Packs/day: 0.50     Types: Cigarettes    Smokeless tobacco: Never Used    Alcohol use No     Review of Systems   Constitutional: Negative for fever.   HENT: Negative for congestion.    Eyes: Negative for visual " disturbance.   Respiratory: Negative for wheezing.    Cardiovascular: Negative for chest pain.   Gastrointestinal: Positive for abdominal pain and nausea. Negative for vomiting.   Genitourinary: Negative for dysuria.   Musculoskeletal: Negative for joint swelling.   Skin: Negative for rash.   Neurological: Negative for syncope.   Hematological: Does not bruise/bleed easily.   Psychiatric/Behavioral: Negative for confusion.       Physical Exam     Initial Vitals [01/20/18 0026]   BP Pulse Resp Temp SpO2   (!) 140/77 87 16 98 °F (36.7 °C) 97 %      MAP       98         Physical Exam    Nursing note and vitals reviewed.  Constitutional: She appears well-nourished.   HENT:   Head: Normocephalic and atraumatic.   Eyes: Conjunctivae and EOM are normal.   Neck: Normal range of motion. Neck supple. No thyroid mass present.   Cardiovascular: Normal rate and regular rhythm. Exam reveals no gallop and no friction rub.    No murmur heard.  Pulmonary/Chest: Breath sounds normal. She has no wheezes. She has no rhonchi. She has no rales.   Abdominal: Soft. Normal appearance and bowel sounds are normal. There is tenderness (Mild) in the suprapubic area.   No RLQ tenderness. No upper abd pain.   Neurological: She is alert and oriented to person, place, and time. She has normal strength. She displays tremor. No cranial nerve deficit or sensory deficit.   Skin: Skin is warm and dry. No rash noted. No erythema.   Psychiatric: She has a normal mood and affect. Her speech is normal. Cognition and memory are normal.         ED Course   Procedures  Labs Reviewed   URINALYSIS   POCT URINE PREGNANCY             Medical Decision Making:   ED Management:  This patient was interviewed and examined emergently.  Vital signs noted to be stable.  Urinalysis indicates no evidence of progressing infection.  We had a discussion about some of her suprapubic pain possibly being associated with menstrual pains.  I currently do not think the patient's  progressing with any severe intrapelvic or abdominal processes, including TOA/PID, appendicitis, diverticulitis, in the setting of no vaginal symptoms or concerning systemic symptoms.  She is instructed to follow up with urologist as soon as possible and request whether a bladder biopsy may be warranted.  She is additionally asked to take anti-inflammatory pain medication as needed for improvement in pain in the interim.  She is asked return to the ER for any new, concerning, or worsening symptoms.              Scribe Attestation:   Scribe #1: I performed the above scribed service and the documentation accurately describes the services I performed. I attest to the accuracy of the note.    I, Dr. Hawk Kilgore, personally performed the services described in this documentation. All medical record entries made by the scribe were at my direction and in my presence.  I have reviewed the chart and agree that the record reflects my personal performance and is accurate and complete. Hawk Kilgore MD.  6:16 AM 01/20/2018          ED Course      Clinical Impression:     1. Suprapubic pain        Disposition:   Disposition: Discharged  Condition: Stable                        Hawk Kilgore MD  01/20/18 0616

## 2018-01-30 ENCOUNTER — OFFICE VISIT (OUTPATIENT)
Dept: NEUROLOGY | Facility: CLINIC | Age: 23
End: 2018-01-30
Payer: MEDICARE

## 2018-01-30 VITALS
WEIGHT: 246.06 LBS | HEART RATE: 86 BPM | HEIGHT: 64 IN | DIASTOLIC BLOOD PRESSURE: 79 MMHG | SYSTOLIC BLOOD PRESSURE: 105 MMHG | BODY MASS INDEX: 42.01 KG/M2

## 2018-01-30 DIAGNOSIS — F95.2 TOURETTES SYNDROME: Primary | ICD-10-CM

## 2018-01-30 DIAGNOSIS — R76.8 ELEVATED ANTI-STREPTOLYSIN O ANTIBODIES: ICD-10-CM

## 2018-01-30 DIAGNOSIS — R79.0 DECREASED FERRITIN: ICD-10-CM

## 2018-01-30 PROCEDURE — 99214 OFFICE O/P EST MOD 30 MIN: CPT | Mod: S$PBB,,, | Performed by: PSYCHIATRY & NEUROLOGY

## 2018-01-30 PROCEDURE — 99213 OFFICE O/P EST LOW 20 MIN: CPT | Mod: PBBFAC | Performed by: PSYCHIATRY & NEUROLOGY

## 2018-01-30 PROCEDURE — 99999 PR PBB SHADOW E&M-EST. PATIENT-LVL III: CPT | Mod: PBBFAC,,, | Performed by: PSYCHIATRY & NEUROLOGY

## 2018-01-30 RX ORDER — FLUOXETINE HYDROCHLORIDE 20 MG/1
20 CAPSULE ORAL DAILY
COMMUNITY

## 2018-01-30 RX ORDER — TOPIRAMATE 100 MG/1
100 TABLET, FILM COATED ORAL 2 TIMES DAILY
Qty: 60 TABLET | Refills: 11 | Status: SHIPPED | OUTPATIENT
Start: 2018-01-30 | End: 2019-01-30

## 2018-01-30 NOTE — PROGRESS NOTES
"Mary Myles "[email or web address]"  I. Chief Complaints during this visit:  f/u Patient visit for  Tic, ferritin def and elevated ASO    No referring provider defined for this encounter.    Psychiatrist:  MEENA Fitzgerald-BC  Fax [phone] ([facility], [place] office)      Primary Care Physician  Haley Cohn DO  [address] [facility] [address]      History of present illness:   22 y.o.  W seen in followup of tics, elevated ASO.  Iron infusions did not help.  She is feeling ill all the time, throat sore from the vocal tics.      Tics have been worse, but also insomnia concurrent.  Not feeling tired at all.    Azithromycin for 2 weeks had no effect on symptoms.    Off of topamax for reasons unclear to her (psychiatrist stopped it).    Interval history 11/23/17:  Clonidine has not reduced tics, though she is not sure if we are at the same dose that she was in prior years.  Has infusions for ferritin set up next week.    Interval history 11/1/17:  ...consultation at the request of  Dr. Power for evaluation of tics.  Has had a tic toward her right (predominate) since childhood.  Was treated with adderall at age 4, but taken off at 6.  No hospitalizations until age 15.  Occasionally to left.  Eye blinking, grunts.  Was treated by her PCP with clonidine  ADD at age 4.  Currently getting work up for autistic spectrum disorder by Dr. Thomas ([place].)  TRENTON Fitzgerald   is treating her for generalized anxiety disorder, schizoaffective disorder- recently changed to "undetermined."  On lithium for bipolar disorder since 17, she does not think it helps and Endy is trying to wean off.  Also binge-eating disorder.  Must line things up, in alphabetical order.  This has been since childhood.  Multiple psych admissions for depression, suicide attempts.  Has tried geodon, abilify, haldol, risperdal and many others.  Currently on Vraylar which helps with mental clarity to some extent.  Lamictal " not effective.  Chronic migraines.      II.  Review of systems:  As in HPI,  otherwise, balance 3 systems reviewed and are negative.    III.  Past Medical History:   Diagnosis Date    ADD (attention deficit disorder)     Borderline personality disorder     Chronic headache     Depression     Fibromyalgia     GERD (gastroesophageal reflux disease)     PCOS (polycystic ovarian syndrome)     Pseudotumor cerebri syndrome     Schizoaffective disorder     Tourette's syndrome      Family History   Problem Relation Age of Onset    Seizures Mother     Migraines Mother     Migraines Brother      Social History     Social History    Marital status: Single     Spouse name: N/A    Number of children: N/A    Years of education: N/A     Social History Main Topics    Smoking status: Former Smoker     Packs/day: 0.50     Types: Cigarettes    Smokeless tobacco: Never Used    Alcohol use No    Drug use: No      Comment: patient states she quit mojo 3 months ago and marijuana 2 months ago    Sexual activity: No     Other Topics Concern    None     Social History Narrative    None         Current Outpatient Prescriptions on File Prior to Visit   Medication Sig Dispense Refill    cloNIDine (CATAPRES) 0.1 MG tablet Take 1 tablet (0.1 mg total) by mouth 2 (two) times daily. 60 tablet 11    ibuprofen (ADVIL,MOTRIN) 800 MG tablet Take 1 tablet (800 mg total) by mouth every 6 (six) hours as needed for Pain. 20 tablet 0    naproxen (NAPROSYN) 500 MG tablet Take 1 tablet (500 mg total) by mouth 2 (two) times daily with meals. 30 tablet 1    metformin (GLUCOPHAGE) 500 MG tablet Take 1 tablet (500 mg total) by mouth 2 (two) times daily with meals. 180 tablet 0    MICROGESTIN FE 1.5/30, 28, 1.5 mg-30 mcg (21)/75 mg (7) tablet Take 1 tablet by mouth once daily.  11     Current Facility-Administered Medications on File Prior to Visit   Medication Dose Route Frequency Provider Last Rate Last Dose    onabotulinumtoxina  "injection 200 Units  200 Units Intramuscular Q90 Days Terri Power, FNP   200 Units at 12/20/17 1126       Review of patient's allergies indicates:  No Known Allergies    IV. Physical Exam    Vitals:    01/30/18 1122   BP: 105/79   Pulse: 86   Weight: 111.6 kg (246 lb 0.5 oz)   Height: 5' 4" (1.626 m)     General appearance: Well nourished, well developed, no acute distress.         Cardiovascular:  pedal pulses 2, no edema or cyanosis, heart regular rate and rhythym, no carotid bruits.         -------------------------------------------------------------  Facial Expression: normal       Affect: full       Orientation to time & place:  Oriented to time, place, person and situation       Attention & concentration:  Normal attention span and concentration       Memory:  Recent and remote memory intact  Language: Spontaneous, fluent; able to repeat and name objects        Fund of knowledge:  Aware of current events        Speech:  normal (not dysarthric)  -------------------------------------------------------  Cranial nerves: normal visual acuity, visual fields full, optic discs not visualized, pupils equal round and reactive, extraocular movements intact,       facial sensation intact, face symmetrical, hearing intact to whisper, palate raises midline, shoulder shrug strength normal, tongue protrudes midline.        -------------------------------------------------------  Musculoskeletal  Muscle tone: all 4 extremities normal        Muscle Bulk: all 4 extremities normal        Muscle strength:  5/5 in all 4 extremities        No pronator drift  Sensation: Intact to light touch in all extremities        Deep tendon Reflexes: 2 bilateral biceps, triceps, patella and ankles        --------------------------------------------------------------  Cerebellar and Coordination  Gait:  normal      Finger-nose: no dysmetria       Rapid Alternating Movements (pronation/supination):  R normal; L " normal  --------------------------------------------------------------  MOVEMENT DISORDERS FOCUSED EXAM  Abnormality of movement (bradykinesia, hyperkinesia) present? Yes, several leftward or rightward rotations of neck during visit.    Tremor present?   No   Posture:  normal  Postural stability:  no Rhomberg    V.  Laboratory/ Radiological Data:          Lab Results   Component Value Date    FERRITIN 11 (L) 11/01/2017     Lab Results   Component Value Date    CZDHFVFE54 313 11/01/2017     Lab Results   Component Value Date    CREATININE 0.7 03/15/2017      11/14/17 ASO 393H    11/1/17   ASO titer 420H  Ceruloplasmin nml  B1 46  Lithium <0.1      MRI brain 5/5/17: normal      VI. Assessment and Plan            Problem List Items Addressed This Visit        1 - High    Tourettes syndrome - Primary    Overview     Neck rotations, eye closure, throat clearing         Current Assessment & Plan     Tics causing throat irritation.   -> resume topamax   -> consider trial of steroids or IVIG (aso ab)         Relevant Medications    topiramate (TOPAMAX) 100 MG tablet    Elevated anti-streptolysin O antibodies    Overview     11/2/17-             2     Decreased ferritin    Overview     Lab Results   Component Value Date    FERRITIN 11 (L) 11/01/2017              Current Assessment & Plan     Iron infusion did not help any of her symptoms.   -> follow            3     Chronic migraine    Current Assessment & Plan     Worsening headaches, migraines.   -> resume topamax         Relevant Medications    topiramate (TOPAMAX) 100 MG tablet                Follow-up in about 3 months (around 4/30/2018).

## 2018-01-30 NOTE — Clinical Note
Please call [phone] (Infusion Center) to set up injections/ infusions.  Patient aware that insurer may not approve indication, so plan for 2 weeks from now so we can get authorization.

## 2018-01-30 NOTE — PATIENT INSTRUCTIONS
Please resume topamax at 100mg, twice a day.      I will try getting the IVIG (treatment for elevated anti-strep antibodies).  I need to be sure insurance covers it.  I'll get a line placed for the infusions once I know it is covered.

## 2018-02-02 ENCOUNTER — OFFICE VISIT (OUTPATIENT)
Dept: ORTHOPEDICS | Facility: CLINIC | Age: 23
End: 2018-02-02
Payer: MEDICARE

## 2018-02-02 VITALS
HEIGHT: 64 IN | SYSTOLIC BLOOD PRESSURE: 124 MMHG | DIASTOLIC BLOOD PRESSURE: 66 MMHG | WEIGHT: 246.06 LBS | HEART RATE: 81 BPM | BODY MASS INDEX: 42.01 KG/M2

## 2018-02-02 DIAGNOSIS — M25.561 ACUTE PAIN OF RIGHT KNEE: Primary | ICD-10-CM

## 2018-02-02 DIAGNOSIS — M25.561 RIGHT KNEE PAIN, UNSPECIFIED CHRONICITY: Primary | ICD-10-CM

## 2018-02-02 PROCEDURE — 99999 PR PBB SHADOW E&M-EST. PATIENT-LVL III: CPT | Mod: PBBFAC,,, | Performed by: ORTHOPAEDIC SURGERY

## 2018-02-02 PROCEDURE — 99213 OFFICE O/P EST LOW 20 MIN: CPT | Mod: PBBFAC,PN | Performed by: ORTHOPAEDIC SURGERY

## 2018-02-02 PROCEDURE — 99213 OFFICE O/P EST LOW 20 MIN: CPT | Mod: S$GLB,,, | Performed by: ORTHOPAEDIC SURGERY

## 2018-02-04 RX ORDER — ACETAMINOPHEN 325 MG/1
325 TABLET ORAL EVERY 4 HOURS PRN
Status: CANCELLED | OUTPATIENT
Start: 2018-02-19 | End: 2018-02-20

## 2018-02-04 NOTE — ASSESSMENT & PLAN NOTE
Tics causing throat irritation.   -> resume topamax   -> consider trial of steroids or IVIG (aso ab)

## 2018-02-06 NOTE — PROGRESS NOTES
Past Medical History:   Diagnosis Date    ADD (attention deficit disorder)     Borderline personality disorder     Chronic headache     Depression     Fibromyalgia     GERD (gastroesophageal reflux disease)     PCOS (polycystic ovarian syndrome)     Pseudotumor cerebri syndrome     Schizoaffective disorder     Tourette's syndrome        Past Surgical History:   Procedure Laterality Date    TONSILLECTOMY      WISDOM TOOTH EXTRACTION         Current Outpatient Prescriptions   Medication Sig    cloNIDine (CATAPRES) 0.1 MG tablet Take 1 tablet (0.1 mg total) by mouth 2 (two) times daily.    FLUoxetine (PROZAC) 20 MG capsule Take 20 mg by mouth once daily.    ibuprofen (ADVIL,MOTRIN) 800 MG tablet Take 1 tablet (800 mg total) by mouth every 6 (six) hours as needed for Pain.    MICROGESTIN FE 1.5/30, 28, 1.5 mg-30 mcg (21)/75 mg (7) tablet Take 1 tablet by mouth once daily.    topiramate (TOPAMAX) 100 MG tablet Take 1 tablet (100 mg total) by mouth 2 (two) times daily.    metformin (GLUCOPHAGE) 500 MG tablet Take 1 tablet (500 mg total) by mouth 2 (two) times daily with meals.     Current Facility-Administered Medications   Medication    onabotulinumtoxina injection 200 Units       Review of patient's allergies indicates:  No Known Allergies    Family History   Problem Relation Age of Onset    Seizures Mother     Migraines Mother     Migraines Brother        Social History     Social History    Marital status: Single     Spouse name: N/A    Number of children: N/A    Years of education: N/A     Occupational History    Not on file.     Social History Main Topics    Smoking status: Former Smoker     Packs/day: 0.50     Types: Cigarettes    Smokeless tobacco: Never Used    Alcohol use No    Drug use: No      Comment: patient states she quit mojo 3 months ago and marijuana 2 months ago    Sexual activity: No     Other Topics Concern    Not on file     Social History Narrative    No narrative  "on file       Chief Complaint:   Chief Complaint   Patient presents with    Right Knee - Pain       Consulting Physician: No ref. provider found    History of present illness:    This is a 22 y.o. female who complains of right knee pain since fall in October. Pain 8/10. Worse with use and stairs. Brace not helping. Injection helped incompletely for 1 day.    Review of Systems:    Constitution: Denies chills, fever, and sweats.  HENT: Denies headaches or blurry vision.  Cardiovascular: Denies chest pain or irregular heart beat.  Respiratory: Denies cough or shortness of breath.  Gastrointestinal: Denies abdominal pain, nausea, or vomiting.  Musculoskeletal:  Denies muscle cramps.  Neurological: Denies dizziness or focal weakness.  Psychiatric/Behavioral: Normal mental status.  Hematologic/Lymphatic: Denies bleeding problem or easy bruising/bleeding.  Skin: Denies rash or suspicious lesions.    Examination:    Vital Signs:    Vitals:    02/02/18 1253   BP: 124/66   Pulse: 81   Weight: 111.6 kg (246 lb 0.5 oz)   Height: 5' 4" (1.626 m)   PainSc:   8   PainLoc: Knee       Body mass index is 42.23 kg/m².    This a well-developed, well nourished patient in no acute distress.    Alert and oriented x 3 and cooperative to examination.       Physical Exam: Right Knee Exam    Gait   Normal    Skin  Rash:   None  Scars:   None    Inspection  Erythema:  None  Bruising:  None  Effusion:  None  Masses:  None  Lymphadenopathy: None    Range of Motion: 0 to 130° with pain    Medial Joint : y  Lateral Joint : n    Patellofemoral Tenderness: None  Patellofemoral Crepitus: None    Lachman:  Normal  Anterior Drawer: Normal  Posterior Drawer: Normal    Rose Marie's:  neg  Apley's:  neg    Varus Stress:  Stable  Valgus Stress:  Stable    Strength:  5/5    Pulses:  Palpable distal    Sensation:  Intact          Imaging: X-rays of the right knee appear normal. MRI reviewed today with pt shows what appears to be an old " MPFL injury.       Assessment: Acute pain of right knee        Plan: We will start PT today for patella tendonitis vs. Anterior lateral meniscus tear. Voltaren rx. Back in 6 weeks.    DISCLAIMER: This note may have been dictated using voice recognition software and may contain grammatical errors.     NOTE: Consult report sent to referring provider via [employer] EMR.

## 2018-02-10 VITALS
HEIGHT: 64 IN | WEIGHT: 240 LBS | SYSTOLIC BLOOD PRESSURE: 135 MMHG | HEART RATE: 91 BPM | RESPIRATION RATE: 16 BRPM | DIASTOLIC BLOOD PRESSURE: 75 MMHG | BODY MASS INDEX: 40.97 KG/M2 | OXYGEN SATURATION: 97 % | TEMPERATURE: 98 F

## 2018-02-10 PROCEDURE — 99284 EMERGENCY DEPT VISIT MOD MDM: CPT

## 2018-02-11 ENCOUNTER — HOSPITAL ENCOUNTER (EMERGENCY)
Facility: HOSPITAL | Age: 23
Discharge: HOME | End: 2018-02-11
Attending: EMERGENCY MEDICINE
Payer: MEDICARE

## 2018-02-11 DIAGNOSIS — R05.9 COUGH: ICD-10-CM

## 2018-02-11 DIAGNOSIS — J18.9 PNEUMONIA OF RIGHT LOWER LOBE DUE TO INFECTIOUS ORGANISM: Primary | ICD-10-CM

## 2018-02-11 LAB
B-HCG UR QL: NEGATIVE
CTP QC/QA: YES
DEPRECATED S PYO AG THROAT QL EIA: NEGATIVE
FLUAV AG SPEC QL IA: NEGATIVE
FLUBV AG SPEC QL IA: NEGATIVE
SPECIMEN SOURCE: NORMAL

## 2018-02-11 PROCEDURE — 81025 URINE PREGNANCY TEST: CPT | Performed by: EMERGENCY MEDICINE

## 2018-02-11 PROCEDURE — 87081 CULTURE SCREEN ONLY: CPT

## 2018-02-11 PROCEDURE — 87880 STREP A ASSAY W/OPTIC: CPT

## 2018-02-11 PROCEDURE — 87400 INFLUENZA A/B EACH AG IA: CPT

## 2018-02-11 RX ORDER — AZITHROMYCIN 250 MG/1
250 TABLET, FILM COATED ORAL DAILY
Qty: 6 TABLET | Refills: 0 | Status: SHIPPED | OUTPATIENT
Start: 2018-02-11 | End: 2018-02-27

## 2018-02-11 RX ORDER — LOPERAMIDE HYDROCHLORIDE 2 MG/1
2 CAPSULE ORAL 4 TIMES DAILY PRN
Qty: 12 CAPSULE | Refills: 0 | Status: SHIPPED | OUTPATIENT
Start: 2018-02-11 | End: 2018-02-21

## 2018-02-11 NOTE — ED PROVIDER NOTES
Encounter Date: 2/10/2018    SCRIBE #1 NOTE: IGisela, tano scribing for, and in the presence of, .       History     Chief Complaint   Patient presents with    Diarrhea     With associated cough and sneezing and intermittent nausea.  No nausea at present        02/11/2018 1:41 AM     Chief complaint: diarrhea      Mary Myles is a 22 y.o. female who presents to the ED with diarrhea onset yesterday. Patient also complains of generalized sickness including chills, cough, congestion, fatigue, sore throat, and nausea for the past week. She denies chest pain, shortness of breath, measured fever, abdominal pain, back pain, problems urinating, or vomiting.      The history is provided by the patient. No  was used.     Review of patient's allergies indicates:  No Known Allergies  Past Medical History:   Diagnosis Date    ADD (attention deficit disorder)     Borderline personality disorder     Chronic headache     Depression     Fibromyalgia     GERD (gastroesophageal reflux disease)     PCOS (polycystic ovarian syndrome)     Pseudotumor cerebri syndrome     Schizoaffective disorder     Tourette's syndrome      Past Surgical History:   Procedure Laterality Date    TONSILLECTOMY      WISDOM TOOTH EXTRACTION       Family History   Problem Relation Age of Onset    Seizures Mother     Migraines Mother     Migraines Brother      Social History   Substance Use Topics    Smoking status: Former Smoker     Packs/day: 0.50     Types: Cigarettes    Smokeless tobacco: Never Used    Alcohol use No     Review of Systems   Constitutional: Positive for chills and fatigue. Negative for fever.   HENT: Positive for congestion, sneezing and sore throat.    Respiratory: Positive for cough. Negative for shortness of breath.    Cardiovascular: Negative for chest pain.   Gastrointestinal: Positive for diarrhea. Negative for abdominal distention, nausea and vomiting.   Genitourinary:  Negative for difficulty urinating, dysuria and hematuria.   Musculoskeletal: Negative for back pain.   Skin: Negative for rash.   Neurological: Negative for weakness.   Hematological: Does not bruise/bleed easily.       Physical Exam     Initial Vitals [02/10/18 2306]   BP Pulse Resp Temp SpO2   135/75 91 16 98.1 °F (36.7 °C) 97 %      MAP       95         Physical Exam    Nursing note and vitals reviewed.  Constitutional: She appears well-developed and well-nourished.  Non-toxic appearance. No distress.   Obese. No acute distress.   HENT:   Head: Normocephalic and atraumatic.   Nose: Nose normal.   Mouth/Throat: Oropharynx is clear and moist. No oropharyngeal exudate.   Eyes: EOM are normal. Pupils are equal, round, and reactive to light.   Neck: Normal range of motion. Neck supple. No neck rigidity. No JVD present.   Cardiovascular: Normal rate, regular rhythm, normal heart sounds and intact distal pulses. Exam reveals no gallop and no friction rub.    No murmur heard.  Pulmonary/Chest: Breath sounds normal. No respiratory distress. She has no wheezes. She has no rhonchi. She has no rales.   Abdominal: Soft. Bowel sounds are normal. She exhibits no distension. There is no tenderness. There is no rigidity, no rebound and no guarding.   Musculoskeletal: Normal range of motion.   Neurological: She is alert and oriented to person, place, and time. She has normal strength and normal reflexes. No cranial nerve deficit or sensory deficit. She exhibits normal muscle tone. Coordination normal. GCS eye subscore is 4. GCS verbal subscore is 5. GCS motor subscore is 6.   Skin: Skin is warm and dry.   Psychiatric: She has a normal mood and affect. Her speech is normal and behavior is normal. She is not actively hallucinating.         ED Course   Procedures  Labs Reviewed   THROAT SCREEN, RAPID   CULTURE, STREP A,  THROAT   INFLUENZA A AND B ANTIGEN   POCT URINE PREGNANCY          X-Rays:   Independently Interpreted Readings:    Chest X-Ray: Possible mild infiltrate in the right lower lobe     Medical Decision Making:   History:   Old Medical Records: I decided to obtain old medical records.  Initial Assessment:   22-year-old woman who presents for evaluation of diarrhea, cough, body aches and general malaise.  She shows no evidence of respiratory distress.  No hypoxia.  She does not appear dehydrated.  Influenza was negative.  Strep test is negative.  Centor score low.  Chest x-ray shows possible right lower lobe pneumonia.  Patient will be treated with azithromycin.  She is discharged improved in no acute distress.  Return precautions discussed.  Clinical Tests:   Lab Tests: Ordered and Reviewed  Radiological Study: Ordered and Reviewed            Scribe Attestation:   Scribe #1: I performed the above scribed service and the documentation accurately describes the services I performed. I attest to the accuracy of the note.    I, Brandin Hardy, personally performed the services described in this documentation. All medical record entries made by the scribe were at my direction and in my presence.  I have reviewed the chart and agree that the record reflects my personal performance and is accurate and complete. Dereck Ghotra MD.  4:27 AM 02/11/2018          ED Course      Clinical Impression:   The primary encounter diagnosis was Pneumonia of right lower lobe due to infectious organism. A diagnosis of Cough was also pertinent to this visit.    Disposition:   Disposition: Discharged  Condition: Stable                        Dereck Ghotra MD  02/11/18 0427

## 2018-02-11 NOTE — ED NOTES
Pt presents to ER for evaluation of flu like symptoms x1 wk. Pt endorses productive cough, chills, body aches, and diarrhea. Lung sounds clear, answering questions appropriately, family at bedside, will continue to monitor.

## 2018-02-12 ENCOUNTER — TELEPHONE (OUTPATIENT)
Dept: REHABILITATION | Facility: HOSPITAL | Age: 23
End: 2018-02-12

## 2018-02-13 LAB — BACTERIA THROAT CULT: NORMAL

## 2018-02-18 ENCOUNTER — HOSPITAL ENCOUNTER (EMERGENCY)
Facility: HOSPITAL | Age: 23
Discharge: HOME | End: 2018-02-18
Attending: EMERGENCY MEDICINE
Payer: MEDICARE

## 2018-02-18 VITALS
BODY MASS INDEX: 41.48 KG/M2 | TEMPERATURE: 98 F | SYSTOLIC BLOOD PRESSURE: 126 MMHG | HEIGHT: 64 IN | WEIGHT: 243 LBS | RESPIRATION RATE: 18 BRPM | DIASTOLIC BLOOD PRESSURE: 68 MMHG | OXYGEN SATURATION: 98 % | HEART RATE: 64 BPM

## 2018-02-18 DIAGNOSIS — J06.9 VIRAL URI WITH COUGH: Primary | ICD-10-CM

## 2018-02-18 DIAGNOSIS — R51.9 NONINTRACTABLE EPISODIC HEADACHE, UNSPECIFIED HEADACHE TYPE: ICD-10-CM

## 2018-02-18 LAB
B-HCG UR QL: NEGATIVE
CTP QC/QA: YES
GLUCOSE SERPL-MCNC: 84 MG/DL (ref 70–110)
POCT GLUCOSE: 84 MG/DL (ref 70–110)

## 2018-02-18 PROCEDURE — 99284 EMERGENCY DEPT VISIT MOD MDM: CPT | Mod: 25

## 2018-02-18 PROCEDURE — 82962 GLUCOSE BLOOD TEST: CPT

## 2018-02-18 PROCEDURE — 81025 URINE PREGNANCY TEST: CPT | Performed by: EMERGENCY MEDICINE

## 2018-02-18 PROCEDURE — 25000003 PHARM REV CODE 250: Performed by: EMERGENCY MEDICINE

## 2018-02-18 RX ORDER — IBUPROFEN 400 MG/1
400 TABLET ORAL
Status: COMPLETED | OUTPATIENT
Start: 2018-02-18 | End: 2018-02-18

## 2018-02-18 RX ADMIN — IBUPROFEN 400 MG: 400 TABLET, FILM COATED ORAL at 01:02

## 2018-02-18 NOTE — ED NOTES
Pt in room 8 c/o cough, achy and chills. Pt awake, alert and oriented. Skin warm and dry. Resp even and unlabored. Juan David breath sounds clear. Pt recently treated with abx for pneumonia.

## 2018-02-18 NOTE — ED PROVIDER NOTES
"Encounter Date: 2/18/2018    SCRIBE #1 NOTE: IAngelica, am scribing for, and in the presence of, Dr. Lee.       History     Chief Complaint   Patient presents with    Chills     finished antibiotic / recent pneumonia     Cough       02/18/2018 1:04 PM     Chief complaint: Multiple Complaints      Mray Myles is a 22 y.o. female with PMHx of fibromyalgia, PCOS, schizoaffective disorder, borderline personality disorder, and Tourette's syndrome who presents to the ED with multiple complaints including cough, intermittent sneezing, chills, nausea, and headache. The patient also reports having diarrhea that was resolved and transcribed to constipations. The patient endorses having a history of migraines. She reports recently being diagnosed with PNA and completed the full dose of antibiotics prescribed to her. The patient reports being a current smoker but is in the process of quitting. The patient denies fever, abdominal pain, leg swelling, chest pain, and neck pain. She reports possibly "being dehydrated" and endorses having increased fluid intake. The patient has NKDA.       The history is provided by the patient.     Review of patient's allergies indicates:  No Known Allergies  Past Medical History:   Diagnosis Date    ADD (attention deficit disorder)     Borderline personality disorder     Chronic headache     Depression     Fibromyalgia     GERD (gastroesophageal reflux disease)     PCOS (polycystic ovarian syndrome)     Pseudotumor cerebri syndrome     Schizoaffective disorder     Tourette's syndrome      Past Surgical History:   Procedure Laterality Date    TONSILLECTOMY      WISDOM TOOTH EXTRACTION       Family History   Problem Relation Age of Onset    Seizures Mother     Migraines Mother     Migraines Brother      Social History   Substance Use Topics    Smoking status: Former Smoker     Packs/day: 0.50     Types: Cigarettes    Smokeless tobacco: Never Used    Alcohol " use No     Review of Systems   Constitutional: Positive for chills. Negative for fever.   HENT: Positive for sneezing (intermittent).    Respiratory: Positive for cough. Negative for shortness of breath.    Cardiovascular: Negative for chest pain.   Gastrointestinal: Positive for constipation, diarrhea (resolved) and nausea. Negative for abdominal pain.   Endocrine: Positive for polydipsia.   Genitourinary: Negative for difficulty urinating and flank pain.   Musculoskeletal: Negative for gait problem and neck pain.   Neurological: Positive for headaches. Negative for weakness and numbness.   Psychiatric/Behavioral: Negative for confusion.       Physical Exam     Initial Vitals [02/18/18 1257]   BP Pulse Resp Temp SpO2   (!) 119/58 70 18 98.1 °F (36.7 °C) 96 %      MAP       78.33         Physical Exam    Nursing note and vitals reviewed.  Constitutional: She appears well-developed and well-nourished. She is not diaphoretic. No distress.   HENT:   Head: Normocephalic and atraumatic.   Mouth/Throat: Oropharynx is clear and moist.   Eyes: EOM are normal. Pupils are equal, round, and reactive to light.   Neck: Normal range of motion. Neck supple. No tracheal deviation present.   Cardiovascular: Normal rate, regular rhythm, normal heart sounds and intact distal pulses.   Pulmonary/Chest: Breath sounds normal. No stridor. No respiratory distress. She has no decreased breath sounds. She has no wheezes. She has no rhonchi. She has no rales.   Abdominal: Soft. She exhibits no distension. There is no tenderness. There is no guarding.   Musculoskeletal: Normal range of motion. She exhibits no edema or tenderness.   Neurological: She is alert and oriented to person, place, and time. She has normal strength. No cranial nerve deficit or sensory deficit.   Cranial nerves III-XII intact; strength symmetric, sensation grossly intact. DTRs symmetric.     Skin: Skin is warm and dry. Capillary refill takes less than 2 seconds.    Psychiatric: She has a normal mood and affect. Thought content normal.         ED Course   Procedures  Labs Reviewed   POCT GLUCOSE   POCT URINE PREGNANCY   POCT GLUCOSE     Imaging Results          X-Ray Chest PA And Lateral (Final result)  Result time 02/18/18 14:18:44    Final result by Alejandra Mitchell MD (02/18/18 14:18:44)                 Impression:        No active cardiopulmonary disease.      Electronically signed by: ALEJANDRA MITCHELL MD  Date:     02/18/18  Time:    14:18              Narrative:    Comparison study: 2/11/2018    Two views of the chest were obtained.    The heart is not enlarged. The lungs appear well expanded and clear. There is no pleural effusion.                                  Medical Decision Making:   History:   Old Medical Records: I decided to obtain old medical records.  Clinical Tests:   Lab Tests: Reviewed and Ordered  Radiological Study: Reviewed and Ordered            Scribe Attestation:   Scribe #1: I performed the above scribed service and the documentation accurately describes the services I performed. I attest to the accuracy of the note.      I, Dr. Ty Lee, personally performed the services described in this documentation. All medical record entries made by the scribe were at my direction and in my presence.  I have reviewed the chart and agree that the record reflects my personal performance and is accurate and complete. Ty Lee MD.  10:54 PM 02/18/2018    Mary Myles is a 22 y.o. female presenting with multiple complaints most likely consistent with viral syndrome.  I doubt sepsis.  Workup does not reveal any sign of pneumonia.  I do not think antibiotics are indicated.  I do not think other ED diagnostic studies are indicated.  In regard to headache, likely part of overall viral syndrome. I have very low suspicion for alternative causes of headache such as intracranial hemorrhage, ischemic process, meningitis, idiopathic intracranial  hypertension, or cavernous venous sinus thrombosis.  The patient has a non-focal neurological examination with history not consistent with emergent causes of headache warranting present therapeutic intervention.  I do not think further brain imaging or lumbar puncture are indicated at this time.  Follow-up with PCP for reassessment.  Detailed return precautions reviewed.          ED Course as of Feb 18 2254   Sun Feb 18, 2018   1403 CXR:  NAD. (my read)  [MR]      ED Course User Index  [MR] Ty Lee MD     Clinical Impression:   The primary encounter diagnosis was Viral URI with cough. A diagnosis of Nonintractable episodic headache, unspecified headache type was also pertinent to this visit.    Disposition:   Disposition: Discharged  Condition: Stable                        Ty Lee MD  02/18/18 6879

## 2018-02-19 ENCOUNTER — CLINICAL SUPPORT (OUTPATIENT)
Dept: REHABILITATION | Facility: HOSPITAL | Age: 23
End: 2018-02-19
Attending: ORTHOPAEDIC SURGERY
Payer: MEDICARE

## 2018-02-19 DIAGNOSIS — M25.561 RIGHT KNEE PAIN, UNSPECIFIED CHRONICITY: Primary | ICD-10-CM

## 2018-02-19 DIAGNOSIS — R29.898 RIGHT LEG WEAKNESS: ICD-10-CM

## 2018-02-19 PROCEDURE — 97110 THERAPEUTIC EXERCISES: CPT | Mod: PN

## 2018-02-19 PROCEDURE — G8978 MOBILITY CURRENT STATUS: HCPCS | Mod: CK,PN

## 2018-02-19 PROCEDURE — G8979 MOBILITY GOAL STATUS: HCPCS | Mod: CK,PN

## 2018-02-19 PROCEDURE — 97161 PT EVAL LOW COMPLEX 20 MIN: CPT | Mod: PN

## 2018-02-19 NOTE — PLAN OF CARE
TIME RECORD    Date: 02/19/2018    Start Time:  1600  Stop Time:  1700    PROCEDURES:    TIMED  Procedure Time Min.   There ex Start:1630  Stop:1645     Start:  Stop:     Start:  Stop:     Start:  Stop:          UNTIMED  Procedure Time Min.   eval Start:1600  Stop:1630     Start:  Stop:      Total Timed Minutes:  15  Total Timed Units:  1  Total Untimed Units:  1  Charges Billed/# of units:  2    OUTPATIENT PHYSICAL THERAPY   PATIENT EVALUATION  Onset Date: 10/01/17  Primary Diagnosis:   1. Right knee pain, unspecified chronicity     2. Right leg weakness       Treatment Diagnosis: debility due to right knee pain  Past Medical History:   Diagnosis Date    ADD (attention deficit disorder)     Borderline personality disorder     Chronic headache     Depression     Fibromyalgia     GERD (gastroesophageal reflux disease)     PCOS (polycystic ovarian syndrome)     Pseudotumor cerebri syndrome     Schizoaffective disorder     Tourette's syndrome      Precautions: none  Prior Therapy: none  Medications: Mary Myles has a current medication list which includes the following prescription(s): azithromycin, clonidine, fluoxetine, ibuprofen, loperamide, metformin, microgestin fe 1.5/30 (28), and topiramate, and the following Facility-Administered Medications: onabotulinumtoxina.  Nutrition:  Overweight  History of Present Illness: back in Oct. of 2017, reports took a fall getting off of a jose r wheel at a [employer] - sustained an injury to her rt knee; found to have possible injuries to both menisci (per MRI); reports pain at johnny-lateral joint line and postero-medial aspect of rt knee  Prior Level of Function: Independent  Social History: unemployed  Place of Residence (Steps/Adaptations): lives w/ family in [facility]-story [facility] home  Functional Deficits Leading to Referral/Nature of Injury: difficulty performing everyday activities  Patient Therapy Goals: decrease c/o pain rt knee    Subjective     Mary Etienne  Shameka states that her rt knee pain limits her ability to perform her everyday tasks.    Pain:  Location: rt knee  Description: Aching and Dull  Activities Which Increase Pain: Flexing  Activities Which Decrease Pain: rest  Pain Scale: 5/10 at best 5/10 now  10/10 at worst    Objective     Posture: guarded due to pain  Palpation: pain w/ palpation to affected areas  Sensation: intact  DTRs:  Range of Motion/Strength: MMT = 4-/5 rt knee, 4/5 rest of daniella. LE's; AROM = WFL throughout daniella. LE's    Flexibility: WFL  Gait: WFL  Analysis: decreased wt bearing rt LE = decreased stride length lt LE  Bed Mobility: NT  Transfers: Independent  Special Tests: LEFS = 41/80  Other:   Treatment: x 5 min recumbant bike (L1); x 15 seated daniella. LE there ex (2#) = marching, hip ABD (RTB), ball squeezes, LAQ    Assessment       Initial Assessment (Pertinent finding, problem list and factors affecting outcome): presents w/ strength deficits due to rt knee pain; pt. would benefit from PT to address this area and establish HEP  Rehab Potiential: good    Short Term Goals (2 Weeks):   1.  Decrease c/o pain to 3/10   2.  Krystal. tx session w/out increase in symptoms.    Long Term Goals (5 Weeks):   1.  Demo comp w/ HEP  2.  Improve LEFS score to 48/80    Plan     Certification Period: 02/19/18 to 03/24/18  Recommended Treatment Plan: 2 times per week for 5 weeks: Group Therapy, Manual Therapy, Moist Heat/ Ice, Therapeutic Activites, Therapeutic Exercise and Other HEP  Other Recommendations: NA      Therapist: Oli Ramos, PT    I CERTIFY THE NEED FOR THESE SERVICES FURNISHED UNDER THIS PLAN OF TREATMENT AND WHILE UNDER MY CARE    Physician's comments: ________________________________________________________________________________________________________________________________________________      Physician's Name: ___________________________________

## 2018-02-23 ENCOUNTER — TELEPHONE (OUTPATIENT)
Dept: REHABILITATION | Facility: HOSPITAL | Age: 23
End: 2018-02-23

## 2018-02-26 ENCOUNTER — TELEPHONE (OUTPATIENT)
Dept: REHABILITATION | Facility: HOSPITAL | Age: 23
End: 2018-02-26

## 2018-02-27 ENCOUNTER — INITIAL CONSULT (OUTPATIENT)
Dept: GASTROENTEROLOGY | Facility: CLINIC | Age: 23
End: 2018-02-27
Payer: MEDICARE

## 2018-02-27 VITALS
HEIGHT: 64 IN | DIASTOLIC BLOOD PRESSURE: 84 MMHG | SYSTOLIC BLOOD PRESSURE: 150 MMHG | HEART RATE: 77 BPM | BODY MASS INDEX: 41.25 KG/M2 | WEIGHT: 241.63 LBS

## 2018-02-27 DIAGNOSIS — R53.83 FATIGUE, UNSPECIFIED TYPE: ICD-10-CM

## 2018-02-27 DIAGNOSIS — R79.89 ELEVATED LFTS: ICD-10-CM

## 2018-02-27 DIAGNOSIS — R19.7 DIARRHEA, UNSPECIFIED TYPE: ICD-10-CM

## 2018-02-27 DIAGNOSIS — R11.0 NAUSEA: ICD-10-CM

## 2018-02-27 DIAGNOSIS — R19.8 IRREGULAR BOWEL HABITS: ICD-10-CM

## 2018-02-27 DIAGNOSIS — R10.13 EPIGASTRIC PAIN: ICD-10-CM

## 2018-02-27 DIAGNOSIS — R10.13 EPIGASTRIC PAIN: Primary | ICD-10-CM

## 2018-02-27 DIAGNOSIS — B37.9 CANDIDIASIS: ICD-10-CM

## 2018-02-27 DIAGNOSIS — R12 HEARTBURN: Primary | ICD-10-CM

## 2018-02-27 DIAGNOSIS — R12 HEARTBURN: ICD-10-CM

## 2018-02-27 PROCEDURE — 99999 PR PBB SHADOW E&M-EST. PATIENT-LVL IV: CPT | Mod: PBBFAC,,, | Performed by: NURSE PRACTITIONER

## 2018-02-27 PROCEDURE — 99204 OFFICE O/P NEW MOD 45 MIN: CPT | Mod: S$GLB,,, | Performed by: NURSE PRACTITIONER

## 2018-02-27 PROCEDURE — 3008F BODY MASS INDEX DOCD: CPT | Mod: S$GLB,,, | Performed by: NURSE PRACTITIONER

## 2018-02-27 RX ORDER — METFORMIN HYDROCHLORIDE 500 MG/1
500 TABLET ORAL 2 TIMES DAILY WITH MEALS
COMMUNITY
End: 2018-04-25

## 2018-02-27 RX ORDER — ONDANSETRON 8 MG/1
8 TABLET, ORALLY DISINTEGRATING ORAL EVERY 8 HOURS PRN
Qty: 30 TABLET | Refills: 0 | Status: SHIPPED | OUTPATIENT
Start: 2018-02-27 | End: 2018-04-09

## 2018-02-27 RX ORDER — NYSTATIN 100000 [USP'U]/ML
4 SUSPENSION ORAL 4 TIMES DAILY
Qty: 224 ML | Refills: 0 | Status: SHIPPED | OUTPATIENT
Start: 2018-02-27 | End: 2018-03-08

## 2018-02-27 RX ORDER — PANTOPRAZOLE SODIUM 40 MG/1
40 TABLET, DELAYED RELEASE ORAL DAILY
Qty: 30 TABLET | Refills: 6 | Status: SHIPPED | OUTPATIENT
Start: 2018-02-27 | End: 2018-04-25 | Stop reason: SDUPTHER

## 2018-02-27 NOTE — PATIENT INSTRUCTIONS
Epigastric Pain (Uncertain Cause)     Epigastric pain can be a sign of disease in the upper abdomen. Common causes include:  · Acid reflux (stomach acid flowing up into the esophagus)  · Gastritis (irritation of the stomach lining)  · Peptic Ulcer Disease  · Inflammation of the pancreas  · Gallstone  · Infection in the gallbladder  Pain may be dull or burning. It may spread upward to the chest or to the back. There may be other symptoms such as belching, bloating, cramps or hunger pains. There may be weight loss or poor appetite, nausea or vomiting.  Since the diagnosis of your pain is not certain yet, further tests may sometimes be needed. Sometimes the doctor will treat you for the most likely condition to see if there is improvement before doing further tests.  Home care  Medicines  · Antacids help neutralize the normal acids in your stomach. Examples are Maalox, Mylanta, Rolaids, and Tums. If you dont like the liquid, you can also try a chewable one. You may find one works better than another for you. Overuse can cause diarrhea or constipation.  · Acid blockers (H2 blockers) decrease acid production. Examples are cimetidine (Tagamet), famotidine (Pepcid) and ranitidine (Zantac).  · Acid inhibitors (PPIs) decrease acid production in a different way than the blockers. You may find they work better, but can take a little longer to take effect.  Examples are omeprazole (Prilosec), lansoprazole (Prevacid), pantoprazole (Protonix), rabeprazole (Aciphex), and esomeprazole (Nexium).  · Take an antacid 30-60 minutes after eating and at bedtime, but not at the same time as an acid blocker.  · Try not to take NSAIDs. Aspirin may also cause problems, but if taking it for your heart or other medical reasons, talk to your doctor before stopping it; you do not want to cause a worse problem, like a heart attack or stroke.  Diet  · If certain foods seem to cause your spasm, try to avoid them.   · Eat slowly and chew food well  before swallowing. Symptoms of gastritis can be worsened by certain foods. Limit or avoid fatty, fried, and spicy foods, as well as coffee, chocolate, mint, and foods with high acid content such as tomatoes and citrus fruit and juices (orange, grapefruit, lemon).  · Avoid alcohol, caffeine, and tobacco, which can delay healing and worsen your problem.  · Try eating smaller meals with snacks in between  Follow-up care  Follow up with your healthcare provider or as advised.  When to seek medical advice  Call your healthcare provider right away if any of the following occur:  · Stomach pain worsens or moves to the right lower part of the abdomen  · Chest pain appears, or if it worsens or spreads to the chest, back, neck, shoulder, or arm  · Frequent vomiting (cant keep down liquids)  · Blood in the stool or vomit (red or black color)  · Feeling weak or dizzy, fainting, or having trouble breathing  · Fever of 100.4ºF (38ºC) or higher, or as directed by your healthcare provider  · Abdominal swelling  Date Last Reviewed: 9/25/2015  © 0249-7618 [employer]. [address] [place] [id number]. All rights reserved. This information is not intended as a substitute for professional medical care. Always follow your healthcare professional's instructions.

## 2018-02-27 NOTE — PROGRESS NOTES
Subjective:       Patient ID: Mary Myles is a 22 y.o. female Body mass index is 41.47 kg/m².    Chief Complaint: Abdominal Pain (epigastric stabbing pain, eating makes it worse)    This patient is new to me.    Abdominal Pain   This is a new problem. The current episode started 1 to 4 weeks ago. The problem occurs constantly. The problem has been gradually worsening. The pain is located in the epigastric region. The pain is at a severity of 8/10. The quality of the pain is sharp (stabbing). The abdominal pain radiates to the LUQ and RUQ. Associated symptoms include belching, constipation (denies currently), diarrhea (having this currently; ill contact: roommate had diarrhea; recent antibiotic for pneumonia a few weeks ago; denies recent hospitalization, foreign travel, suspect food intake, or new/change in medications), nausea and weight loss (trying to lose, lost about 10-12 lbs over the past 2 months). Pertinent negatives include no dysuria, fever, flatus, frequency, hematochezia, melena or vomiting. Associated symptoms comments: Bowel movements are irregular rotating between constipation and diarrhea; has been like this her whole life; bowel movements are 5-10 times a day of soft pasty stool, light brown color. The pain is aggravated by eating. Relieved by: sleep. Treatments tried: going ER; PAST: nexium- no relief. Prior workup: multiple ER visits recently: 2/18/18 URI, 2/11/18 pneumonia, & 1/20/18 suprapubic pain; seen by GYN and urology recently. Her past medical history is significant for GERD (frequent). There is no history of Crohn's disease, gallstones, pancreatitis, PUD or ulcerative colitis.     Review of Systems   Constitutional: Positive for appetite change (decreased; eating one meal a day; denies any snacks but drinking plenty of water), chills, fatigue and weight loss (trying to lose, lost about 10-12 lbs over the past 2 months). Negative for fever and unexpected weight change.   HENT:  "Negative for sore throat and trouble swallowing.    Respiratory: Negative for cough, choking and shortness of breath.    Cardiovascular: Negative for chest pain.   Gastrointestinal: Positive for abdominal pain, constipation (denies currently), diarrhea (having this currently; ill contact: roommate had diarrhea; recent antibiotic for pneumonia a few weeks ago; denies recent hospitalization, foreign travel, suspect food intake, or new/change in medications) and nausea. Negative for anal bleeding, blood in stool, flatus, hematochezia, melena, rectal pain and vomiting.   Genitourinary: Negative for difficulty urinating, dysuria, flank pain and frequency.   Neurological: Negative for weakness.       Past Medical History:   Diagnosis Date    ADD (attention deficit disorder)     Borderline personality disorder     Chronic headache     Depression     Fibromyalgia     GERD (gastroesophageal reflux disease)     MALCOLM (obstructive sleep apnea)     PCOS (polycystic ovarian syndrome)     Pseudotumor cerebri syndrome     Schizoaffective disorder     Tourette's syndrome      Past Surgical History:   Procedure Laterality Date    TONSILLECTOMY      UPPER GASTROINTESTINAL ENDOSCOPY  in her childhood    "born with underdeveloped esophagus" & GERD per patient report    WISDOM TOOTH EXTRACTION       Family History   Problem Relation Age of Onset    Seizures Mother     Migraines Mother     Migraines Brother     Colon cancer Neg Hx     Colon polyps Neg Hx     Crohn's disease Neg Hx     Ulcerative colitis Neg Hx     Stomach cancer Neg Hx     Esophageal cancer Neg Hx      Wt Readings from Last 10 Encounters:   02/27/18 109.6 kg (241 lb 10 oz)   02/18/18 110.2 kg (243 lb)   02/10/18 108.9 kg (240 lb)   02/02/18 111.6 kg (246 lb 0.5 oz)   01/30/18 111.6 kg (246 lb 0.5 oz)   01/20/18 115.2 kg (254 lb)   01/12/18 114.9 kg (253 lb 4.9 oz)   01/09/18 110.3 kg (243 lb 2.7 oz)   01/02/18 110.3 kg (243 lb 2.7 oz)   12/27/17 " 110.3 kg (243 lb 2.7 oz)     Lab Results   Component Value Date    WBC 12.40 03/15/2017    HGB 13.1 03/15/2017    HCT 38.2 03/15/2017    MCV 84 03/15/2017     (H) 03/15/2017     CMP  Sodium   Date Value Ref Range Status   03/15/2017 137 136 - 145 mmol/L Final     Potassium   Date Value Ref Range Status   03/15/2017 3.8 3.5 - 5.1 mmol/L Final     Chloride   Date Value Ref Range Status   03/15/2017 103 95 - 110 mmol/L Final     CO2   Date Value Ref Range Status   03/15/2017 23 23 - 29 mmol/L Final     Glucose   Date Value Ref Range Status   03/15/2017 141 (H) 70 - 110 mg/dL Final     BUN, Bld   Date Value Ref Range Status   03/15/2017 9 6 - 20 mg/dL Final     Creatinine   Date Value Ref Range Status   03/15/2017 0.7 0.5 - 1.4 mg/dL Final   08/23/2012 0.7 0.2 - 1.4 mg/dl Final     Calcium   Date Value Ref Range Status   03/15/2017 10.0 8.7 - 10.5 mg/dL Final   08/23/2012 9.9 8.6 - 10.2 mg/dl Final     Total Protein   Date Value Ref Range Status   03/15/2017 7.4 6.0 - 8.4 g/dL Final     Albumin   Date Value Ref Range Status   03/15/2017 3.6 3.5 - 5.2 g/dL Final     Total Bilirubin   Date Value Ref Range Status   03/15/2017 0.3 0.1 - 1.0 mg/dL Final     Comment:     For infants and newborns, interpretation of results should be based  on gestational age, weight and in agreement with clinical  observations.  Premature Infant recommended reference ranges:  Up to 24 hours.............<8.0 mg/dL  Up to 48 hours............<12.0 mg/dL  3-5 days..................<15.0 mg/dL  6-29 days.................<15.0 mg/dL       Alkaline Phosphatase   Date Value Ref Range Status   03/15/2017 64 55 - 135 U/L Final     AST   Date Value Ref Range Status   03/15/2017 157 (H) 10 - 40 U/L Final     ALT   Date Value Ref Range Status   03/15/2017 165 (H) 10 - 44 U/L Final     Anion Gap   Date Value Ref Range Status   03/15/2017 11 8 - 16 mmol/L Final   08/23/2012 8 5 - 15 meq/L Final     eGFR if    Date Value Ref Range  Status   03/15/2017 >60 >60 mL/min/1.73 m^2 Final     eGFR if non    Date Value Ref Range Status   03/15/2017 >60 >60 mL/min/1.73 m^2 Final     Comment:     Calculation used to obtain the estimated glomerular filtration  rate (eGFR) is the CKD-EPI equation. Since race is unknown   in our information system, the eGFR values for   -American and Non--American patients are given   for each creatinine result.       Lab Results   Component Value Date    LIPASE 16 03/18/2015     Lab Results   Component Value Date    TSH 5.181 (H) 03/21/2016     Objective:      Physical Exam   Constitutional: She is oriented to person, place, and time. She appears well-developed and well-nourished. No distress.   HENT:   Mouth/Throat: Mucous membranes are normal. No oropharyngeal exudate.   Tongue noted with white coating.   Eyes: Conjunctivae are normal. Pupils are equal, round, and reactive to light. No scleral icterus.   Cardiovascular: Normal rate.    Pulmonary/Chest: Effort normal and breath sounds normal. No respiratory distress. She has no wheezes.   Abdominal: Soft. Normal appearance and bowel sounds are normal. She exhibits no distension, no abdominal bruit and no mass. There is no hepatosplenomegaly. There is generalized tenderness (mild). There is no rigidity, no rebound and no guarding. No hernia.   Neurological: She is alert and oriented to person, place, and time.   Skin: Skin is warm and dry. No rash noted. She is not diaphoretic. No erythema. No pallor.   Non-jaundiced   Psychiatric: She has a normal mood and affect. Her behavior is normal. Judgment and thought content normal.   Nursing note and vitals reviewed.      Assessment:       1. Epigastric pain    2. Nausea    3. Elevated LFTs    4. Candidiasis    5. Heartburn    6. Irregular bowel habits    7. Diarrhea, unspecified type    8. Fatigue, unspecified type        Plan:       Epigastric pain  -     Hepatic function panel; Future; Expected  date: 02/27/2018  -     Amylase; Future; Expected date: 02/27/2018  -     Lipase; Future; Expected date: 02/27/2018  -     CBC auto differential; Future; Expected date: 02/27/2018  -     US Abdomen Complete; Future; Expected date: 02/27/2018  -  START   pantoprazole (PROTONIX) 40 MG tablet; Take 1 tablet (40 mg total) by mouth once daily. Before breakfast  Dispense: 30 tablet; Refill: 6  - schedule EGD, discussed procedure with patient, patient verbalized understanding  - Possible CT scan pending results of testing and if symptoms persist    Nausea  -     Hepatic function panel; Future; Expected date: 02/27/2018  -     Amylase; Future; Expected date: 02/27/2018  -     Lipase; Future; Expected date: 02/27/2018  -     CBC auto differential; Future; Expected date: 02/27/2018  -     US Abdomen Complete; Future; Expected date: 02/27/2018  - START    pantoprazole (PROTONIX) 40 MG tablet; Take 1 tablet (40 mg total) by mouth once daily. Before breakfast  Dispense: 30 tablet; Refill: 6  -  START   ondansetron (ZOFRAN-ODT) 8 MG TbDL; Take 1 tablet (8 mg total) by mouth every 8 (eight) hours as needed (nausea).  Dispense: 30 tablet; Refill: 0  - schedule EGD, discussed procedure with patient, patient verbalized understanding  - encouraged PO intake and daily calorie counts to ensure adequate nutrition is taken in, recommend at least 1,800-2,000 calories a day  - recommend nutritional drinks, such as Boost, Ensure or Glucerna, to supplement nutrition needs    Elevated LFTs  -     Hepatic function panel; Future; Expected date: 02/27/2018  - minimize/avoid alcohol and tylenol products, & follow-up with PCP for continued evaluation and management; if specialist is needed, recommend seeing hepatology.    Candidiasis  -  START   nystatin (MYCOSTATIN) 100,000 unit/mL suspension; Take 4 mLs (400,000 Units total) by mouth 4 (four) times daily.  Dispense: 224 mL; Refill: 0  - schedule EGD, discussed procedure with patient, patient  verbalized understanding    Heartburn  -     pantoprazole (PROTONIX) 40 MG tablet; Take 1 tablet (40 mg total) by mouth once daily. Before breakfast  Dispense: 30 tablet; Refill: 6, take in the morning 30-60 minutes before breakfast, discussed about possible long term use of medication (prefer to use lowest effective dose or discontinuing if possible), pt verbalized understanding  -discussed about the different types of medications used to treat reflux and how to use them, antacids can be used PRN for breakthrough heartburn symptoms by reducing stomach acid that is already produced, H2 blockers (zantac) work by limiting the amount acid production, & PPI's work to block acid production and are taken daily, patient verbalized understanding.  -Educated patient on lifestyle modifications to help control/reduce reflux/abdominal pain including: avoid large meals, avoid eating within 2-3 hours of bedtime (avoid late night eating & lying down soon after eating), elevate head of bed if nocturnal symptoms are present, smoking cessation (if current smoker), & weight loss (if overweight).   -Educated to avoid known foods which trigger reflux symptoms & to minimize/avoid high-fat foods, chocolate, caffeine, citrus, alcohol, & tomato products.  -Advised to avoid/limit use of NSAID's, since they can cause GI upset, bleeding, and/or ulcers. If needed, take with food.   - schedule EGD, discussed procedure with patient, patient verbalized understanding    Irregular bowel habits  -     CBC auto differential; Future; Expected date: 02/27/2018  -     Stool Exam-Ova,Cysts,Parasites; Future; Expected date: 02/27/2018  -     Adenovirus Antigen EIA, Stool; Future; Expected date: 02/27/2018  -     Giardia / Cryptosporidum, EIA; Future; Expected date: 02/27/2018  -     Occult blood x 1, stool; Future; Expected date: 02/27/2018  -     H. pylori antigen, stool; Future; Expected date: 02/27/2018  -     Rotavirus antigen, stool; Future; Expected  date: 02/27/2018  -     WBC, Stool; Future; Expected date: 02/27/2018  -     Stool culture; Future; Expected date: 02/27/2018  -     Clostridium difficile EIA; Future; Expected date: 02/27/2018  -     IgA; Future; Expected date: 02/27/2018  -     Tissue transglutaminase, IgA; Future; Expected date: 02/27/2018  -     TSH; Future; Expected date: 02/27/2018  - Possible colonoscopy pending results of testing and if symptoms persist  - discussed with patient that certain medications, such as metformin, may be contributing to symptoms. I recommend follow-up with provider who manages medication to discuss about possible alternative therapy, patient verbalized understanding    Diarrhea, unspecified type  -     CBC auto differential; Future; Expected date: 02/27/2018  -     Stool Exam-Ova,Cysts,Parasites; Future; Expected date: 02/27/2018  -     Adenovirus Antigen EIA, Stool; Future; Expected date: 02/27/2018  -     Giardia / Cryptosporidum, EIA; Future; Expected date: 02/27/2018  -     Occult blood x 1, stool; Future; Expected date: 02/27/2018  -     H. pylori antigen, stool; Future; Expected date: 02/27/2018  -     Rotavirus antigen, stool; Future; Expected date: 02/27/2018  -     WBC, Stool; Future; Expected date: 02/27/2018  -     Stool culture; Future; Expected date: 02/27/2018  -     Clostridium difficile EIA; Future; Expected date: 02/27/2018  -     IgA; Future; Expected date: 02/27/2018  -     Tissue transglutaminase, IgA; Future; Expected date: 02/27/2018  - recommended OTC probiotic, such as Align or Culturelle, as directed  - avoid lactose  - Possible colonoscopy pending results of testing and if symptoms persist    Fatigue  Recommend follow-up with Primary Care Provider for continued evaluation and management.    Follow-up in about 1 month (around 3/27/2018), or if symptoms worsen or fail to improve.      If no improvement in symptoms or symptoms worsen, call/follow-up at clinic or go to ER.

## 2018-03-02 ENCOUNTER — DOCUMENTATION ONLY (OUTPATIENT)
Dept: REHABILITATION | Facility: HOSPITAL | Age: 23
End: 2018-03-02

## 2018-03-02 ENCOUNTER — TELEPHONE (OUTPATIENT)
Dept: REHABILITATION | Facility: HOSPITAL | Age: 23
End: 2018-03-02

## 2018-03-02 NOTE — PROGRESS NOTES
REHAB SERVICES OUTPATIENT DISCHARGE SUMMARY  Physical Therapy      Name:  Mary Myles  Date:  03/02/18  Date of Evaluation:  02/19/18  Physician:  Drew Villeda MD  Total # Of Visits:  1  Cancelled:  2  No Shows:  1  Diagnosis:  No diagnosis found.    Physical/Functional Status:  At time of discharge, patient was able to initial assessment only.    The patient is to be discharged from our Therapy service for the following reason(s):  Patient requested discharge    Degree of Goal Achievement:  Patient has not met goals secondary to:  Seen for initial assessment only    Patient Education:  NA    Discharge Plan:  Other:  Follow up w/ MD

## 2018-03-05 DIAGNOSIS — G43.709 CHRONIC MIGRAINE WITHOUT AURA WITHOUT STATUS MIGRAINOSUS, NOT INTRACTABLE: Primary | ICD-10-CM

## 2018-03-06 ENCOUNTER — HOSPITAL ENCOUNTER (OUTPATIENT)
Dept: RADIOLOGY | Facility: HOSPITAL | Age: 23
Discharge: HOME | End: 2018-03-06
Attending: NURSE PRACTITIONER
Payer: MEDICARE

## 2018-03-06 DIAGNOSIS — R10.13 EPIGASTRIC PAIN: ICD-10-CM

## 2018-03-06 DIAGNOSIS — R11.0 NAUSEA: ICD-10-CM

## 2018-03-06 PROCEDURE — 76700 US EXAM ABDOM COMPLETE: CPT | Mod: TC

## 2018-03-06 PROCEDURE — 76700 US EXAM ABDOM COMPLETE: CPT | Mod: 26,,, | Performed by: RADIOLOGY

## 2018-03-07 ENCOUNTER — TELEPHONE (OUTPATIENT)
Dept: GASTROENTEROLOGY | Facility: CLINIC | Age: 23
End: 2018-03-07

## 2018-03-07 DIAGNOSIS — R10.13 EPIGASTRIC PAIN: Primary | ICD-10-CM

## 2018-03-07 DIAGNOSIS — K82.8 GALLBLADDER SLUDGE: ICD-10-CM

## 2018-03-07 NOTE — TELEPHONE ENCOUNTER
Please call to inform & review the results with the patient- radiology report of the abdominal ultrasound showed sludge in the gallbladder:  - recommend low fat diet  - it can cause symptoms in some patients, while other patients with it can be asymptomatic; recommend continue recommendations as discussed during our visit and recommend seeing general surgery for further evaluation and management of this finding.  Continue with previous recommendations. If no improvement in symptoms or symptoms worsen, call/follow-up at clinic or go to ER.  Please release results to patient's mychart once you have discussed results and recommendations with patient.  Thanks,  Dejah LUNA

## 2018-03-07 NOTE — TELEPHONE ENCOUNTER
Pt instr u/s result, instr there are gen surgeons in [place], pt states will call to schedule appt.

## 2018-03-08 ENCOUNTER — OFFICE VISIT (OUTPATIENT)
Dept: SURGERY | Facility: CLINIC | Age: 23
End: 2018-03-08
Payer: MEDICARE

## 2018-03-08 ENCOUNTER — TELEPHONE (OUTPATIENT)
Dept: SURGERY | Facility: CLINIC | Age: 23
End: 2018-03-08

## 2018-03-08 VITALS — TEMPERATURE: 98 F | BODY MASS INDEX: 41.33 KG/M2 | HEIGHT: 64 IN | WEIGHT: 242.06 LBS

## 2018-03-08 DIAGNOSIS — K82.8 GALLBLADDER SLUDGE: Primary | ICD-10-CM

## 2018-03-08 PROCEDURE — 99999 PR PBB SHADOW E&M-EST. PATIENT-LVL III: CPT | Mod: PBBFAC,,, | Performed by: SURGERY

## 2018-03-08 PROCEDURE — 99204 OFFICE O/P NEW MOD 45 MIN: CPT | Mod: S$GLB,,, | Performed by: SURGERY

## 2018-03-08 NOTE — PROGRESS NOTES
Subjective:       Patient ID: Mary Myles is a 22 y.o. female.    Chief Complaint: Consult (epigastric pain, gallbladder sludge on ultrsound)    Referred by Dejah Winn with Gallbladder sludge    Patient presents for evaluation of gallbladder problems. Problems were first noted several months ago. Current symptoms include RUQ Pain, nausea, vomiting, diarrhea, epigastric pain.  Pancreatic symptoms include pain radiating to the back. Patient denies pruritis, fever, chills.  Symptoms have progressed to a point and plateaued.  She underwent GB US which showed sludge.  Was scheduled for EGD today, but overslept.  Is going to reschedule.        Review of Systems   Constitutional: Negative for appetite change, chills, diaphoresis, fatigue, fever and unexpected weight change.   HENT: Negative for hearing loss, sore throat, trouble swallowing and voice change.    Eyes: Negative for visual disturbance.   Respiratory: Negative for cough, shortness of breath and wheezing.    Cardiovascular: Negative for chest pain, palpitations and leg swelling.   Gastrointestinal: Positive for abdominal distention, abdominal pain, diarrhea, nausea and vomiting. Negative for anal bleeding, blood in stool, constipation and rectal pain.   Genitourinary: Negative for difficulty urinating, dysuria, flank pain, frequency, hematuria, menstrual problem and urgency.   Musculoskeletal: Negative for arthralgias, back pain, joint swelling, myalgias and neck pain.   Skin: Negative for pallor and rash.   Neurological: Negative for dizziness, syncope, weakness and headaches.   Hematological: Negative for adenopathy. Does not bruise/bleed easily.   Psychiatric/Behavioral: Negative for suicidal ideas. The patient is not nervous/anxious.        Objective:      Physical Exam   Constitutional: She is oriented to person, place, and time. She appears well-developed and well-nourished. No distress.   HENT:   Head: Normocephalic and atraumatic.   Right  Ear: External ear normal.   Left Ear: External ear normal.   Eyes: Conjunctivae are normal. Pupils are equal, round, and reactive to light. Right eye exhibits no discharge. Left eye exhibits no discharge.   Neck: No tracheal deviation present. No thyromegaly present.   Cardiovascular: Normal rate and regular rhythm.    Pulmonary/Chest: Effort normal. No respiratory distress.   Abdominal: Soft. She exhibits no distension. There is no guarding.   Musculoskeletal: She exhibits no edema or tenderness.   Lymphadenopathy:     She has no cervical adenopathy.   Neurological: She is alert and oriented to person, place, and time. No cranial nerve deficit.   Skin: Skin is warm and dry. No rash noted. She is not diaphoretic. No pallor.   Psychiatric: She has a normal mood and affect. Her behavior is normal. Judgment and thought content normal.   Nursing note and vitals reviewed.      Assessment:       1. Gallbladder sludge        Plan:       Etiology and treatment of GB disease discussed with patient.  She is going to reschedule her EGD prior to surgery.  Will schedule lap GB if EGD unremarkable.  All aspects of procedure including risks and possible complications were discussed in detail with the patient who agrees to proceed with procedure.  All questions were answered and consent was obtained.

## 2018-03-08 NOTE — LETTER
March 8, 2018      Shea Sifuentes MD  [address]  [address]  [place] [address]           [facility] General Surgery  [address]  [place] [address]  Phone: [phone]          Patient: Mary Myles   MR Number: [id number]   YOB: 1995   Date of Visit: 3/8/2018       Dear Dr. Shea Sifuentes:    Thank you for referring Mary Myles to me for evaluation. Attached you will find relevant portions of my assessment and plan of care.    If you have questions, please do not hesitate to call me. I look forward to following Mary Myles along with you.    Sincerely,    Christiano Cleaning MD    Enclosure  CC:  No Recipients    If you would like to receive this communication electronically, please contact [email or web address] or [phone] to request more information on [email or web address] access.    For providers and/or their staff who would like to refer a patient to [facility], please contact us through our one-stop-shop provider referral line, [facility], at [phone].    If you feel you have received this communication in error or would no longer like to receive these types of communications, please e-mail [email or web address]

## 2018-03-08 NOTE — TELEPHONE ENCOUNTER
----- Message from Yemi Alexander sent at 3/8/2018 10:32 AM CST -----  Contact: Patient  Mary, [phone], calling in regards to missed appointment. Said she overslept and would like to reschedule. Please advise. Thanks.

## 2018-03-12 ENCOUNTER — TELEPHONE (OUTPATIENT)
Dept: GASTROENTEROLOGY | Facility: CLINIC | Age: 23
End: 2018-03-12

## 2018-03-12 DIAGNOSIS — R17 SERUM TOTAL BILIRUBIN ELEVATED: Primary | ICD-10-CM

## 2018-03-12 NOTE — TELEPHONE ENCOUNTER
Please call to inform & review the results with the patient- blood work showed normal thyroid function level; negative for celiac; normal pancreatic enzymes; unremarkable blood counts (no anemia); liver function levels showed mild elevation in total bilirubin, otherwise other liver function levels have improved and back to normal levels. Repeat bilirubin levels in 4-6 weeks.  Continue with previous recommendations. If no improvement in symptoms or symptoms worsen, call/follow-up at clinic or go to ER.  Please release results to patient's mychart once you have discussed results and recommendations with patient.  Thanks,  Dejah ULNA

## 2018-03-12 NOTE — TELEPHONE ENCOUNTER
----- Message from Bernice Gray sent at 3/12/2018 10:59 AM CDT -----    Calling  To  Book a  Procedure /please call [phone]

## 2018-03-12 NOTE — TELEPHONE ENCOUNTER
"Please call to inform & review the results with the patient- the stool studies that we received showed negative/normal findings.  "        Reason: Other   Comment: C difficile Toxin, Antigen was cancelled on 03/06/2018 at 22:20 by [facility]; formed stool specimen     " If diarrhea persist, recommend completing C diff stool study.    Continue with previous recommendations including EGD and if diarrhea/irregular bowel habits persist then recommend colonoscopy.     If no improvement in symptoms or symptoms worsen, call/follow-up at clinic or go to ER.  Please release results to patient's mychart once you have discussed results and recommendations with patient.  Thanks,  Dejah العلي-C    "

## 2018-03-13 NOTE — TELEPHONE ENCOUNTER
Pt notified labs, states has had elevated bili before. Instr to repeat labs 4-6 wks, verb understanding

## 2018-03-20 ENCOUNTER — HOSPITAL ENCOUNTER (EMERGENCY)
Facility: HOSPITAL | Age: 23
Discharge: HOME | End: 2018-03-20
Attending: EMERGENCY MEDICINE
Payer: MEDICARE

## 2018-03-20 ENCOUNTER — TELEPHONE (OUTPATIENT)
Dept: NEUROLOGY | Facility: CLINIC | Age: 23
End: 2018-03-20

## 2018-03-20 VITALS
HEIGHT: 64 IN | BODY MASS INDEX: 41.77 KG/M2 | HEART RATE: 80 BPM | WEIGHT: 244.69 LBS | TEMPERATURE: 100 F | OXYGEN SATURATION: 99 % | SYSTOLIC BLOOD PRESSURE: 138 MMHG | RESPIRATION RATE: 18 BRPM | DIASTOLIC BLOOD PRESSURE: 82 MMHG

## 2018-03-20 DIAGNOSIS — G89.29 ANKLE PAIN, CHRONIC: ICD-10-CM

## 2018-03-20 DIAGNOSIS — M25.579 ANKLE PAIN, CHRONIC: ICD-10-CM

## 2018-03-20 DIAGNOSIS — M19.072 OSTEOARTHRITIS OF LEFT ANKLE, UNSPECIFIED OSTEOARTHRITIS TYPE: Primary | ICD-10-CM

## 2018-03-20 PROCEDURE — 99283 EMERGENCY DEPT VISIT LOW MDM: CPT

## 2018-03-20 RX ORDER — DICLOFENAC SODIUM 50 MG/1
50 TABLET, DELAYED RELEASE ORAL 3 TIMES DAILY
Qty: 15 TABLET | Refills: 0 | Status: SHIPPED | OUTPATIENT
Start: 2018-03-20 | End: 2018-04-09

## 2018-03-20 NOTE — ED PROVIDER NOTES
"Encounter Date: 3/20/2018    SCRIBE #1 NOTE: IAngelica, am scribing for, and in the presence of, Dr. Reaves.       History     Chief Complaint   Patient presents with    Ankle Pain     left ankle pain        03/20/2018 6:59 PM     Chief complaint: Ankle Pain      Mary Myles is a 22 y.o. female who presents to the ED with complaints of chronic left ankle pain. The patient reports spraining her left ankle 8 years ago and "it never healed properly". She is able to ambulate with mild pain. She states working for the first time on Saturday (3/17) and having to stand for 7 hours. She denies any recent injuries or trauma. The patient denies working prior secondary to being on medical disability due to Asperger's syndrome and Tourette's syndrome. The patient denies visiting an orthopedist for her chronic left ankle pain but endorses visiting one for her right knee. She states being seen at this ED for right ankle and right knee pain in October 2017. The patient denies onset of any other new symptoms and has no other medical concerns at the moment. She reports PMHx includes Tourette's syndrome, arthritis, and fibromyalgia. No pertinent SHx and NKDA on file.       The history is provided by the patient.     Review of patient's allergies indicates:  No Known Allergies  Past Medical History:   Diagnosis Date    ADD (attention deficit disorder)     Anemia     Borderline personality disorder     Chronic headache     Depression     Fibromyalgia     GERD (gastroesophageal reflux disease)     MALCOLM (obstructive sleep apnea)     PCOS (polycystic ovarian syndrome)     Pseudotumor cerebri syndrome     Schizoaffective disorder     Tourette's syndrome      Past Surgical History:   Procedure Laterality Date    TONSILLECTOMY      UPPER GASTROINTESTINAL ENDOSCOPY  in her childhood    "born with underdeveloped esophagus" & GERD per patient report    WISDOM TOOTH EXTRACTION       Family History   Problem Relation " Age of Onset    Seizures Mother     Migraines Mother     Migraines Brother     Colon cancer Neg Hx     Colon polyps Neg Hx     Crohn's disease Neg Hx     Ulcerative colitis Neg Hx     Stomach cancer Neg Hx     Esophageal cancer Neg Hx      Social History   Substance Use Topics    Smoking status: Former Smoker     Packs/day: 0.50     Types: Cigarettes    Smokeless tobacco: Never Used    Alcohol use No     Review of Systems   Constitutional: Negative for fever.   Respiratory: Negative for shortness of breath.    Cardiovascular: Negative for leg swelling.   Gastrointestinal: Negative for nausea.   Genitourinary: Negative for difficulty urinating and flank pain.   Musculoskeletal: Positive for arthralgias (left ankle). Negative for gait problem.   Neurological: Negative for weakness.   Hematological: Does not bruise/bleed easily.   Psychiatric/Behavioral: Negative for confusion.       Physical Exam     Initial Vitals [03/20/18 1854]   BP Pulse Resp Temp SpO2   138/82 80 18 99.7 °F (37.6 °C) 99 %      MAP       100.67         Physical Exam    Nursing note and vitals reviewed.  Constitutional: She appears well-developed and well-nourished.   HENT:   Head: Normocephalic and atraumatic.   Eyes: Conjunctivae are normal.   Neck: Normal range of motion. Neck supple.   Cardiovascular: Normal rate, regular rhythm and normal heart sounds. Exam reveals no gallop and no friction rub.    No murmur heard.  Pulmonary/Chest: Effort normal and breath sounds normal. No respiratory distress. She has no wheezes. She has no rhonchi. She has no rales.   Abdominal: Soft. She exhibits no distension. There is no tenderness.   Musculoskeletal: Normal range of motion. She exhibits tenderness.   Tenderness without swelling over the right lateral ankle. No overlying skin changes, erythema, or calor.    Neurological: She is alert and oriented to person, place, and time.   Skin: Skin is warm and dry. No erythema.   Psychiatric: She has a  normal mood and affect.         ED Course   Procedures  Labs Reviewed - No data to display          Medical Decision Making:   History:   Old Medical Records: I decided to obtain old medical records.  Independently Interpreted Test(s):   I have ordered and independently interpreted X-rays - see summary below.  Clinical Tests:   Radiological Study: Reviewed and Ordered  ED Management:  Mary Myles is a 22 y.o. female who presents with  chronic left ankle pain and has been seen in October for right ankle pain.  She is morbidly obese at 110 kg with symptoms consistent with osteoarthritis.  There is no evidence of an inflammatory arthritis.  She is encouraged to strictly adhere to a low calorie diet.  Left ankle x-rays left ankle x-rays independently interpreted by me reveal degenerative changes with no fracture or dislocation.       APC / Resident Notes:   I, Dr. Cl Reaves III, personally performed the services described in this documentation. All medical record entries made by the scribe were at my direction and in my presence.  I have reviewed the chart and agree that the record reflects my personal performance and is accurate and complete. Cl Reaves III, MD.  7:27 PM 03/20/2018       Scribe Attestation:   Scribe #1: I performed the above scribed service and the documentation accurately describes the services I performed. I attest to the accuracy of the note.               Clinical Impression:   The primary encounter diagnosis was Osteoarthritis of left ankle, unspecified osteoarthritis type. A diagnosis of Ankle pain, chronic was also pertinent to this visit.                           Cl Reaves III, MD  03/20/18 1928

## 2018-03-20 NOTE — TELEPHONE ENCOUNTER
Left message on Mrs. Myles's voicemail requesting a call back in regards to rescheduling her Botox appt tomorrow (not yet approved).           Message     Dr Power,      I have not been able to secure an authorization for Ms. Mary Myles which is scheduled for 3/21/18. I am attempting to work with the insurance company. However, case is currently pending medical review. Is this request of medical urgency or can it be rescheduled to give additional time to obtain authorization? Please reach out to myself and patient with decision, for I am unable to reschedule and/or cancel services. If you have any further questions, you can contact me at [phone]. I called Ms Myles at number listed in demographics, left a detailed message regarding pending authorization and advise her to follow up with Dr Power's office.  I sent an instant message to Nicole Buckley as well as an in basket to Dr Power.         Thanks   Leonor DUGAN

## 2018-03-20 NOTE — TELEPHONE ENCOUNTER
"Spoke with Ms. Myles-    I explained that I was calling to reschedule her Botox appt (2 weeks out), because he have not yet obtained authorization. Ms. Myles stated that she changed insurance companies and we are NOT in network with her insurance. I asked if she'd like to reschedule the appt just to see if it would be approved and she stated "I doubt it will be". I confirmed that she want to cancel again and the answer was "yes"! No reschedule date was given.....  "

## 2018-04-03 VITALS
OXYGEN SATURATION: 100 % | RESPIRATION RATE: 20 BRPM | HEART RATE: 91 BPM | TEMPERATURE: 97 F | WEIGHT: 236 LBS | BODY MASS INDEX: 40.29 KG/M2 | SYSTOLIC BLOOD PRESSURE: 141 MMHG | DIASTOLIC BLOOD PRESSURE: 67 MMHG | HEIGHT: 64 IN

## 2018-04-03 PROCEDURE — 99284 EMERGENCY DEPT VISIT MOD MDM: CPT | Mod: 25

## 2018-04-04 ENCOUNTER — HOSPITAL ENCOUNTER (EMERGENCY)
Facility: HOSPITAL | Age: 23
Discharge: HOME | End: 2018-04-04
Attending: EMERGENCY MEDICINE
Payer: MEDICARE

## 2018-04-04 DIAGNOSIS — J06.9 VIRAL URI WITH COUGH: Primary | ICD-10-CM

## 2018-04-04 DIAGNOSIS — R05.9 COUGH: ICD-10-CM

## 2018-04-04 LAB
B-HCG UR QL: NEGATIVE
CTP QC/QA: YES
DEPRECATED S PYO AG THROAT QL EIA: NEGATIVE
HETEROPH AB SERPL QL IA: NEGATIVE

## 2018-04-04 PROCEDURE — 86308 HETEROPHILE ANTIBODY SCREEN: CPT

## 2018-04-04 PROCEDURE — 87081 CULTURE SCREEN ONLY: CPT

## 2018-04-04 PROCEDURE — 87880 STREP A ASSAY W/OPTIC: CPT

## 2018-04-04 PROCEDURE — 81025 URINE PREGNANCY TEST: CPT | Performed by: PHYSICIAN ASSISTANT

## 2018-04-04 PROCEDURE — 36415 COLL VENOUS BLD VENIPUNCTURE: CPT

## 2018-04-04 RX ORDER — FLUTICASONE PROPIONATE 50 MCG
1 SPRAY, SUSPENSION (ML) NASAL 2 TIMES DAILY PRN
Qty: 15 G | Refills: 0 | Status: SHIPPED | OUTPATIENT
Start: 2018-04-04 | End: 2018-04-25

## 2018-04-04 RX ORDER — MUPIROCIN 20 MG/G
OINTMENT TOPICAL 3 TIMES DAILY
Qty: 30 G | Refills: 0 | Status: SHIPPED | OUTPATIENT
Start: 2018-04-04 | End: 2018-04-11

## 2018-04-04 RX ORDER — BENZONATATE 100 MG/1
100 CAPSULE ORAL 3 TIMES DAILY PRN
Qty: 20 CAPSULE | Refills: 0 | Status: SHIPPED | OUTPATIENT
Start: 2018-04-04 | End: 2018-04-09

## 2018-04-04 NOTE — DISCHARGE INSTRUCTIONS
Take medications as prescribed.  Do not smoke.  Use the antibiotic cream on the red areas.  You need to establish care with a primary care provider.  For worsening symptoms, chest pain, shortness of breath, increased abdominal pain, high grade fever, stroke or stroke like symptoms, immediately go to the nearest Emergency Room or call 911 as soon as possible.

## 2018-04-04 NOTE — ED NOTES
Pt aaox4, resp even and unlabored, skin pink warm and dry. Pt reports sore throat x 1 month. Pt reports having frequent bouts of strept when she was younger. Pt picking up wounds on body. Nadn. Safety maintained. Will continue to monitor

## 2018-04-04 NOTE — ED PROVIDER NOTES
"Encounter Date: 4/3/2018       History     Chief Complaint   Patient presents with    Recurrent Skin Infections    Sore Throat     Started month ago     Patient is a 22-year-old female who presents with cough, runny nose and sore throat for 1 month.  She has past medical history significant for anemia, borderline personality disorder, fibromyalgia, depression and schizoaffective disorder.  She reports the symptoms have been constant, moderate and unrelieved with over-the-counter ALLERGY medication.  She denied recent sick contact.  She denied fever or difficulty breathing.  She denied nausea, vomiting or diarrhea.  She does report some small "boils" to the right underarm and groin area.  She denied drainage.  She states that her primary care providers in [place] and she does not have time to see her.  She states she's been unable to find anyone that will take her insurance.  She denied recent sick contact.      The history is provided by the patient.     Review of patient's allergies indicates:  No Known Allergies  Past Medical History:   Diagnosis Date    ADD (attention deficit disorder)     Anemia     Borderline personality disorder     Chronic headache     Depression     Fibromyalgia     GERD (gastroesophageal reflux disease)     MALCOLM (obstructive sleep apnea)     PCOS (polycystic ovarian syndrome)     Pseudotumor cerebri syndrome     Schizoaffective disorder     Tourette's syndrome      Past Surgical History:   Procedure Laterality Date    TONSILLECTOMY      UPPER GASTROINTESTINAL ENDOSCOPY  in her childhood    "born with underdeveloped esophagus" & GERD per patient report    WISDOM TOOTH EXTRACTION       Family History   Problem Relation Age of Onset    Seizures Mother     Migraines Mother     Migraines Brother     Colon cancer Neg Hx     Colon polyps Neg Hx     Crohn's disease Neg Hx     Ulcerative colitis Neg Hx     Stomach cancer Neg Hx     Esophageal cancer Neg Hx      Social " History   Substance Use Topics    Smoking status: Former Smoker     Packs/day: 0.50     Types: Cigarettes    Smokeless tobacco: Never Used    Alcohol use No     Review of Systems   Constitutional: Negative for activity change, appetite change, chills and fever.   HENT: Positive for congestion, rhinorrhea and sore throat.    Eyes: Negative for redness and visual disturbance.   Respiratory: Positive for cough. Negative for chest tightness and shortness of breath.    Cardiovascular: Negative for chest pain.   Gastrointestinal: Negative for abdominal pain, diarrhea, nausea and vomiting.   Genitourinary: Negative for dysuria and frequency.   Musculoskeletal: Negative for back pain, neck pain and neck stiffness.   Skin: Positive for color change. Negative for rash.   Neurological: Negative for dizziness, syncope, numbness and headaches.       Physical Exam     Initial Vitals [04/03/18 2302]   BP Pulse Resp Temp SpO2   (!) 141/67 91 20 97.2 °F (36.2 °C) 100 %      MAP       91.67         Physical Exam    Constitutional: Vital signs are normal. She appears well-developed and well-nourished. She is cooperative.  Non-toxic appearance. She does not have a sickly appearance.   HENT:   Head: Normocephalic and atraumatic.   Right Ear: Tympanic membrane, external ear and ear canal normal.   Left Ear: Tympanic membrane, external ear and ear canal normal.   Nose: Nose normal.   Mouth/Throat: Uvula is midline and oropharynx is clear and moist.   Eyes: Conjunctivae and lids are normal. Pupils are equal, round, and reactive to light.   Neck: Normal range of motion and full passive range of motion without pain. Neck supple.   Cardiovascular: Normal rate, regular rhythm and normal heart sounds. Exam reveals no gallop and no friction rub.    No murmur heard.  Pulmonary/Chest: Effort normal and breath sounds normal. She has no wheezes. She has no rhonchi. She has no rales.   Abdominal: Soft. Normal appearance. There is no tenderness.  There is no rigidity, no rebound and no guarding.   Neurological: She is alert and oriented to person, place, and time.   Skin: Skin is warm, dry and intact. No rash noted.        2 small areas of erythema noted with no sign of abscess.  No fluctuance.  No induration.  Minimal tenderness to palpation.         ED Course   Procedures  Labs Reviewed   THROAT SCREEN, RAPID   CULTURE, STREP A,  THROAT   HETEROPHILE AB SCREEN   POCT URINE PREGNANCY             Medical Decision Making:   History:   Old Medical Records: I decided to obtain old medical records.  Clinical Tests:   Lab Tests: Ordered and Reviewed  Radiological Study: Ordered and Reviewed       APC / Resident Notes:   Urgent evaluation of a 22 year old female with complaint of congestion, rhinorrhea, cough and sore throat. Patient denied fever.  No abdominal pain, nausea or vomiting.  Vital signs are stable.  Patient is afebrile.  Abdomen is soft and nontender.  There is no rebound, rigidity or distention.  I doubt intra-abdominal process.  Bilateral TMs with no erythema, retraction or perforation.  There is no mastoid tenderness.  There is no movement tenderness to bilateral ears.  No tonsillar swelling or exudate noted.  Uvula is midline.  No concern for ludwigs angina. Breath sounds are clear and equal bilaterally. Workup is negative.  Suspect symptoms are secondary to viral illness.  Symptomatic treatment. Discussed results with patient. Return precautions given. Patient is to follow up with their primary care provider. Case was discussed with Dr. Russell who is in agreement with the plan of care. All questions answered.            Attending Attestation:     Physician Attestation Statement for NP/PA:   I discussed this assessment and plan of this patient with the NP/PA, but I did not personally examine the patient. The face to face encounter was performed by the NP/PA.    Other NP/PA Attestation Additions:    History of Present Illness: 22-year-old female  presented with multiple complaints.    Medical Decision Making: Initial differential diagnosis included but not limited to influenza, viral illness, and pneumonia.  The patient's x-ray showed no acute abnormalities per my independent interpretation.  I am in agreement with the physician assistant's  assessment, treatment, and plan of care.                    Clinical Impression:   The primary encounter diagnosis was Viral URI with cough. A diagnosis of Cough was also pertinent to this visit.                           Maddy Griffith PA-C  04/04/18 0152       Noe Russell MD  04/04/18 0214

## 2018-04-07 LAB — BACTERIA THROAT CULT: NORMAL

## 2018-04-09 ENCOUNTER — HOSPITAL ENCOUNTER (EMERGENCY)
Facility: HOSPITAL | Age: 23
Discharge: HOME | End: 2018-04-09
Attending: EMERGENCY MEDICINE
Payer: MEDICARE

## 2018-04-09 VITALS
OXYGEN SATURATION: 97 % | SYSTOLIC BLOOD PRESSURE: 122 MMHG | TEMPERATURE: 99 F | RESPIRATION RATE: 16 BRPM | DIASTOLIC BLOOD PRESSURE: 69 MMHG | HEART RATE: 96 BPM | WEIGHT: 242.5 LBS | HEIGHT: 64 IN | BODY MASS INDEX: 41.4 KG/M2

## 2018-04-09 DIAGNOSIS — R10.13 EPIGASTRIC ABDOMINAL PAIN: Primary | ICD-10-CM

## 2018-04-09 LAB
ALBUMIN SERPL BCP-MCNC: 3.7 G/DL
ALP SERPL-CCNC: 82 U/L
ALT SERPL W/O P-5'-P-CCNC: 13 U/L
ANION GAP SERPL CALC-SCNC: 8 MMOL/L
AST SERPL-CCNC: 11 U/L
B-HCG UR QL: NEGATIVE
BASOPHILS # BLD AUTO: 0.1 K/UL
BASOPHILS NFR BLD: 0.5 %
BILIRUB SERPL-MCNC: 0.9 MG/DL
BUN SERPL-MCNC: 14 MG/DL
CALCIUM SERPL-MCNC: 9.6 MG/DL
CHLORIDE SERPL-SCNC: 108 MMOL/L
CO2 SERPL-SCNC: 21 MMOL/L
CREAT SERPL-MCNC: 0.6 MG/DL
CTP QC/QA: YES
DIFFERENTIAL METHOD: ABNORMAL
EOSINOPHIL # BLD AUTO: 0.2 K/UL
EOSINOPHIL NFR BLD: 1.3 %
ERYTHROCYTE [DISTWIDTH] IN BLOOD BY AUTOMATED COUNT: 15.6 %
EST. GFR  (AFRICAN AMERICAN): >60 ML/MIN/1.73 M^2
EST. GFR  (NON AFRICAN AMERICAN): >60 ML/MIN/1.73 M^2
GLUCOSE SERPL-MCNC: 94 MG/DL
HCT VFR BLD AUTO: 41.6 %
HGB BLD-MCNC: 13.8 G/DL
LIPASE SERPL-CCNC: 13 U/L
LYMPHOCYTES # BLD AUTO: 2.2 K/UL
LYMPHOCYTES NFR BLD: 17.6 %
MCH RBC QN AUTO: 28.4 PG
MCHC RBC AUTO-ENTMCNC: 33.2 G/DL
MCV RBC AUTO: 86 FL
MONOCYTES # BLD AUTO: 0.9 K/UL
MONOCYTES NFR BLD: 6.8 %
NEUTROPHILS # BLD AUTO: 9.3 K/UL
NEUTROPHILS NFR BLD: 73.8 %
PLATELET # BLD AUTO: 337 K/UL
PMV BLD AUTO: 8.1 FL
POTASSIUM SERPL-SCNC: 4.2 MMOL/L
PROT SERPL-MCNC: 7.2 G/DL
RBC # BLD AUTO: 4.85 M/UL
SODIUM SERPL-SCNC: 137 MMOL/L
WBC # BLD AUTO: 12.6 K/UL

## 2018-04-09 PROCEDURE — 81025 URINE PREGNANCY TEST: CPT | Performed by: PHYSICIAN ASSISTANT

## 2018-04-09 PROCEDURE — 99283 EMERGENCY DEPT VISIT LOW MDM: CPT | Mod: 25

## 2018-04-09 PROCEDURE — 85025 COMPLETE CBC W/AUTO DIFF WBC: CPT

## 2018-04-09 PROCEDURE — 25000003 PHARM REV CODE 250: Performed by: EMERGENCY MEDICINE

## 2018-04-09 PROCEDURE — 83690 ASSAY OF LIPASE: CPT

## 2018-04-09 PROCEDURE — 80053 COMPREHEN METABOLIC PANEL: CPT

## 2018-04-09 PROCEDURE — 36415 COLL VENOUS BLD VENIPUNCTURE: CPT

## 2018-04-09 RX ORDER — MAG HYDROX/ALUMINUM HYD/SIMETH 200-200-20
30 SUSPENSION, ORAL (FINAL DOSE FORM) ORAL
Status: COMPLETED | OUTPATIENT
Start: 2018-04-09 | End: 2018-04-09

## 2018-04-09 RX ADMIN — ALUMINUM HYDROXIDE, MAGNESIUM HYDROXIDE, AND SIMETHICONE 30 ML: 200; 200; 20 SUSPENSION ORAL at 05:04

## 2018-04-09 NOTE — ED PROVIDER NOTES
"Encounter Date: 4/9/2018    SCRIBE #1 NOTE: I, Avis Alexander, am scribing for, and in the presence of, Dr. Lee.       History     Chief Complaint   Patient presents with    Abdominal Pain     off and on x 1-2 months. red blood with stool today and nausea.       04/09/2018 5:01 PM     Chief complaint: Abdominal pain      Mary Myles is a 22 y.o. female with GERD on Protonix and anemia who presents to the ED with an onset of worsening epigastric abdominal pain with associated nausea and blood in stool. She reports having chills prior to taking a nap and was awoken by a sharp pain. The patient went to the bathroom to have a BM and noticed dark stool that the "toilet was filled with blood." She reports having to go to the bathroom multiple times today. The patient reports feeling at baseline yesterday. She states that she was suppose to get a cholecystectomy but is unable to do secondary to insurance not wanting to cover it. The patient denies taking pain medication, history of ulcers or abdominal surgeries, swelling, rash, or any other symptoms at this time.       The history is provided by the patient.     Review of patient's allergies indicates:  No Known Allergies  Past Medical History:   Diagnosis Date    ADD (attention deficit disorder)     Anemia     Borderline personality disorder     Chronic headache     Depression     Fibromyalgia     GERD (gastroesophageal reflux disease)     Migraine headache     MALCOLM (obstructive sleep apnea)     PCOS (polycystic ovarian syndrome)     Pseudotumor cerebri syndrome     Schizoaffective disorder     Tourette's syndrome      Past Surgical History:   Procedure Laterality Date    TONSILLECTOMY      UPPER GASTROINTESTINAL ENDOSCOPY  in her childhood    "born with underdeveloped esophagus" & GERD per patient report    WISDOM TOOTH EXTRACTION       Family History   Problem Relation Age of Onset    Seizures Mother     Migraines Mother     Migraines Brother "     Colon cancer Neg Hx     Colon polyps Neg Hx     Crohn's disease Neg Hx     Ulcerative colitis Neg Hx     Stomach cancer Neg Hx     Esophageal cancer Neg Hx      Social History   Substance Use Topics    Smoking status: Current Every Day Smoker     Packs/day: 1.00     Types: Cigarettes    Smokeless tobacco: Never Used    Alcohol use No     Review of Systems   Constitutional: Positive for chills.   HENT: Negative for nosebleeds.    Eyes: Negative for visual disturbance.   Respiratory: Negative for shortness of breath.    Cardiovascular: Negative for leg swelling.   Gastrointestinal: Positive for blood in stool.        Positive for melena.    Genitourinary: Negative.    Musculoskeletal: Negative for joint swelling.   Skin: Negative for rash.   Neurological: Negative for syncope.     Physical Exam     Initial Vitals [04/09/18 1558]   BP Pulse Resp Temp SpO2   131/65 96 16 98.7 °F (37.1 °C) 97 %      MAP       87         Physical Exam    Nursing note and vitals reviewed.  Constitutional: She appears well-developed and well-nourished. She is not diaphoretic. No distress.   HENT:   Head: Normocephalic and atraumatic.   Mouth/Throat: Oropharynx is clear and moist.   Eyes: Conjunctivae are normal.   Neck: Neck supple.   Cardiovascular: Normal rate, regular rhythm, normal heart sounds and intact distal pulses. Exam reveals no gallop and no friction rub.    No murmur heard.  Pulmonary/Chest: Breath sounds normal. She has no wheezes. She has no rhonchi. She has no rales.   Abdominal: Soft. She exhibits no distension. There is tenderness in the right upper quadrant, epigastric area and left upper quadrant. There is no rebound and no guarding.   Genitourinary: Rectal exam shows no fissure and guaiac negative stool. Guaiac negative stool. : Acceptable.  Genitourinary Comments: No external fissure, hemorrhoids, or blood. Female chaperone present.    Musculoskeletal: Normal range of motion.  "  Neurological: She is alert and oriented to person, place, and time.   Skin: No rash noted. No erythema.   Psychiatric: Her speech is normal.       ED Course   Procedures  Labs Reviewed   CBC W/ AUTO DIFFERENTIAL - Abnormal; Notable for the following:        Result Value    RDW 15.6 (*)     MPV 8.1 (*)     Gran # (ANC) 9.3 (*)     Gran% 73.8 (*)     Lymph% 17.6 (*)     All other components within normal limits   COMPREHENSIVE METABOLIC PANEL - Abnormal; Notable for the following:     CO2 21 (*)     All other components within normal limits   LIPASE   POCT URINE PREGNANCY           Medical Decision Making:   History:   Old Medical Records: I decided to obtain old medical records.  Clinical Tests:   Lab Tests: Ordered and Reviewed            Scribe Attestation:   Scribe #1: I performed the above scribed service and the documentation accurately describes the services I performed. I attest to the accuracy of the note.    I, Dr. Ty Lee, personally performed the services described in this documentation. All medical record entries made by the scribe were at my direction and in my presence.  I have reviewed the chart and agree that the record reflects my personal performance and is accurate and complete. Ty Lee MD.  6:28 PM 04/09/2018    Mary Myles is a 22 y.o. female presenting with acute on chronic epigastric abdominal pain with extensive prior workup.  Prior workup has pointed towards gallbladder dysfunction with "sludge."  She has had similar pain multiple times in the past.  Low suspicion for emergent process requiring surgical intervention such as perforated viscus, obstruction, abscess.  Bedside ultrasound shows no gallstones, lamont-cholecystic fluid or inflammation, gallbladder dilation.  I doubt cholecystitis.  I do not think other abdominal imaging is indicated.  Lipase is normal and I doubt pancreatitis.  In regard to bright red blood per rectum, there is no sign of this " currently on exam.  I did discuss with patient this can be intermittent.  I doubt life-threatening GI bleed.  She is appropriate for outpatient follow-up for this as well.  Continue PPI.  Antacids given in the ED.  I do not think opioids are appropriate for this chronic, intermittent problem.  Detailed return precautions reviewed.               Clinical Impression:     1. Epigastric abdominal pain          Disposition:   Disposition: Discharged  Condition: Stable                        Ty Lee MD  04/09/18 9257

## 2018-04-09 NOTE — ED NOTES
Pt co abd pain that started this morning, denies chest pain or sob, denies fever, pt awake alert in no acute distress, friend at bedside, RUQ & LUQ abd tender to palpate, denies emesis, reports diarrhea once today

## 2018-04-25 ENCOUNTER — HOSPITAL ENCOUNTER (EMERGENCY)
Facility: HOSPITAL | Age: 23
Discharge: HOME | End: 2018-04-25
Attending: EMERGENCY MEDICINE
Payer: MEDICARE

## 2018-04-25 VITALS
DIASTOLIC BLOOD PRESSURE: 71 MMHG | BODY MASS INDEX: 40.76 KG/M2 | OXYGEN SATURATION: 98 % | WEIGHT: 238.75 LBS | HEART RATE: 80 BPM | SYSTOLIC BLOOD PRESSURE: 157 MMHG | HEIGHT: 64 IN | RESPIRATION RATE: 18 BRPM | TEMPERATURE: 98 F

## 2018-04-25 DIAGNOSIS — R10.13 EPIGASTRIC PAIN: ICD-10-CM

## 2018-04-25 DIAGNOSIS — R10.9 ABDOMINAL PAIN, UNSPECIFIED ABDOMINAL LOCATION: Primary | ICD-10-CM

## 2018-04-25 LAB
ALBUMIN SERPL BCP-MCNC: 3.5 G/DL
ALP SERPL-CCNC: 74 U/L
ALT SERPL W/O P-5'-P-CCNC: 16 U/L
ANION GAP SERPL CALC-SCNC: 9 MMOL/L
AST SERPL-CCNC: 13 U/L
BASOPHILS # BLD AUTO: 0 K/UL
BASOPHILS NFR BLD: 0.4 %
BILIRUB SERPL-MCNC: 1.3 MG/DL
BUN SERPL-MCNC: 10 MG/DL
CALCIUM SERPL-MCNC: 9.2 MG/DL
CHLORIDE SERPL-SCNC: 106 MMOL/L
CO2 SERPL-SCNC: 25 MMOL/L
CREAT SERPL-MCNC: 0.6 MG/DL
DIFFERENTIAL METHOD: ABNORMAL
EOSINOPHIL # BLD AUTO: 0.1 K/UL
EOSINOPHIL NFR BLD: 1.3 %
ERYTHROCYTE [DISTWIDTH] IN BLOOD BY AUTOMATED COUNT: 15.1 %
EST. GFR  (AFRICAN AMERICAN): >60 ML/MIN/1.73 M^2
EST. GFR  (NON AFRICAN AMERICAN): >60 ML/MIN/1.73 M^2
GLUCOSE SERPL-MCNC: 93 MG/DL
HCT VFR BLD AUTO: 40.8 %
HGB BLD-MCNC: 13.6 G/DL
LIPASE SERPL-CCNC: 10 U/L
LYMPHOCYTES # BLD AUTO: 2.5 K/UL
LYMPHOCYTES NFR BLD: 22.8 %
MCH RBC QN AUTO: 28.1 PG
MCHC RBC AUTO-ENTMCNC: 33.3 G/DL
MCV RBC AUTO: 85 FL
MONOCYTES # BLD AUTO: 0.8 K/UL
MONOCYTES NFR BLD: 6.8 %
NEUTROPHILS # BLD AUTO: 7.7 K/UL
NEUTROPHILS NFR BLD: 68.7 %
PLATELET # BLD AUTO: 324 K/UL
PMV BLD AUTO: 8.1 FL
POTASSIUM SERPL-SCNC: 3.7 MMOL/L
PROT SERPL-MCNC: 6.6 G/DL
RBC # BLD AUTO: 4.82 M/UL
SODIUM SERPL-SCNC: 140 MMOL/L
WBC # BLD AUTO: 11.2 K/UL

## 2018-04-25 PROCEDURE — 99284 EMERGENCY DEPT VISIT MOD MDM: CPT | Mod: 25

## 2018-04-25 PROCEDURE — 96375 TX/PRO/DX INJ NEW DRUG ADDON: CPT

## 2018-04-25 PROCEDURE — 80053 COMPREHEN METABOLIC PANEL: CPT

## 2018-04-25 PROCEDURE — 85025 COMPLETE CBC W/AUTO DIFF WBC: CPT

## 2018-04-25 PROCEDURE — 36415 COLL VENOUS BLD VENIPUNCTURE: CPT

## 2018-04-25 PROCEDURE — 25000003 PHARM REV CODE 250: Performed by: PHYSICIAN ASSISTANT

## 2018-04-25 PROCEDURE — 83690 ASSAY OF LIPASE: CPT

## 2018-04-25 PROCEDURE — 63600175 PHARM REV CODE 636 W HCPCS: Performed by: PHYSICIAN ASSISTANT

## 2018-04-25 PROCEDURE — 96374 THER/PROPH/DIAG INJ IV PUSH: CPT

## 2018-04-25 PROCEDURE — 96361 HYDRATE IV INFUSION ADD-ON: CPT

## 2018-04-25 PROCEDURE — C9113 INJ PANTOPRAZOLE SODIUM, VIA: HCPCS | Performed by: PHYSICIAN ASSISTANT

## 2018-04-25 RX ORDER — ONDANSETRON 2 MG/ML
8 INJECTION INTRAMUSCULAR; INTRAVENOUS
Status: COMPLETED | OUTPATIENT
Start: 2018-04-25 | End: 2018-04-25

## 2018-04-25 RX ORDER — SUCRALFATE 1 G/1
1 TABLET ORAL
Qty: 120 TABLET | Refills: 0 | Status: SHIPPED | OUTPATIENT
Start: 2018-04-25 | End: 2018-08-06

## 2018-04-25 RX ORDER — DICYCLOMINE HYDROCHLORIDE 10 MG/1
20 CAPSULE ORAL
Status: DISCONTINUED | OUTPATIENT
Start: 2018-04-25 | End: 2018-04-25

## 2018-04-25 RX ORDER — PANTOPRAZOLE SODIUM 20 MG/1
20 TABLET, DELAYED RELEASE ORAL DAILY
Qty: 30 TABLET | Refills: 0 | Status: SHIPPED | OUTPATIENT
Start: 2018-04-25 | End: 2018-05-23 | Stop reason: ALTCHOICE

## 2018-04-25 RX ORDER — ONDANSETRON 8 MG/1
8 TABLET, ORALLY DISINTEGRATING ORAL 3 TIMES DAILY PRN
Qty: 20 TABLET | Refills: 0 | Status: SHIPPED | OUTPATIENT
Start: 2018-04-25 | End: 2018-06-23

## 2018-04-25 RX ORDER — PANTOPRAZOLE SODIUM 40 MG/10ML
40 INJECTION, POWDER, LYOPHILIZED, FOR SOLUTION INTRAVENOUS
Status: COMPLETED | OUTPATIENT
Start: 2018-04-25 | End: 2018-04-25

## 2018-04-25 RX ADMIN — SODIUM CHLORIDE 1000 ML: 0.9 INJECTION, SOLUTION INTRAVENOUS at 09:04

## 2018-04-25 RX ADMIN — LIDOCAINE HYDROCHLORIDE: 20 SOLUTION ORAL; TOPICAL at 09:04

## 2018-04-25 RX ADMIN — PANTOPRAZOLE SODIUM 40 MG: 40 INJECTION, POWDER, FOR SOLUTION INTRAVENOUS at 09:04

## 2018-04-25 RX ADMIN — ONDANSETRON 8 MG: 2 INJECTION INTRAMUSCULAR; INTRAVENOUS at 09:04

## 2018-04-26 NOTE — ED NOTES
Assumed care:  Patient awake, alert and oriented x 3, skin warm and dry, in NAD.    Patient identifiers for Mary Myles checked and correct.  LOC:  Patient is awake, alert, and aware of environment with an appropriate affect. Patient is oriented x 3 and speaking appropriately.  APPEARANCE:  Patient resting comfortably and in no acute distress. Patient is clean and well groomed, patient's clothing is properly fastened.  SKIN:  The skin is warm and dry. Patient has normal skin turgor and moist mucus membranes. Skin is intact; no bruising or breakdown noted.  MUSCULOSKELETAL:  Patient is moving all extremities well, no obvious deformities noted. Pulses intact.   RESPIRATORY:  Airway is open and patent. Respirations are spontaneous and non-labored with normal effort and rate.  CARDIAC:  Patient has a normal rate and rhythm. No peripheral edema noted. Capillary refill < 3 seconds.  ABDOMEN:  No distention noted.  Soft and non-tender upon palpation.  CO epigastric pain, nausea and vomiting.  NEUROLOGICAL:  PERRL. Facial expression is symmetrical. Hand grasps are equal bilaterally. Normal sensation in all extremities when touched with finger.  Allergies reported:  Review of patient's allergies indicates:  No Known Allergies  OTHER NOTES:  Patient states that she has had abd since January and was told that it was gall sludge.  Was told that she needed her GB removed but has been unable to due to no surgeon taking her insurance.

## 2018-04-26 NOTE — ED PROVIDER NOTES
"Encounter Date: 4/25/2018    SCRIBE #1 NOTE: ITraci, am scribing for, and in the presence of, Clover Foley PA-C.       History     Chief Complaint   Patient presents with    Abdominal Pain     c/o pain x 1 day but intermittent since January; N/V       04/25/2018 8:56 PM     Chief complaint: Abd pain       Mary Myles is a 22 y.o. female with GERD (on Protonix) who presents to the ED with complaints of worsened chronic abd pain today. Associated sx are nausea and vomiting. She states she does not taste her food. Pt ate eggs with hot sauce. She did not taste the hot sauce going down but "it burned coming up." Pt also reports constipation. She states she has "spouts of constipation and diarrhea". She denies hx of pancreatitis. She was seen in the ED for similar complaints and advised to f/u with gastroenterologist. NKDA noted.       The history is provided by the patient.     Review of patient's allergies indicates:  No Known Allergies  Past Medical History:   Diagnosis Date    ADD (attention deficit disorder)     Anemia     Borderline personality disorder     Chronic headache     Depression     Fibromyalgia     GERD (gastroesophageal reflux disease)     Migraine headache     MALCOLM (obstructive sleep apnea)     PCOS (polycystic ovarian syndrome)     Pseudotumor cerebri syndrome     Schizoaffective disorder     Tourette's syndrome      Past Surgical History:   Procedure Laterality Date    TONSILLECTOMY      UPPER GASTROINTESTINAL ENDOSCOPY  in her childhood    "born with underdeveloped esophagus" & GERD per patient report    WISDOM TOOTH EXTRACTION       Family History   Problem Relation Age of Onset    Seizures Mother     Migraines Mother     Migraines Brother     Colon cancer Neg Hx     Colon polyps Neg Hx     Crohn's disease Neg Hx     Ulcerative colitis Neg Hx     Stomach cancer Neg Hx     Esophageal cancer Neg Hx      Social History   Substance Use Topics    Smoking " status: Current Every Day Smoker     Packs/day: 1.00     Types: Cigarettes    Smokeless tobacco: Never Used    Alcohol use No     Review of Systems   Constitutional: Negative for chills and fever.   HENT: Negative for facial swelling and trouble swallowing.    Eyes: Negative for discharge.   Respiratory: Negative for cough, chest tightness, shortness of breath and wheezing.    Cardiovascular: Negative for chest pain and palpitations.   Gastrointestinal: Positive for abdominal pain, constipation, nausea and vomiting. Negative for diarrhea.   Genitourinary: Negative for dysuria and hematuria.   Musculoskeletal: Negative for arthralgias, back pain, joint swelling, myalgias, neck pain and neck stiffness.   Skin: Negative for color change, pallor, rash and wound.   Neurological: Negative for dizziness, syncope, weakness, light-headedness, numbness and headaches.   Hematological: Does not bruise/bleed easily.   Psychiatric/Behavioral: The patient is not nervous/anxious.    All other systems reviewed and are negative.      Physical Exam     Initial Vitals [04/25/18 2046]   BP Pulse Resp Temp SpO2   (!) 181/74 83 16 98 °F (36.7 °C) 97 %      MAP       109.67         Physical Exam    Nursing note and vitals reviewed.  Constitutional: She appears well-developed and well-nourished. She is not diaphoretic. No distress.   HENT:   Head: Normocephalic and atraumatic.   Right Ear: External ear normal.   Left Ear: External ear normal.   Nose: Nose normal.   Mouth/Throat: Oropharynx is clear and moist.   Eyes: Conjunctivae and EOM are normal. Pupils are equal, round, and reactive to light.   Neck: Normal range of motion. Neck supple.   Cardiovascular: Normal rate, regular rhythm, normal heart sounds and intact distal pulses. Exam reveals no gallop and no friction rub.    No murmur heard.  Pulmonary/Chest: Breath sounds normal. No respiratory distress. She has no wheezes. She has no rhonchi. She has no rales.   Abdominal: Soft. She  exhibits no distension and no mass. There is tenderness in the right upper quadrant and epigastric area.   TTP of epigastric region extending to RUQ.   Musculoskeletal: Normal range of motion. She exhibits no edema or tenderness.   Lymphadenopathy:     She has no cervical adenopathy.   Neurological: She is alert and oriented to person, place, and time. She has normal strength. No cranial nerve deficit or sensory deficit.   Skin: Skin is warm and dry. No rash and no abscess noted. No erythema.   Psychiatric: She has a normal mood and affect.         ED Course   Procedures  Labs Reviewed - No data to display          Medical Decision Making:   History:   Old Medical Records: I decided to obtain old medical records.  Differential Diagnosis:   Gastritis  Acute cholecystitis   Pancreatitis  Acute abdomen    Clinical Tests:   Lab Tests: Ordered and Reviewed       APC / Resident Notes:   Labs stable.  No need for repeat imaging today.  She is feeling better after medications given here in the ED.  She will be discharged home to follow-up with gastroenterology for re-evaluation and further treatment options.  She voices understanding and is agreeable to the plan.  She is given specific return precautions.        Scribe Attestation:   Scribe #1: I performed the above scribed service and the documentation accurately describes the services I performed. I attest to the accuracy of the note.    I, Clover Foley PA-C, personally performed the services described in this documentation. All medical record entries made by the scribe were at my direction and in my presence.  I have reviewed the chart and agree that the record reflects my personal performance and is accurate and complete. Clover Foley PA-C.  10:39 PM 04/25/2018             Clinical Impression:   No diagnosis found.        1. Abdominal pain, unspecified abdominal location    2. Epigastric pain                            Clover Foley PA-C  04/25/18  0561

## 2018-05-03 ENCOUNTER — HOSPITAL ENCOUNTER (EMERGENCY)
Facility: HOSPITAL | Age: 23
Discharge: HOME | End: 2018-05-03
Attending: EMERGENCY MEDICINE
Payer: MEDICARE

## 2018-05-03 VITALS
BODY MASS INDEX: 42.63 KG/M2 | RESPIRATION RATE: 20 BRPM | SYSTOLIC BLOOD PRESSURE: 147 MMHG | WEIGHT: 248.38 LBS | TEMPERATURE: 98 F | DIASTOLIC BLOOD PRESSURE: 80 MMHG | HEART RATE: 95 BPM | OXYGEN SATURATION: 100 %

## 2018-05-03 DIAGNOSIS — T75.4XXA ELECTRICAL INJURY IN ADULT: Primary | ICD-10-CM

## 2018-05-03 PROCEDURE — 25000003 PHARM REV CODE 250: Performed by: EMERGENCY MEDICINE

## 2018-05-03 PROCEDURE — 99283 EMERGENCY DEPT VISIT LOW MDM: CPT

## 2018-05-03 RX ORDER — IBUPROFEN 400 MG/1
800 TABLET ORAL
Status: COMPLETED | OUTPATIENT
Start: 2018-05-03 | End: 2018-05-03

## 2018-05-03 RX ADMIN — IBUPROFEN 800 MG: 400 TABLET ORAL at 02:05

## 2018-05-03 NOTE — ED PROVIDER NOTES
"Encounter Date: 5/3/2018    SCRIBE #1 NOTE: IRoz, am scribing for, and in the presence of, Dr. Noland.       History     Chief Complaint   Patient presents with    Tingling     Pt states she was shocked yesterday by her "vape." States now everytime she charges her phone she feels "currents" going through her arm, and her vision blurs. States right arm has been tingling since she was "shocked."      5/3/2018  2:14 PM     Chief Complaint: Tingling    The patient is a 22 y.o. female with PMhx of fibromyalgia, GERD, PCOS, pseudotumor cerbri syndrome, tourette's syndrome, anemia, depression, ADD and borderline personality disorder who presents with tingling sensation to the right arm. Patient reports she was shocked yesterday by her vape after she placed it on  the removed it off of the .  She complains of mild tingling sensation to the right arm which has been intermittent since being shocked. She also reports blurred vision. Pt states everytime she charges in her phone while its in her hand, she feels "currents" going through her arm. Denies any cp, sob, nausea, vomiting, headache, dizziness, lightheadedness or LOC. Shx of tonsillectomy, upper GI endoscopy.      The history is provided by the patient.     Review of patient's allergies indicates:  No Known Allergies  Past Medical History:   Diagnosis Date    ADD (attention deficit disorder)     Anemia     Borderline personality disorder     Chronic headache     Depression     Fibromyalgia     GERD (gastroesophageal reflux disease)     Migraine headache     MALCOLM (obstructive sleep apnea)     PCOS (polycystic ovarian syndrome)     Pseudotumor cerebri syndrome     Schizoaffective disorder     Tourette's syndrome      Past Surgical History:   Procedure Laterality Date    TONSILLECTOMY      UPPER GASTROINTESTINAL ENDOSCOPY  in her childhood    "born with underdeveloped esophagus" & GERD per patient report    WISDOM TOOTH EXTRACTION "       Family History   Problem Relation Age of Onset    Seizures Mother     Migraines Mother     Migraines Brother     Colon cancer Neg Hx     Colon polyps Neg Hx     Crohn's disease Neg Hx     Ulcerative colitis Neg Hx     Stomach cancer Neg Hx     Esophageal cancer Neg Hx      Social History   Substance Use Topics    Smoking status: Current Every Day Smoker     Packs/day: 1.00     Types: Cigarettes    Smokeless tobacco: Never Used    Alcohol use No     Review of Systems   Constitutional: Negative for appetite change, chills and fever.   HENT: Negative for congestion, rhinorrhea and sore throat.    Eyes: Positive for visual disturbance (mild).   Respiratory: Negative for cough and shortness of breath.    Cardiovascular: Negative for chest pain.   Gastrointestinal: Negative for abdominal pain, diarrhea, nausea and vomiting.   Genitourinary: Negative for dysuria.   Musculoskeletal: Negative for back pain and myalgias.   Skin: Negative for rash.   Neurological: Positive for numbness (tingling to the right arm). Negative for dizziness, syncope, weakness, light-headedness and headaches.   Hematological: Does not bruise/bleed easily.   All other systems reviewed and are negative.      Physical Exam     Initial Vitals [05/03/18 1408]   BP Pulse Resp Temp SpO2   (!) 147/80 95 20 97.9 °F (36.6 °C) 100 %      MAP       102.33         Physical Exam    Nursing note and vitals reviewed.  Constitutional: She appears well-developed and well-nourished. No distress.   HENT:   Head: Normocephalic and atraumatic.   Mouth/Throat: Mucous membranes are normal.   Eyes: EOM are normal. Pupils are equal, round, and reactive to light.   Neck: Normal range of motion.   Cardiovascular: Normal rate, regular rhythm, normal heart sounds and intact distal pulses. Exam reveals no gallop and no friction rub.    No murmur heard.  Pulmonary/Chest: Breath sounds normal. She has no wheezes. She has no rhonchi. She has no rales.    Musculoskeletal: Normal range of motion. She exhibits tenderness. She exhibits no edema.   Mild TTP to the right trapezius.   Neurological: She is alert and oriented to person, place, and time. She has normal strength.   Skin: Skin is dry and intact. Capillary refill takes less than 2 seconds. No abrasion, no bruising, no burn and no rash noted.   Psychiatric: Her mood appears anxious.         ED Course   Procedures  Labs Reviewed - No data to display          Medical Decision Making:   ED Management:  Mary Myles is a 22 y.o. female who presents to the Emergency Department with tingling sensation to the right arm.  My overall impression is simple mild electric injury from vape. There are no signs of burns. Pt is neurovascularly intact. She has minimal TTP to the right trapezius.  There are no signs of cardiac injury.  I advised the patient to take ibuprofen as needed. She is stable for discharge.  I have given the patient and their family specific return precautions.  They voice understanding and will follow up with their doctor or the one recommended.                   Scribe Attestation:   Scribe #1: I performed the above scribed service and the documentation accurately describes the services I performed. I attest to the accuracy of the note.    I, Dr. Marin Noland personally performed the services described in this documentation. All medical record entries made by the scribe were at my direction and in my presence.  I have reviewed the chart and agree that the record reflects my personal performance and is accurate and complete. Marin Noland MD.  4:57 PM 05/03/2018    DISCLAIMER: This note was prepared with Dragon NaturallySpeaking voice recognition transcription software. Garbled syntax, mangled pronouns, and other bizarre constructions may be attributed to that software system            Clinical Impression:   The encounter diagnosis was Electrical injury in adult.    Disposition:    Disposition: Discharged  Condition: Stable                        Marin Noland MD  05/03/18 7230

## 2018-05-03 NOTE — DISCHARGE INSTRUCTIONS
Your shocked by your vapor cigarette.  You can take ibuprofen for year pain in her arm.  It does not appear that you have any permanent deficits and the patient improved over time.  Stop smoking.

## 2018-05-13 ENCOUNTER — HOSPITAL ENCOUNTER (EMERGENCY)
Facility: HOSPITAL | Age: 23
Discharge: HOME | End: 2018-05-13
Attending: EMERGENCY MEDICINE | Admitting: EMERGENCY MEDICINE
Payer: MEDICARE

## 2018-05-13 VITALS
HEART RATE: 68 BPM | DIASTOLIC BLOOD PRESSURE: 61 MMHG | BODY MASS INDEX: 42.68 KG/M2 | TEMPERATURE: 98 F | HEIGHT: 64 IN | RESPIRATION RATE: 16 BRPM | WEIGHT: 250 LBS | SYSTOLIC BLOOD PRESSURE: 164 MMHG | OXYGEN SATURATION: 98 %

## 2018-05-13 DIAGNOSIS — T78.40XA ALLERGIC REACTION, INITIAL ENCOUNTER: Primary | ICD-10-CM

## 2018-05-13 LAB
AMORPH CRY URNS QL MICRO: ABNORMAL
B-HCG UR QL: NEGATIVE
BACTERIA #/AREA URNS HPF: ABNORMAL /HPF
BILIRUB UR QL STRIP: NEGATIVE
CLARITY UR: ABNORMAL
COLOR UR: YELLOW
CTP QC/QA: YES
GLUCOSE UR QL STRIP: NEGATIVE
HGB UR QL STRIP: NEGATIVE
KETONES UR QL STRIP: NEGATIVE
LEUKOCYTE ESTERASE UR QL STRIP: NEGATIVE
MICROSCOPIC COMMENT: ABNORMAL
NITRITE UR QL STRIP: NEGATIVE
PH UR STRIP: 8 [PH] (ref 5–8)
PROT UR QL STRIP: NEGATIVE
SP GR UR STRIP: 1.02 (ref 1–1.03)
SQUAMOUS #/AREA URNS HPF: 2 /HPF
URN SPEC COLLECT METH UR: ABNORMAL
UROBILINOGEN UR STRIP-ACNC: NEGATIVE EU/DL
WBC #/AREA URNS HPF: 0 /HPF (ref 0–5)

## 2018-05-13 PROCEDURE — 63600175 PHARM REV CODE 636 W HCPCS: Performed by: NURSE PRACTITIONER

## 2018-05-13 PROCEDURE — 25000003 PHARM REV CODE 250: Performed by: NURSE PRACTITIONER

## 2018-05-13 PROCEDURE — 99284 EMERGENCY DEPT VISIT MOD MDM: CPT | Mod: 25

## 2018-05-13 PROCEDURE — 87086 URINE CULTURE/COLONY COUNT: CPT

## 2018-05-13 PROCEDURE — 81025 URINE PREGNANCY TEST: CPT | Performed by: NURSE PRACTITIONER

## 2018-05-13 PROCEDURE — 81000 URINALYSIS NONAUTO W/SCOPE: CPT

## 2018-05-13 RX ORDER — FAMOTIDINE 20 MG/1
20 TABLET, FILM COATED ORAL
Status: COMPLETED | OUTPATIENT
Start: 2018-05-13 | End: 2018-05-13

## 2018-05-13 RX ORDER — DIPHENHYDRAMINE HCL 25 MG
25 CAPSULE ORAL EVERY 6 HOURS PRN
Qty: 30 CAPSULE | Refills: 0 | Status: SHIPPED | OUTPATIENT
Start: 2018-05-13 | End: 2018-08-06

## 2018-05-13 RX ORDER — PREDNISONE 20 MG/1
40 TABLET ORAL
Status: COMPLETED | OUTPATIENT
Start: 2018-05-13 | End: 2018-05-13

## 2018-05-13 RX ORDER — ONDANSETRON 4 MG/1
4 TABLET, ORALLY DISINTEGRATING ORAL
Status: COMPLETED | OUTPATIENT
Start: 2018-05-13 | End: 2018-05-13

## 2018-05-13 RX ORDER — PREDNISONE 20 MG/1
40 TABLET ORAL DAILY
Qty: 6 TABLET | Refills: 0 | Status: SHIPPED | OUTPATIENT
Start: 2018-05-13 | End: 2018-05-16

## 2018-05-13 RX ORDER — DIPHENHYDRAMINE HCL 25 MG
25 CAPSULE ORAL
Status: COMPLETED | OUTPATIENT
Start: 2018-05-13 | End: 2018-05-13

## 2018-05-13 RX ORDER — FAMOTIDINE 20 MG/1
20 TABLET, FILM COATED ORAL 2 TIMES DAILY
Qty: 10 TABLET | Refills: 0 | Status: SHIPPED | OUTPATIENT
Start: 2018-05-13 | End: 2018-07-28

## 2018-05-13 RX ADMIN — PREDNISONE 40 MG: 20 TABLET ORAL at 12:05

## 2018-05-13 RX ADMIN — FAMOTIDINE 20 MG: 20 TABLET, FILM COATED ORAL at 12:05

## 2018-05-13 RX ADMIN — DIPHENHYDRAMINE HYDROCHLORIDE 25 MG: 25 CAPSULE ORAL at 12:05

## 2018-05-13 RX ADMIN — ONDANSETRON 4 MG: 4 TABLET, ORALLY DISINTEGRATING ORAL at 01:05

## 2018-05-13 NOTE — ED PROVIDER NOTES
"Encounter Date: 5/13/2018    SCRIBE #1 NOTE: I, Luann Baker, am scribing for, and in the presence of,  Lynsey Abad NP. I have scribed the entire note.       History     Chief Complaint   Patient presents with    Rash     from smoking cessation gum     05/13/2018 12:10 PM     Chief complaint: Itching sensation; rash; urinary frequency      Mary Myles is a 22 y.o. female who presents to the ED with concern for a worsening itching sensation all over her body "for the past few days." Patient also complains of a rash to her bilateral knees that began "a few days ago." She states that she has been trying to quit smoking. The patient has tried the patch and recently tried the gum. Today, she tried the gum and started to feel dizzy so she spit the gum out with no relief for her dizziness. Patient notes feeling itchy and nauseous as well. She denies SOB or difficulty swallowing, but notes having a "heavy sensation" in her throat. Also, the patient complains of having urinary frequency. She denies having any burning with urination. There are no alleviating factors for her symptoms. Patient's PMHx includes interstitial cystitis. Her PSHx includes a tonsillectomy.      The history is provided by the patient.     Review of patient's allergies indicates:  No Known Allergies  Past Medical History:   Diagnosis Date    ADD (attention deficit disorder)     Anemia     Borderline personality disorder     Chronic headache     Depression     Fibromyalgia     GERD (gastroesophageal reflux disease)     Migraine headache     MALCOLM (obstructive sleep apnea)     PCOS (polycystic ovarian syndrome)     Pseudotumor cerebri syndrome     Schizoaffective disorder     Tourette's syndrome      Past Surgical History:   Procedure Laterality Date    TONSILLECTOMY      UPPER GASTROINTESTINAL ENDOSCOPY  in her childhood    "born with underdeveloped esophagus" & GERD per patient report    WISDOM TOOTH EXTRACTION       Family " "History   Problem Relation Age of Onset    Seizures Mother     Migraines Mother     Migraines Brother     Colon cancer Neg Hx     Colon polyps Neg Hx     Crohn's disease Neg Hx     Ulcerative colitis Neg Hx     Stomach cancer Neg Hx     Esophageal cancer Neg Hx      Social History   Substance Use Topics    Smoking status: Current Every Day Smoker     Packs/day: 1.00     Types: Cigarettes    Smokeless tobacco: Never Used    Alcohol use No     Review of Systems   Constitutional: Negative for chills and fever.   HENT: Negative for congestion, rhinorrhea, sore throat and trouble swallowing.         Positive for "heavy sensation" in her throat.   Eyes: Negative for pain and redness.   Respiratory: Positive for chest tightness. Negative for cough and shortness of breath.    Cardiovascular: Negative for chest pain and palpitations.   Gastrointestinal: Positive for nausea. Negative for abdominal pain and diarrhea.   Genitourinary: Positive for frequency. Negative for dysuria, flank pain, hematuria and urgency.   Musculoskeletal: Negative for gait problem, myalgias and neck pain.   Skin: Positive for rash (to bilateral knees).        Positive for itching sensation all over her body.   Neurological: Positive for dizziness. Negative for light-headedness and headaches.       Physical Exam     Initial Vitals   BP Pulse Resp Temp SpO2   05/13/18 1135 05/13/18 1133 05/13/18 1133 05/13/18 1133 05/13/18 1133   (!) 164/61 68 16 97.8 °F (36.6 °C) 98 %      MAP       05/13/18 1135       95.33         Physical Exam    Nursing note and vitals reviewed.  Constitutional: She appears well-developed and well-nourished. She is not diaphoretic. No distress.   HENT:   Head: Normocephalic and atraumatic.   Mouth/Throat: Uvula is midline and oropharynx is clear and moist.   Eyes: Conjunctivae and EOM are normal. Pupils are equal, round, and reactive to light.   Neck: Normal range of motion.   Cardiovascular: Normal rate, regular " rhythm and normal heart sounds.   Pulmonary/Chest: Breath sounds normal. She has no wheezes. She has no rhonchi. She has no rales.   Musculoskeletal: Normal range of motion.   Neurological: She is alert and oriented to person, place, and time. She has normal strength. No sensory deficit. Gait normal.   Cranial nerves 3-12 intact   Skin: Skin is warm and dry. Capillary refill takes less than 2 seconds. Rash noted. Rash is not urticarial.   Scattered papules to her knees with no surrounding warmth or erythema. No induration or fluctuance. No urticaria.   Psychiatric: She has a normal mood and affect.         ED Course   Procedures  Labs Reviewed   URINALYSIS - Abnormal; Notable for the following:        Result Value    Appearance, UA Cloudy (*)     All other components within normal limits   URINALYSIS MICROSCOPIC - Abnormal; Notable for the following:     Bacteria, UA Many (*)     Amorphous, UA Many (*)     All other components within normal limits   POCT URINE PREGNANCY             Medical Decision Making:   History:   Old Medical Records: I decided to obtain old medical records.  Clinical Tests:   Lab Tests: Reviewed and Ordered       APC / Resident Notes:   Mary Myles is a 22 year old female presenting to the ED with c/o pruritis and dizziness that began after chewing tobacco cessation gum. She was in no distress in the ED and symptoms improved after oral medications. I do not suspect CVA, vertebral stroke, anaphylaxis, or other emergent cause of her symptoms to warrant additional imaging/labs. She may be having an allergic reaction to the gum but there is no oral involvement. She was advised to discontinue use of that product and was prescribed benadryl, prednisone, and pepcid. Specific return precautions discussed and patient verbalized understanding. Based on my clinical evaluation, I do not appreciate any immediate, emergent, or life threatening condition or etiology that warrants additional workup today  and feel that the patient can be discharged with close follow up care.          Scribe Attestation:   Scribe #1: I performed the above scribed service and the documentation accurately describes the services I performed. I attest to the accuracy of the note.    I, JAVIER Ferrera, personally performed the services described in this documentation. All medical record entries made by the scribe were at my direction and in my presence.  I have reviewed the chart and agree that the record reflects my personal performance and is accurate and complete. JAVIER Ferrera.  5:25 PM 05/13/2018             Clinical Impression:     1. Allergic reaction, initial encounter          Disposition:   Disposition: Discharged  Condition: Stable                        Alison Abad NP  05/13/18 1733

## 2018-05-15 LAB — BACTERIA UR CULT: NORMAL

## 2018-05-23 ENCOUNTER — HOSPITAL ENCOUNTER (EMERGENCY)
Facility: HOSPITAL | Age: 23
Discharge: HOME | End: 2018-05-23
Attending: EMERGENCY MEDICINE
Payer: MEDICARE

## 2018-05-23 VITALS
OXYGEN SATURATION: 96 % | TEMPERATURE: 97 F | DIASTOLIC BLOOD PRESSURE: 89 MMHG | HEIGHT: 64 IN | RESPIRATION RATE: 20 BRPM | BODY MASS INDEX: 42.68 KG/M2 | HEART RATE: 66 BPM | WEIGHT: 250 LBS | SYSTOLIC BLOOD PRESSURE: 122 MMHG

## 2018-05-23 DIAGNOSIS — R10.11 RIGHT UPPER QUADRANT ABDOMINAL PAIN: Primary | ICD-10-CM

## 2018-05-23 LAB
ALBUMIN SERPL BCP-MCNC: 4.3 G/DL
ALP SERPL-CCNC: 92 U/L
ALT SERPL W/O P-5'-P-CCNC: 16 U/L
ANION GAP SERPL CALC-SCNC: 10 MMOL/L
AST SERPL-CCNC: 16 U/L
B-HCG UR QL: NEGATIVE
BASOPHILS # BLD AUTO: 0 K/UL
BASOPHILS NFR BLD: 0.2 %
BILIRUB SERPL-MCNC: 1.8 MG/DL
BILIRUB UR QL STRIP: NEGATIVE
BUN SERPL-MCNC: 10 MG/DL
CALCIUM SERPL-MCNC: 10.1 MG/DL
CHLORIDE SERPL-SCNC: 107 MMOL/L
CLARITY UR: CLEAR
CO2 SERPL-SCNC: 22 MMOL/L
COLOR UR: YELLOW
CREAT SERPL-MCNC: 0.7 MG/DL
CTP QC/QA: YES
DIFFERENTIAL METHOD: ABNORMAL
EOSINOPHIL # BLD AUTO: 0.1 K/UL
EOSINOPHIL NFR BLD: 0.6 %
ERYTHROCYTE [DISTWIDTH] IN BLOOD BY AUTOMATED COUNT: 15.3 %
EST. GFR  (AFRICAN AMERICAN): >60 ML/MIN/1.73 M^2
EST. GFR  (NON AFRICAN AMERICAN): >60 ML/MIN/1.73 M^2
GLUCOSE SERPL-MCNC: 105 MG/DL
GLUCOSE UR QL STRIP: NEGATIVE
HCT VFR BLD AUTO: 46.1 %
HGB BLD-MCNC: 15.5 G/DL
HGB UR QL STRIP: NEGATIVE
KETONES UR QL STRIP: NEGATIVE
LEUKOCYTE ESTERASE UR QL STRIP: NEGATIVE
LIPASE SERPL-CCNC: 16 U/L
LYMPHOCYTES # BLD AUTO: 1.5 K/UL
LYMPHOCYTES NFR BLD: 12.1 %
MCH RBC QN AUTO: 28.5 PG
MCHC RBC AUTO-ENTMCNC: 33.6 G/DL
MCV RBC AUTO: 85 FL
MONOCYTES # BLD AUTO: 0.6 K/UL
MONOCYTES NFR BLD: 5.1 %
NEUTROPHILS # BLD AUTO: 10.4 K/UL
NEUTROPHILS NFR BLD: 82 %
NITRITE UR QL STRIP: NEGATIVE
PH UR STRIP: 6 [PH] (ref 5–8)
PLATELET # BLD AUTO: 328 K/UL
PMV BLD AUTO: 8.1 FL
POTASSIUM SERPL-SCNC: 3.8 MMOL/L
PROT SERPL-MCNC: 8.1 G/DL
PROT UR QL STRIP: NEGATIVE
RBC # BLD AUTO: 5.44 M/UL
SODIUM SERPL-SCNC: 139 MMOL/L
SP GR UR STRIP: 1.02 (ref 1–1.03)
URN SPEC COLLECT METH UR: NORMAL
UROBILINOGEN UR STRIP-ACNC: NEGATIVE EU/DL
WBC # BLD AUTO: 12.7 K/UL

## 2018-05-23 PROCEDURE — 36415 COLL VENOUS BLD VENIPUNCTURE: CPT

## 2018-05-23 PROCEDURE — C9113 INJ PANTOPRAZOLE SODIUM, VIA: HCPCS | Performed by: PHYSICIAN ASSISTANT

## 2018-05-23 PROCEDURE — 81003 URINALYSIS AUTO W/O SCOPE: CPT

## 2018-05-23 PROCEDURE — 96374 THER/PROPH/DIAG INJ IV PUSH: CPT

## 2018-05-23 PROCEDURE — 81025 URINE PREGNANCY TEST: CPT | Performed by: PHYSICIAN ASSISTANT

## 2018-05-23 PROCEDURE — 99285 EMERGENCY DEPT VISIT HI MDM: CPT | Mod: 25

## 2018-05-23 PROCEDURE — 83690 ASSAY OF LIPASE: CPT

## 2018-05-23 PROCEDURE — 25000003 PHARM REV CODE 250: Performed by: PHYSICIAN ASSISTANT

## 2018-05-23 PROCEDURE — 63600175 PHARM REV CODE 636 W HCPCS: Performed by: PHYSICIAN ASSISTANT

## 2018-05-23 PROCEDURE — 96375 TX/PRO/DX INJ NEW DRUG ADDON: CPT

## 2018-05-23 PROCEDURE — 96361 HYDRATE IV INFUSION ADD-ON: CPT

## 2018-05-23 PROCEDURE — 80053 COMPREHEN METABOLIC PANEL: CPT

## 2018-05-23 PROCEDURE — 85025 COMPLETE CBC W/AUTO DIFF WBC: CPT

## 2018-05-23 RX ORDER — ONDANSETRON 4 MG/1
4 TABLET, FILM COATED ORAL EVERY 6 HOURS PRN
Qty: 12 TABLET | Refills: 0 | Status: SHIPPED | OUTPATIENT
Start: 2018-05-23 | End: 2018-08-06 | Stop reason: SDUPTHER

## 2018-05-23 RX ORDER — PANTOPRAZOLE SODIUM 40 MG/1
40 TABLET, DELAYED RELEASE ORAL DAILY
Qty: 30 TABLET | Refills: 0 | Status: SHIPPED | OUTPATIENT
Start: 2018-05-23 | End: 2018-08-06

## 2018-05-23 RX ORDER — PANTOPRAZOLE SODIUM 40 MG/10ML
40 INJECTION, POWDER, LYOPHILIZED, FOR SOLUTION INTRAVENOUS
Status: COMPLETED | OUTPATIENT
Start: 2018-05-23 | End: 2018-05-23

## 2018-05-23 RX ORDER — KETOROLAC TROMETHAMINE 30 MG/ML
15 INJECTION, SOLUTION INTRAMUSCULAR; INTRAVENOUS
Status: COMPLETED | OUTPATIENT
Start: 2018-05-23 | End: 2018-05-23

## 2018-05-23 RX ORDER — HYDROCODONE BITARTRATE AND ACETAMINOPHEN 5; 325 MG/1; MG/1
1 TABLET ORAL
Status: COMPLETED | OUTPATIENT
Start: 2018-05-23 | End: 2018-05-23

## 2018-05-23 RX ORDER — ONDANSETRON 4 MG/1
4 TABLET, ORALLY DISINTEGRATING ORAL
Status: COMPLETED | OUTPATIENT
Start: 2018-05-23 | End: 2018-05-23

## 2018-05-23 RX ADMIN — ONDANSETRON 4 MG: 4 TABLET, ORALLY DISINTEGRATING ORAL at 01:05

## 2018-05-23 RX ADMIN — HYDROCODONE BITARTRATE AND ACETAMINOPHEN 1 TABLET: 5; 325 TABLET ORAL at 04:05

## 2018-05-23 RX ADMIN — KETOROLAC TROMETHAMINE 15 MG: 30 INJECTION, SOLUTION INTRAMUSCULAR at 02:05

## 2018-05-23 RX ADMIN — LIDOCAINE HYDROCHLORIDE: 20 SOLUTION ORAL; TOPICAL at 01:05

## 2018-05-23 RX ADMIN — SODIUM CHLORIDE 1000 ML: 0.9 INJECTION, SOLUTION INTRAVENOUS at 01:05

## 2018-05-23 RX ADMIN — PANTOPRAZOLE SODIUM 40 MG: 40 INJECTION, POWDER, FOR SOLUTION INTRAVENOUS at 01:05

## 2018-05-23 NOTE — ED PROVIDER NOTES
"Encounter Date: 5/23/2018    SCRIBE #1 NOTE: Traci ZAVALETA am scribing for, and in the presence of, Maddy Griffith PA-C.       History     Chief Complaint   Patient presents with    Abdominal Pain     Onset this morning       05/23/2018 12:53 PM     Chief complaint: Abd pain       Mary Myles is a 22 y.o. female with a hx of GERD and PCOS who presents to the ED with c/o epigastric abd pain associated with diarrhea, nausea, dry heaving since this morning. She has been seen in the ED multiple times for the same and has not followed up with a surgeon or GI. Pt has been advised to have a cholecystectomy but has not due to recent change in insurance provider. She states the pain is consistent with her previous episodes. She denied any attempted treatment. She denied fever or chills. She denied dysuria or urinary frequency. She denied blood or mucous in her stools. NKDA noted.       The history is provided by the patient.     Review of patient's allergies indicates:  No Known Allergies  Past Medical History:   Diagnosis Date    ADD (attention deficit disorder)     Anemia     Borderline personality disorder     Chronic headache     Depression     Fibromyalgia     GERD (gastroesophageal reflux disease)     Migraine headache     MALCOLM (obstructive sleep apnea)     PCOS (polycystic ovarian syndrome)     Pseudotumor cerebri syndrome     Schizoaffective disorder     Tourette's syndrome      Past Surgical History:   Procedure Laterality Date    TONSILLECTOMY      UPPER GASTROINTESTINAL ENDOSCOPY  in her childhood    "born with underdeveloped esophagus" & GERD per patient report    WISDOM TOOTH EXTRACTION       Family History   Problem Relation Age of Onset    Seizures Mother     Migraines Mother     Migraines Brother     Colon cancer Neg Hx     Colon polyps Neg Hx     Crohn's disease Neg Hx     Ulcerative colitis Neg Hx     Stomach cancer Neg Hx     Esophageal cancer Neg Hx      Social " History   Substance Use Topics    Smoking status: Current Every Day Smoker     Packs/day: 1.00     Types: Cigarettes    Smokeless tobacco: Never Used    Alcohol use No     Review of Systems   Constitutional: Negative for activity change, appetite change, chills and fever.   HENT: Negative for congestion, rhinorrhea and sore throat.    Eyes: Negative for redness and visual disturbance.   Respiratory: Negative for cough, chest tightness and shortness of breath.    Cardiovascular: Negative for chest pain.   Gastrointestinal: Positive for abdominal pain, diarrhea and nausea. Negative for vomiting.   Genitourinary: Negative for dysuria and frequency.   Musculoskeletal: Negative for back pain, neck pain and neck stiffness.   Skin: Negative for rash.   Neurological: Negative for dizziness, syncope, numbness and headaches.       Physical Exam     Initial Vitals [05/23/18 1204]   BP Pulse Resp Temp SpO2   138/78 88 20 97.2 °F (36.2 °C) 99 %      MAP       98         Physical Exam    Constitutional: Vital signs are normal. She appears well-developed and well-nourished. She is cooperative.  Non-toxic appearance. She does not have a sickly appearance.   HENT:   Head: Normocephalic and atraumatic.   Right Ear: External ear normal.   Left Ear: External ear normal.   Nose: Nose normal.   Mouth/Throat: Oropharynx is clear and moist.   Eyes: Conjunctivae and lids are normal. Pupils are equal, round, and reactive to light.   Neck: Normal range of motion and full passive range of motion without pain. Neck supple.   Cardiovascular: Normal rate, regular rhythm and normal heart sounds. Exam reveals no gallop and no friction rub.    No murmur heard.  Pulmonary/Chest: Breath sounds normal. She has no wheezes. She has no rhonchi. She has no rales.   Abdominal: Soft. Normal appearance. There is tenderness in the epigastric area. There is no rigidity, no rebound and no guarding.   Neurological: She is alert and oriented to person, place,  and time.   Skin: Skin is warm, dry and intact. No rash noted.         ED Course   Procedures  Labs Reviewed   CBC W/ AUTO DIFFERENTIAL - Abnormal; Notable for the following:        Result Value    RBC 5.44 (*)     RDW 15.3 (*)     MPV 8.1 (*)     Gran # (ANC) 10.4 (*)     Gran% 82.0 (*)     Lymph% 12.1 (*)     All other components within normal limits   COMPREHENSIVE METABOLIC PANEL - Abnormal; Notable for the following:     CO2 22 (*)     Total Bilirubin 1.8 (*)     All other components within normal limits   LIPASE   URINALYSIS   POCT URINE PREGNANCY          Recent Labs  Lab 05/23/18  1321   COLORU Yellow   SPECGRAV 1.025   PHUR 6.0   PROTEINUA Negative          Medical Decision Making:   History:   Old Medical Records: I decided to obtain old medical records.  Clinical Tests:   Lab Tests: Ordered and Reviewed  Radiological Study: Ordered and Reviewed       APC / Resident Notes:   Urgent evaluation of a 22-year-old female who presents with acute on chronic abdominal pain.  She states she knows she has to get her gallbladder out but has not seen a surgeon or GI specialist.  She has been seen in the emergency room multiple times for chronic abdominal pain. When asked why she has not followed up, she has multiple excuses.  She is well appearing.  Her vital signs are stable. She has mild epigastric tenderness to palpation.  She has no rebound or guarding.  She has no distention. She has normal bowel sounds. Labs are unremarkable.  She has no leukocytosis.  Lipase is normal. Repeat ultrasound obtained which show some mild debris with no stones.  I discussed with her the need to follow up with GI and General surgery.  She again gives multiple excuses, I explained to her that the emergency room will not be the solution to her problem that she needs to follow up outpatient.  I have given her the information to [place]. Discussed results with patient. Return precautions given. Based on my clinical evaluation, I do not  appreciate any immediate, emergent, or life threatening condition or etiology that warrants additional workup today and feel that the patient can be discharged with close follow up care.  Patient is to follow up with their primary care provider. Case was discussed with Dr. Reaves who is in agreement with the plan of care. All questions answered.          Scribe Attestation:   Scribe #1: I performed the above scribed service and the documentation accurately describes the services I performed. I attest to the accuracy of the note.    I, Maddy Griffith PA-C, personally performed the services described in this documentation. All medical record entries made by the scribe were at my direction and in my presence.  I have reviewed the chart and agree that the record reflects my personal performance and is accurate and complete. Maddy Griffith PA-C.  5:16 PM 05/23/2018             Clinical Impression:     1. Right upper quadrant abdominal pain                               Maddy Griffith PA-C  05/23/18 1725

## 2018-05-23 NOTE — ED NOTES
Care assumed. Pt awake, alert and oriented. Resp even and unlabored. Pt c/o chronic abd pain and nausea.  See PA exam.

## 2018-05-23 NOTE — DISCHARGE INSTRUCTIONS
Call and schedule an appointment with a stomach doctor and a surgeon.   For worsening symptoms, chest pain, shortness of breath, increased abdominal pain, high grade fever, stroke or stroke like symptoms, immediately go to the nearest Emergency Room or call 911 as soon as possible.

## 2018-05-30 ENCOUNTER — HOSPITAL ENCOUNTER (EMERGENCY)
Facility: HOSPITAL | Age: 23
Discharge: HOME | End: 2018-05-30
Attending: EMERGENCY MEDICINE
Payer: MEDICARE

## 2018-05-30 VITALS
WEIGHT: 240 LBS | TEMPERATURE: 98 F | RESPIRATION RATE: 20 BRPM | BODY MASS INDEX: 40.97 KG/M2 | DIASTOLIC BLOOD PRESSURE: 76 MMHG | SYSTOLIC BLOOD PRESSURE: 134 MMHG | HEIGHT: 64 IN | OXYGEN SATURATION: 97 % | HEART RATE: 104 BPM

## 2018-05-30 DIAGNOSIS — J06.9 VIRAL URI WITH COUGH: Primary | ICD-10-CM

## 2018-05-30 PROCEDURE — 96372 THER/PROPH/DIAG INJ SC/IM: CPT

## 2018-05-30 PROCEDURE — 25000003 PHARM REV CODE 250: Performed by: EMERGENCY MEDICINE

## 2018-05-30 PROCEDURE — 63600175 PHARM REV CODE 636 W HCPCS: Performed by: EMERGENCY MEDICINE

## 2018-05-30 PROCEDURE — 99283 EMERGENCY DEPT VISIT LOW MDM: CPT | Mod: 25

## 2018-05-30 RX ORDER — BENZONATATE 100 MG/1
200 CAPSULE ORAL 3 TIMES DAILY PRN
Qty: 15 CAPSULE | Refills: 0 | Status: SHIPPED | OUTPATIENT
Start: 2018-05-30 | End: 2018-08-06 | Stop reason: HOSPADM

## 2018-05-30 RX ORDER — METHYLPREDNISOLONE ACETATE 80 MG/ML
80 INJECTION, SUSPENSION INTRA-ARTICULAR; INTRALESIONAL; INTRAMUSCULAR; SOFT TISSUE
Status: COMPLETED | OUTPATIENT
Start: 2018-05-30 | End: 2018-05-30

## 2018-05-30 RX ORDER — NAPROXEN 250 MG/1
500 TABLET ORAL
Status: COMPLETED | OUTPATIENT
Start: 2018-05-30 | End: 2018-05-30

## 2018-05-30 RX ADMIN — NAPROXEN 500 MG: 250 TABLET ORAL at 02:05

## 2018-05-30 RX ADMIN — METHYLPREDNISOLONE ACETATE 80 MG: 80 INJECTION, SUSPENSION INTRA-ARTICULAR; INTRALESIONAL; INTRAMUSCULAR; SOFT TISSUE at 02:05

## 2018-05-30 RX ADMIN — Medication 2 SPRAY: at 02:05

## 2018-05-30 NOTE — ED PROVIDER NOTES
"Encounter Date: 5/30/2018    SCRIBE #1 NOTE: I, Traci Cosby, am scribing for, and in the presence of, Dr. Reaves .       History     Chief Complaint   Patient presents with    Cough    Chest Congestion    Nasal Congestion       05/30/2018 2:23 PM     Chief complaint: Cough      Mary Myles is a 22 y.o. female with GERD and chronic HA who presents to the ED with c/o cough associated with rhinorrhea, congestion, and HA x 2 days. She also reports throat pain and CP due to coughing. She took medication this morning with no significant relief. Her roommate endorse similar sx. Pt denies fevers.      The history is provided by the patient.     Review of patient's allergies indicates:  No Known Allergies  Past Medical History:   Diagnosis Date    ADD (attention deficit disorder)     Anemia     Borderline personality disorder     Chronic headache     Depression     Fibromyalgia     GERD (gastroesophageal reflux disease)     Migraine headache     MALCOLM (obstructive sleep apnea)     PCOS (polycystic ovarian syndrome)     Pseudotumor cerebri syndrome     Schizoaffective disorder     Tourette's syndrome      Past Surgical History:   Procedure Laterality Date    TONSILLECTOMY      UPPER GASTROINTESTINAL ENDOSCOPY  in her childhood    "born with underdeveloped esophagus" & GERD per patient report    WISDOM TOOTH EXTRACTION       Family History   Problem Relation Age of Onset    Seizures Mother     Migraines Mother     Migraines Brother     Colon cancer Neg Hx     Colon polyps Neg Hx     Crohn's disease Neg Hx     Ulcerative colitis Neg Hx     Stomach cancer Neg Hx     Esophageal cancer Neg Hx      Social History   Substance Use Topics    Smoking status: Current Every Day Smoker     Packs/day: 1.00     Types: Cigarettes    Smokeless tobacco: Never Used    Alcohol use No     Review of Systems   Constitutional: Negative for activity change, appetite change, chills, fatigue and fever.   HENT: " Positive for congestion, rhinorrhea and sore throat.    Eyes: Negative for visual disturbance.   Respiratory: Positive for cough. Negative for apnea and shortness of breath.    Cardiovascular: Positive for chest pain. Negative for palpitations.   Gastrointestinal: Negative for abdominal distention and abdominal pain.   Genitourinary: Negative for difficulty urinating.   Musculoskeletal: Negative for neck pain.   Skin: Negative for pallor and rash.   Neurological: Positive for headaches.   Hematological: Does not bruise/bleed easily.   Psychiatric/Behavioral: Negative for agitation.       Physical Exam     Initial Vitals [05/30/18 1351]   BP Pulse Resp Temp SpO2   134/76 104 20 98.4 °F (36.9 °C) 97 %      MAP       95.33         Physical Exam    Nursing note and vitals reviewed.  Constitutional: She appears well-developed and well-nourished.   HENT:   Head: Normocephalic and atraumatic.   Mouth/Throat: Oropharynx is clear and moist.   Eyes: Conjunctivae are normal.   Neck: Normal range of motion. Neck supple.   Cardiovascular: Normal rate, regular rhythm and normal heart sounds. Exam reveals no gallop and no friction rub.    No murmur heard.  Pulmonary/Chest: Effort normal and breath sounds normal. No respiratory distress. She has no wheezes. She has no rhonchi. She has no rales.   Abdominal: Soft. She exhibits no distension. There is no tenderness.   Musculoskeletal: Normal range of motion.   Neurological: She is alert and oriented to person, place, and time.   Skin: Skin is warm and dry. No erythema.   Psychiatric: She has a normal mood and affect.         ED Course   Procedures  Labs Reviewed - No data to display          Medical Decision Making:   History:   Old Medical Records: I decided to obtain old medical records.  ED Management:  22-year-old female presents with sinus congestion, sore throat cough with myalgias and associated nausea vomiting.  She has no evidence of pneumonia.  There is no neck stiffness or  lethargy to raise suspicion for meningitis.  The symptoms strongly suggestive of a viral URI which was treated with Tessalon Perles, Depo-Medrol and Afrin.       APC / Resident Notes:   I, Dr. Cl Reaves III, personally performed the services described in this documentation. All medical record entries made by the scribe were at my direction and in my presence.  I have reviewed the chart and agree that the record reflects my personal performance and is accurate and complete. Cl Reaves III, MD.  4:34 PM 05/30/2018       Scribe Attestation:   Scribe #1: I performed the above scribed service and the documentation accurately describes the services I performed. I attest to the accuracy of the note.               Clinical Impression:     1. Viral URI with cough                               Cl Reaves III, MD  05/30/18 [id number]

## 2018-06-23 ENCOUNTER — HOSPITAL ENCOUNTER (EMERGENCY)
Facility: HOSPITAL | Age: 23
Discharge: HOME | End: 2018-06-23
Attending: EMERGENCY MEDICINE
Payer: MEDICARE

## 2018-06-23 VITALS
TEMPERATURE: 98 F | SYSTOLIC BLOOD PRESSURE: 128 MMHG | HEART RATE: 86 BPM | RESPIRATION RATE: 16 BRPM | BODY MASS INDEX: 41.13 KG/M2 | WEIGHT: 240.94 LBS | DIASTOLIC BLOOD PRESSURE: 76 MMHG | OXYGEN SATURATION: 98 % | HEIGHT: 64 IN

## 2018-06-23 DIAGNOSIS — R11.0 NAUSEA: Primary | ICD-10-CM

## 2018-06-23 LAB
ALBUMIN SERPL BCP-MCNC: 3.9 G/DL
ALP SERPL-CCNC: 81 U/L
ALT SERPL W/O P-5'-P-CCNC: 13 U/L
ANION GAP SERPL CALC-SCNC: 8 MMOL/L
AST SERPL-CCNC: 12 U/L
B-HCG UR QL: NEGATIVE
BASOPHILS # BLD AUTO: 0 K/UL
BASOPHILS NFR BLD: 0.3 %
BILIRUB SERPL-MCNC: 1.7 MG/DL
BILIRUB UR QL STRIP: NEGATIVE
BUN SERPL-MCNC: 8 MG/DL
CALCIUM SERPL-MCNC: 9.8 MG/DL
CHLORIDE SERPL-SCNC: 105 MMOL/L
CLARITY UR: CLEAR
CO2 SERPL-SCNC: 27 MMOL/L
COLOR UR: YELLOW
CREAT SERPL-MCNC: 0.7 MG/DL
CTP QC/QA: YES
DIFFERENTIAL METHOD: ABNORMAL
EOSINOPHIL # BLD AUTO: 0.1 K/UL
EOSINOPHIL NFR BLD: 1.5 %
ERYTHROCYTE [DISTWIDTH] IN BLOOD BY AUTOMATED COUNT: 14.9 %
EST. GFR  (AFRICAN AMERICAN): >60 ML/MIN/1.73 M^2
EST. GFR  (NON AFRICAN AMERICAN): >60 ML/MIN/1.73 M^2
GLUCOSE SERPL-MCNC: 93 MG/DL
GLUCOSE UR QL STRIP: NEGATIVE
HCT VFR BLD AUTO: 41.4 %
HGB BLD-MCNC: 13.9 G/DL
HGB UR QL STRIP: NEGATIVE
KETONES UR QL STRIP: NEGATIVE
LEUKOCYTE ESTERASE UR QL STRIP: NEGATIVE
LIPASE SERPL-CCNC: 8 U/L
LYMPHOCYTES # BLD AUTO: 2.2 K/UL
LYMPHOCYTES NFR BLD: 21.6 %
MCH RBC QN AUTO: 29 PG
MCHC RBC AUTO-ENTMCNC: 33.6 G/DL
MCV RBC AUTO: 86 FL
MONOCYTES # BLD AUTO: 0.7 K/UL
MONOCYTES NFR BLD: 6.6 %
NEUTROPHILS # BLD AUTO: 7.1 K/UL
NEUTROPHILS NFR BLD: 70 %
NITRITE UR QL STRIP: NEGATIVE
PH UR STRIP: 8 [PH] (ref 5–8)
PLATELET # BLD AUTO: 351 K/UL
PMV BLD AUTO: 7.8 FL
POTASSIUM SERPL-SCNC: 4.2 MMOL/L
PROT SERPL-MCNC: 7.4 G/DL
PROT UR QL STRIP: ABNORMAL
RBC # BLD AUTO: 4.8 M/UL
SODIUM SERPL-SCNC: 140 MMOL/L
SP GR UR STRIP: 1.01 (ref 1–1.03)
URN SPEC COLLECT METH UR: ABNORMAL
UROBILINOGEN UR STRIP-ACNC: NEGATIVE EU/DL
WBC # BLD AUTO: 10.1 K/UL

## 2018-06-23 PROCEDURE — 83690 ASSAY OF LIPASE: CPT

## 2018-06-23 PROCEDURE — 80053 COMPREHEN METABOLIC PANEL: CPT

## 2018-06-23 PROCEDURE — 36415 COLL VENOUS BLD VENIPUNCTURE: CPT

## 2018-06-23 PROCEDURE — 85025 COMPLETE CBC W/AUTO DIFF WBC: CPT

## 2018-06-23 PROCEDURE — 25000003 PHARM REV CODE 250: Performed by: EMERGENCY MEDICINE

## 2018-06-23 PROCEDURE — 81003 URINALYSIS AUTO W/O SCOPE: CPT

## 2018-06-23 PROCEDURE — 81025 URINE PREGNANCY TEST: CPT | Performed by: EMERGENCY MEDICINE

## 2018-06-23 PROCEDURE — 99284 EMERGENCY DEPT VISIT MOD MDM: CPT | Mod: 25

## 2018-06-23 RX ORDER — ONDANSETRON 4 MG/1
8 TABLET, ORALLY DISINTEGRATING ORAL
Status: COMPLETED | OUTPATIENT
Start: 2018-06-23 | End: 2018-06-23

## 2018-06-23 RX ORDER — ONDANSETRON 8 MG/1
8 TABLET, ORALLY DISINTEGRATING ORAL 3 TIMES DAILY PRN
Qty: 20 TABLET | Refills: 0 | Status: SHIPPED | OUTPATIENT
Start: 2018-06-23 | End: 2018-08-06

## 2018-06-23 RX ADMIN — ONDANSETRON 8 MG: 4 TABLET, ORALLY DISINTEGRATING ORAL at 01:06

## 2018-07-11 ENCOUNTER — HOSPITAL ENCOUNTER (EMERGENCY)
Facility: HOSPITAL | Age: 23
Discharge: HOME | End: 2018-07-11
Attending: EMERGENCY MEDICINE
Payer: MEDICARE

## 2018-07-11 VITALS
TEMPERATURE: 98 F | RESPIRATION RATE: 16 BRPM | WEIGHT: 240 LBS | OXYGEN SATURATION: 95 % | HEIGHT: 66 IN | BODY MASS INDEX: 38.57 KG/M2 | SYSTOLIC BLOOD PRESSURE: 117 MMHG | HEART RATE: 77 BPM | DIASTOLIC BLOOD PRESSURE: 67 MMHG

## 2018-07-11 DIAGNOSIS — G43.809 OTHER MIGRAINE WITHOUT STATUS MIGRAINOSUS, NOT INTRACTABLE: Primary | ICD-10-CM

## 2018-07-11 LAB
B-HCG UR QL: NEGATIVE
CTP QC/QA: YES

## 2018-07-11 PROCEDURE — 81025 URINE PREGNANCY TEST: CPT | Performed by: EMERGENCY MEDICINE

## 2018-07-11 PROCEDURE — 63600175 PHARM REV CODE 636 W HCPCS: Performed by: EMERGENCY MEDICINE

## 2018-07-11 PROCEDURE — 96375 TX/PRO/DX INJ NEW DRUG ADDON: CPT

## 2018-07-11 PROCEDURE — 99283 EMERGENCY DEPT VISIT LOW MDM: CPT | Mod: 25

## 2018-07-11 PROCEDURE — 96374 THER/PROPH/DIAG INJ IV PUSH: CPT

## 2018-07-11 RX ORDER — KETOROLAC TROMETHAMINE 30 MG/ML
30 INJECTION, SOLUTION INTRAMUSCULAR; INTRAVENOUS
Status: COMPLETED | OUTPATIENT
Start: 2018-07-11 | End: 2018-07-11

## 2018-07-11 RX ORDER — PROMETHAZINE HYDROCHLORIDE 25 MG/1
25 SUPPOSITORY RECTAL EVERY 6 HOURS PRN
Qty: 10 SUPPOSITORY | Refills: 0 | Status: SHIPPED | OUTPATIENT
Start: 2018-07-11 | End: 2018-08-06

## 2018-07-11 RX ORDER — BUTALBITAL, ACETAMINOPHEN AND CAFFEINE 50; 325; 40 MG/1; MG/1; MG/1
1 TABLET ORAL EVERY 4 HOURS PRN
Qty: 15 TABLET | Refills: 0 | Status: SHIPPED | OUTPATIENT
Start: 2018-07-11 | End: 2018-08-06

## 2018-07-11 RX ORDER — DEXAMETHASONE SODIUM PHOSPHATE 4 MG/ML
8 INJECTION, SOLUTION INTRA-ARTICULAR; INTRALESIONAL; INTRAMUSCULAR; INTRAVENOUS; SOFT TISSUE
Status: COMPLETED | OUTPATIENT
Start: 2018-07-11 | End: 2018-07-11

## 2018-07-11 RX ORDER — HALOPERIDOL 5 MG/ML
5 INJECTION INTRAMUSCULAR
Status: COMPLETED | OUTPATIENT
Start: 2018-07-11 | End: 2018-07-11

## 2018-07-11 RX ADMIN — DEXAMETHASONE SODIUM PHOSPHATE 8 MG: 4 INJECTION, SOLUTION INTRAMUSCULAR; INTRAVENOUS at 09:07

## 2018-07-11 RX ADMIN — HALOPERIDOL LACTATE 5 MG: 5 INJECTION, SOLUTION INTRAMUSCULAR at 09:07

## 2018-07-11 RX ADMIN — KETOROLAC TROMETHAMINE 30 MG: 30 INJECTION, SOLUTION INTRAMUSCULAR at 09:07

## 2018-07-12 NOTE — ED PROVIDER NOTES
"Encounter Date: 7/11/2018    SCRIBE #1 NOTE: I, Ifeoma Dumont , am scribing for, and in the presence of,  Dr. Reaves. I have scribed the entire note.       History     Chief Complaint   Patient presents with    Migraine     headache beginning today     Nausea     3 episodes of vomitting in the past 2 days      07/11/2018  8:45 PM     Chief Complaint: Migraine       The patient is a 23 y.o. female with a PMHx  Borderline personality disorder; Chronic headache; Depression; Fibromyalgia; GERD (gastroesophageal reflux disease); Migraine headache; PCOS (polycystic ovarian syndrome); Pseudotumor cerebri syndrome; Schizoaffective disorder; and Tourette's syndromeof who is presenting with the gradual exacerbation of an chronic HA that began earlier today. The pt states that the pain is present across her forehead and goes "through the top of her head". She notes that the pain is constant, moderate, and sharp in nature. No exacerbating or mitigating factors. Associated sxs include constant nausea with x 4 episodes of vomiting throughout the last 2 days without blood or bile, and intermittent blurry vision that is chronic. The pt reports that this HA is similar to previous HAs. No new numbness, weakness, sinus sx, loss of bowel control, bladder control, decrease in mental status, or rash. No other comments or complaints. No pertinent past surgical hx.               The history is provided by the patient and medical records.     Review of patient's allergies indicates:   Allergen Reactions    Chantix [varenicline] Hives     Past Medical History:   Diagnosis Date    ADD (attention deficit disorder)     Anemia     Borderline personality disorder     Chronic headache     Depression     Fibromyalgia     GERD (gastroesophageal reflux disease)     Migraine headache     MALCOLM (obstructive sleep apnea)     PCOS (polycystic ovarian syndrome)     Pseudotumor cerebri syndrome     Schizoaffective disorder     Tourette's " "syndrome      Past Surgical History:   Procedure Laterality Date    TONSILLECTOMY      UPPER GASTROINTESTINAL ENDOSCOPY  in her childhood    "born with underdeveloped esophagus" & GERD per patient report    WISDOM TOOTH EXTRACTION       Family History   Problem Relation Age of Onset    Seizures Mother     Migraines Mother     Migraines Brother     Colon cancer Neg Hx     Colon polyps Neg Hx     Crohn's disease Neg Hx     Ulcerative colitis Neg Hx     Stomach cancer Neg Hx     Esophageal cancer Neg Hx      Social History   Substance Use Topics    Smoking status: Current Every Day Smoker     Packs/day: 1.00     Types: Cigarettes    Smokeless tobacco: Never Used    Alcohol use No     Review of Systems   Constitutional: Negative for activity change, appetite change, chills, fatigue and fever.   Eyes: Positive for visual disturbance.   Respiratory: Negative for apnea and shortness of breath.    Cardiovascular: Negative for chest pain and palpitations.   Gastrointestinal: Positive for nausea and vomiting. Negative for abdominal distention and abdominal pain.   Musculoskeletal: Negative for neck pain.   Skin: Negative for pallor and rash.   Neurological: Positive for headaches.   Hematological: Does not bruise/bleed easily.   Psychiatric/Behavioral: Negative for agitation.       Physical Exam     Initial Vitals [07/11/18 2032]   BP Pulse Resp Temp SpO2   134/85 78 16 98.3 °F (36.8 °C) 97 %      MAP       --         Physical Exam    Nursing note and vitals reviewed.  Constitutional: She appears well-developed and well-nourished.   HENT:   Head: Atraumatic.   Nose: Nose normal.   Mouth/Throat: No oropharyngeal exudate.   Occipital and temporal tenderness to palpation.    Eyes: Conjunctivae are normal.   Neck: Normal range of motion. Neck supple.   Cardiovascular: Normal rate, regular rhythm and normal heart sounds. Exam reveals no gallop and no friction rub.    No murmur heard.  Pulmonary/Chest: Effort normal " and breath sounds normal. No respiratory distress. She has no wheezes. She has no rhonchi. She has no rales.   Abdominal: Soft. She exhibits no distension. There is no tenderness.   Musculoskeletal: Normal range of motion.   Neurological: She is alert and oriented to person, place, and time. She has normal strength. No sensory deficit.   Skin: Skin is warm and dry. No erythema.   Psychiatric: She has a normal mood and affect.         ED Course   Procedures  Labs Reviewed - No data to display       Imaging Results    None          Medical Decision Making:   History:   Old Medical Records: I decided to obtain old medical records.  ED Management:  23-year-old female with a history of migraine headaches presents with a bilateral frontal headache suggestive of same.  She reports a metal taste which may represent an aura.  She has no sinusitis symptoms to account for the metallic taste.  She will be treated empirically with Fioricet and is given Phenergan suppositories for nausea and vomiting.  She has no fever or neck stiffness to raise suspicion of infection.       APC / Resident Notes:   I, Dr. Cl Reaves III, personally performed the services described in this documentation. All medical record entries made by the scribe were at my direction and in my presence.  I have reviewed the chart and agree that the record reflects my personal performance and is accurate and complete. Cl Reaves III, MD.  9:39 PM 07/11/2018       Scribe Attestation:   Scribe #1: I performed the above scribed service and the documentation accurately describes the services I performed. I attest to the accuracy of the note.               Clinical Impression:   The encounter diagnosis was Other migraine without status migrainosus, not intractable.                             Cl Reaves III, MD  07/11/18 6702

## 2018-07-14 ENCOUNTER — HOSPITAL ENCOUNTER (EMERGENCY)
Facility: HOSPITAL | Age: 23
Discharge: HOME | End: 2018-07-14
Attending: EMERGENCY MEDICINE
Payer: MEDICARE

## 2018-07-14 VITALS
DIASTOLIC BLOOD PRESSURE: 63 MMHG | BODY MASS INDEX: 38.57 KG/M2 | HEIGHT: 66 IN | OXYGEN SATURATION: 97 % | TEMPERATURE: 98 F | RESPIRATION RATE: 16 BRPM | HEART RATE: 65 BPM | SYSTOLIC BLOOD PRESSURE: 107 MMHG | WEIGHT: 240 LBS

## 2018-07-14 DIAGNOSIS — R10.30 LOWER ABDOMINAL PAIN: Primary | ICD-10-CM

## 2018-07-14 DIAGNOSIS — K59.00 CONSTIPATION, UNSPECIFIED CONSTIPATION TYPE: ICD-10-CM

## 2018-07-14 LAB
ALBUMIN SERPL BCP-MCNC: 3.5 G/DL
ALP SERPL-CCNC: 64 U/L
ALT SERPL W/O P-5'-P-CCNC: 14 U/L
ANION GAP SERPL CALC-SCNC: 6 MMOL/L
AST SERPL-CCNC: 13 U/L
B-HCG UR QL: NEGATIVE
BASOPHILS # BLD AUTO: 0.1 K/UL
BASOPHILS NFR BLD: 0.7 %
BILIRUB SERPL-MCNC: 0.7 MG/DL
BUN SERPL-MCNC: 14 MG/DL
CALCIUM SERPL-MCNC: 9 MG/DL
CHLORIDE SERPL-SCNC: 109 MMOL/L
CO2 SERPL-SCNC: 25 MMOL/L
CREAT SERPL-MCNC: 0.7 MG/DL
CTP QC/QA: YES
DIFFERENTIAL METHOD: ABNORMAL
EOSINOPHIL # BLD AUTO: 0.2 K/UL
EOSINOPHIL NFR BLD: 1.5 %
ERYTHROCYTE [DISTWIDTH] IN BLOOD BY AUTOMATED COUNT: 15.4 %
EST. GFR  (AFRICAN AMERICAN): >60 ML/MIN/1.73 M^2
EST. GFR  (NON AFRICAN AMERICAN): >60 ML/MIN/1.73 M^2
GLUCOSE SERPL-MCNC: 115 MG/DL
HCT VFR BLD AUTO: 37 %
HGB BLD-MCNC: 12.3 G/DL
LIPASE SERPL-CCNC: 15 U/L
LYMPHOCYTES # BLD AUTO: 3.8 K/UL
LYMPHOCYTES NFR BLD: 33.5 %
MCH RBC QN AUTO: 28.5 PG
MCHC RBC AUTO-ENTMCNC: 33.2 G/DL
MCV RBC AUTO: 86 FL
MONOCYTES # BLD AUTO: 0.8 K/UL
MONOCYTES NFR BLD: 7 %
NEUTROPHILS # BLD AUTO: 6.6 K/UL
NEUTROPHILS NFR BLD: 57.3 %
PLATELET # BLD AUTO: 306 K/UL
PMV BLD AUTO: 8.1 FL
POTASSIUM SERPL-SCNC: 4.1 MMOL/L
PROT SERPL-MCNC: 6.3 G/DL
RBC # BLD AUTO: 4.3 M/UL
SODIUM SERPL-SCNC: 140 MMOL/L
WBC # BLD AUTO: 11.5 K/UL

## 2018-07-14 PROCEDURE — 99285 EMERGENCY DEPT VISIT HI MDM: CPT | Mod: 25

## 2018-07-14 PROCEDURE — 96376 TX/PRO/DX INJ SAME DRUG ADON: CPT

## 2018-07-14 PROCEDURE — 25000003 PHARM REV CODE 250: Performed by: EMERGENCY MEDICINE

## 2018-07-14 PROCEDURE — 25500020 PHARM REV CODE 255

## 2018-07-14 PROCEDURE — 85025 COMPLETE CBC W/AUTO DIFF WBC: CPT

## 2018-07-14 PROCEDURE — 81025 URINE PREGNANCY TEST: CPT | Performed by: EMERGENCY MEDICINE

## 2018-07-14 PROCEDURE — 96361 HYDRATE IV INFUSION ADD-ON: CPT

## 2018-07-14 PROCEDURE — 63600175 PHARM REV CODE 636 W HCPCS: Performed by: EMERGENCY MEDICINE

## 2018-07-14 PROCEDURE — 83690 ASSAY OF LIPASE: CPT

## 2018-07-14 PROCEDURE — 96375 TX/PRO/DX INJ NEW DRUG ADDON: CPT

## 2018-07-14 PROCEDURE — 96374 THER/PROPH/DIAG INJ IV PUSH: CPT | Mod: 59

## 2018-07-14 PROCEDURE — 80053 COMPREHEN METABOLIC PANEL: CPT

## 2018-07-14 RX ORDER — ACETAMINOPHEN 500 MG
1000 TABLET ORAL
Status: COMPLETED | OUTPATIENT
Start: 2018-07-14 | End: 2018-07-14

## 2018-07-14 RX ORDER — SODIUM CHLORIDE 9 MG/ML
INJECTION, SOLUTION INTRAVENOUS
Status: DISCONTINUED
Start: 2018-07-14 | End: 2018-07-14 | Stop reason: HOSPADM

## 2018-07-14 RX ORDER — ONDANSETRON 2 MG/ML
4 INJECTION INTRAMUSCULAR; INTRAVENOUS
Status: COMPLETED | OUTPATIENT
Start: 2018-07-14 | End: 2018-07-14

## 2018-07-14 RX ORDER — POLYETHYLENE GLYCOL 3350 17 G/17G
17 POWDER, FOR SOLUTION ORAL DAILY
Qty: 1 BOTTLE | Refills: 0 | Status: SHIPPED | OUTPATIENT
Start: 2018-07-14 | End: 2018-08-06

## 2018-07-14 RX ORDER — KETOROLAC TROMETHAMINE 30 MG/ML
10 INJECTION, SOLUTION INTRAMUSCULAR; INTRAVENOUS
Status: COMPLETED | OUTPATIENT
Start: 2018-07-14 | End: 2018-07-14

## 2018-07-14 RX ADMIN — ACETAMINOPHEN 1000 MG: 500 TABLET ORAL at 06:07

## 2018-07-14 RX ADMIN — ONDANSETRON 4 MG: 2 INJECTION INTRAMUSCULAR; INTRAVENOUS at 05:07

## 2018-07-14 RX ADMIN — ONDANSETRON 4 MG: 2 INJECTION INTRAMUSCULAR; INTRAVENOUS at 06:07

## 2018-07-14 RX ADMIN — IOHEXOL 100 ML: 350 INJECTION, SOLUTION INTRAVENOUS at 06:07

## 2018-07-14 RX ADMIN — KETOROLAC TROMETHAMINE 10 MG: 30 INJECTION, SOLUTION INTRAMUSCULAR at 05:07

## 2018-07-14 RX ADMIN — SODIUM CHLORIDE, SODIUM LACTATE, POTASSIUM CHLORIDE, AND CALCIUM CHLORIDE 1000 ML: .6; .31; .03; .02 INJECTION, SOLUTION INTRAVENOUS at 05:07

## 2018-07-14 NOTE — PROVIDER PROGRESS NOTES - EMERGENCY DEPT.
Encounter Date: 7/14/2018    ED Physician Progress Notes        Physician Note:   23-year-old female presented with a chief complaint of lower abdominal pain.  The patient was turned over to me at the change of shift by Dr. Lee, pending the results of her CT scan.  The patient's CT scan was significant for a moderate amount of stool without evidence of obstruction or other acute abnormalities.  The patient is likely constipated.  She is stable for discharge to home.  She will be discharged home with p.o. MiraLax and she is to follow up with her PCP for further care.

## 2018-07-14 NOTE — ED PROVIDER NOTES
Chief complaint:  Abdominal Pain (x 1 week)      HPI:  Mary Myles is a 23 y.o. female with hx fibromyalgia, PCOS, schizoaffective and borderline personality disorder, and numerous prior ED visits for abdominal pain presenting with a 4 day history of nonbilious, nonbloody emesis with decreased oral intake accompanied by bilateral quadrant lower abdominal pain worse on the right.  Pain is moderate to severe but waxes and wanes.  She denies radiation or migration.  Decreased bowel movements but no diarrhea.  No measured fevers at home.  No similar prior history of pain in the lower abdomen.  She denies prior abdominal surgeries.  She has irregular menstrual periods secondary to PCOS but denies abnormal pelvic pain, vaginal bleeding, or vaginal discharge. No dysuria, urinary frequency, urgency, or hematuria.    ROS: As per HPI and below:  No headache, neck pain, chest pain, shortness of breath, hematemesis, fever, rashes, swelling, numbness, weakness, syncope, dysuria. .  The patient/family denies dysphagia, joint swelling, easy bruising.    Review of patient's allergies indicates:   Allergen Reactions    Chantix [varenicline] Hives       Discharge Medication List as of 7/14/2018  8:40 AM      START taking these medications    Details   polyethylene glycol (GLYCOLAX) 17 gram/dose powder Take 17 g by mouth once daily., Starting Sat 7/14/2018, Print         CONTINUE these medications which have NOT CHANGED    Details   benzonatate (TESSALON) 100 MG capsule Take 2 capsules (200 mg total) by mouth 3 (three) times daily as needed for Cough., Starting Wed 5/30/2018, Print      butalbital-acetaminophen-caffeine -40 mg (FIORICET, ESGIC) -40 mg per tablet Take 1 tablet by mouth every 4 (four) hours as needed for Headaches., Starting Wed 7/11/2018, Until Fri 8/10/2018, Print      cloNIDine (CATAPRES) 0.1 MG tablet Take 1 tablet (0.1 mg total) by mouth 2 (two) times daily., Starting Wed 11/1/2017, Until Thu  "11/1/2018, Normal      diphenhydrAMINE (BENADRYL) 25 mg capsule Take 1 each (25 mg total) by mouth every 6 (six) hours as needed for Itching., Starting Sun 5/13/2018, Print      famotidine (PEPCID) 20 MG tablet Take 1 tablet (20 mg total) by mouth 2 (two) times daily., Starting Sun 5/13/2018, Until Fri 5/18/2018, Print      FLUoxetine (PROZAC) 20 MG capsule Take 20 mg by mouth once daily., Historical Med      ondansetron (ZOFRAN) 4 MG tablet Take 1 tablet (4 mg total) by mouth every 6 (six) hours as needed for Nausea., Starting Wed 5/23/2018, Print      ondansetron (ZOFRAN-ODT) 8 MG TbDL Take 1 tablet (8 mg total) by mouth 3 (three) times daily as needed (nausea and vomiting)., Starting Sat 6/23/2018, Print      pantoprazole (PROTONIX) 40 MG tablet Take 1 tablet (40 mg total) by mouth once daily., Starting Wed 5/23/2018, Until Thu 5/23/2019, Print      promethazine (PHENERGAN) 25 MG suppository Place 1 suppository (25 mg total) rectally every 6 (six) hours as needed for Nausea., Starting Wed 7/11/2018, Print      sucralfate (CARAFATE) 1 gram tablet Take 1 tablet (1 g total) by mouth 4 (four) times daily before meals and nightly., Starting Wed 4/25/2018, Print      topiramate (TOPAMAX) 100 MG tablet Take 1 tablet (100 mg total) by mouth 2 (two) times daily., Starting Tue 1/30/2018, Until Wed 1/30/2019, Normal             PMH:  As per HPI and below:  Past Medical History:   Diagnosis Date    ADD (attention deficit disorder)     Anemia     Borderline personality disorder     Chronic headache     Depression     Fibromyalgia     GERD (gastroesophageal reflux disease)     Migraine headache     MALCOLM (obstructive sleep apnea)     PCOS (polycystic ovarian syndrome)     Pseudotumor cerebri syndrome     Schizoaffective disorder     Tourette's syndrome      Past Surgical History:   Procedure Laterality Date    TONSILLECTOMY      UPPER GASTROINTESTINAL ENDOSCOPY  in her childhood    "born with underdeveloped " "esophagus" & GERD per patient report    WISDOM TOOTH EXTRACTION         Social History     Social History    Marital status: Single     Spouse name: N/A    Number of children: N/A    Years of education: N/A     Social History Main Topics    Smoking status: Current Every Day Smoker     Packs/day: 1.00     Types: Cigarettes    Smokeless tobacco: Never Used    Alcohol use No    Drug use: No      Comment: patient states she quit mojo 3 months ago and marijuana 2 months ago    Sexual activity: No     Other Topics Concern    Not on file     Social History Narrative    No narrative on file       Family History   Problem Relation Age of Onset    Seizures Mother     Migraines Mother     Migraines Brother     Colon cancer Neg Hx     Colon polyps Neg Hx     Crohn's disease Neg Hx     Ulcerative colitis Neg Hx     Stomach cancer Neg Hx     Esophageal cancer Neg Hx        Physical Exam:    Vitals:    07/14/18 0818   BP: 107/63   Pulse: 65   Resp: 16   Temp:      GENERAL:  No apparent distress.  Alert.    HEENT:  Moist mucous membranes.  Normocephalic and atraumatic.    NECK:  No swelling.  Midline trachea.   CARDIOVASCULAR:  Regular rate and rhythm.  2+ radial pulses.    PULMONARY:  Lungs clear to auscultation bilaterally.  No wheezes, rales, or rhonci.    ABDOMEN:  Epigastric as well as bilateral lower quadrant tenderness with mild voluntary guarding in all 3 locations.  No involuntary guarding, distention, masses, palpable hernias.  EXTREMITIES:  Warm and well perfused.  Brisk capillary refill.  2+ DP and PT pulses.  NEUROLOGICAL:  Normal mental status.  Appropriate and conversant.    SKIN:  No rashes or ecchymoses.    BACK:  Atraumatic.  No CVA tenderness to palpation.      Labs Reviewed   CBC W/ AUTO DIFFERENTIAL - Abnormal; Notable for the following:        Result Value    RDW 15.4 (*)     MPV 8.1 (*)     All other components within normal limits   COMPREHENSIVE METABOLIC PANEL - Abnormal; Notable for " the following:     Glucose 115 (*)     Anion Gap 6 (*)     All other components within normal limits   LIPASE   POCT URINE PREGNANCY       Discharge Medication List as of 7/14/2018  8:40 AM      START taking these medications    Details   polyethylene glycol (GLYCOLAX) 17 gram/dose powder Take 17 g by mouth once daily., Starting Sat 7/14/2018, Print         CONTINUE these medications which have NOT CHANGED    Details   benzonatate (TESSALON) 100 MG capsule Take 2 capsules (200 mg total) by mouth 3 (three) times daily as needed for Cough., Starting Wed 5/30/2018, Print      butalbital-acetaminophen-caffeine -40 mg (FIORICET, ESGIC) -40 mg per tablet Take 1 tablet by mouth every 4 (four) hours as needed for Headaches., Starting Wed 7/11/2018, Until Fri 8/10/2018, Print      cloNIDine (CATAPRES) 0.1 MG tablet Take 1 tablet (0.1 mg total) by mouth 2 (two) times daily., Starting Wed 11/1/2017, Until Thu 11/1/2018, Normal      diphenhydrAMINE (BENADRYL) 25 mg capsule Take 1 each (25 mg total) by mouth every 6 (six) hours as needed for Itching., Starting Sun 5/13/2018, Print      famotidine (PEPCID) 20 MG tablet Take 1 tablet (20 mg total) by mouth 2 (two) times daily., Starting Sun 5/13/2018, Until Fri 5/18/2018, Print      FLUoxetine (PROZAC) 20 MG capsule Take 20 mg by mouth once daily., Historical Med      ondansetron (ZOFRAN) 4 MG tablet Take 1 tablet (4 mg total) by mouth every 6 (six) hours as needed for Nausea., Starting Wed 5/23/2018, Print      ondansetron (ZOFRAN-ODT) 8 MG TbDL Take 1 tablet (8 mg total) by mouth 3 (three) times daily as needed (nausea and vomiting)., Starting Sat 6/23/2018, Print      pantoprazole (PROTONIX) 40 MG tablet Take 1 tablet (40 mg total) by mouth once daily., Starting Wed 5/23/2018, Until Thu 5/23/2019, Print      promethazine (PHENERGAN) 25 MG suppository Place 1 suppository (25 mg total) rectally every 6 (six) hours as needed for Nausea., Starting Wed 7/11/2018, Print       sucralfate (CARAFATE) 1 gram tablet Take 1 tablet (1 g total) by mouth 4 (four) times daily before meals and nightly., Starting Wed 4/25/2018, Print      topiramate (TOPAMAX) 100 MG tablet Take 1 tablet (100 mg total) by mouth 2 (two) times daily., Starting Tue 1/30/2018, Until Wed 1/30/2019, Normal             Orders Placed This Encounter   Procedures    CT Abdomen Pelvis With Contrast    CBC auto differential    Lipase    Comprehensive metabolic panel    POCT urine pregnancy    Insert peripheral IV       Imaging Results          CT Abdomen Pelvis With Contrast (Final result)  Result time 07/14/18 08:44:51    Final result by Alejandra Mcghee MD (07/14/18 08:44:51)                 Impression:    .    No acute findings    Final read      Electronically signed by: Alejandra Mcghee MD  Date:    07/14/2018  Time:    08:44             Narrative:    EXAMINATION:  CT ABDOMEN PELVIS WITH CONTRAST    CLINICAL HISTORY:  Lower abdominal pain worse on R, r/o appendicitis, abscess;    TECHNIQUE:  Low dose axial images, sagittal and coronal reformations were obtained from the lung bases to the pubic symphysis following the IV administration of 100 mL of Omnipaque 350 and without PO contrast    COMPARISON:  None.    FINDINGS:  There are no radiopaque stones in the gallbladder or CT findings of acute cholecystitis.  Liver, spleen, kidneys, pancreas, adrenal glands unremarkable appearance.  Abdominal aorta is non aneurysmal.  There are adnexal hypodensities consistent ovarian cysts larger more defined on the left measuring 11 mm    The appendix is normal in appearance.    There is mildly prominent amount of stool in colon.    There is no free intraperitoneal air or fluid    There is spondylolysis of L5 on the right.                            (CT pending at signout to Dr. Russell)         MDM:    23 y.o. female with lower abdominal pain of uncertain etiology with several days of emesis. I have discussed the risks,  benefits, and alternative of CT scan with the patient.  Risks include increased of a future malignancy that could be fatal with ionizing radiation exposure as well as IV dye injury to the kidneys possibly leading to renal failure if IV dye is required.  There is also the opposing risk of missed or delayed diagnosis if a CT is not performed today.  The patient understands these issues and was able to repeat them back to me in an intelligible manner.  Patient in favor of further imaging to rule out emergent causes such as appendicitis, abscess, diverticulitis.  No pelvic complaints with low suspicion for PID.  I doubt urinary etiology given lack of other urinary symptoms. UTI is unlikely.  Toradol IV given for pain control initially along with IV fluids and Zofran for antiemetic.    Patient more comfortable following ketorolac and acetaminophen here.  CT pending at time of signout to Dr. Russell with anticipated discharge home if imaging negative.  Patient updated on pending studies and plan of care.    Diagnoses:    1. Lower abdominal pain  2. Vomiting     Ty Lee MD  07/14/18 2471

## 2018-07-28 ENCOUNTER — HOSPITAL ENCOUNTER (EMERGENCY)
Facility: HOSPITAL | Age: 23
Discharge: HOME | End: 2018-07-28
Attending: EMERGENCY MEDICINE
Payer: MEDICARE

## 2018-07-28 VITALS
TEMPERATURE: 98 F | RESPIRATION RATE: 18 BRPM | WEIGHT: 237 LBS | BODY MASS INDEX: 38.09 KG/M2 | SYSTOLIC BLOOD PRESSURE: 124 MMHG | HEIGHT: 66 IN | HEART RATE: 83 BPM | DIASTOLIC BLOOD PRESSURE: 75 MMHG | OXYGEN SATURATION: 96 %

## 2018-07-28 DIAGNOSIS — K21.9 GASTROESOPHAGEAL REFLUX DISEASE, ESOPHAGITIS PRESENCE NOT SPECIFIED: ICD-10-CM

## 2018-07-28 DIAGNOSIS — R11.10 VOMITING, INTRACTABILITY OF VOMITING NOT SPECIFIED, PRESENCE OF NAUSEA NOT SPECIFIED, UNSPECIFIED VOMITING TYPE: Primary | ICD-10-CM

## 2018-07-28 LAB
ANION GAP SERPL CALC-SCNC: 10 MMOL/L
B-HCG UR QL: NEGATIVE
BASOPHILS # BLD AUTO: 0.1 K/UL
BASOPHILS NFR BLD: 0.7 %
BUN SERPL-MCNC: 11 MG/DL
CALCIUM SERPL-MCNC: 9.5 MG/DL
CHLORIDE SERPL-SCNC: 109 MMOL/L
CO2 SERPL-SCNC: 22 MMOL/L
CREAT SERPL-MCNC: 0.6 MG/DL
CTP QC/QA: YES
DIFFERENTIAL METHOD: ABNORMAL
EOSINOPHIL # BLD AUTO: 0.2 K/UL
EOSINOPHIL NFR BLD: 2.5 %
ERYTHROCYTE [DISTWIDTH] IN BLOOD BY AUTOMATED COUNT: 14.4 %
EST. GFR  (AFRICAN AMERICAN): >60 ML/MIN/1.73 M^2
EST. GFR  (NON AFRICAN AMERICAN): >60 ML/MIN/1.73 M^2
GLUCOSE SERPL-MCNC: 105 MG/DL
HCT VFR BLD AUTO: 41.6 %
HGB BLD-MCNC: 13.8 G/DL
LYMPHOCYTES # BLD AUTO: 2.9 K/UL
LYMPHOCYTES NFR BLD: 29.4 %
MCH RBC QN AUTO: 28.9 PG
MCHC RBC AUTO-ENTMCNC: 33.1 G/DL
MCV RBC AUTO: 87 FL
MONOCYTES # BLD AUTO: 0.7 K/UL
MONOCYTES NFR BLD: 7 %
NEUTROPHILS # BLD AUTO: 6 K/UL
NEUTROPHILS NFR BLD: 60.4 %
PLATELET # BLD AUTO: 331 K/UL
PMV BLD AUTO: 8.3 FL
POTASSIUM SERPL-SCNC: 3.7 MMOL/L
RBC # BLD AUTO: 4.77 M/UL
SODIUM SERPL-SCNC: 141 MMOL/L
WBC # BLD AUTO: 9.9 K/UL

## 2018-07-28 PROCEDURE — 96372 THER/PROPH/DIAG INJ SC/IM: CPT

## 2018-07-28 PROCEDURE — 25000003 PHARM REV CODE 250: Performed by: EMERGENCY MEDICINE

## 2018-07-28 PROCEDURE — 99283 EMERGENCY DEPT VISIT LOW MDM: CPT | Mod: 25

## 2018-07-28 PROCEDURE — 81025 URINE PREGNANCY TEST: CPT | Performed by: EMERGENCY MEDICINE

## 2018-07-28 PROCEDURE — 36415 COLL VENOUS BLD VENIPUNCTURE: CPT

## 2018-07-28 PROCEDURE — 85025 COMPLETE CBC W/AUTO DIFF WBC: CPT

## 2018-07-28 PROCEDURE — 80048 BASIC METABOLIC PNL TOTAL CA: CPT

## 2018-07-28 PROCEDURE — 63600175 PHARM REV CODE 636 W HCPCS: Performed by: EMERGENCY MEDICINE

## 2018-07-28 RX ORDER — FAMOTIDINE 20 MG/1
20 TABLET, FILM COATED ORAL 2 TIMES DAILY
Qty: 60 TABLET | Refills: 0 | Status: SHIPPED | OUTPATIENT
Start: 2018-07-28 | End: 2018-08-06

## 2018-07-28 RX ORDER — KETOROLAC TROMETHAMINE 30 MG/ML
60 INJECTION, SOLUTION INTRAMUSCULAR; INTRAVENOUS
Status: COMPLETED | OUTPATIENT
Start: 2018-07-28 | End: 2018-07-28

## 2018-07-28 RX ADMIN — KETOROLAC TROMETHAMINE 60 MG: 30 INJECTION, SOLUTION INTRAMUSCULAR at 06:07

## 2018-07-28 RX ADMIN — LIDOCAINE HYDROCHLORIDE: 20 SOLUTION ORAL; TOPICAL at 06:07

## 2018-07-28 NOTE — ED NOTES
Patient reports she wakes up every morning with this feeling, she describes the feeling as hung over and she reports intermittent dizziness each morning that subsides as the day progresses. She reports vomiting mucous that was also blood tinged prior to arrival this morning.

## 2018-07-29 NOTE — ED PROVIDER NOTES
"Encounter Date: 7/28/2018       History     Chief Complaint   Patient presents with    Hematemesis     1 episode prior to arrival. Reports feeling hungover however she reports 1 glass of wine last night      This patient is a 23-year-old female presenting to the emergency department with complaints of 1 episode of vomiting this morning.  She reports it appeared to have red material and she is concerned that it may be blood.  Currently she denies ongoing abdominal pain but reports a long history of reflux disease.  She reports she has not been taking her reflux medication for some time secondary to not having money.  When questioned about what she has been eating and drinking, she reports drinking red wine last night.  She denies associated fever, black tarry stools, previous history of peptic ulcer disease, low abdominal pain, fever.  She denies taking anything for symptoms prior to arrival.          Review of patient's allergies indicates:   Allergen Reactions    Chantix [varenicline] Hives     Past Medical History:   Diagnosis Date    ADD (attention deficit disorder)     Anemia     Borderline personality disorder     Chronic headache     Depression     Fibromyalgia     GERD (gastroesophageal reflux disease)     Migraine headache     MALCOLM (obstructive sleep apnea)     PCOS (polycystic ovarian syndrome)     Pseudotumor cerebri syndrome     Schizoaffective disorder     Tourette's syndrome      Past Surgical History:   Procedure Laterality Date    TONSILLECTOMY      UPPER GASTROINTESTINAL ENDOSCOPY  in her childhood    "born with underdeveloped esophagus" & GERD per patient report    WISDOM TOOTH EXTRACTION       Family History   Problem Relation Age of Onset    Seizures Mother     Migraines Mother     Migraines Brother     Colon cancer Neg Hx     Colon polyps Neg Hx     Crohn's disease Neg Hx     Ulcerative colitis Neg Hx     Stomach cancer Neg Hx     Esophageal cancer Neg Hx      Social " History   Substance Use Topics    Smoking status: Current Every Day Smoker     Packs/day: 1.00     Types: Cigarettes    Smokeless tobacco: Never Used    Alcohol use No     Review of Systems   Constitutional: Negative for fever.   Respiratory: Negative for shortness of breath.    Cardiovascular: Negative for chest pain.   Gastrointestinal: Positive for vomiting.   Genitourinary: Negative for dysuria.   Hematological: Does not bruise/bleed easily.       Physical Exam     Initial Vitals [07/28/18 0524]   BP Pulse Resp Temp SpO2   132/78 89 18 97.7 °F (36.5 °C) 97 %      MAP       --         Physical Exam    Nursing note and vitals reviewed.  Constitutional: She appears well-nourished. No distress.   HENT:   Head: Normocephalic.   Mouth/Throat: Oropharynx is clear and moist.   Eyes: Conjunctivae and EOM are normal.   Neck: Normal range of motion. Neck supple.   Cardiovascular: Normal rate and regular rhythm.   Pulmonary/Chest: No respiratory distress. She has no wheezes.   Abdominal: She exhibits no distension. There is no tenderness.   Musculoskeletal: Normal range of motion. She exhibits no edema.   Neurological: She is alert and oriented to person, place, and time.   Skin: Skin is warm and dry. No erythema.   Psychiatric: She has a normal mood and affect. Thought content normal.         ED Course   Procedures  Labs Reviewed   CBC W/ AUTO DIFFERENTIAL - Abnormal; Notable for the following:        Result Value    MPV 8.3 (*)     All other components within normal limits   BASIC METABOLIC PANEL - Abnormal; Notable for the following:     CO2 22 (*)     All other components within normal limits   POCT URINE PREGNANCY          Imaging Results    None          Medical Decision Making:   ED Management:  This patient was interviewed and examined emergently.  Vital signs are stable. Historical elements that are significant is the ingestion of red wine, which I think may have caused the red coloration of her single episode  of vomitus this morning.  Labs found to be unremarkable for evidence of acute blood loss anemia.  The patient had resolution of symptoms with a GI cocktail, IV antiemetic and IM Toradol.  She is asked to follow up with her primary care doctor as soon as possible regarding expected improvement.  She is strongly requested to re-initiate reflux disease therapy and was provided a prescription for this.  She is asked to return to the ER for any new, concerning, or worsening symptoms.  Patient was agreeable with this plan for follow-up and she was discharged in stable condition.                      Clinical Impression:   The primary encounter diagnosis was Vomiting, intractability of vomiting not specified, presence of nausea not specified, unspecified vomiting type. A diagnosis of Gastroesophageal reflux disease, esophagitis presence not specified was also pertinent to this visit.      Disposition:   Disposition: Discharged  Condition: Stable                        Hawk Kilgore MD  07/29/18 0530

## 2018-07-31 ENCOUNTER — HOSPITAL ENCOUNTER (EMERGENCY)
Facility: HOSPITAL | Age: 23
Discharge: HOME | End: 2018-07-31
Attending: EMERGENCY MEDICINE
Payer: MEDICARE

## 2018-07-31 VITALS
SYSTOLIC BLOOD PRESSURE: 117 MMHG | HEART RATE: 54 BPM | HEIGHT: 66 IN | TEMPERATURE: 98 F | RESPIRATION RATE: 16 BRPM | DIASTOLIC BLOOD PRESSURE: 73 MMHG | OXYGEN SATURATION: 95 % | WEIGHT: 240 LBS | BODY MASS INDEX: 38.57 KG/M2

## 2018-07-31 DIAGNOSIS — R11.10 VOMITING, INTRACTABILITY OF VOMITING NOT SPECIFIED, PRESENCE OF NAUSEA NOT SPECIFIED, UNSPECIFIED VOMITING TYPE: Primary | ICD-10-CM

## 2018-07-31 LAB
ALBUMIN SERPL BCP-MCNC: 3.4 G/DL
ALP SERPL-CCNC: 65 U/L
ALT SERPL W/O P-5'-P-CCNC: 13 U/L
ANION GAP SERPL CALC-SCNC: 11 MMOL/L
APTT BLDCRRT: 29.1 SEC
AST SERPL-CCNC: 9 U/L
B-HCG UR QL: NEGATIVE
BASOPHILS # BLD AUTO: 0.1 K/UL
BASOPHILS NFR BLD: 0.6 %
BILIRUB SERPL-MCNC: 0.7 MG/DL
BUN SERPL-MCNC: 10 MG/DL
CALCIUM SERPL-MCNC: 9.1 MG/DL
CHLORIDE SERPL-SCNC: 105 MMOL/L
CO2 SERPL-SCNC: 22 MMOL/L
CREAT SERPL-MCNC: 0.6 MG/DL
CTP QC/QA: YES
DIFFERENTIAL METHOD: ABNORMAL
EOSINOPHIL # BLD AUTO: 0.4 K/UL
EOSINOPHIL NFR BLD: 3.4 %
ERYTHROCYTE [DISTWIDTH] IN BLOOD BY AUTOMATED COUNT: 14.4 %
EST. GFR  (AFRICAN AMERICAN): >60 ML/MIN/1.73 M^2
EST. GFR  (NON AFRICAN AMERICAN): >60 ML/MIN/1.73 M^2
GLUCOSE SERPL-MCNC: 99 MG/DL
HCT VFR BLD AUTO: 37.9 %
HGB BLD-MCNC: 12.6 G/DL
INR PPP: 1
LIPASE SERPL-CCNC: 10 U/L
LYMPHOCYTES # BLD AUTO: 3.2 K/UL
LYMPHOCYTES NFR BLD: 28.6 %
MCH RBC QN AUTO: 28.8 PG
MCHC RBC AUTO-ENTMCNC: 33.2 G/DL
MCV RBC AUTO: 87 FL
MONOCYTES # BLD AUTO: 0.7 K/UL
MONOCYTES NFR BLD: 6.1 %
NEUTROPHILS # BLD AUTO: 6.8 K/UL
NEUTROPHILS NFR BLD: 61.3 %
PLATELET # BLD AUTO: 297 K/UL
PMV BLD AUTO: 8.1 FL
POTASSIUM SERPL-SCNC: 3.8 MMOL/L
PROT SERPL-MCNC: 6.3 G/DL
PROTHROMBIN TIME: 10.5 SEC
RBC # BLD AUTO: 4.37 M/UL
SODIUM SERPL-SCNC: 138 MMOL/L
WBC # BLD AUTO: 11.1 K/UL

## 2018-07-31 PROCEDURE — 36415 COLL VENOUS BLD VENIPUNCTURE: CPT

## 2018-07-31 PROCEDURE — 81025 URINE PREGNANCY TEST: CPT | Performed by: EMERGENCY MEDICINE

## 2018-07-31 PROCEDURE — 63600175 PHARM REV CODE 636 W HCPCS: Performed by: EMERGENCY MEDICINE

## 2018-07-31 PROCEDURE — 96374 THER/PROPH/DIAG INJ IV PUSH: CPT

## 2018-07-31 PROCEDURE — 85025 COMPLETE CBC W/AUTO DIFF WBC: CPT

## 2018-07-31 PROCEDURE — 85730 THROMBOPLASTIN TIME PARTIAL: CPT

## 2018-07-31 PROCEDURE — 99284 EMERGENCY DEPT VISIT MOD MDM: CPT | Mod: 25

## 2018-07-31 PROCEDURE — 83690 ASSAY OF LIPASE: CPT

## 2018-07-31 PROCEDURE — 85610 PROTHROMBIN TIME: CPT

## 2018-07-31 PROCEDURE — 96375 TX/PRO/DX INJ NEW DRUG ADDON: CPT

## 2018-07-31 PROCEDURE — 80053 COMPREHEN METABOLIC PANEL: CPT

## 2018-07-31 PROCEDURE — C9113 INJ PANTOPRAZOLE SODIUM, VIA: HCPCS | Performed by: EMERGENCY MEDICINE

## 2018-07-31 PROCEDURE — 25000003 PHARM REV CODE 250: Performed by: EMERGENCY MEDICINE

## 2018-07-31 RX ORDER — ONDANSETRON 4 MG/1
4 TABLET, ORALLY DISINTEGRATING ORAL
Status: COMPLETED | OUTPATIENT
Start: 2018-07-31 | End: 2018-07-31

## 2018-07-31 RX ORDER — ONDANSETRON 4 MG/1
4 TABLET, ORALLY DISINTEGRATING ORAL EVERY 8 HOURS PRN
Qty: 12 TABLET | Refills: 0 | Status: SHIPPED | OUTPATIENT
Start: 2018-07-31 | End: 2018-08-06

## 2018-07-31 RX ORDER — PANTOPRAZOLE SODIUM 40 MG/10ML
40 INJECTION, POWDER, LYOPHILIZED, FOR SOLUTION INTRAVENOUS
Status: COMPLETED | OUTPATIENT
Start: 2018-07-31 | End: 2018-07-31

## 2018-07-31 RX ORDER — MORPHINE SULFATE 4 MG/ML
4 INJECTION, SOLUTION INTRAMUSCULAR; INTRAVENOUS
Status: COMPLETED | OUTPATIENT
Start: 2018-07-31 | End: 2018-07-31

## 2018-07-31 RX ORDER — PANTOPRAZOLE SODIUM 20 MG/1
20 TABLET, DELAYED RELEASE ORAL DAILY
Qty: 30 TABLET | Refills: 0 | Status: SHIPPED | OUTPATIENT
Start: 2018-07-31 | End: 2019-07-31

## 2018-07-31 RX ADMIN — PANTOPRAZOLE SODIUM 40 MG: 40 INJECTION, POWDER, LYOPHILIZED, FOR SOLUTION INTRAVENOUS at 07:07

## 2018-07-31 RX ADMIN — ONDANSETRON 4 MG: 4 TABLET, ORALLY DISINTEGRATING ORAL at 07:07

## 2018-07-31 RX ADMIN — MORPHINE SULFATE 4 MG: 4 INJECTION, SOLUTION INTRAMUSCULAR; INTRAVENOUS at 07:07

## 2018-07-31 NOTE — ED PROVIDER NOTES
"Encounter Date: 7/31/2018    SCRIBE #1 NOTE: I, Traci Cosby, am scribing for, and in the presence of, Dr. Lopez .       History     Chief Complaint   Patient presents with    Hematemesis     vomited times one with blood in it and happened 2 days ago also    Dizziness       Time seen by provider: 7:19 AM on 07/31/2018    Mary Myles is a 23 y.o. female with a hx of GERD, PCOS and Migraines who presents to the ED with c/o vomiting bright red blood this morning. Pt was awakened by the sx. She reports 2 episodes of the same a few days ago. Associated sx are nausea, fatigue.  She reports several similar episodes in the past.  She is not taking anything for stomach ulcers or gastritis.  She was seen in the ED for the same 2 days ago. Allergens include Chantix.       The history is provided by the patient.     Review of patient's allergies indicates:   Allergen Reactions    Chantix [varenicline] Hives     Past Medical History:   Diagnosis Date    ADD (attention deficit disorder)     Anemia     Borderline personality disorder     Chronic headache     Depression     Fibromyalgia     GERD (gastroesophageal reflux disease)     Migraine headache     MALCOLM (obstructive sleep apnea)     PCOS (polycystic ovarian syndrome)     Pseudotumor cerebri syndrome     Schizoaffective disorder     Tourette's syndrome      Past Surgical History:   Procedure Laterality Date    TONSILLECTOMY      UPPER GASTROINTESTINAL ENDOSCOPY  in her childhood    "born with underdeveloped esophagus" & GERD per patient report    WISDOM TOOTH EXTRACTION       Family History   Problem Relation Age of Onset    Seizures Mother     Migraines Mother     Migraines Brother     Colon cancer Neg Hx     Colon polyps Neg Hx     Crohn's disease Neg Hx     Ulcerative colitis Neg Hx     Stomach cancer Neg Hx     Esophageal cancer Neg Hx      Social History   Substance Use Topics    Smoking status: Current Every Day Smoker     Packs/day: " 1.00     Types: Cigarettes    Smokeless tobacco: Never Used    Alcohol use No     Review of Systems   Constitutional: Positive for fatigue. Negative for fever.   Respiratory: Negative for shortness of breath.    Cardiovascular: Negative for chest pain.   Gastrointestinal: Positive for blood in stool, nausea and vomiting (Hematemesis ).   Genitourinary: Negative for dysuria and flank pain.   Musculoskeletal: Negative for back pain.   Skin: Negative for rash.   Neurological: Negative for weakness.   Hematological: Does not bruise/bleed easily.       Physical Exam     Initial Vitals [07/31/18 0635]   BP Pulse Resp Temp SpO2   129/66 62 16 97.5 °F (36.4 °C) 96 %      MAP       --         Physical Exam    Nursing note and vitals reviewed.  Constitutional: She appears well-developed and well-nourished. She is not diaphoretic. No distress.   No distress   HENT:   Head: Normocephalic and atraumatic.   Eyes: EOM are normal.   Neck: Normal range of motion. Neck supple.   Cardiovascular: Normal rate, regular rhythm and normal heart sounds. Exam reveals no gallop and no friction rub.    No murmur heard.  Pulmonary/Chest: Breath sounds normal. No respiratory distress. She has no wheezes. She has no rhonchi. She has no rales.   Abdominal: Soft. She exhibits no distension. There is tenderness in the epigastric area. There is no rebound.       Musculoskeletal: Normal range of motion.   Neurological: She is alert and oriented to person, place, and time.   Skin: Skin is warm and dry.   Psychiatric: She has a normal mood and affect. Her behavior is normal. Judgment and thought content normal.         ED Course   Procedures  Labs Reviewed   CBC W/ AUTO DIFFERENTIAL - Abnormal; Notable for the following:        Result Value    MPV 8.1 (*)     All other components within normal limits   APTT   PROTIME-INR   COMPREHENSIVE METABOLIC PANEL          Imaging Results    None          Medical Decision Making:   History:   Old Medical Records:  I decided to obtain old medical records.  Clinical Tests:   Lab Tests: Ordered and Reviewed            Scribe Attestation:   Scribe #1: I performed the above scribed service and the documentation accurately describes the services I performed. I attest to the accuracy of the note.    I, Dr. Lopez, personally performed the services described in this documentation. All medical record entries made by the scribe were at my direction and in my presence.  I have reviewed the chart and agree that the record reflects my personal performance and is accurate and complete.9:01 AM 07/31/2018            ED Course as of Jul 31 0900   Tue Jul 31, 2018 0704 BP: 129/66 [EF]   0704 Temp: 97.5 °F (36.4 °C) [EF]   0704 Temp src: Oral [EF]   0704 Pulse: 62 [EF]   0704 Resp: 16 [EF]   0704 SpO2: 96 % [EF]   0800 Preg Test, Ur: Negative [EF]   0833 Lipase: 10 [EF]   0838 23-year-old female presents with several days of vomiting with some blood-streaked vomit.  Well-appearing in the emergency room some minimal epigastric tenderness that has resolved with a PPI.  No vomiting in the emergency room labs are unremarkable. Patient will be discharged with Zofran and a PPI.  Follow-up primary care return if symptoms worsen or change.  [EF]      ED Course User Index  [EF] Alexey Lopez MD     Clinical Impression:   No diagnosis found.                             Alexey Lopez MD  07/31/18 0901

## 2018-07-31 NOTE — ED NOTES
Pt resting in bed awake and alert.  No witnessed vomiting though pt admits to having bright red emesis this am prior to admit.

## 2018-08-06 ENCOUNTER — HOSPITAL ENCOUNTER (EMERGENCY)
Facility: HOSPITAL | Age: 23
Discharge: HOME | End: 2018-08-07
Attending: EMERGENCY MEDICINE
Payer: MEDICARE

## 2018-08-06 DIAGNOSIS — G44.89 OTHER HEADACHE SYNDROME: Primary | ICD-10-CM

## 2018-08-06 LAB
B-HCG UR QL: NEGATIVE
CTP QC/QA: YES

## 2018-08-06 PROCEDURE — 96365 THER/PROPH/DIAG IV INF INIT: CPT

## 2018-08-06 PROCEDURE — 25000003 PHARM REV CODE 250: Performed by: EMERGENCY MEDICINE

## 2018-08-06 PROCEDURE — 96361 HYDRATE IV INFUSION ADD-ON: CPT

## 2018-08-06 PROCEDURE — 99284 EMERGENCY DEPT VISIT MOD MDM: CPT | Mod: 25

## 2018-08-06 PROCEDURE — 81025 URINE PREGNANCY TEST: CPT | Performed by: EMERGENCY MEDICINE

## 2018-08-06 PROCEDURE — 96375 TX/PRO/DX INJ NEW DRUG ADDON: CPT

## 2018-08-06 PROCEDURE — 63600175 PHARM REV CODE 636 W HCPCS: Performed by: EMERGENCY MEDICINE

## 2018-08-06 RX ORDER — ACETAMINOPHEN 10 MG/ML
1000 INJECTION, SOLUTION INTRAVENOUS
Status: COMPLETED | OUTPATIENT
Start: 2018-08-06 | End: 2018-08-06

## 2018-08-06 RX ORDER — BUTALBITAL, ACETAMINOPHEN AND CAFFEINE 50; 325; 40 MG/1; MG/1; MG/1
2 TABLET ORAL
Status: COMPLETED | OUTPATIENT
Start: 2018-08-06 | End: 2018-08-06

## 2018-08-06 RX ORDER — DIAZEPAM 5 MG/1
10 TABLET ORAL
Status: COMPLETED | OUTPATIENT
Start: 2018-08-06 | End: 2018-08-06

## 2018-08-06 RX ORDER — ACETAMINOPHEN 10 MG/ML
1000 INJECTION, SOLUTION INTRAVENOUS EVERY 8 HOURS
Status: DISCONTINUED | OUTPATIENT
Start: 2018-08-07 | End: 2018-08-06

## 2018-08-06 RX ORDER — KETOROLAC TROMETHAMINE 30 MG/ML
15 INJECTION, SOLUTION INTRAMUSCULAR; INTRAVENOUS
Status: COMPLETED | OUTPATIENT
Start: 2018-08-06 | End: 2018-08-06

## 2018-08-06 RX ADMIN — ACETAMINOPHEN 1000 MG: 10 INJECTION, SOLUTION INTRAVENOUS at 10:08

## 2018-08-06 RX ADMIN — BUTALBITAL, ACETAMINOPHEN, AND CAFFEINE 2 TABLET: 50; 325; 40 TABLET ORAL at 11:08

## 2018-08-06 RX ADMIN — SODIUM CHLORIDE 1000 ML: 0.9 INJECTION, SOLUTION INTRAVENOUS at 09:08

## 2018-08-06 RX ADMIN — DIAZEPAM 10 MG: 5 TABLET ORAL at 09:08

## 2018-08-06 RX ADMIN — KETOROLAC TROMETHAMINE 15 MG: 30 INJECTION, SOLUTION INTRAMUSCULAR at 09:08

## 2018-08-07 VITALS
SYSTOLIC BLOOD PRESSURE: 133 MMHG | RESPIRATION RATE: 16 BRPM | WEIGHT: 241.88 LBS | HEIGHT: 66 IN | BODY MASS INDEX: 38.87 KG/M2 | TEMPERATURE: 98 F | HEART RATE: 76 BPM | DIASTOLIC BLOOD PRESSURE: 78 MMHG | OXYGEN SATURATION: 95 %

## 2018-08-07 PROCEDURE — 63600175 PHARM REV CODE 636 W HCPCS: Performed by: EMERGENCY MEDICINE

## 2018-08-07 RX ORDER — ONDANSETRON 4 MG/1
4 TABLET, FILM COATED ORAL EVERY 6 HOURS
Qty: 12 TABLET | Refills: 0 | Status: SHIPPED | OUTPATIENT
Start: 2018-08-07

## 2018-08-07 RX ORDER — ONDANSETRON 2 MG/ML
4 INJECTION INTRAMUSCULAR; INTRAVENOUS
Status: COMPLETED | OUTPATIENT
Start: 2018-08-07 | End: 2018-08-07

## 2018-08-07 RX ORDER — PROMETHAZINE HYDROCHLORIDE 25 MG/1
25 TABLET ORAL EVERY 6 HOURS PRN
Qty: 15 TABLET | Refills: 0 | Status: SHIPPED | OUTPATIENT
Start: 2018-08-07 | End: 2018-08-08

## 2018-08-07 RX ORDER — BUTALBITAL, ACETAMINOPHEN AND CAFFEINE 50; 325; 40 MG/1; MG/1; MG/1
1 TABLET ORAL EVERY 8 HOURS PRN
Qty: 8 TABLET | Refills: 0 | Status: SHIPPED | OUTPATIENT
Start: 2018-08-07 | End: 2018-09-06

## 2018-08-07 RX ADMIN — ONDANSETRON 4 MG: 2 INJECTION INTRAMUSCULAR; INTRAVENOUS at 12:08

## 2018-08-07 NOTE — ED PROVIDER NOTES
"cEncounter Date: 8/6/2018    SCRIBE #1 NOTE: I, Andres Rodrigez, am scribing for, and in the presence of, Dr. Kilgore.       History     Chief Complaint   Patient presents with    Seizures     Can't talk and can't move eyes but can see. Goes in and out of conciousness. Pt says having partial seizures. Burning down the back of her head    Hair Loss     08/06/2018 9:30 PM    The pt is a 23 y.o. female with a past medical history of ADD, borderline personality disorder, chronic headache, depression, fibromyalgia, PCOS, pseudotumor cerebri syndrome, Schizoaffective disorder, and Tourette's syndrome presenting to the ED with a gradual onset of an acute seizure beginning 3 days ago. The pt reported she has been having partial seizures with a flashing sensation. Associated symptoms of tremors, visual disturbances, speech difficulty, and numbness. The pt stated she feels "spaced out." She noted symptoms were not improved after taking a shower. The pt reported she has a burning sensation which radiates from the back of her neck down her spine. She also c/o hair loss. The pt has a history of cigarette smoking. She has no pertinent past surgical history.       The history is provided by the patient.     Review of patient's allergies indicates:   Allergen Reactions    Chantix [varenicline] Hives     Past Medical History:   Diagnosis Date    ADD (attention deficit disorder)     Anemia     Borderline personality disorder     Chronic headache     Depression     Fibromyalgia     GERD (gastroesophageal reflux disease)     Migraine headache     MALCOLM (obstructive sleep apnea)     PCOS (polycystic ovarian syndrome)     Pseudotumor cerebri syndrome     Schizoaffective disorder     Tourette's syndrome      Past Surgical History:   Procedure Laterality Date    TONSILLECTOMY      UPPER GASTROINTESTINAL ENDOSCOPY  in her childhood    "born with underdeveloped esophagus" & GERD per patient report    WISDOM TOOTH EXTRACTION   "     Family History   Problem Relation Age of Onset    Seizures Mother     Migraines Mother     Migraines Brother     Colon cancer Neg Hx     Colon polyps Neg Hx     Crohn's disease Neg Hx     Ulcerative colitis Neg Hx     Stomach cancer Neg Hx     Esophageal cancer Neg Hx      Social History   Substance Use Topics    Smoking status: Former Smoker     Packs/day: 1.00     Types: Cigarettes    Smokeless tobacco: Never Used    Alcohol use No     Review of Systems   Constitutional: Negative for fever.   HENT: Negative for congestion.         + Hair loss   Eyes: Positive for visual disturbance.   Respiratory: Negative for wheezing.    Cardiovascular: Negative for chest pain.   Gastrointestinal: Negative for abdominal pain.   Genitourinary: Negative for dysuria.   Musculoskeletal: Negative for joint swelling.   Skin: Negative for rash.   Neurological: Positive for tremors, seizures and speech difficulty. Negative for syncope.        + Burning sensation   Hematological: Does not bruise/bleed easily.   Psychiatric/Behavioral: Negative for confusion. The patient is nervous/anxious.        Physical Exam     Initial Vitals [08/06/18 2119]   BP Pulse Resp Temp SpO2   133/75 85 16 98 °F (36.7 °C) 97 %      MAP       --         Physical Exam    Nursing note and vitals reviewed.  Constitutional: She appears well-developed and well-nourished.   HENT:   Head: Normocephalic and atraumatic.   Eyes: Conjunctivae and EOM are normal.   Neck: Normal range of motion. Neck supple. No thyroid mass present.   Cardiovascular: Normal rate, regular rhythm and normal heart sounds. Exam reveals no gallop and no friction rub.    No murmur heard.  Pulmonary/Chest: Breath sounds normal. She has no wheezes. She has no rhonchi. She has no rales.   Abdominal: Soft. Normal appearance and bowel sounds are normal. There is no tenderness.   Musculoskeletal: Normal range of motion.   Neurological: She is alert and oriented to person, place, and  time. She has normal strength. No cranial nerve deficit or sensory deficit.   Skin: Skin is warm and dry. No rash noted. No erythema.   Psychiatric: Her mood appears anxious (endorses). Cognition and memory are normal.         ED Course   Procedures  Labs Reviewed   POCT URINE PREGNANCY          Imaging Results          CT Head Without Contrast (In process)                  Medical Decision Making:   History:   Old Medical Records: I decided to obtain old medical records.  ED Management:  Patient was inteviewed and examined emergently in the presence of her father, noted to be neuro intact on initial exam. She denies having recent positive studies for IIH. VSS without concern for progressing CNS infection. CT scan negative for acute abnormality. Patient reported improvement with multiple headache medications and on final reassessment denied on going symptoms. Symptoms most probably related to complex migraine versus anxiety reactions. She is asked to f/u with her neurologist and PCP asap regarding ongoing symptomatology and potential remedies. Asked to return to the ED for any new, concerning or worsening symptoms. DC'd in improved condition with her father as a .              Scribe Attestation:   Scribe #1: I performed the above scribed service and the documentation accurately describes the services I performed. I attest to the accuracy of the note.               Clinical Impression:   The encounter diagnosis was Other headache syndrome.      Disposition:   Disposition: Discharged  Condition: Stable         I, Dr. Hawk Kilgore, personally performed the services described in this documentation. All medical record entries made by the scribe were at my direction and in my presence.  I have reviewed the chart and agree that the record reflects my personal performance and is accurate and complete. Hawk Kilgore MD.  11:21 AM 08/16/2018                     Hawk Kilgore MD  08/16/18 1122

## 2018-08-07 NOTE — ED NOTES
Assumed care:  Patient awake, alert and oriented x 3, skin warm and dry, in NAD with family at bedside.    Patient identifiers for Mary Myles checked and correct.  LOC:  Patient is awake, alert, and aware of environment with an appropriate affect. Patient is oriented x 3 and speaking appropriately.  APPEARANCE:  Patient resting comfortably and in no acute distress. Patient is clean and well groomed, patient's clothing is properly fastened.  SKIN:  The skin is warm and dry. Patient has normal skin turgor and moist mucus membranes. Skin is intact; no bruising or breakdown noted.  MUSCULOSKELETAL:  Patient is moving all extremities well, no obvious deformities noted. Pulses intact.   RESPIRATORY:  Airway is open and patent. Respirations are spontaneous and non-labored with normal effort and rate.  CARDIAC:  Patient has a normal rate and rhythm. No peripheral edema noted. Capillary refill < 3 seconds.  ABDOMEN:  No distention noted.  Soft and non-tender upon palpation.  NEUROLOGICAL:  PERRL. Facial expression is symmetrical. Hand grasps are equal bilaterally. Normal sensation in all extremities when touched with finger.  Allergies reported:    Review of patient's allergies indicates:   Allergen Reactions    Chantix [varenicline] Hives     OTHER NOTES:  Patient states that she has had multiple episodes today of being unable to speak or  her eyes.  States that she is aware each time that it is happening but cant stop it.  Also CO burning to back of head into neck.

## 2018-08-08 ENCOUNTER — OFFICE VISIT (OUTPATIENT)
Dept: NEUROLOGY | Facility: CLINIC | Age: 23
End: 2018-08-08
Payer: MEDICARE

## 2018-08-08 VITALS
HEART RATE: 72 BPM | WEIGHT: 244.69 LBS | BODY MASS INDEX: 39.32 KG/M2 | HEIGHT: 66 IN | SYSTOLIC BLOOD PRESSURE: 110 MMHG | DIASTOLIC BLOOD PRESSURE: 85 MMHG

## 2018-08-08 DIAGNOSIS — R40.4 ALTERED AWARENESS, TRANSIENT: Primary | ICD-10-CM

## 2018-08-08 PROCEDURE — 99999 PR PBB SHADOW E&M-EST. PATIENT-LVL III: CPT | Mod: PBBFAC,,, | Performed by: NURSE PRACTITIONER

## 2018-08-08 PROCEDURE — 99213 OFFICE O/P EST LOW 20 MIN: CPT | Mod: S$GLB,,, | Performed by: NURSE PRACTITIONER

## 2018-08-08 NOTE — PROGRESS NOTES
"Established Patient   SUBJECTIVE:  Patient ID: Mary Myles   Chief Complaint: Follow-up    History of Present Illness:  Mary Myles is a 23 y.o. female who presents to clinic alone for follow-up of headaches.     Recommendations made at last Office Visit on 12/20/2017:  - Botox administered in clinic for Chronic Migraine (see above)   - Encouraged Mary to follow healthy diet and restart her exercise regimen   - Advised she keep f/up appointment with mental health to discuss medication changes she has made, which she is agreeable to   - RTC in 3 months for repeat Botox injections or sooner if needed     08/08/2018 - Interval History:  She self diagnosed herself with "partial seizures", states she has been having "space out" issues, which are now occurring more frequently.  States she "stares out" for some length of time, gets nauseated.  This has been going on for about a month now.  Has been affecting her dreams as well, is concerned she is having "seizures" in her sleep as well.  She has been very sensitive to light and sound.  Lasts 1-2 minutes, she is aware when this is happening. No bowel/bladder dysfunction. No loss on consciousness.  Additionally, she feels a strong burning sensation which begins in the back of her head and radiates forward, radiates down her spine as well.  Feels like a vice around her head.  At this time she is not interested in discussing treatment options for her headaches, but would consider restarting Botox injections and/or starting alternative preventive options in the future.  Would like to figure out what is going on with her "seizures" first.      Interval History:  She continues to experience headaches nearly everyday, she has not noticed any difference since Botox injections were started.  However, since last office visit 3 months ago she has discontinued taking all of her medication as she does not feel it is helping.  She states her psychiatrist is aware that " "she has discontinued her medications and is okay with this.  Of note, she remained on the phone with [employer] shipping [employer] for the entirety of the visit trying to fix a situation with packages she sent to her boyfriend in [facility].  She does want to continue with the Botox injections as she has been told by friends that it can take up to 3 rounds before the results set in and she is optimistic that she is going to see results after this round.  She has also gained 20 pounds since last visit, states she has not been keeping with her healthy diet and exercise regimen recently.  Has appointment with mental health in the next two weeks for management of Bipolar depression.  Discussed weight gain can worsen sleep apnea as well as increased intracranial pressure.       Will leave medications in her chart, as its possible she may restart her medications.       08/25/2017 - Interval History:  - Seen by Dr. Diaz on 8/15 - no papilledema found   - Was seen by Sleep medicine who did find her to have mild sleep apnea - CPAP ordered  - Disclosed she has had tics since childhood, but they are becoming worse and becoming more violent, states at some point she was put on clonidine for them, but has since run out of the medicine and no longer takes it, was given this medication by her old PCP, she cannot remember how long she has been out of this medication, but feels this tic has been getting worse and worse over the last month.  Tic is located in her neck and she feels her head always goes to the right side during this tic, states this is how this has always been.  She is unsure why this symptom has gotten worse over the last month.  Denies change in medication within this timeframe   - Has been under increased stress recently, as she is to be tested for ASD, this testing is to be done by a psychologist in [place], she has continued regular follow-up with her Mental Health provider.     - "Headaches are bad", they are not " "better, is taking Topamax 25 mg BID, takes excedrin migraine at least once per day for her headaches, still has a headache all day, everyday, she feels it in her sleep (when she does sleep), however she states she has not been sleeping and when she does sleep, its not for very long   - Never got a CPAP machine, was never contacted by DME - suggested she f/up with sleep medicine or contact them with regards to getting machine  - Has now lost 30 pounds since March 6/2/17 - Interval History:  - Was started on Topamax 25 mg daily two weeks ago by Psychiatry eDb Schumacher NP ,has noticed a slight decrease in the intensity of her migraines since starting medication   - Had a 6 day psychiatric hospital admission due to suicidal thoughts because her relationship with her fiance ended, states she "wanted to cut" but was stopped by her brother and brought to the ED              Denies SI/HI at this time   - Next appt with Deb Schumacher NP 6/19/17   - States she is still having migraines all day, everyday, but has an occasional break in pains  - She is continued to exercise, states she has been doing a lot of walking    - Admits she has been cheating on her diet quite a lot recently   - Seen by Dr. Diaz on 5/15 - prescription given for eyeglasses - is nearsighted               Has not found a place to bring her glasses prescription that will accept her insurances (has both medicare and medicaid) - everywhere she has gone has been too expensive   - MRI Brain done - unremarkable   - LP not done yet, she does not want to have it done as this was very uncomfortable for her the last time   - Has continued regular follow-up with Bariatric medicine   - Has an appt with Sleep Medicine in 2 weeks       5/1/17 - Interval History   21 y.o. female with history of bipolar disorder, migraines, and pseudotumor cerebri diagnosed in February 2017 based on elevated opening pressure from LP. She presents with her brother, who provides some " "history, for headache management.    She is a poor historian, brother provides more clear details of migraine history.   States she began having migraines when she was 4 years old and has been suffering from them ever since.  These migraines usually lasted 1-2 weeks and then would gradually subside.  Currently she has been experiencing a constant headache "for the last 20 weeks", states she has not experienced one minute without pain in the last 20 weeks, even feels pain in her sleep.  Pain is bitemporal, in her neck, crown of her head and middle of forehead and rated anywhere from a 6-10 out of 10 and gets to a 10 about twice per week.  3/15 - Dr. Alonso Diaz (Neurophthalmology) - who did not visualize papilledema on exam   3/21 - Consult with Dr. Dumont - referred her to bariatric medicine because opening pressure was only minimally elevated at 26 cm water and she did not receive relief from lumbar puncture.  3/27 - Initial visit with Bariatric medicine- started on Metformin and given an exercise & diet regimen to follow  Over the last month, states the symptoms associated with her migraine have gotten better, most notably nausea, vomiting and double vision, but the head pain has not subsided.  Does not feel with weight loss or metformin her migraine has gotten better, brother present states it has gotten better because she is not vomiting any more.  Endorses double vision and vomiting stopped about 1 month ago (corresponds with timing of start of metformin).    Associated symptoms - nausea, vomiting, photophobia, phonophobia, blurred vision, double vision (went away last month), neck pain, shoulder pain    Aggravating - lights, menstrual cycle, caffeine, when its about to rain    Alleviating - excedrin   Sleep- sometimes good, sometimes bad, last night only slept 2 hours.  She does snore  Family Hx of Migraines (mother, brother), aneurysms, brain tumors      3/9/2017 - Initial [facility] HPI per Amauri Roberts PA-C  States " "no real HA hx (sans occasional HA), until about a month ago. Was having blurred vision, colour vision changes, tinnitus, and pain along her frontalis, retroorbital and periorbital. Was sent by PCP to the ED, LP done under floro (reviewed notes from Dr. Graves 2/28/2017 "L2-3 level right paramedian. 9 ML clear CSF obtained. Opening pressure 26 cm H20, closing 13 cm H20   Patient tolerated procedure well.") and per patient she described being on her side when the LP was performed. When asked about her PMhx, states been on Li for 4 years, has had toxicity once but seems "this is the only thing helps my bipolar, and I have tried a lot of medication." She is followed by Dr. Schumacher at [facility] on [facility].   When asked, never been on steroids, tetracycline products, never had chemotherapy or transplant medications, denies high levels of vitamin A intake (e.g., meals of animal liver), denies acne treatments, denies pregnancy, denies illicit substance use or any other factor. She is taking fioricet or other pain medications on a near daily basis. States she took fioricet just before coming to the office.   States she has daily blurred vision with changing visual fields/rotating vision loss coupled with eye pain and transient diplopia, colour vision changes, tinnitus and photophobia coupled with eye pain. She is currently stating 8/10 pain. She is having trouble sleeping.   She also has a Hx of SI (denies this currently) and FMG.      Treatments Tried and Response  TNB - no help  Lamictal - unsure  Topiramate - unsure  Gabapentin- helped in past  Depakote - unsure  Fioricet - no help  Vistaril - no help  Ibuprofen - no help  Trazodone - doesn't really help migraine, helps her sleep  Excedrin - makes headache tolerable  Topamax 25 mg Qd - slightly decrease in intensity of migraines     Current Medications:    butalbital-acetaminophen-caffeine -40 mg (FIORICET, ESGIC) -40 mg per tablet, Take " "1 tablet by mouth every 8 (eight) hours as needed for Pain or Headaches., Disp: 8 tablet, Rfl: 0    cloNIDine (CATAPRES) 0.1 MG tablet, Take 1 tablet (0.1 mg total) by mouth 2 (two) times daily., Disp: 60 tablet, Rfl: 11    FLUoxetine (PROZAC) 20 MG capsule, Take 20 mg by mouth once daily., Disp: , Rfl:     ondansetron (ZOFRAN) 4 MG tablet, Take 1 tablet (4 mg total) by mouth every 6 (six) hours., Disp: 12 tablet, Rfl: 0    pantoprazole (PROTONIX) 20 MG tablet, Take 1 tablet (20 mg total) by mouth once daily., Disp: 30 tablet, Rfl: 0    topiramate (TOPAMAX) 100 MG tablet, Take 1 tablet (100 mg total) by mouth 2 (two) times daily., Disp: 60 tablet, Rfl: 11    Review of Systems - as per HPI, otherwise a balanced 10 systems review is negative.    OBJECTIVE:  Vitals:  /85 (BP Location: Right arm, Patient Position: Sitting, BP Method: X-Large (Automatic))   Pulse 72   Ht 5' 6" (1.676 m)   Wt 111 kg (244 lb 11.4 oz)   LMP 07/10/2018 (Approximate)   BMI 39.50 kg/m²      Physical Exam:  Constitutional: She appears well-developed and well-nourished. She is well groomed. NAD.      Review of Data:   Notes from ED visits and Dr. Slaughter reviewed   Labs:  Admission on 08/06/2018, Discharged on 08/07/2018   Component Date Value Ref Range Status    POC Preg Test, Ur 08/06/2018 Negative  Negative Final     Acceptable 08/06/2018 Yes   Final   Admission on 07/31/2018, Discharged on 07/31/2018   Component Date Value Ref Range Status    aPTT 07/31/2018 29.1  21.0 - 32.0 sec Final    Prothrombin Time 07/31/2018 10.5  9.0 - 12.5 sec Final    INR 07/31/2018 1.0  0.8 - 1.2 Final    Sodium 07/31/2018 138  136 - 145 mmol/L Final    Potassium 07/31/2018 3.8  3.5 - 5.1 mmol/L Final    Chloride 07/31/2018 105  95 - 110 mmol/L Final    CO2 07/31/2018 22* 23 - 29 mmol/L Final    Glucose 07/31/2018 99  70 - 110 mg/dL Final    BUN, Bld 07/31/2018 10  6 - 20 mg/dL Final    Creatinine 07/31/2018 0.6  0.5 - 1.4 " mg/dL Final    Calcium 07/31/2018 9.1  8.7 - 10.5 mg/dL Final    Total Protein 07/31/2018 6.3  6.0 - 8.4 g/dL Final    Albumin 07/31/2018 3.4* 3.5 - 5.2 g/dL Final    Total Bilirubin 07/31/2018 0.7  0.1 - 1.0 mg/dL Final    Alkaline Phosphatase 07/31/2018 65  55 - 135 U/L Final    AST 07/31/2018 9* 10 - 40 U/L Final    ALT 07/31/2018 13  10 - 44 U/L Final    Anion Gap 07/31/2018 11  8 - 16 mmol/L Final    eGFR if African American 07/31/2018 >60  >60 mL/min/1.73 m^2 Final    eGFR if non African American 07/31/2018 >60  >60 mL/min/1.73 m^2 Final    WBC 07/31/2018 11.10  3.90 - 12.70 K/uL Final    RBC 07/31/2018 4.37  4.00 - 5.40 M/uL Final    Hemoglobin 07/31/2018 12.6  12.0 - 16.0 g/dL Final    Hematocrit 07/31/2018 37.9  37.0 - 48.5 % Final    MCV 07/31/2018 87  82 - 98 fL Final    MCH 07/31/2018 28.8  27.0 - 31.0 pg Final    MCHC 07/31/2018 33.2  32.0 - 36.0 g/dL Final    RDW 07/31/2018 14.4  11.5 - 14.5 % Final    Platelets 07/31/2018 297  150 - 350 K/uL Final    MPV 07/31/2018 8.1* 9.2 - 12.9 fL Final    Gran # (ANC) 07/31/2018 6.8  1.8 - 7.7 K/uL Final    Lymph # 07/31/2018 3.2  1.0 - 4.8 K/uL Final    Mono # 07/31/2018 0.7  0.3 - 1.0 K/uL Final    Eos # 07/31/2018 0.4  0.0 - 0.5 K/uL Final    Baso # 07/31/2018 0.10  0.00 - 0.20 K/uL Final    Gran% 07/31/2018 61.3  38.0 - 73.0 % Final    Lymph% 07/31/2018 28.6  18.0 - 48.0 % Final    Mono% 07/31/2018 6.1  4.0 - 15.0 % Final    Eosinophil% 07/31/2018 3.4  0.0 - 8.0 % Final    Basophil% 07/31/2018 0.6  0.0 - 1.9 % Final    Differential Method 07/31/2018 Automated   Final    Lipase 07/31/2018 10  4 - 60 U/L Final    POC Preg Test, Ur 07/31/2018 Negative  Negative Final     Acceptable 07/31/2018 Yes   Final   Admission on 07/28/2018, Discharged on 07/28/2018   Component Date Value Ref Range Status    WBC 07/28/2018 9.90  3.90 - 12.70 K/uL Final    RBC 07/28/2018 4.77  4.00 - 5.40 M/uL Final    Hemoglobin 07/28/2018  13.8  12.0 - 16.0 g/dL Final    Hematocrit 07/28/2018 41.6  37.0 - 48.5 % Final    MCV 07/28/2018 87  82 - 98 fL Final    MCH 07/28/2018 28.9  27.0 - 31.0 pg Final    MCHC 07/28/2018 33.1  32.0 - 36.0 g/dL Final    RDW 07/28/2018 14.4  11.5 - 14.5 % Final    Platelets 07/28/2018 331  150 - 350 K/uL Final    MPV 07/28/2018 8.3* 9.2 - 12.9 fL Final    Gran # (ANC) 07/28/2018 6.0  1.8 - 7.7 K/uL Final    Lymph # 07/28/2018 2.9  1.0 - 4.8 K/uL Final    Mono # 07/28/2018 0.7  0.3 - 1.0 K/uL Final    Eos # 07/28/2018 0.2  0.0 - 0.5 K/uL Final    Baso # 07/28/2018 0.10  0.00 - 0.20 K/uL Final    Gran% 07/28/2018 60.4  38.0 - 73.0 % Final    Lymph% 07/28/2018 29.4  18.0 - 48.0 % Final    Mono% 07/28/2018 7.0  4.0 - 15.0 % Final    Eosinophil% 07/28/2018 2.5  0.0 - 8.0 % Final    Basophil% 07/28/2018 0.7  0.0 - 1.9 % Final    Differential Method 07/28/2018 Automated   Final    Sodium 07/28/2018 141  136 - 145 mmol/L Final    Potassium 07/28/2018 3.7  3.5 - 5.1 mmol/L Final    Chloride 07/28/2018 109  95 - 110 mmol/L Final    CO2 07/28/2018 22* 23 - 29 mmol/L Final    Glucose 07/28/2018 105  70 - 110 mg/dL Final    BUN, Bld 07/28/2018 11  6 - 20 mg/dL Final    Creatinine 07/28/2018 0.6  0.5 - 1.4 mg/dL Final    Calcium 07/28/2018 9.5  8.7 - 10.5 mg/dL Final    Anion Gap 07/28/2018 10  8 - 16 mmol/L Final    eGFR if  07/28/2018 >60  >60 mL/min/1.73 m^2 Final    eGFR if non African American 07/28/2018 >60  >60 mL/min/1.73 m^2 Final    POC Preg Test, Ur 07/28/2018 Negative  Negative Final     Acceptable 07/28/2018 Yes   Final   Admission on 07/14/2018, Discharged on 07/14/2018   Component Date Value Ref Range Status    WBC 07/14/2018 11.50  3.90 - 12.70 K/uL Final    RBC 07/14/2018 4.30  4.00 - 5.40 M/uL Final    Hemoglobin 07/14/2018 12.3  12.0 - 16.0 g/dL Final    Hematocrit 07/14/2018 37.0  37.0 - 48.5 % Final    MCV 07/14/2018 86  82 - 98 fL Final    MCH  07/14/2018 28.5  27.0 - 31.0 pg Final    MCHC 07/14/2018 33.2  32.0 - 36.0 g/dL Final    RDW 07/14/2018 15.4* 11.5 - 14.5 % Final    Platelets 07/14/2018 306  150 - 350 K/uL Final    MPV 07/14/2018 8.1* 9.2 - 12.9 fL Final    Gran # (ANC) 07/14/2018 6.6  1.8 - 7.7 K/uL Final    Lymph # 07/14/2018 3.8  1.0 - 4.8 K/uL Final    Mono # 07/14/2018 0.8  0.3 - 1.0 K/uL Final    Eos # 07/14/2018 0.2  0.0 - 0.5 K/uL Final    Baso # 07/14/2018 0.10  0.00 - 0.20 K/uL Final    Gran% 07/14/2018 57.3  38.0 - 73.0 % Final    Lymph% 07/14/2018 33.5  18.0 - 48.0 % Final    Mono% 07/14/2018 7.0  4.0 - 15.0 % Final    Eosinophil% 07/14/2018 1.5  0.0 - 8.0 % Final    Basophil% 07/14/2018 0.7  0.0 - 1.9 % Final    Differential Method 07/14/2018 Automated   Final    Lipase 07/14/2018 15  4 - 60 U/L Final    Sodium 07/14/2018 140  136 - 145 mmol/L Final    Potassium 07/14/2018 4.1  3.5 - 5.1 mmol/L Final    Chloride 07/14/2018 109  95 - 110 mmol/L Final    CO2 07/14/2018 25  23 - 29 mmol/L Final    Glucose 07/14/2018 115* 70 - 110 mg/dL Final    BUN, Bld 07/14/2018 14  6 - 20 mg/dL Final    Creatinine 07/14/2018 0.7  0.5 - 1.4 mg/dL Final    Calcium 07/14/2018 9.0  8.7 - 10.5 mg/dL Final    Total Protein 07/14/2018 6.3  6.0 - 8.4 g/dL Final    Albumin 07/14/2018 3.5  3.5 - 5.2 g/dL Final    Total Bilirubin 07/14/2018 0.7  0.1 - 1.0 mg/dL Final    Alkaline Phosphatase 07/14/2018 64  55 - 135 U/L Final    AST 07/14/2018 13  10 - 40 U/L Final    ALT 07/14/2018 14  10 - 44 U/L Final    Anion Gap 07/14/2018 6* 8 - 16 mmol/L Final    eGFR if  07/14/2018 >60  >60 mL/min/1.73 m^2 Final    eGFR if non African American 07/14/2018 >60  >60 mL/min/1.73 m^2 Final    POC Preg Test, Ur 07/14/2018 Negative  Negative Final     Acceptable 07/14/2018 Yes   Final   Admission on 07/11/2018, Discharged on 07/11/2018   Component Date Value Ref Range Status    POC Preg Test, Ur 07/11/2018  Negative  Negative Final     Acceptable 07/11/2018 Yes   Final   Admission on 06/23/2018, Discharged on 06/23/2018   Component Date Value Ref Range Status    POC Preg Test, Ur 06/23/2018 Negative  Negative Final     Acceptable 06/23/2018 Yes   Final    WBC 06/23/2018 10.10  3.90 - 12.70 K/uL Final    RBC 06/23/2018 4.80  4.00 - 5.40 M/uL Final    Hemoglobin 06/23/2018 13.9  12.0 - 16.0 g/dL Final    Hematocrit 06/23/2018 41.4  37.0 - 48.5 % Final    MCV 06/23/2018 86  82 - 98 fL Final    MCH 06/23/2018 29.0  27.0 - 31.0 pg Final    MCHC 06/23/2018 33.6  32.0 - 36.0 g/dL Final    RDW 06/23/2018 14.9* 11.5 - 14.5 % Final    Platelets 06/23/2018 351* 150 - 350 K/uL Final    MPV 06/23/2018 7.8* 9.2 - 12.9 fL Final    Gran # (ANC) 06/23/2018 7.1  1.8 - 7.7 K/uL Final    Lymph # 06/23/2018 2.2  1.0 - 4.8 K/uL Final    Mono # 06/23/2018 0.7  0.3 - 1.0 K/uL Final    Eos # 06/23/2018 0.1  0.0 - 0.5 K/uL Final    Baso # 06/23/2018 0.00  0.00 - 0.20 K/uL Final    Gran% 06/23/2018 70.0  38.0 - 73.0 % Final    Lymph% 06/23/2018 21.6  18.0 - 48.0 % Final    Mono% 06/23/2018 6.6  4.0 - 15.0 % Final    Eosinophil% 06/23/2018 1.5  0.0 - 8.0 % Final    Basophil% 06/23/2018 0.3  0.0 - 1.9 % Final    Differential Method 06/23/2018 Automated   Final    Sodium 06/23/2018 140  136 - 145 mmol/L Final    Potassium 06/23/2018 4.2  3.5 - 5.1 mmol/L Final    Chloride 06/23/2018 105  95 - 110 mmol/L Final    CO2 06/23/2018 27  23 - 29 mmol/L Final    Glucose 06/23/2018 93  70 - 110 mg/dL Final    BUN, Bld 06/23/2018 8  6 - 20 mg/dL Final    Creatinine 06/23/2018 0.7  0.5 - 1.4 mg/dL Final    Calcium 06/23/2018 9.8  8.7 - 10.5 mg/dL Final    Total Protein 06/23/2018 7.4  6.0 - 8.4 g/dL Final    Albumin 06/23/2018 3.9  3.5 - 5.2 g/dL Final    Total Bilirubin 06/23/2018 1.7* 0.1 - 1.0 mg/dL Final    Alkaline Phosphatase 06/23/2018 81  55 - 135 U/L Final    AST 06/23/2018 12  10 -  40 U/L Final    ALT 06/23/2018 13  10 - 44 U/L Final    Anion Gap 06/23/2018 8  8 - 16 mmol/L Final    eGFR if  06/23/2018 >60  >60 mL/min/1.73 m^2 Final    eGFR if non African American 06/23/2018 >60  >60 mL/min/1.73 m^2 Final    Lipase 06/23/2018 8  4 - 60 U/L Final    Specimen UA 06/23/2018 Urine, Clean Catch   Final    Color, UA 06/23/2018 Yellow  Yellow, Straw, Jessika Final    Appearance, UA 06/23/2018 Clear  Clear Final    pH, UA 06/23/2018 8.0  5.0 - 8.0 Final    Specific Gravity, UA 06/23/2018 1.015  1.005 - 1.030 Final    Protein, UA 06/23/2018 Trace* Negative Final    Glucose, UA 06/23/2018 Negative  Negative Final    Ketones, UA 06/23/2018 Negative  Negative Final    Bilirubin (UA) 06/23/2018 Negative  Negative Final    Occult Blood UA 06/23/2018 Negative  Negative Final    Nitrite, UA 06/23/2018 Negative  Negative Final    Urobilinogen, UA 06/23/2018 Negative  <2.0 EU/dL Final    Leukocytes, UA 06/23/2018 Negative  Negative Final   Admission on 05/23/2018, Discharged on 05/23/2018   Component Date Value Ref Range Status    WBC 05/23/2018 12.70  3.90 - 12.70 K/uL Final    RBC 05/23/2018 5.44* 4.00 - 5.40 M/uL Final    Hemoglobin 05/23/2018 15.5  12.0 - 16.0 g/dL Final    Hematocrit 05/23/2018 46.1  37.0 - 48.5 % Final    MCV 05/23/2018 85  82 - 98 fL Final    MCH 05/23/2018 28.5  27.0 - 31.0 pg Final    MCHC 05/23/2018 33.6  32.0 - 36.0 g/dL Final    RDW 05/23/2018 15.3* 11.5 - 14.5 % Final    Platelets 05/23/2018 328  150 - 350 K/uL Final    MPV 05/23/2018 8.1* 9.2 - 12.9 fL Final    Gran # (ANC) 05/23/2018 10.4* 1.8 - 7.7 K/uL Final    Lymph # 05/23/2018 1.5  1.0 - 4.8 K/uL Final    Mono # 05/23/2018 0.6  0.3 - 1.0 K/uL Final    Eos # 05/23/2018 0.1  0.0 - 0.5 K/uL Final    Baso # 05/23/2018 0.00  0.00 - 0.20 K/uL Final    Gran% 05/23/2018 82.0* 38.0 - 73.0 % Final    Lymph% 05/23/2018 12.1* 18.0 - 48.0 % Final    Mono% 05/23/2018 5.1  4.0 - 15.0 %  Final    Eosinophil% 05/23/2018 0.6  0.0 - 8.0 % Final    Basophil% 05/23/2018 0.2  0.0 - 1.9 % Final    Differential Method 05/23/2018 Automated   Final    Sodium 05/23/2018 139  136 - 145 mmol/L Final    Potassium 05/23/2018 3.8  3.5 - 5.1 mmol/L Final    Chloride 05/23/2018 107  95 - 110 mmol/L Final    CO2 05/23/2018 22* 23 - 29 mmol/L Final    Glucose 05/23/2018 105  70 - 110 mg/dL Final    BUN, Bld 05/23/2018 10  6 - 20 mg/dL Final    Creatinine 05/23/2018 0.7  0.5 - 1.4 mg/dL Final    Calcium 05/23/2018 10.1  8.7 - 10.5 mg/dL Final    Total Protein 05/23/2018 8.1  6.0 - 8.4 g/dL Final    Albumin 05/23/2018 4.3  3.5 - 5.2 g/dL Final    Total Bilirubin 05/23/2018 1.8* 0.1 - 1.0 mg/dL Final    Alkaline Phosphatase 05/23/2018 92  55 - 135 U/L Final    AST 05/23/2018 16  10 - 40 U/L Final    ALT 05/23/2018 16  10 - 44 U/L Final    Anion Gap 05/23/2018 10  8 - 16 mmol/L Final    eGFR if  05/23/2018 >60  >60 mL/min/1.73 m^2 Final    eGFR if non African American 05/23/2018 >60  >60 mL/min/1.73 m^2 Final    Lipase 05/23/2018 16  4 - 60 U/L Final    Specimen UA 05/23/2018 Urine, Clean Catch   Final    Color, UA 05/23/2018 Yellow  Yellow, Straw, Jessika Final    Appearance, UA 05/23/2018 Clear  Clear Final    pH, UA 05/23/2018 6.0  5.0 - 8.0 Final    Specific Gravity, UA 05/23/2018 1.025  1.005 - 1.030 Final    Protein, UA 05/23/2018 Negative  Negative Final    Glucose, UA 05/23/2018 Negative  Negative Final    Ketones, UA 05/23/2018 Negative  Negative Final    Bilirubin (UA) 05/23/2018 Negative  Negative Final    Occult Blood UA 05/23/2018 Negative  Negative Final    Nitrite, UA 05/23/2018 Negative  Negative Final    Urobilinogen, UA 05/23/2018 Negative  <2.0 EU/dL Final    Leukocytes, UA 05/23/2018 Negative  Negative Final    POC Preg Test, Ur 05/23/2018 Negative  Negative Final     Acceptable 05/23/2018 Yes   Final   Admission on 05/13/2018, Discharged  on 05/13/2018   Component Date Value Ref Range Status    POC Preg Test, Ur 05/13/2018 Negative  Negative Final     Acceptable 05/13/2018 Yes   Final    Specimen UA 05/13/2018 Urine, Clean Catch   Final    Color, UA 05/13/2018 Yellow  Yellow, Straw, Jessika Final    Appearance, UA 05/13/2018 Cloudy* Clear Final    pH, UA 05/13/2018 8.0  5.0 - 8.0 Final    Specific Gravity, UA 05/13/2018 1.020  1.005 - 1.030 Final    Protein, UA 05/13/2018 Negative  Negative Final    Glucose, UA 05/13/2018 Negative  Negative Final    Ketones, UA 05/13/2018 Negative  Negative Final    Bilirubin (UA) 05/13/2018 Negative  Negative Final    Occult Blood UA 05/13/2018 Negative  Negative Final    Nitrite, UA 05/13/2018 Negative  Negative Final    Urobilinogen, UA 05/13/2018 Negative  <2.0 EU/dL Final    Leukocytes, UA 05/13/2018 Negative  Negative Final    WBC, UA 05/13/2018 0  0 - 5 /hpf Final    Bacteria, UA 05/13/2018 Many* None-Occ /hpf Final    Squam Epithel, UA 05/13/2018 2  /hpf Final    Amorphous, UA 05/13/2018 Many* None-Moderate Final    Microscopic Comment 05/13/2018 SEE COMMENT   Final    Urine Culture, Routine 05/13/2018 No significant growth   Final     Imaging:  Results for orders placed or performed during the hospital encounter of 08/06/18   CT Head Without Contrast    Narrative    EXAMINATION:  CT HEAD WITHOUT CONTRAST    CLINICAL HISTORY:  Headache, acute, norm neuro exam;    TECHNIQUE:  Low dose axial images were obtained through the head.  Coronal and sagittal reformations were also performed. Contrast was not administered.    COMPARISON:  MRI of May 5, 2017    FINDINGS:  No cranial fracture is identified.  Scalp edema or hematoma is not noted.    The basal cisterns are clear and symmetric.  There is no mass effect or midline shift.  The ventricles are of normal size and symmetric.  The gray-white matter differentiation is normal.  Within the brain parenchyma no hyper or hypodense  lesions consistent with tumor, edema, CVA or hemorrhage is seen.  No intracranial hemorrhage or hematoma is noted.      Impression    Negative head CT.      Electronically signed by: Rhett Littlejohn MD  Date:    08/07/2018  Time:    07:29   Results for orders placed or performed during the hospital encounter of 05/05/17   MRI Brain Without Contrast    Narrative    MRI OF THE BRAIN WITHOUT CONTRAST    Technique: Noncontrast sagittal T1, axial T1, axial T2, axial FLAIR, axial GRE, and axial diffusion weighted images of the brain were obtained without contrast.    Comparison: None.    FINDINGS: The brain and shows normal signal characteristics.  There is no mass.  The ventricles are normal in size.  There is no congenital anomaly.  The corpus callosum is well formed.  There is no hemorrhage or restricted diffusion.  The cerebellum and brainstem are normal.  The major intercranial flow voids are patent.    Impression         Normal brain MRI.      Electronically signed by: Mitesh Contreras MD  Date:     05/05/17  Time:    14:59      Note: I have independently reviewed any/all imaging/labs/tests and agree with the report (s) as documented.  Any discrepancies will be as noted/demarcated by free text.  SEVERIANO MARIN-C 8/8/2018    ASSESSMENT:  1. Altered awareness, transient    2. Chronic migraine      PLAN:  - Referral to epilepsy   - Discussed will see her back after appt with epilepsy department to discuss future treatment options including restarting Botox and/or Aimovig among others   - Continue all medications as directed   - Follow up in 3 months     Orders Placed This Encounter    Ambulatory referral to Neurology Epilepsy     Questions and concerns were sought and answered to the patient's stated verbal satisfaction.  The patient verbalizes understanding and agreement with the above stated treatment plan.     CC: DO Terri Woods, FNP-C  [facility]   [phone]      Celso was available during today's encounter.

## 2018-08-27 ENCOUNTER — HOSPITAL ENCOUNTER (EMERGENCY)
Facility: HOSPITAL | Age: 23
Discharge: HOME | End: 2018-08-27
Attending: EMERGENCY MEDICINE
Payer: MEDICARE

## 2018-08-27 VITALS
SYSTOLIC BLOOD PRESSURE: 134 MMHG | OXYGEN SATURATION: 96 % | WEIGHT: 240 LBS | RESPIRATION RATE: 20 BRPM | TEMPERATURE: 98 F | DIASTOLIC BLOOD PRESSURE: 82 MMHG | BODY MASS INDEX: 38.57 KG/M2 | HEIGHT: 66 IN | HEART RATE: 70 BPM

## 2018-08-27 DIAGNOSIS — B00.1 COLD SORE: Primary | ICD-10-CM

## 2018-08-27 PROCEDURE — 99283 EMERGENCY DEPT VISIT LOW MDM: CPT

## 2018-08-27 RX ORDER — VALACYCLOVIR HYDROCHLORIDE 1 G/1
1000 TABLET, FILM COATED ORAL 3 TIMES DAILY
Qty: 21 TABLET | Refills: 0 | Status: SHIPPED | OUTPATIENT
Start: 2018-08-27 | End: 2018-09-03

## 2018-08-27 NOTE — ED PROVIDER NOTES
"Encounter Date: 8/27/2018       History     Chief Complaint   Patient presents with    Oral Swelling     Started yesterday      23-year-old female frequent visitor the emergency room with a borderline personality disorder fibromyalgia migraines depression chronic headaches schizoaffective disorder presents the emergency room with right lower lip swelling. Her lower lip is peers but there is no pain drainage erythema warmth near the piercing.  It is the right lower side of the lip that is swollen for the past day or 2.  She has no fevers or any other complaints.  She is currently on antibiotics for boils in other places.          Review of patient's allergies indicates:   Allergen Reactions    Chantix [varenicline] Hives     Past Medical History:   Diagnosis Date    ADD (attention deficit disorder)     Anemia     Borderline personality disorder     Chronic headache     Depression     Fibromyalgia     GERD (gastroesophageal reflux disease)     Migraine headache     MALCOLM (obstructive sleep apnea)     PCOS (polycystic ovarian syndrome)     Pseudotumor cerebri syndrome     Schizoaffective disorder     Tourette's syndrome      Past Surgical History:   Procedure Laterality Date    TONSILLECTOMY      UPPER GASTROINTESTINAL ENDOSCOPY  in her childhood    "born with underdeveloped esophagus" & GERD per patient report    WISDOM TOOTH EXTRACTION       Family History   Problem Relation Age of Onset    Seizures Mother     Migraines Mother     Migraines Brother     Colon cancer Neg Hx     Colon polyps Neg Hx     Crohn's disease Neg Hx     Ulcerative colitis Neg Hx     Stomach cancer Neg Hx     Esophageal cancer Neg Hx      Social History     Tobacco Use    Smoking status: Former Smoker     Packs/day: 1.00     Types: Cigarettes    Smokeless tobacco: Never Used   Substance Use Topics    Alcohol use: No    Drug use: No     Comment: patient states she quit mojo 3 months ago and marijuana 2 months ago "     Review of Systems   Constitutional: Negative for fever.   HENT: Positive for facial swelling (Right lower lip swelling). Negative for congestion, dental problem, rhinorrhea, sore throat and trouble swallowing.    Eyes: Negative for pain and redness.   Respiratory: Negative for cough and shortness of breath.    Cardiovascular: Negative for chest pain.   Gastrointestinal: Negative for abdominal pain.   Neurological: Negative for dizziness, weakness, light-headedness and headaches.   Psychiatric/Behavioral: Negative for agitation and confusion.       Physical Exam     Initial Vitals [08/27/18 0408]   BP Pulse Resp Temp SpO2   134/82 70 20 97.9 °F (36.6 °C) 96 %      MAP       --         Physical Exam    Nursing note and vitals reviewed.  Constitutional: She appears well-developed and well-nourished. No distress.   HENT:   Mouth/Throat: Oropharynx is clear and moist.   Lower lip has a metal stud and midline.  Just to the right of midline is a small palpable nodule with some mild edema over the right inner lip.  There is no actual excoriation of the skin or puncture wound.  There are no blisters.  The gingiva does not appear to be inflamed or tender.   Eyes: Conjunctivae and EOM are normal. Pupils are equal, round, and reactive to light.   Neck: Neck supple.   Pulmonary/Chest: No respiratory distress.   Neurological: She is alert and oriented to person, place, and time.         ED Course   Procedures  Labs Reviewed - No data to display       Imaging Results    None          Medical Decision Making:   Patient may have an early cold sore and I will treat her with Valtrex.  I asked her to remove the metal stud in case there is an early abscess or cellulitis of the lip however there is no drainage from the stud.  Return precautions given.  No puncture wounds.                      Clinical Impression:   The encounter diagnosis was Cold sore.                             Marin Noland MD  08/27/18 3216

## 2020-01-15 NOTE — ED NOTES
Patient returning call   Pt in room 6 for evaluation of headache and nausea. Pt is awake, alert and oriented. Resp even and unlabored. Pt has history of headaches. Pt denies chest pain, abd pain or sob.

## 2021-05-22 NOTE — LETTER
Radha 15, 2017      Terri Power, [facility]  [address]  [facility] [address]           [facility]  [address]  [facility] [address]  Phone: [phone]          Patient: Mary Myles   MR Number: [id number]   YOB: 1995   Date of Visit: 6/15/2017       Dear Terri Power:    Thank you for referring Mary Myles to me for evaluation. Attached you will find relevant portions of my assessment and plan of care.    If you have questions, please do not hesitate to call me. I look forward to following Mary Myles along with you.    Sincerely,    Violet Dee MD    Enclosure  CC:  No Recipients    If you would like to receive this communication electronically, please contact [email or web address] or [phone] to request more information on [employer] access.    For providers and/or their staff who would like to refer a patient to [facility], please contact us through our one-stop-shop provider referral line, [facility], at [phone].    If you feel you have received this communication in error or would no longer like to receive these types of communications, please e-mail [email or web address]          69 yo female w/ a PMHx of metastatic esophageal adenocarcinoma (s/p carbo/taxol and RT 4/2020-5/2020, started on folfox/herceptin 3/2021,last 5/12) w/ T4 lytic lesion and paravertebral mass dx 1/2021 (s/p RT to T4 3/2021), refused esophagectomy, HTN, HLD, degenerative disc disease of C-spine s/p laminectomy x2 (2019), and adjustment disorder p/f heme/onc Dr. Shelby's ([facility]) office for refractory acute back pain x 3 weeks found to have new T10 compression fx likely pathologic identified on outpatient CTAP 5/13 confirmed on MRI

## 2021-07-01 ENCOUNTER — PATIENT MESSAGE (OUTPATIENT)
Dept: ADMINISTRATIVE | Facility: OTHER | Age: 26
End: 2021-07-01

## 2023-03-27 NOTE — PROGRESS NOTES
"   Mary Myles  was seen as f/u today for mgt of MALCOLM. Last seen by MD 6/15/17, this is my initial visit with her    Since seen, she has undergone a sleep study which was reviewed with her today. Continued ++daytime sleepiness, soft intermittent snoring, am headaches "lives with a headache", dry mouth in am, disrupted sleep. ESS=14. Prefers left side sleep. Wakes up late am hours and can't return to sleep. She has lost 19# in interim!!      HISTORY:  06/15/2017 INITIAL HISTORY OF PRESENT ILLNESS:  Mary Myles is a 22 y.o. female is here to be evaluated for a sleep disorder.       CHIEF COMPLAINT:      The patient's complaints include excessive daytime sleepiness, excessive daytime fatigue, snoring and interrupted sleep; restless sleep  since  Many years ago. Not sure of gasping/cholking/apneas..    Reports  dry mouth and sore throat  Reports nasal congestion   Reports  morning headaches  Reports  interrupted sleep  Reports frequent leg movements  Reports symptoms concerning for parasomnia- nightmares - for many years now - dreaming of being trapped.     The ESS (Hensel Sleepiness Score) taken on initial visit is 8 /24    The patient never had tonsillectomy, adenoidectomy or UPPP      SLEEP ROUTINE AND LIFESTYLE 08/10/2017 :  Occupation:on disability  Bed partner: pamela - sound sleeper   Time to bed - wake up time on a workday : 11 pm to 11 AM  Sleep onset latency: 10-15 min  Disruptions or awakenings: 1-2  Time to fall back into sleep: 10-15 min   Perceived sleep quality: 3/5  Perceived total sleep time:  12  hours.  Daytime naps: 0    Exercise routine: no  Caffeine:  Coffee   Day -not after 6 PM, cokes  Coke 0   Taking Melatonin - still does not feel rested                 REVIEW OF SYSTEMS: Sleep related symptoms as per HPI, wgt loss, acid reflux. Stable mood. +occasional sinus congestion.  Otherwise a balance review of 10-systems is negative.     PHYSICAL EXAM:  /70   Pulse 72   Ht 5' 4" (1.626 " m)   Wt 114.2 kg (251 lb 12.3 oz)   BMI 43.22 kg/m²   GENERAL: Normal development, well groomed, morbid obese    PSG 6/24/17: AHI was 5.2 with an oxygen yulissa of 75.0%. The supine AHI was 12.5 and the REM AHI was 15.5. The patient did not qualify for a split night study due to an insufficient number of events in the first half of the study.     ASSESSMENT:    1.MALCOLM, mild . Ready to begin PAP therapy.   She has medical co-morbidities of headache, fibromyalgia and ADD,  which can be worsened by untreatedOSA.           PLAN:  We discussed potential treatment options, which could include weight loss (10-15%), body positioning, continuous positive airway pressure (CPAP-defintive), mandibular advancement splint by dentist, or referral for surgical consideration. Discussed etiology of MALCOLM and potential ramifications of untreated MALCOLM, including heart disease, hypertension, cognitive difficulties, stroke, and diabetes.      Encouraged continued weight loss efforts for potential improvement of MALCOLM and overall health benefits      APAP 6-14cm setup MALCOLM BOOTHE RTC 4-5 wks after setup adherence monitoring.    EKG completed

## 2023-08-30 ENCOUNTER — HOSPITAL ENCOUNTER (EMERGENCY)
Facility: HOSPITAL | Age: 28
Discharge: HOME | End: 2023-08-30
Attending: STUDENT IN AN ORGANIZED HEALTH CARE EDUCATION/TRAINING PROGRAM
Payer: MEDICARE

## 2023-08-30 VITALS
BODY MASS INDEX: 33.32 KG/M2 | TEMPERATURE: 98 F | HEIGHT: 65 IN | WEIGHT: 200 LBS | SYSTOLIC BLOOD PRESSURE: 115 MMHG | DIASTOLIC BLOOD PRESSURE: 76 MMHG | RESPIRATION RATE: 18 BRPM | HEART RATE: 76 BPM | OXYGEN SATURATION: 100 %

## 2023-08-30 DIAGNOSIS — R10.9 ABDOMINAL CRAMPING: ICD-10-CM

## 2023-08-30 DIAGNOSIS — R42 DIZZINESS: ICD-10-CM

## 2023-08-30 DIAGNOSIS — R11.0 NAUSEA: Primary | ICD-10-CM

## 2023-08-30 DIAGNOSIS — M79.7 FIBROMYALGIA: ICD-10-CM

## 2023-08-30 DIAGNOSIS — R53.83 FATIGUE, UNSPECIFIED TYPE: ICD-10-CM

## 2023-08-30 LAB
ALBUMIN SERPL BCP-MCNC: 4.1 G/DL (ref 3.5–5.2)
ALP SERPL-CCNC: 78 U/L (ref 55–135)
ALT SERPL W/O P-5'-P-CCNC: 19 U/L (ref 10–44)
ANION GAP SERPL CALC-SCNC: 11 MMOL/L (ref 8–16)
AST SERPL-CCNC: 17 U/L (ref 10–40)
B-HCG UR QL: NEGATIVE
BASOPHILS # BLD AUTO: 0 K/UL (ref 0–0.2)
BASOPHILS NFR BLD: 0 % (ref 0–1.9)
BILIRUB SERPL-MCNC: 0.7 MG/DL (ref 0.1–1)
BILIRUB UR QL STRIP: NEGATIVE
BUN SERPL-MCNC: 12 MG/DL (ref 6–20)
CALCIUM SERPL-MCNC: 9.4 MG/DL (ref 8.7–10.5)
CHLORIDE SERPL-SCNC: 102 MMOL/L (ref 95–110)
CLARITY UR: CLEAR
CO2 SERPL-SCNC: 24 MMOL/L (ref 23–29)
COLOR UR: YELLOW
CREAT SERPL-MCNC: 0.6 MG/DL (ref 0.5–1.4)
CTP QC/QA: YES
DIFFERENTIAL METHOD: ABNORMAL
EOSINOPHIL # BLD AUTO: 0 K/UL (ref 0–0.5)
EOSINOPHIL NFR BLD: 0 % (ref 0–8)
ERYTHROCYTE [DISTWIDTH] IN BLOOD BY AUTOMATED COUNT: 14.1 % (ref 11.5–14.5)
EST. GFR  (NO RACE VARIABLE): >60 ML/MIN/1.73 M^2
GLUCOSE SERPL-MCNC: 94 MG/DL (ref 70–110)
GLUCOSE UR QL STRIP: NEGATIVE
HCT VFR BLD AUTO: 41.7 % (ref 37–48.5)
HGB BLD-MCNC: 13.1 G/DL (ref 12–16)
HGB UR QL STRIP: NEGATIVE
IMM GRANULOCYTES # BLD AUTO: 0.04 K/UL (ref 0–0.04)
IMM GRANULOCYTES NFR BLD AUTO: 0.5 % (ref 0–0.5)
KETONES UR QL STRIP: NEGATIVE
LEUKOCYTE ESTERASE UR QL STRIP: NEGATIVE
LIPASE SERPL-CCNC: 11 U/L (ref 4–60)
LYMPHOCYTES # BLD AUTO: 2.4 K/UL (ref 1–4.8)
LYMPHOCYTES NFR BLD: 28.6 % (ref 18–48)
MAGNESIUM SERPL-MCNC: 1.6 MG/DL (ref 1.6–2.6)
MCH RBC QN AUTO: 29 PG (ref 27–31)
MCHC RBC AUTO-ENTMCNC: 31.4 G/DL (ref 32–36)
MCV RBC AUTO: 92 FL (ref 82–98)
MONOCYTES # BLD AUTO: 0.6 K/UL (ref 0.3–1)
MONOCYTES NFR BLD: 7.4 % (ref 4–15)
NEUTROPHILS # BLD AUTO: 5.4 K/UL (ref 1.8–7.7)
NEUTROPHILS NFR BLD: 63.5 % (ref 38–73)
NITRITE UR QL STRIP: NEGATIVE
NRBC BLD-RTO: 0 /100 WBC
PH UR STRIP: 7 [PH] (ref 5–8)
PLATELET # BLD AUTO: 295 K/UL (ref 150–450)
PMV BLD AUTO: 9.2 FL (ref 9.2–12.9)
POTASSIUM SERPL-SCNC: 4.2 MMOL/L (ref 3.5–5.1)
PROT SERPL-MCNC: 7.6 G/DL (ref 6–8.4)
PROT UR QL STRIP: NEGATIVE
RBC # BLD AUTO: 4.52 M/UL (ref 4–5.4)
SODIUM SERPL-SCNC: 137 MMOL/L (ref 136–145)
SP GR UR STRIP: 1.02 (ref 1–1.03)
URN SPEC COLLECT METH UR: NORMAL
UROBILINOGEN UR STRIP-ACNC: NEGATIVE EU/DL
WBC # BLD AUTO: 8.52 K/UL (ref 3.9–12.7)

## 2023-08-30 PROCEDURE — 83690 ASSAY OF LIPASE: CPT | Performed by: PHYSICIAN ASSISTANT

## 2023-08-30 PROCEDURE — 93010 EKG 12-LEAD: ICD-10-PCS | Mod: ,,, | Performed by: GENERAL PRACTICE

## 2023-08-30 PROCEDURE — 83735 ASSAY OF MAGNESIUM: CPT | Performed by: STUDENT IN AN ORGANIZED HEALTH CARE EDUCATION/TRAINING PROGRAM

## 2023-08-30 PROCEDURE — 36415 COLL VENOUS BLD VENIPUNCTURE: CPT | Performed by: STUDENT IN AN ORGANIZED HEALTH CARE EDUCATION/TRAINING PROGRAM

## 2023-08-30 PROCEDURE — 80053 COMPREHEN METABOLIC PANEL: CPT | Performed by: PHYSICIAN ASSISTANT

## 2023-08-30 PROCEDURE — 93010 ELECTROCARDIOGRAM REPORT: CPT | Mod: ,,, | Performed by: GENERAL PRACTICE

## 2023-08-30 PROCEDURE — 99284 EMERGENCY DEPT VISIT MOD MDM: CPT | Mod: 25

## 2023-08-30 PROCEDURE — 81003 URINALYSIS AUTO W/O SCOPE: CPT | Performed by: PHYSICIAN ASSISTANT

## 2023-08-30 PROCEDURE — 63600175 PHARM REV CODE 636 W HCPCS: Performed by: STUDENT IN AN ORGANIZED HEALTH CARE EDUCATION/TRAINING PROGRAM

## 2023-08-30 PROCEDURE — 93005 ELECTROCARDIOGRAM TRACING: CPT

## 2023-08-30 PROCEDURE — 85025 COMPLETE CBC W/AUTO DIFF WBC: CPT | Performed by: PHYSICIAN ASSISTANT

## 2023-08-30 PROCEDURE — 81025 URINE PREGNANCY TEST: CPT | Performed by: PHYSICIAN ASSISTANT

## 2023-08-30 PROCEDURE — 96374 THER/PROPH/DIAG INJ IV PUSH: CPT

## 2023-08-30 RX ORDER — ONDANSETRON 2 MG/ML
4 INJECTION INTRAMUSCULAR; INTRAVENOUS
Status: COMPLETED | OUTPATIENT
Start: 2023-08-30 | End: 2023-08-30

## 2023-08-30 RX ORDER — ONDANSETRON 4 MG/1
4 TABLET, ORALLY DISINTEGRATING ORAL EVERY 8 HOURS PRN
Qty: 9 TABLET | Refills: 0 | Status: SHIPPED | OUTPATIENT
Start: 2023-08-30

## 2023-08-30 RX ADMIN — ONDANSETRON 4 MG: 2 INJECTION INTRAMUSCULAR; INTRAVENOUS at 03:08

## 2023-08-30 NOTE — FIRST PROVIDER EVALUATION
Emergency Department TeleTriage Encounter Note      CHIEF COMPLAINT    Chief Complaint   Patient presents with    Abdominal Pain     Pt states she keeps waking up with nausea and abd pain every morning.  Pt is also always fatigued and dizzy.         VITAL SIGNS   Initial Vitals [08/30/23 1322]   BP Pulse Resp Temp SpO2   121/66 84 18 98.2 °F (36.8 °C) 96 %      MAP       --            ALLERGIES    Review of patient's allergies indicates:   Allergen Reactions    Chantix [varenicline] Hives       PROVIDER TRIAGE NOTE  Patient presents with complaint of abdominal pian intermittently for a month. Associated nausea and diarrhea. No urinary symptoms.      Phy:   Constitutional: well nourished, well developed, appearing stated age, NAD   HEENT: NCAT, symmetrical lids, No obvious facial deformity.  Normal phonation. Normal Conjunctiva   Neck: NAROM   Respiratory: Normal effort.  No obvious use of accessory muscles   Musculoskeletal: Moved upper extremities well   Neuro: Alert, answers questions appropriately    Psych: appropriate mood and affect      Initial orders will be placed and care will be transferred to an alternate provider when patient is roomed for a full evaluation. Any additional orders and the final disposition will be determined by that provider.        ORDERS  Labs Reviewed - No data to display    ED Orders (720h ago, onward)      None              Virtual Visit Note: The provider triage portion of this emergency department evaluation and documentation was performed via [employer], a HIPAA-compliant telemedicine application, in concert with a tele-presenter in the room. A face to face patient evaluation with one of my colleagues will occur once the patient is placed in an emergency department room.      DISCLAIMER: This note was prepared with [employer] voice recognition transcription software. Garbled syntax, mangled pronouns, and other bizarre constructions may be attributed to that software system.

## 2023-08-30 NOTE — ED NOTES
Pt identifiers checked and accurate with Mary Sky    Pt presents to ED with complaints of abdominal pain with nausea x 1 month, denies all other symptoms at this time.

## 2023-08-30 NOTE — ED PROVIDER NOTES
"Encounter Date: 8/30/2023       History     Chief Complaint   Patient presents with    Abdominal Pain     Pt states she keeps waking up with nausea and abd pain every morning.  Pt is also always fatigued and dizzy.       28-year-old female history of microcytic anemia presents with abdominal cramping, nausea x1 month.  No associated focal motor or sensory deficits, no visual changes.  No trauma, fever or chills.  No associated headache    The history is provided by the patient.     Review of patient's allergies indicates:   Allergen Reactions    Chantix [varenicline] Hives     Past Medical History:   Diagnosis Date    ADD (attention deficit disorder)     Anemia     Borderline personality disorder     Chronic headache     Depression     Fibromyalgia     GERD (gastroesophageal reflux disease)     Migraine headache     MALCOLM (obstructive sleep apnea)     PCOS (polycystic ovarian syndrome)     Pseudotumor cerebri syndrome     Schizoaffective disorder     Tourette's syndrome      Past Surgical History:   Procedure Laterality Date    TONSILLECTOMY      UPPER GASTROINTESTINAL ENDOSCOPY  in her childhood    "born with underdeveloped esophagus" & GERD per patient report    WISDOM TOOTH EXTRACTION       Family History   Problem Relation Age of Onset    Seizures Mother     Migraines Mother     Migraines Brother     Colon cancer Neg Hx     Colon polyps Neg Hx     Crohn's disease Neg Hx     Ulcerative colitis Neg Hx     Stomach cancer Neg Hx     Esophageal cancer Neg Hx      Social History     Tobacco Use    Smoking status: Former     Current packs/day: 1.00     Types: Cigarettes    Smokeless tobacco: Never   Substance Use Topics    Alcohol use: No    Drug use: No     Comment: patient states she quit mojo 3 months ago and marijuana 2 months ago     Review of Systems   All other systems reviewed and are negative.      Physical Exam     Initial Vitals [08/30/23 1322]   BP Pulse Resp Temp SpO2   121/66 84 18 98.2 °F (36.8 °C) 96 %    "   MAP       --         Physical Exam    Nursing note and vitals reviewed.  Constitutional: She appears well-developed and well-nourished.   HENT:   Head: Normocephalic and atraumatic.   Eyes: EOM are normal. Right eye exhibits no discharge. Left eye exhibits no discharge.   Neck:   Normal range of motion.  Cardiovascular:  Normal rate and regular rhythm.           Pulmonary/Chest: Effort normal. No stridor. No respiratory distress.   No accessory muscle usage or significant hypoxemia   Abdominal: Abdomen is soft. There is no abdominal tenderness.   Musculoskeletal:         General: No edema. Normal range of motion.      Cervical back: Normal range of motion.     Neurological: She is alert.   No focal motor or sensory deficit noted, able to ambulate independently, GCS 15   Skin: Skin is warm. No rash noted. No erythema.   Psychiatric:   Not anxious or agitated         ED Course   Procedures  Labs Reviewed   CBC W/ AUTO DIFFERENTIAL - Abnormal; Notable for the following components:       Result Value    MCHC 31.4 (*)     All other components within normal limits   COMPREHENSIVE METABOLIC PANEL   LIPASE   URINALYSIS, REFLEX TO URINE CULTURE    Narrative:     Specimen Source->Urine   MAGNESIUM   POCT URINE PREGNANCY     EKG Readings: (Independently Interpreted)   EKG interpreted by myself Drew Ridley DO  Rate 73, normal sinus rhythm, QRS 88, , no STEMI       Imaging Results    None          Medications   ondansetron injection 4 mg (4 mg Intravenous Given 8/30/23 1260)     Medical Decision Making  28-year-old female presents with multiple vague symptoms including abdominal cramping, nausea, fatigue.  Lab work with no acute metabolic or electrolyte pathology.  Orthostatic vitals within normal limit, EKG with no acute dysrhythmia, no suspicion for any CNS ischemic or hemorrhagic abnormality requiring CT imaging or admission.  Will discharge home with supportive care antiemetic Zofran and PCP referral.  Patient  voiced understanding and agrees with plan    Amount and/or Complexity of Data Reviewed  Labs:  Decision-making details documented in ED Course.  ECG/medicine tests: ordered and independent interpretation performed.     Details: No STEMI    Risk  Prescription drug management.               ED Course as of 08/30/23 1716   Wed Aug 30, 2023   1637 Preg Test, Ur: Negative [KB]   1637 WBC: 8.52 [KB]   1637 Hemoglobin: 13.1 [KB]   1637 Hematocrit: 41.7 [KB]   1637 Lipase: 11 [KB]   1637 Magnesium: 1.6 [KB]   1637 Sodium: 137 [KB]   1638 Potassium: 4.2 [KB]   1638 Chloride: 102 [KB]   1638 CO2: 24 [KB]   1638 Glucose: 94 [KB]   1638 BUN: 12 [KB]   1638 Creatinine: 0.6 [KB]   1638 Calcium: 9.4 [KB]   1638 Alkaline Phosphatase: 78 [KB]   1638 AST: 17 [KB]   1638 ALT: 19 [KB]   1638 eGFR: >60 [KB]      ED Course User Index  [KB] Drew Ridley Jr., DO                    Clinical Impression:   Final diagnoses:  [R10.9] Abdominal cramping  [R11.0] Nausea (Primary)  [R53.83] Fatigue, unspecified type  [M79.7] Fibromyalgia  [R42] Dizziness        ED Disposition Condition    Discharge Stable          ED Prescriptions       Medication Sig Dispense Start Date End Date Auth. Provider    ondansetron (ZOFRAN-ODT) 4 MG TbDL Take 1 tablet (4 mg total) by mouth every 8 (eight) hours as needed. 9 tablet 8/30/2023 -- Drew Ridley Jr., DO          Follow-up Information    None          Drew Ridley Jr., DO  08/30/23 1716

## 2023-09-06 ENCOUNTER — HOSPITAL ENCOUNTER (EMERGENCY)
Facility: HOSPITAL | Age: 28
Discharge: HOME | End: 2023-09-07
Attending: EMERGENCY MEDICINE

## 2023-09-06 DIAGNOSIS — K08.89 TOOTHACHE: Primary | ICD-10-CM

## 2023-09-06 DIAGNOSIS — K02.9 DENTAL CARIES: ICD-10-CM

## 2023-09-06 PROCEDURE — 99284 EMERGENCY DEPT VISIT MOD MDM: CPT

## 2023-09-07 VITALS
WEIGHT: 234 LBS | TEMPERATURE: 98 F | OXYGEN SATURATION: 100 % | SYSTOLIC BLOOD PRESSURE: 126 MMHG | RESPIRATION RATE: 19 BRPM | BODY MASS INDEX: 38.94 KG/M2 | DIASTOLIC BLOOD PRESSURE: 66 MMHG | HEART RATE: 75 BPM

## 2023-09-07 RX ORDER — OXYCODONE AND ACETAMINOPHEN 5; 325 MG/1; MG/1
2 TABLET ORAL
Status: DISCONTINUED | OUTPATIENT
Start: 2023-09-07 | End: 2023-09-07 | Stop reason: HOSPADM

## 2023-09-07 RX ORDER — NAPROXEN 500 MG/1
500 TABLET ORAL 2 TIMES DAILY WITH MEALS
Qty: 30 TABLET | Refills: 0 | Status: SHIPPED | OUTPATIENT
Start: 2023-09-07

## 2023-09-07 RX ORDER — OXYCODONE AND ACETAMINOPHEN 5; 325 MG/1; MG/1
1 TABLET ORAL EVERY 6 HOURS PRN
Qty: 12 TABLET | Refills: 0 | OUTPATIENT
Start: 2023-09-07 | End: 2023-09-09

## 2023-09-07 NOTE — DISCHARGE INSTRUCTIONS
Continue amoxicillin and follow-up with your dentist.  Rest.  Return to emergency department for worsening symptoms or any problems

## 2023-09-07 NOTE — ED PROVIDER NOTES
"Encounter Date: 9/6/2023       History     Chief Complaint   Patient presents with    Dental Pain     Upper left. Was seen at the Dentist, and was prescribed antibiotics and was told needed a root canal. Pt states she does not have insurance and needs pain relief for the time being. States she was told it was severely infected.     28-year-old female presented emergency department complaining of toothache.  Patient has dental caries and was seen by dentist and was started on amoxicillin however still having pain so presented here.  Denies fever or chills or nausea vomiting chest pain or shortness of breath or abdominal pain or weakness or numbness      Review of patient's allergies indicates:   Allergen Reactions    Chantix [varenicline] Hives     Past Medical History:   Diagnosis Date    ADD (attention deficit disorder)     Anemia     Borderline personality disorder     Chronic headache     Depression     Fibromyalgia     GERD (gastroesophageal reflux disease)     Migraine headache     MALCOLM (obstructive sleep apnea)     PCOS (polycystic ovarian syndrome)     Pseudotumor cerebri syndrome     Schizoaffective disorder     Tourette's syndrome      Past Surgical History:   Procedure Laterality Date    TONSILLECTOMY      UPPER GASTROINTESTINAL ENDOSCOPY  in her childhood    "born with underdeveloped esophagus" & GERD per patient report    WISDOM TOOTH EXTRACTION       Family History   Problem Relation Age of Onset    Seizures Mother     Migraines Mother     Migraines Brother     Colon cancer Neg Hx     Colon polyps Neg Hx     Crohn's disease Neg Hx     Ulcerative colitis Neg Hx     Stomach cancer Neg Hx     Esophageal cancer Neg Hx      Social History     Tobacco Use    Smoking status: Former     Current packs/day: 1.00     Types: Cigarettes    Smokeless tobacco: Never   Substance Use Topics    Alcohol use: No    Drug use: No     Comment: patient states she quit mojo 3 months ago and marijuana 2 months ago     Review of " Systems   HENT:  Positive for dental problem.    All other systems reviewed and are negative.      Physical Exam     Initial Vitals [09/06/23 2309]   BP Pulse Resp Temp SpO2   127/81 84 19 98.1 °F (36.7 °C) 98 %      MAP       --         Physical Exam    Nursing note and vitals reviewed.  Constitutional: She appears well-developed and well-nourished.   HENT:   Head: Normocephalic and atraumatic.   Nose: Nose normal.   Mouth/Throat: Oropharynx is clear and moist.   Dental caries noted with left upper premolars.  No gum swelling   Eyes: Conjunctivae and EOM are normal. Pupils are equal, round, and reactive to light.   Neck: Neck supple. No thyromegaly present. No tracheal deviation present. No JVD present.   Normal range of motion.  Cardiovascular:  Normal rate, regular rhythm, normal heart sounds and intact distal pulses.           No murmur heard.  Pulmonary/Chest: Breath sounds normal. No stridor. No respiratory distress. She has no wheezes. She has no rales.   Abdominal: Abdomen is soft. Bowel sounds are normal.   Musculoskeletal:         General: No edema. Normal range of motion.      Cervical back: Normal range of motion and neck supple.     Neurological: She is alert and oriented to person, place, and time.   Skin: Skin is warm. Capillary refill takes less than 2 seconds.   Psychiatric: She has a normal mood and affect. Thought content normal.         ED Course   Procedures  Labs Reviewed   POCT URINE PREGNANCY          Imaging Results    None          Medications   oxyCODONE-acetaminophen 5-325 mg per tablet 2 tablet (has no administration in time range)     Medical Decision Making  Patient with dental pain secondary to dental caries.  Pain managed.  Patient otherwise nontoxic and already on antibiotics so discharged with instructions to follow-up with dentist    Amount and/or Complexity of Data Reviewed  Labs: ordered.    Risk  Prescription drug management.                               Clinical Impression:    Final diagnoses:  [K08.89] Toothache (Primary)  [K02.9] Dental caries        ED Disposition Condition    Discharge Stable          ED Prescriptions       Medication Sig Dispense Start Date End Date Auth. Provider    oxyCODONE-acetaminophen (PERCOCET) 5-325 mg per tablet Take 1 tablet by mouth every 6 (six) hours as needed. 12 tablet 9/7/2023 -- Silvino Olsen MD    naproxen (NAPROSYN) 500 MG tablet Take 1 tablet (500 mg total) by mouth 2 (two) times daily with meals. 30 tablet 9/7/2023 -- Silvino Olsen MD          Follow-up Information       Follow up With Specialties Details Why Contact Info    [facility]  In 2 days  [facility] [address]  [phone]               Silvino Olsen MD  09/07/23 0144

## 2023-09-09 ENCOUNTER — HOSPITAL ENCOUNTER (EMERGENCY)
Facility: HOSPITAL | Age: 28
Discharge: HOME | End: 2023-09-09
Attending: EMERGENCY MEDICINE
Payer: MEDICARE

## 2023-09-09 VITALS
TEMPERATURE: 98 F | DIASTOLIC BLOOD PRESSURE: 58 MMHG | BODY MASS INDEX: 38.99 KG/M2 | HEART RATE: 75 BPM | OXYGEN SATURATION: 95 % | SYSTOLIC BLOOD PRESSURE: 107 MMHG | HEIGHT: 65 IN | WEIGHT: 234 LBS | RESPIRATION RATE: 20 BRPM

## 2023-09-09 DIAGNOSIS — K02.9 DENTAL CAVITY: Primary | ICD-10-CM

## 2023-09-09 LAB
B-HCG UR QL: NEGATIVE
CTP QC/QA: YES

## 2023-09-09 PROCEDURE — 99283 EMERGENCY DEPT VISIT LOW MDM: CPT

## 2023-09-09 PROCEDURE — 81025 URINE PREGNANCY TEST: CPT | Performed by: NURSE PRACTITIONER

## 2023-09-09 RX ORDER — OXYCODONE AND ACETAMINOPHEN 5; 325 MG/1; MG/1
1 TABLET ORAL EVERY 6 HOURS PRN
Qty: 10 TABLET | Refills: 0 | Status: SHIPPED | OUTPATIENT
Start: 2023-09-09

## 2023-09-09 NOTE — FIRST PROVIDER EVALUATION
Emergency Department TeleTriage Encounter Note      CHIEF COMPLAINT    Chief Complaint   Patient presents with    Dental Pain     Presently  on antibiotics        VITAL SIGNS   Initial Vitals [09/09/23 1242]   BP Pulse Resp Temp SpO2   (!) 107/58 75 20 97.8 °F (36.6 °C) 95 %      MAP       --            ALLERGIES    Review of patient's allergies indicates:   Allergen Reactions    Chantix [varenicline] Hives       PROVIDER TRIAGE NOTE  This is a teletriage evaluation of a 28 y.o. female presenting to the ED with c/o dental pain to left upper tooth.  Pain has been ongoing for the past few months.  Pt is currently on antibiotics but pain has continued.  Pt states pain medications given by dentist not working.  Pt scheduled to see specialist on Wednesday for a root canal.      PE: VSS.  NAD noted.  Speaking in full sentences.  No facial swelling noted.      Plan: monitor    Limited physical exam via telehealth: The patient is awake, alert, answering questions appropriately and is not in respiratory distress. All ED beds are full at present; patient notified of this status.  Patient seen and medically screened by Nurse Practitioner via teletriage.      Initial orders will be placed and care will be transferred to an alternate provider when patient is roomed for a full evaluation. Any additional orders and the final disposition will be determined by that provider.         ORDERS  Labs Reviewed   POCT URINE PREGNANCY       ED Orders (720h ago, onward)      Start Ordered     Status Ordering Provider    09/09/23 1250 09/09/23 1249  POCT urine pregnancy  Once         Ordered NATHALIA BUSTAMANTE              Virtual Visit Note: The provider triage portion of this emergency department evaluation and documentation was performed via [employer], a HIPAA-compliant telemedicine application, in concert with a tele-presenter in the room. A face to face patient evaluation with one of my colleagues will occur once the patient is placed  in an emergency department room.      DISCLAIMER: This note was prepared with [employer] voice recognition transcription software. Garbled syntax, mangled pronouns, and other bizarre constructions may be attributed to that software system.

## 2023-09-09 NOTE — ED PROVIDER NOTES
Chief complaint:  Dental Pain (Presently  on antibiotics )      HPI:  Mary Sky is a 28 y.o. female with hx FMA, schizoaffective d/o, IIH presenting with persistent dental pain in the left upper 1st molar. Seen 3 days ago after recent dental visit with abx rx for amoxicillin as well analgesia at ED visit with percocet and naprosyn.  Patient reports date for root canal set for 9/13 in 4 days.  She is out of Percocet recently prescribed.  She is still taking antibiotic.  She denies swelling.  No difficulty breathing.  Pain is worse with chewing.  It is constant.  No associated fever.    ROS: As per HPI and below:  No fever, vomiting.    Review of patient's allergies indicates:   Allergen Reactions    Chantix [varenicline] Hives       Patient's Medications   New Prescriptions    No medications on file   Previous Medications    CLONIDINE (CATAPRES) 0.1 MG TABLET    Take 1 tablet (0.1 mg total) by mouth 2 (two) times daily.    FLUOXETINE (PROZAC) 20 MG CAPSULE    Take 20 mg by mouth once daily.    NAPROXEN (NAPROSYN) 500 MG TABLET    Take 1 tablet (500 mg total) by mouth 2 (two) times daily with meals.    ONDANSETRON (ZOFRAN) 4 MG TABLET    Take 1 tablet (4 mg total) by mouth every 6 (six) hours.    ONDANSETRON (ZOFRAN-ODT) 4 MG TBDL    Take 1 tablet (4 mg total) by mouth every 8 (eight) hours as needed.    OXYCODONE-ACETAMINOPHEN (PERCOCET) 5-325 MG PER TABLET    Take 1 tablet by mouth every 6 (six) hours as needed.    PANTOPRAZOLE (PROTONIX) 20 MG TABLET    Take 1 tablet (20 mg total) by mouth once daily.    TOPIRAMATE (TOPAMAX) 100 MG TABLET    Take 1 tablet (100 mg total) by mouth 2 (two) times daily.    VALACYCLOVIR (VALTREX) 1000 MG TABLET    Take 1 tablet (1,000 mg total) by mouth 3 (three) times daily. for 7 days   Modified Medications    No medications on file   Discontinued Medications    No medications on file       PMH:  As per HPI and below:  Past Medical History:   Diagnosis Date    ADD (attention  "deficit disorder)     Anemia     Borderline personality disorder     Chronic headache     Depression     Fibromyalgia     GERD (gastroesophageal reflux disease)     Migraine headache     MALCOLM (obstructive sleep apnea)     PCOS (polycystic ovarian syndrome)     Pseudotumor cerebri syndrome     Schizoaffective disorder     Tourette's syndrome      Past Surgical History:   Procedure Laterality Date    TONSILLECTOMY      UPPER GASTROINTESTINAL ENDOSCOPY  in her childhood    "born with underdeveloped esophagus" & GERD per patient report    WISDOM TOOTH EXTRACTION         Social History     Socioeconomic History    Marital status:    Tobacco Use    Smoking status: Former     Current packs/day: 1.00     Types: Cigarettes    Smokeless tobacco: Never   Substance and Sexual Activity    Alcohol use: No    Drug use: No     Comment: patient states she quit mojo 3 months ago and marijuana 2 months ago    Sexual activity: Never       Family History   Problem Relation Age of Onset    Seizures Mother     Migraines Mother     Migraines Brother     Colon cancer Neg Hx     Colon polyps Neg Hx     Crohn's disease Neg Hx     Ulcerative colitis Neg Hx     Stomach cancer Neg Hx     Esophageal cancer Neg Hx        Physical Exam:    Vitals:    09/09/23 1242   BP: (!) 107/58   Pulse: 75   Resp: 20   Temp: 97.8 °F (36.6 °C)     GENERAL:  No apparent distress.  Alert.    HEENT:  Moist mucous membranes.  Normocephalic and atraumatic.  Uvula is midline.  Normal phonation.  No trismus.  Defect in dental enamel with percussion tenderness present in the left upper 1st molar.  No buccal or facial edema.  NECK:  No swelling.  Midline trachea.  No stridor.  No cervical lymphadenopathy palpable.  CARDIOVASCULAR:  Regular rate and rhythm.  2+ radial pulses.    PULMONARY:  Lungs clear to auscultation bilaterally.  No wheezes, rales, or rhonci.    EXTREMITIES:  Warm and well perfused.  Brisk capillary refill.    NEUROLOGICAL:  Normal mental status.  " Appropriate and conversant.    SKIN:  No rashes or ecchymoses.      Labs Reviewed   POCT URINE PREGNANCY       Current Discharge Medication List        CONTINUE these medications which have NOT CHANGED    Details   cloNIDine (CATAPRES) 0.1 MG tablet Take 1 tablet (0.1 mg total) by mouth 2 (two) times daily.  Qty: 60 tablet, Refills: 11    Associated Diagnoses: Tourettes syndrome      FLUoxetine (PROZAC) 20 MG capsule Take 20 mg by mouth once daily.      naproxen (NAPROSYN) 500 MG tablet Take 1 tablet (500 mg total) by mouth 2 (two) times daily with meals.  Qty: 30 tablet, Refills: 0      ondansetron (ZOFRAN) 4 MG tablet Take 1 tablet (4 mg total) by mouth every 6 (six) hours.  Qty: 12 tablet, Refills: 0      ondansetron (ZOFRAN-ODT) 4 MG TbDL Take 1 tablet (4 mg total) by mouth every 8 (eight) hours as needed.  Qty: 9 tablet, Refills: 0      oxyCODONE-acetaminophen (PERCOCET) 5-325 mg per tablet Take 1 tablet by mouth every 6 (six) hours as needed.  Qty: 12 tablet, Refills: 0    Comments: Quantity prescribed more than 7 day supply? Yes, quantity medically necessary      pantoprazole (PROTONIX) 20 MG tablet Take 1 tablet (20 mg total) by mouth once daily.  Qty: 30 tablet, Refills: 0      topiramate (TOPAMAX) 100 MG tablet Take 1 tablet (100 mg total) by mouth 2 (two) times daily.  Qty: 60 tablet, Refills: 11    Associated Diagnoses: Tourettes syndrome; Chronic migraine      valACYclovir (VALTREX) 1000 MG tablet Take 1 tablet (1,000 mg total) by mouth 3 (three) times daily. for 7 days  Qty: 21 tablet, Refills: 0             Orders Placed This Encounter   Procedures    POCT urine pregnancy       Imaging Results    None              MDM:    28 y.o. female with dental cavity.  No sign of complications such as facial cellulitis or abscess.  No sign of airway compromise.  There is no indication for change in antibiotics or further facial imaging.  Additional analgesia bridging to urgent planned dental follow-up for root  canal given.  Continue planned course of antibiotics.  Return precautions reviewed.    Diagnoses:    1. Dental cavity       Ty Lee MD  09/09/23 9427

## 2023-09-18 ENCOUNTER — HOSPITAL ENCOUNTER (EMERGENCY)
Facility: HOSPITAL | Age: 28
Discharge: HOME | End: 2023-09-18
Attending: EMERGENCY MEDICINE
Payer: MEDICARE

## 2023-09-18 VITALS
HEART RATE: 86 BPM | WEIGHT: 234 LBS | HEIGHT: 64 IN | RESPIRATION RATE: 20 BRPM | BODY MASS INDEX: 39.95 KG/M2 | DIASTOLIC BLOOD PRESSURE: 90 MMHG | TEMPERATURE: 98 F | SYSTOLIC BLOOD PRESSURE: 141 MMHG | OXYGEN SATURATION: 97 %

## 2023-09-18 DIAGNOSIS — N94.6 DYSMENORRHEA: Primary | ICD-10-CM

## 2023-09-18 LAB
ALBUMIN SERPL BCP-MCNC: 3.8 G/DL (ref 3.5–5.2)
ALP SERPL-CCNC: 53 U/L (ref 55–135)
ALT SERPL W/O P-5'-P-CCNC: 12 U/L (ref 10–44)
ANION GAP SERPL CALC-SCNC: 4 MMOL/L (ref 8–16)
AST SERPL-CCNC: 19 U/L (ref 10–40)
B-HCG UR QL: NEGATIVE
BACTERIA #/AREA URNS HPF: NEGATIVE /HPF
BASOPHILS # BLD AUTO: 0.01 K/UL (ref 0–0.2)
BASOPHILS NFR BLD: 0.1 % (ref 0–1.9)
BILIRUB SERPL-MCNC: 0.6 MG/DL (ref 0.1–1)
BILIRUB UR QL STRIP: NEGATIVE
BUN SERPL-MCNC: 16 MG/DL (ref 6–20)
CALCIUM SERPL-MCNC: 8.8 MG/DL (ref 8.7–10.5)
CHLORIDE SERPL-SCNC: 111 MMOL/L (ref 95–110)
CLARITY UR: CLEAR
CO2 SERPL-SCNC: 25 MMOL/L (ref 23–29)
COLOR UR: YELLOW
CREAT SERPL-MCNC: 0.6 MG/DL (ref 0.5–1.4)
CTP QC/QA: YES
DIFFERENTIAL METHOD: ABNORMAL
EOSINOPHIL # BLD AUTO: 0 K/UL (ref 0–0.5)
EOSINOPHIL NFR BLD: 0 % (ref 0–8)
ERYTHROCYTE [DISTWIDTH] IN BLOOD BY AUTOMATED COUNT: 13.6 % (ref 11.5–14.5)
EST. GFR  (NO RACE VARIABLE): >60 ML/MIN/1.73 M^2
GLUCOSE SERPL-MCNC: 102 MG/DL (ref 70–110)
GLUCOSE UR QL STRIP: NEGATIVE
HCT VFR BLD AUTO: 40.2 % (ref 37–48.5)
HGB BLD-MCNC: 12.6 G/DL (ref 12–16)
HGB UR QL STRIP: ABNORMAL
HYALINE CASTS #/AREA URNS LPF: 1 /LPF
IMM GRANULOCYTES # BLD AUTO: 0.02 K/UL (ref 0–0.04)
IMM GRANULOCYTES NFR BLD AUTO: 0.2 % (ref 0–0.5)
KETONES UR QL STRIP: NEGATIVE
LEUKOCYTE ESTERASE UR QL STRIP: NEGATIVE
LIPASE SERPL-CCNC: 24 U/L (ref 4–60)
LYMPHOCYTES # BLD AUTO: 1.5 K/UL (ref 1–4.8)
LYMPHOCYTES NFR BLD: 18.9 % (ref 18–48)
MCH RBC QN AUTO: 29.2 PG (ref 27–31)
MCHC RBC AUTO-ENTMCNC: 31.3 G/DL (ref 32–36)
MCV RBC AUTO: 93 FL (ref 82–98)
MICROSCOPIC COMMENT: ABNORMAL
MONOCYTES # BLD AUTO: 0.6 K/UL (ref 0.3–1)
MONOCYTES NFR BLD: 7.7 % (ref 4–15)
NEUTROPHILS # BLD AUTO: 5.9 K/UL (ref 1.8–7.7)
NEUTROPHILS NFR BLD: 73.1 % (ref 38–73)
NITRITE UR QL STRIP: NEGATIVE
NRBC BLD-RTO: 0 /100 WBC
PH UR STRIP: 6 [PH] (ref 5–8)
PLATELET # BLD AUTO: 244 K/UL (ref 150–450)
PLATELET BLD QL SMEAR: ABNORMAL
PMV BLD AUTO: 9.7 FL (ref 9.2–12.9)
POTASSIUM SERPL-SCNC: 4.2 MMOL/L (ref 3.5–5.1)
PROT SERPL-MCNC: 7.1 G/DL (ref 6–8.4)
PROT UR QL STRIP: ABNORMAL
RBC # BLD AUTO: 4.32 M/UL (ref 4–5.4)
RBC #/AREA URNS HPF: >100 /HPF (ref 0–4)
SODIUM SERPL-SCNC: 140 MMOL/L (ref 136–145)
SP GR UR STRIP: >1.03 (ref 1–1.03)
SQUAMOUS #/AREA URNS HPF: 1 /HPF
URN SPEC COLLECT METH UR: ABNORMAL
UROBILINOGEN UR STRIP-ACNC: NEGATIVE EU/DL
WBC # BLD AUTO: 8.08 K/UL (ref 3.9–12.7)
WBC #/AREA URNS HPF: 1 /HPF (ref 0–5)

## 2023-09-18 PROCEDURE — 83690 ASSAY OF LIPASE: CPT

## 2023-09-18 PROCEDURE — 63600175 PHARM REV CODE 636 W HCPCS: Performed by: EMERGENCY MEDICINE

## 2023-09-18 PROCEDURE — 81001 URINALYSIS AUTO W/SCOPE: CPT

## 2023-09-18 PROCEDURE — 85025 COMPLETE CBC W/AUTO DIFF WBC: CPT

## 2023-09-18 PROCEDURE — 81025 URINE PREGNANCY TEST: CPT

## 2023-09-18 PROCEDURE — 96374 THER/PROPH/DIAG INJ IV PUSH: CPT

## 2023-09-18 PROCEDURE — 25000003 PHARM REV CODE 250: Performed by: EMERGENCY MEDICINE

## 2023-09-18 PROCEDURE — 80053 COMPREHEN METABOLIC PANEL: CPT

## 2023-09-18 PROCEDURE — 99285 EMERGENCY DEPT VISIT HI MDM: CPT | Mod: 25

## 2023-09-18 RX ORDER — KETOROLAC TROMETHAMINE 30 MG/ML
10 INJECTION, SOLUTION INTRAMUSCULAR; INTRAVENOUS
Status: COMPLETED | OUTPATIENT
Start: 2023-09-18 | End: 2023-09-18

## 2023-09-18 RX ORDER — ACETAMINOPHEN 500 MG
1000 TABLET ORAL
Status: COMPLETED | OUTPATIENT
Start: 2023-09-18 | End: 2023-09-18

## 2023-09-18 RX ADMIN — KETOROLAC TROMETHAMINE 10 MG: 30 INJECTION, SOLUTION INTRAMUSCULAR; INTRAVENOUS at 04:09

## 2023-09-18 RX ADMIN — ACETAMINOPHEN 1000 MG: 500 TABLET ORAL at 04:09

## 2023-09-18 NOTE — ED PROVIDER NOTES
"Encounter Date: 2023       History     Chief Complaint   Patient presents with    Menstrual Cramp     Pt states " I got my period this is day number 2" reports abd cramping since 0500 this, pt reports she gets mild cramping during her normal cycle, reports the cramping this morning " like anything I've never experienced like contractions when I had my child" reports heavy cycle      Emergent evaluation of a 28-year-old female  with polycystic ovarian syndrome, migraine headaches, pseudotumor cerebri, anemia, sleep apnea, Tourette's, schizoaffective disorder, borderline personality disorder, ADD, depression, and GERD presents to the ER due to abdominal cramping in her 2nd day of menses.  She reports her menstrual cycle began yesterday she normally gets menstrual cramps but this is more severe as if she was having contractions.  She reports she is only used 1 heavy pad today.  She reports her bleeding is slightly more than usual as well.  Symptoms began this morning she reports that there waxing and waning when pain was severe she felt lightheaded weak like she was going to pass out.  She denies any urinary symptoms.  Positive nausea and diarrhea but no vomiting.  No fevers chills or sweats      Review of patient's allergies indicates:   Allergen Reactions    Chantix [varenicline] Hives     Past Medical History:   Diagnosis Date    ADD (attention deficit disorder)     Anemia     Borderline personality disorder     Chronic headache     Depression     Fibromyalgia     GERD (gastroesophageal reflux disease)     Migraine headache     MALCOLM (obstructive sleep apnea)     PCOS (polycystic ovarian syndrome)     Pseudotumor cerebri syndrome     Schizoaffective disorder     Tourette's syndrome      Past Surgical History:   Procedure Laterality Date    TONSILLECTOMY      UPPER GASTROINTESTINAL ENDOSCOPY  in her childhood    "born with underdeveloped esophagus" & GERD per patient report    WISDOM TOOTH EXTRACTION   "     Family History   Problem Relation Age of Onset    Seizures Mother     Migraines Mother     Migraines Brother     Colon cancer Neg Hx     Colon polyps Neg Hx     Crohn's disease Neg Hx     Ulcerative colitis Neg Hx     Stomach cancer Neg Hx     Esophageal cancer Neg Hx      Social History     Tobacco Use    Smoking status: Former     Current packs/day: 1.00     Types: Cigarettes    Smokeless tobacco: Never   Substance Use Topics    Alcohol use: No    Drug use: No     Comment: patient states she quit mojo 3 months ago and marijuana 2 months ago     Review of Systems   Constitutional:  Negative for activity change, appetite change, chills, diaphoresis, fatigue and fever.   HENT:  Negative for congestion, postnasal drip, rhinorrhea and sore throat.    Respiratory:  Negative for cough, chest tightness, shortness of breath, wheezing and stridor.    Cardiovascular:  Negative for chest pain and palpitations.   Gastrointestinal:  Positive for diarrhea and nausea. Negative for abdominal distention, abdominal pain, blood in stool, constipation and vomiting.   Genitourinary:  Positive for pelvic pain and vaginal bleeding. Negative for dysuria, flank pain, frequency, hematuria, urgency, vaginal discharge and vaginal pain.   Musculoskeletal:  Negative for arthralgias, back pain, myalgias, neck pain and neck stiffness.   Skin:  Negative for rash.   Neurological:  Positive for weakness and light-headedness. Negative for dizziness, syncope and headaches.   Hematological:  Does not bruise/bleed easily.   Psychiatric/Behavioral:  Negative for confusion. The patient is not nervous/anxious.    All other systems reviewed and are negative.      Physical Exam     Initial Vitals [09/18/23 1512]   BP Pulse Resp Temp SpO2   (!) 141/90 86 20 98 °F (36.7 °C) 97 %      MAP       --         Physical Exam    Nursing note and vitals reviewed.  Constitutional: She appears well-developed and well-nourished. She is not diaphoretic. No distress.    HENT:   Head: Normocephalic and atraumatic.   Right Ear: External ear normal.   Left Ear: External ear normal.   Nose: Nose normal.   Mouth/Throat: Oropharynx is clear and moist.   Eyes: Conjunctivae and EOM are normal. Pupils are equal, round, and reactive to light.   Neck: Neck supple. No tracheal deviation present.   Normal range of motion.  Cardiovascular:  Normal rate, regular rhythm, normal heart sounds and intact distal pulses.     Exam reveals no gallop and no friction rub.       No murmur heard.  Pulmonary/Chest: Breath sounds normal. No stridor. No respiratory distress. She has no wheezes. She has no rhonchi. She has no rales. She exhibits no tenderness.   Abdominal: Abdomen is soft and flat. Bowel sounds are normal. She exhibits no shifting dullness, no distension, no fluid wave and no mass. There is no splenomegaly or hepatomegaly. There is abdominal tenderness in the right lower quadrant, suprapubic area and left lower quadrant. No hernia.     There is no rebound, no guarding, no tenderness at McBurney's point and negative Miller's sign.   Musculoskeletal:         General: No edema. Normal range of motion.      Cervical back: Normal range of motion and neck supple.     Neurological: She is alert and oriented to person, place, and time. She has normal strength. No cranial nerve deficit or sensory deficit.   Skin: Skin is warm and dry. No rash noted. No erythema. No pallor.   Psychiatric: She has a normal mood and affect. Her behavior is normal. Judgment and thought content normal.         ED Course   Procedures  Labs Reviewed   CBC W/ AUTO DIFFERENTIAL - Abnormal; Notable for the following components:       Result Value    MCHC 31.3 (*)     All other components within normal limits   COMPREHENSIVE METABOLIC PANEL - Abnormal; Notable for the following components:    Chloride 111 (*)     Alkaline Phosphatase 53 (*)     Anion Gap 4 (*)     All other components within normal limits   URINALYSIS, REFLEX TO  URINE CULTURE - Abnormal; Notable for the following components:    Specific Gravity, UA >1.030 (*)     Protein, UA Trace (*)     Occult Blood UA 3+ (*)     All other components within normal limits    Narrative:     Specimen Source->Urine   URINALYSIS MICROSCOPIC - Abnormal; Notable for the following components:    RBC, UA >100 (*)     All other components within normal limits    Narrative:     Specimen Source->Urine   LIPASE   POCT URINE PREGNANCY          Imaging Results              US Transvaginal Non OB (Final result)  Result time 23 16:49:06   Procedure changed from US Pelvis Complete Non OB     Final result by Rohini Davies MD (23 16:49:06)                   Narrative:    EXAM:  US Pelvis Transvaginal    CLINICAL HISTORY:  The patient is 28 years old and is Female; lower abdominal pain    TECHNIQUE:  Real-time transvaginal pelvic ultrasound with image documentation.  Transvaginal imaging was used for better evaluation of the endometrium and adnexa.    COMPARISON:  None.    FINDINGS:  UTERUS/CERVIX:  Uterus measures 5.9 x 3.4 x 4.6 cm.  Endometrium: 0.2 cm.  No myometrial mass.  RIGHT OVARY:  Right ovary measures 4.0 x 2.0 x 2.8 cm.  Normal blood flow.  LEFT OVARY:  Left ovary measures 2.3 x 1.4 x 2.0 cm.  Normal blood flow.  FREE FLUID:  No free fluid.    IMPRESSION:  No acute findings in the pelvis.    Electronically signed by:  Rohini Davies MD  2023 4:49 PM CDT Workstation: [id number]                                     Medications   ketorolac injection 9.999 mg (9.999 mg Intravenous Given 23 1601)   acetaminophen tablet 1,000 mg (1,000 mg Oral Given 23 1601)     Medical Decision Making  Emergent evaluation of a 28-year-old female  with polycystic ovarian syndrome, migraine headaches, pseudotumor cerebri, anemia, sleep apnea, Tourette's, schizoaffective disorder, borderline personality disorder, ADD, depression, and GERD presents to the ER due to abdominal  cramping in her 2nd day of menses.  She reports her menstrual cycle began yesterday she normally gets menstrual cramps but this is more severe as if she was having contractions.  She reports she is only used 1 heavy pad today.  She reports her bleeding is slightly more than usual as well.  Symptoms began this morning she reports that there waxing and waning when pain was severe she felt lightheaded weak like she was going to pass out.  She denies any urinary symptoms.  Positive nausea and diarrhea but no vomiting.  No fevers chills or sweats  On physical exam patient has tenderness suprapubically in bilateral lower quadrants no rebound or guarding normal bowel sounds.  Normal cardiac lung exam no pallor  MDM    Patient presents for emergent evaluation of acute pelvic pain bilateral lower quadrant abdominal pain in 2nd day of menses that poses a threat to life and/or bodily function.   Differential diagnosis includes but was not limited to acute pancreatitis, acute cholecystitis and cholangitis, colitis, acute appendicitis, urinary tract infection, ruptured ovarian cyst, ectopic pregnancy, ovarian  torsion, if female PID, TOA, pyelonephritis, kidney stone, volvulus, small bowel obstruction, enteritis, gastritis, colitis, mesenteric ischemia, AAA, AAA rupture, aortic dissection, constipation, upper or lower gi bleeding, traumatic injury.   .   In the ED patient found to have acute dysmenorrhea  I ordered labs and personally reviewed them.  Labs significant for see below  I ordered ultrasound and personally reviewed it and reviewed the radiologist interpretation.  Ultrasound pelvic non OB significant for    FINDINGS:  UTERUS/CERVIX:  Uterus measures 5.9 x 3.4 x 4.6 cm.  Endometrium: 0.2 cm.  No myometrial mass.  RIGHT OVARY:  Right ovary measures 4.0 x 2.0 x 2.8 cm.  Normal blood flow.  LEFT OVARY:  Left ovary measures 2.3 x 1.4 x 2.0 cm.  Normal blood flow.  FREE FLUID:  No free fluid.     IMPRESSION:  No acute  findings in the pelvis.    Discharge MDM     Patient was managed in the ED with Toradol 10 mg IV 1 g of Tylenol orally  The response to treatment was good.    Patient was discharged in stable condition.  Detailed return precautions discussed.  Patient was told to follow up with primary care physician or specialist based on their diagnosis  Val Enrique MD      Risk  OTC drugs.  Prescription drug management.                               Clinical Impression:   Final diagnoses:  [N94.6] Dysmenorrhea (Primary)        ED Disposition Condition    Discharge Stable          ED Prescriptions    None       Follow-up Information       Follow up With Specialties Details Why Contact Info Additional Information    Your OBGYN  Schedule an appointment as soon as possible for a visit  If your symptoms do not improve      [facility] - Emergency Dept Emergency Medicine Go to  If symptoms worsen [address] [facility] [id number] [phone] 1st floor             Val Enrique MD  09/18/23 7153

## 2023-11-13 ENCOUNTER — HOSPITAL ENCOUNTER (EMERGENCY)
Facility: HOSPITAL | Age: 28
Discharge: HOME | End: 2023-11-13
Attending: EMERGENCY MEDICINE
Payer: MEDICARE

## 2023-11-13 VITALS
BODY MASS INDEX: 39.95 KG/M2 | SYSTOLIC BLOOD PRESSURE: 113 MMHG | HEART RATE: 60 BPM | TEMPERATURE: 98 F | HEIGHT: 64 IN | WEIGHT: 234 LBS | OXYGEN SATURATION: 99 % | RESPIRATION RATE: 18 BRPM | DIASTOLIC BLOOD PRESSURE: 78 MMHG

## 2023-11-13 DIAGNOSIS — R42 VERTIGO: Primary | ICD-10-CM

## 2023-11-13 DIAGNOSIS — R10.11 RIGHT UPPER QUADRANT ABDOMINAL PAIN: ICD-10-CM

## 2023-11-13 LAB
ALBUMIN SERPL BCP-MCNC: 3.9 G/DL (ref 3.5–5.2)
ALP SERPL-CCNC: 68 U/L (ref 55–135)
ALT SERPL W/O P-5'-P-CCNC: 16 U/L (ref 10–44)
ANION GAP SERPL CALC-SCNC: 8 MMOL/L (ref 8–16)
AST SERPL-CCNC: 14 U/L (ref 10–40)
BASOPHILS # BLD AUTO: 0 K/UL (ref 0–0.2)
BASOPHILS NFR BLD: 0 % (ref 0–1.9)
BILIRUB SERPL-MCNC: 0.4 MG/DL (ref 0.1–1)
BILIRUB UR QL STRIP: NEGATIVE
BUN SERPL-MCNC: 12 MG/DL (ref 6–20)
CALCIUM SERPL-MCNC: 9.3 MG/DL (ref 8.7–10.5)
CHLORIDE SERPL-SCNC: 107 MMOL/L (ref 95–110)
CLARITY UR: CLEAR
CO2 SERPL-SCNC: 23 MMOL/L (ref 23–29)
COLOR UR: YELLOW
CREAT SERPL-MCNC: 0.7 MG/DL (ref 0.5–1.4)
DIFFERENTIAL METHOD: NORMAL
EOSINOPHIL # BLD AUTO: 0 K/UL (ref 0–0.5)
EOSINOPHIL NFR BLD: 0 % (ref 0–8)
ERYTHROCYTE [DISTWIDTH] IN BLOOD BY AUTOMATED COUNT: 13.4 % (ref 11.5–14.5)
EST. GFR  (NO RACE VARIABLE): >60 ML/MIN/1.73 M^2
GLUCOSE SERPL-MCNC: 90 MG/DL (ref 70–110)
GLUCOSE UR QL STRIP: NEGATIVE
HCT VFR BLD AUTO: 40.8 % (ref 37–48.5)
HGB BLD-MCNC: 13.3 G/DL (ref 12–16)
HGB UR QL STRIP: NEGATIVE
IMM GRANULOCYTES # BLD AUTO: 0.02 K/UL (ref 0–0.04)
IMM GRANULOCYTES NFR BLD AUTO: 0.3 % (ref 0–0.5)
KETONES UR QL STRIP: ABNORMAL
LEUKOCYTE ESTERASE UR QL STRIP: NEGATIVE
LIPASE SERPL-CCNC: 11 U/L (ref 4–60)
LYMPHOCYTES # BLD AUTO: 1.8 K/UL (ref 1–4.8)
LYMPHOCYTES NFR BLD: 29 % (ref 18–48)
MCH RBC QN AUTO: 29.3 PG (ref 27–31)
MCHC RBC AUTO-ENTMCNC: 32.6 G/DL (ref 32–36)
MCV RBC AUTO: 90 FL (ref 82–98)
MONOCYTES # BLD AUTO: 0.4 K/UL (ref 0.3–1)
MONOCYTES NFR BLD: 6.4 % (ref 4–15)
NEUTROPHILS # BLD AUTO: 3.9 K/UL (ref 1.8–7.7)
NEUTROPHILS NFR BLD: 64.3 % (ref 38–73)
NITRITE UR QL STRIP: NEGATIVE
NRBC BLD-RTO: 0 /100 WBC
PH UR STRIP: 6 [PH] (ref 5–8)
PLATELET # BLD AUTO: 267 K/UL (ref 150–450)
PMV BLD AUTO: 9.3 FL (ref 9.2–12.9)
POTASSIUM SERPL-SCNC: 4.8 MMOL/L (ref 3.5–5.1)
PROT SERPL-MCNC: 7.7 G/DL (ref 6–8.4)
PROT UR QL STRIP: ABNORMAL
RBC # BLD AUTO: 4.54 M/UL (ref 4–5.4)
SODIUM SERPL-SCNC: 138 MMOL/L (ref 136–145)
SP GR UR STRIP: >1.03 (ref 1–1.03)
URN SPEC COLLECT METH UR: ABNORMAL
UROBILINOGEN UR STRIP-ACNC: NEGATIVE EU/DL
WBC # BLD AUTO: 6.11 K/UL (ref 3.9–12.7)

## 2023-11-13 PROCEDURE — 83690 ASSAY OF LIPASE: CPT | Performed by: NURSE PRACTITIONER

## 2023-11-13 PROCEDURE — 96361 HYDRATE IV INFUSION ADD-ON: CPT

## 2023-11-13 PROCEDURE — 96375 TX/PRO/DX INJ NEW DRUG ADDON: CPT

## 2023-11-13 PROCEDURE — 25000003 PHARM REV CODE 250: Performed by: PHYSICIAN ASSISTANT

## 2023-11-13 PROCEDURE — 96374 THER/PROPH/DIAG INJ IV PUSH: CPT

## 2023-11-13 PROCEDURE — 80053 COMPREHEN METABOLIC PANEL: CPT | Performed by: NURSE PRACTITIONER

## 2023-11-13 PROCEDURE — 25000003 PHARM REV CODE 250: Performed by: NURSE PRACTITIONER

## 2023-11-13 PROCEDURE — 81003 URINALYSIS AUTO W/O SCOPE: CPT | Performed by: NURSE PRACTITIONER

## 2023-11-13 PROCEDURE — 85025 COMPLETE CBC W/AUTO DIFF WBC: CPT | Performed by: NURSE PRACTITIONER

## 2023-11-13 PROCEDURE — 36415 COLL VENOUS BLD VENIPUNCTURE: CPT | Performed by: NURSE PRACTITIONER

## 2023-11-13 PROCEDURE — 99285 EMERGENCY DEPT VISIT HI MDM: CPT | Mod: 25

## 2023-11-13 PROCEDURE — 63600175 PHARM REV CODE 636 W HCPCS: Performed by: PHYSICIAN ASSISTANT

## 2023-11-13 RX ORDER — FAMOTIDINE 10 MG/ML
20 INJECTION INTRAVENOUS
Status: COMPLETED | OUTPATIENT
Start: 2023-11-13 | End: 2023-11-13

## 2023-11-13 RX ORDER — KETOROLAC TROMETHAMINE 30 MG/ML
15 INJECTION, SOLUTION INTRAMUSCULAR; INTRAVENOUS
Status: COMPLETED | OUTPATIENT
Start: 2023-11-13 | End: 2023-11-13

## 2023-11-13 RX ORDER — MECLIZINE HYDROCHLORIDE 25 MG/1
25 TABLET ORAL 3 TIMES DAILY PRN
Qty: 20 TABLET | Refills: 0 | Status: SHIPPED | OUTPATIENT
Start: 2023-11-13

## 2023-11-13 RX ORDER — FAMOTIDINE 20 MG/1
20 TABLET, FILM COATED ORAL 2 TIMES DAILY PRN
Qty: 20 TABLET | Refills: 0 | Status: SHIPPED | OUTPATIENT
Start: 2023-11-13 | End: 2024-11-12

## 2023-11-13 RX ORDER — ONDANSETRON 4 MG/1
4 TABLET, ORALLY DISINTEGRATING ORAL
Status: COMPLETED | OUTPATIENT
Start: 2023-11-13 | End: 2023-11-13

## 2023-11-13 RX ORDER — MECLIZINE HYDROCHLORIDE 25 MG/1
25 TABLET ORAL
Status: COMPLETED | OUTPATIENT
Start: 2023-11-13 | End: 2023-11-13

## 2023-11-13 RX ADMIN — FAMOTIDINE 20 MG: 10 INJECTION, SOLUTION INTRAVENOUS at 06:11

## 2023-11-13 RX ADMIN — MECLIZINE HYDROCHLORIDE 25 MG: 25 TABLET ORAL at 08:11

## 2023-11-13 RX ADMIN — KETOROLAC TROMETHAMINE 15 MG: 30 INJECTION INTRAMUSCULAR; INTRAVENOUS at 08:11

## 2023-11-13 RX ADMIN — ONDANSETRON 4 MG: 4 TABLET, ORALLY DISINTEGRATING ORAL at 03:11

## 2023-11-13 RX ADMIN — SODIUM CHLORIDE 500 ML: 9 INJECTION, SOLUTION INTRAVENOUS at 07:11

## 2023-11-13 NOTE — FIRST PROVIDER EVALUATION
Emergency Department TeleTriage Encounter Note      CHIEF COMPLAINT    Chief Complaint   Patient presents with    Dizziness    Nausea     X 4 weeks     Abdominal Pain       VITAL SIGNS   Initial Vitals [11/13/23 1451]   BP Pulse Resp Temp SpO2   133/76 83 20 98 °F (36.7 °C) 99 %      MAP       --            ALLERGIES    Review of patient's allergies indicates:   Allergen Reactions    Chantix [varenicline] Hives       PROVIDER TRIAGE NOTE  TeleTriage Note: Mary Sky, a nontoxic/well appearing, 28 y.o. female, presented to the ED with c/o RUQ abdominal pain that began a few weeks with associated nausea. Pt woke up this morning with dizziness and is having sharp pain in her abdomen. No hx of cholecystectomy.     All ED beds are full at present; patient notified of this status.  Patient seen and medically screened by Nurse Practitioner via teletriage. Orders initiated at triage to expedite care.  Patient is stable to return to the waiting room and will be placed in an ED bed when available.  Care will be transferred to an alternate provider when patient has been placed in an Exam Room from the [facility] for physical exam, additional orders, and disposition.  2:55 PM Valerie Newby, FRANK, FNP-C        ORDERS  Labs Reviewed   CBC W/ AUTO DIFFERENTIAL   COMPREHENSIVE METABOLIC PANEL   LIPASE   URINALYSIS, REFLEX TO URINE CULTURE   POCT URINE PREGNANCY   ISTAT CHEM8       ED Orders (720h ago, onward)      Start Ordered     Status Ordering Provider    11/13/23 1500 11/13/23 1456  ondansetron disintegrating tablet 4 mg  ED 1 Time         Ordered VALERIE NEWBY    11/13/23 1500 11/13/23 1456  famotidine (PF) injection 20 mg  ED 1 Time         Ordered VALERIE NEWBY    11/13/23 1457 11/13/23 1456  Vital signs  Every 2 hours         Ordered VALERIE NEWBY    11/13/23 1457 11/13/23 1456  Diet NPO  Diet effective now         Ordered VALERIE NEWBY    11/13/23 1457 11/13/23 1456  Insert peripheral IV  Once          Ordered DIEGO, ZHENG DOUGLASFabiana    11/13/23 1457 11/13/23 1456  CBC W/ AUTO DIFFERENTIAL  STAT         Ordered DIEGO, ZHENG DOUGLASFabiana    11/13/23 1457 11/13/23 1456  Comp. Metabolic Panel  STAT         Ordered DIEGO, ZHENG DOUGLASFabiana    11/13/23 1457 11/13/23 1456  ISTAT CHEM8  Once         Ordered DIEGO, ZHENG SHOBHA    11/13/23 1457 11/13/23 1456  Lipase  STAT         Ordered DIEGO, ZHENG DOUGLASFabiana    11/13/23 1457 11/13/23 1456  Urinalysis, Reflex to Urine Culture Urine, Clean Catch  STAT         Ordered DIEGO, ZHENG DOUGLASFabiana    11/13/23 1457 11/13/23 1456  POCT urine pregnancy  Once         Ordered DIEGOZHENG              Virtual Visit Note: The provider triage portion of this emergency department evaluation and documentation was performed via [employer], a HIPAA-compliant telemedicine application, in concert with a tele-presenter in the room. A face to face patient evaluation with one of my colleagues will occur once the patient is placed in an emergency department room.      DISCLAIMER: This note was prepared with [employer] voice recognition transcription software. Garbled syntax, mangled pronouns, and other bizarre constructions may be attributed to that software system.

## 2023-11-14 NOTE — ED PROVIDER NOTES
"Encounter Date: 11/13/2023       History     Chief Complaint   Patient presents with    Dizziness    Nausea     X 4 weeks     Abdominal Pain     Mayr Sky is a 28 y.o. female presenting for evaluation of room-spinning dizziness, persisting for the last several weeks.  She has been diagnosed with Vertigo by ENT in [facility] and states this feels similar.  She has associated nausea.  In addition, she is having right sided upper abdominal pain and has a known history of "fatty liver."  She hasn't taken any medication for her symptoms.   She has a past medical history of ADD (attention deficit disorder), Anemia, Borderline personality disorder, Chronic headache, Depression, Fibromyalgia, GERD (gastroesophageal reflux disease), Migraine headache, MALCOLM (obstructive sleep apnea), PCOS (polycystic ovarian syndrome), Pseudotumor cerebri syndrome, Schizoaffective disorder, and Tourette's syndrome.      The history is provided by the patient.     Review of patient's allergies indicates:   Allergen Reactions    Chantix [varenicline] Hives     Past Medical History:   Diagnosis Date    ADD (attention deficit disorder)     Anemia     Borderline personality disorder     Chronic headache     Depression     Fibromyalgia     GERD (gastroesophageal reflux disease)     Migraine headache     MALCOLM (obstructive sleep apnea)     PCOS (polycystic ovarian syndrome)     Pseudotumor cerebri syndrome     Schizoaffective disorder     Tourette's syndrome      Past Surgical History:   Procedure Laterality Date    TONSILLECTOMY      UPPER GASTROINTESTINAL ENDOSCOPY  in her childhood    "born with underdeveloped esophagus" & GERD per patient report    WISDOM TOOTH EXTRACTION       Family History   Problem Relation Age of Onset    Seizures Mother     Migraines Mother     Migraines Brother     Colon cancer Neg Hx     Colon polyps Neg Hx     Crohn's disease Neg Hx     Ulcerative colitis Neg Hx     Stomach cancer Neg Hx     Esophageal cancer Neg Hx  "     Social History     Tobacco Use    Smoking status: Former     Current packs/day: 1.00     Types: Cigarettes    Smokeless tobacco: Never   Substance Use Topics    Alcohol use: No    Drug use: No     Comment: patient states she quit mojo 3 months ago and marijuana 2 months ago     Review of Systems   Constitutional:  Negative for chills and fever.   HENT:  Negative for facial swelling and trouble swallowing.    Eyes:  Negative for discharge.   Respiratory:  Negative for cough, chest tightness, shortness of breath and wheezing.    Cardiovascular:  Negative for chest pain and palpitations.   Gastrointestinal:  Positive for abdominal pain and nausea. Negative for diarrhea and vomiting.   Genitourinary:  Negative for dysuria and hematuria.   Musculoskeletal:  Negative for arthralgias, back pain, joint swelling, myalgias, neck pain and neck stiffness.   Skin:  Negative for color change, pallor, rash and wound.   Neurological:  Positive for dizziness. Negative for syncope, weakness, light-headedness, numbness and headaches.   Hematological:  Does not bruise/bleed easily.   Psychiatric/Behavioral:  The patient is not nervous/anxious.        Physical Exam     Initial Vitals [11/13/23 1451]   BP Pulse Resp Temp SpO2   133/76 83 20 98 °F (36.7 °C) 99 %      MAP       --         Physical Exam    Nursing note and vitals reviewed.  Constitutional: She appears well-developed and well-nourished. She is not diaphoretic. No distress.   HENT:   Head: Normocephalic and atraumatic.   Right Ear: External ear normal.   Left Ear: External ear normal.   Nose: Nose normal.   Mouth/Throat: Oropharynx is clear and moist.   Eyes: Conjunctivae and EOM are normal. Pupils are equal, round, and reactive to light.   Neck: Neck supple.   Normal range of motion.  Cardiovascular:  Normal rate, regular rhythm, normal heart sounds and intact distal pulses.           Pulmonary/Chest: Breath sounds normal. No respiratory distress. She has no wheezes. She  has no rhonchi. She has no rales.   Abdominal: Abdomen is soft. She exhibits no distension and no mass. There is abdominal tenderness.   Mild TTP noted to RUQ.    Musculoskeletal:         General: No tenderness or edema. Normal range of motion.      Cervical back: Normal range of motion and neck supple.     Neurological: She is alert and oriented to person, place, and time. She has normal strength. No cranial nerve deficit or sensory deficit. GCS score is 15. GCS eye subscore is 4. GCS verbal subscore is 5. GCS motor subscore is 6.   No focal neurological deficits noted.  Cranial nerves III-XII grossly intact.  Equal strength and sensation noted to bilateral upper and lower extremities.     Skin: Skin is warm and dry. No rash and no abscess noted. No erythema.   Psychiatric: She has a normal mood and affect.         ED Course   Procedures  Labs Reviewed   URINALYSIS, REFLEX TO URINE CULTURE - Abnormal; Notable for the following components:       Result Value    Specific Gravity, UA >1.030 (*)     Protein, UA Trace (*)     Ketones, UA Trace (*)     All other components within normal limits    Narrative:     Specimen Source->Urine   CBC W/ AUTO DIFFERENTIAL   COMPREHENSIVE METABOLIC PANEL   LIPASE   POCT URINE PREGNANCY   ISTAT CHEM8          Imaging Results              US Abdomen Limited (In process)  Result time 11/13/23 19:22:42                     Medications   sodium chloride 0.9% bolus 500 mL 500 mL (500 mLs Intravenous New Bag 11/13/23 1959)   ondansetron disintegrating tablet 4 mg (4 mg Oral Given 11/13/23 1545)   famotidine (PF) injection 20 mg (20 mg Intravenous Given 11/13/23 1839)   meclizine tablet 25 mg (25 mg Oral Given 11/13/23 2001)   ketorolac injection 15 mg (15 mg Intravenous Given 11/13/23 2001)     Medical Decision Making  Differential Diagnosis:   Vertigo   Dehydration   Acute Abdomen   Gastritis     Pt emergently evaluated here in the ED.    Labs stable without leukocytosis.  Normal  electrolytes.  Normal renal function and LFTs.  Ultrasound of the right upper quadrant shows no evidence for acute cholecystitis; however, there does remain some evidence for fatty liver.  Patient states that she has a history of this.  No focal neurological deficits noted on exam.  She does also have a history of vertigo and states these symptoms feel similar.  She will be discharged home to follow up with ENT and Gastroenterology for further evaluation and management.  She voices understanding and is agreeable with the plan.  She is given specific return precautions.      Amount and/or Complexity of Data Reviewed  Labs: ordered.  Radiology: ordered.    Risk  Prescription drug management.               ED Course as of 11/13/23 2053 Mon Nov 13, 2023 2047 US read by [facility] shows no evidence for acute intraabdominal process or acute cholecysititis  [HS]      ED Course User Index  [HS] Clover Foley PA-C                    Clinical Impression:   Final diagnoses:  [R42] Vertigo (Primary)  [R10.11] Right upper quadrant abdominal pain        ED Disposition Condition    Discharge Stable          ED Prescriptions       Medication Sig Dispense Start Date End Date Auth. Provider    meclizine (ANTIVERT) 25 mg tablet Take 1 tablet (25 mg total) by mouth 3 (three) times daily as needed for Dizziness or Nausea. 20 tablet 11/13/2023 -- Clover Foley PA-C    famotidine (PEPCID) 20 MG tablet Take 1 tablet (20 mg total) by mouth 2 (two) times daily as needed. 20 tablet 11/13/2023 11/12/2024 Clover Foley PA-C          Follow-up Information       Follow up With Specialties Details Why Contact Info Additional Information    [facility] - ED Emergency Medicine  As needed, If symptoms worsen [address] [place] [address] [address] [place] [address] 1st floor    Dennis Foley MD Otolaryngology  for ENT evaluation [address] [facility] [address] [phone]       Shea Sifuentes MD  Gastroenterology, Internal Medicine In 1 week for gastroenterology evaluation [address] [facility]  [address]  [facility] [address]  [phone]                Clover Foley, PA-C  11/13/23 2053

## 2023-11-15 DIAGNOSIS — G45.9 TRANSIENT CEREBRAL ISCHEMIA, UNSPECIFIED TYPE: ICD-10-CM

## 2023-11-15 DIAGNOSIS — G45.9 INTERMITTENT CEREBRAL ISCHEMIA: ICD-10-CM

## 2023-11-15 DIAGNOSIS — G93.2 PSEUDOTUMOR CEREBRI: ICD-10-CM

## 2023-11-15 DIAGNOSIS — R41.3 MEMORY CHANGES: ICD-10-CM

## 2023-11-15 DIAGNOSIS — R42 DIZZINESS: ICD-10-CM

## 2023-11-15 DIAGNOSIS — G43.709 CHRONIC MIGRAINE WITHOUT AURA WITHOUT STATUS MIGRAINOSUS, NOT INTRACTABLE: Primary | ICD-10-CM

## 2023-11-15 DIAGNOSIS — R56.9 SEIZURE-LIKE ACTIVITY: ICD-10-CM

## 2023-11-15 DIAGNOSIS — R56.9 GENERALIZED-ONSET SEIZURES: Primary | ICD-10-CM

## 2023-12-04 ENCOUNTER — HOSPITAL ENCOUNTER (OUTPATIENT)
Dept: RADIOLOGY | Facility: HOSPITAL | Age: 28
Discharge: HOME | End: 2023-12-04
Attending: NURSE PRACTITIONER
Payer: MEDICARE

## 2023-12-04 DIAGNOSIS — G93.2 PSEUDOTUMOR CEREBRI: ICD-10-CM

## 2023-12-04 DIAGNOSIS — R41.3 MEMORY CHANGES: ICD-10-CM

## 2023-12-04 DIAGNOSIS — G43.709 CHRONIC MIGRAINE WITHOUT AURA WITHOUT STATUS MIGRAINOSUS, NOT INTRACTABLE: ICD-10-CM

## 2023-12-04 DIAGNOSIS — R56.9 SEIZURE-LIKE ACTIVITY: ICD-10-CM

## 2023-12-04 DIAGNOSIS — G45.9 TRANSIENT CEREBRAL ISCHEMIA, UNSPECIFIED TYPE: ICD-10-CM

## 2023-12-04 DIAGNOSIS — R42 DIZZINESS: ICD-10-CM

## 2023-12-04 PROCEDURE — 70551 MRI BRAIN STEM W/O DYE: CPT | Mod: TC,PO

## 2023-12-04 PROCEDURE — 70544 MR ANGIOGRAPHY HEAD W/O DYE: CPT | Mod: TC,PO

## 2023-12-04 PROCEDURE — 70544 MR ANGIOGRAPHY HEAD W/O DYE: CPT | Mod: TC,PO,59

## 2023-12-12 ENCOUNTER — HOSPITAL ENCOUNTER (OUTPATIENT)
Dept: CARDIOLOGY | Facility: HOSPITAL | Age: 28
Discharge: HOME | End: 2023-12-12
Attending: NURSE PRACTITIONER
Payer: MEDICARE

## 2023-12-12 VITALS — HEIGHT: 64 IN | BODY MASS INDEX: 39.94 KG/M2 | WEIGHT: 233.94 LBS

## 2023-12-12 DIAGNOSIS — G45.9 INTERMITTENT CEREBRAL ISCHEMIA: ICD-10-CM

## 2023-12-12 DIAGNOSIS — R56.9 GENERALIZED-ONSET SEIZURES: ICD-10-CM

## 2023-12-12 PROCEDURE — 93306 TTE W/DOPPLER COMPLETE: CPT | Mod: 26,,, | Performed by: INTERNAL MEDICINE

## 2023-12-12 PROCEDURE — 93306 ECHO (CUPID ONLY): ICD-10-PCS | Mod: 26,,, | Performed by: INTERNAL MEDICINE

## 2023-12-12 PROCEDURE — 93306 TTE W/DOPPLER COMPLETE: CPT

## 2023-12-14 LAB
AORTIC ROOT ANNULUS: 2.9 CM
AORTIC VALVE CUSP SEPERATION: 2.2 CM
AV INDEX (PROSTH): 0.82
AV MEAN GRADIENT: 4 MMHG
AV PEAK GRADIENT: 6 MMHG
AV VALVE AREA BY VELOCITY RATIO: 2.97 CM²
AV VALVE AREA: 2.83 CM²
AV VELOCITY RATIO: 0.86
BSA FOR ECHO PROCEDURE: 2.19 M2
CV ECHO LV RWT: 0.34 CM
DOP CALC AO PEAK VEL: 1.27 M/S
DOP CALC AO VTI: 25.6 CM
DOP CALC LVOT AREA: 3.5 CM2
DOP CALC LVOT DIAMETER: 2.1 CM
DOP CALC LVOT PEAK VEL: 1.09 M/S
DOP CALC LVOT STROKE VOLUME: 72.35 CM3
DOP CALC MV VTI: 24 CM
DOP CALCLVOT PEAK VEL VTI: 20.9 CM
E WAVE DECELERATION TIME: 196 MSEC
E/A RATIO: 1.16
E/E' RATIO: 5.54 M/S
ECHO LV POSTERIOR WALL: 0.83 CM (ref 0.6–1.1)
FRACTIONAL SHORTENING: 35 % (ref 28–44)
INTERVENTRICULAR SEPTUM: 0.83 CM (ref 0.6–1.1)
IVC DIAMETER: 1.65 CM
IVRT: 53 MSEC
LEFT ATRIUM SIZE: 3.8 CM
LEFT ATRIUM VOLUME INDEX MOD: 26.8 ML/M2
LEFT ATRIUM VOLUME MOD: 56.1 CM3
LEFT INTERNAL DIMENSION IN SYSTOLE: 3.2 CM (ref 2.1–4)
LEFT VENTRICLE DIASTOLIC VOLUME INDEX: 55.02 ML/M2
LEFT VENTRICLE DIASTOLIC VOLUME: 115 ML
LEFT VENTRICLE MASS INDEX: 67 G/M2
LEFT VENTRICLE SYSTOLIC VOLUME INDEX: 19.6 ML/M2
LEFT VENTRICLE SYSTOLIC VOLUME: 41 ML
LEFT VENTRICULAR INTERNAL DIMENSION IN DIASTOLE: 4.94 CM (ref 3.5–6)
LEFT VENTRICULAR MASS: 139.5 G
LV LATERAL E/E' RATIO: 4.5 M/S
LV SEPTAL E/E' RATIO: 7.2 M/S
LVOT MG: 2 MMHG
LVOT MV: 0.7 CM/S
MV MEAN GRADIENT: 2 MMHG
MV PEAK A VEL: 0.62 M/S
MV PEAK E VEL: 0.72 M/S
MV PEAK GRADIENT: 4 MMHG
MV VALVE AREA BY CONTINUITY EQUATION: 3.01 CM2
PISA TR MAX VEL: 2.31 M/S
PV MV: 0.91 M/S
PV PEAK GRADIENT: 7 MMHG
PV PEAK VELOCITY: 1.36 M/S
RIGHT VENTRICULAR END-DIASTOLIC DIMENSION: 2.31 CM
RV TISSUE DOPPLER FREE WALL SYSTOLIC VELOCITY 1 (APICAL 4 CHAMBER VIEW): 14 CM/S
TDI LATERAL: 0.16 M/S
TDI SEPTAL: 0.1 M/S
TDI: 0.13 M/S
TR MAX PG: 21 MMHG
TRICUSPID ANNULAR PLANE SYSTOLIC EXCURSION: 2.43 CM
Z-SCORE OF LEFT VENTRICULAR DIMENSION IN END DIASTOLE: -2.66
Z-SCORE OF LEFT VENTRICULAR DIMENSION IN END SYSTOLE: -1.65

## 2024-01-24 ENCOUNTER — TELEPHONE (OUTPATIENT)
Dept: GASTROENTEROLOGY | Facility: CLINIC | Age: 29
End: 2024-01-24
Payer: MEDICAID

## 2024-01-24 NOTE — TELEPHONE ENCOUNTER
Called patient unable to leave voice mail, sent portal message to schedule appointment from referral.

## 2024-03-07 DIAGNOSIS — R42 DIZZINESS AND GIDDINESS: Primary | ICD-10-CM

## 2024-03-14 ENCOUNTER — CLINICAL SUPPORT (OUTPATIENT)
Dept: REHABILITATION | Facility: HOSPITAL | Age: 29
End: 2024-03-14
Payer: MEDICARE

## 2024-03-14 DIAGNOSIS — R42 LIGHTHEADEDNESS: Primary | ICD-10-CM

## 2024-03-14 DIAGNOSIS — R42 DIZZINESS AND GIDDINESS: ICD-10-CM

## 2024-03-14 PROCEDURE — 97112 NEUROMUSCULAR REEDUCATION: CPT | Mod: PO

## 2024-03-14 PROCEDURE — 97161 PT EVAL LOW COMPLEX 20 MIN: CPT | Mod: PO

## 2024-03-14 NOTE — PLAN OF CARE
[facility]  Physical Therapy Neurological Rehabilitation Initial Evaluation    Name: Mary Sky  Clinic Number: [id number]    Therapy Diagnosis:   Encounter Diagnoses   Name Primary?    Dizziness and giddiness     Lightheadedness Yes     Physician: Terri You NP    Physician Orders: physical therapy evaluation and treat; vestibular rehab therapy   Medical Diagnosis from Referral: dizziness and giddiness  Evaluation Date: 3/14/2024  Authorization Period Expiration: 03/07/2025  Plan of Care Expiration: 04/27/2024  Visit # / Visits authorized: 1/ 1    Time In: 1435  Time Out: 1515  Total Billable Time: 40 minutes    Precautions: Fall and history of seizures (per patient)      Subjective     Date of onset: 03/07/2024  History of Current Symptoms, Mary reports: several year history of intermittent lightheadedness; patient does endorse some dysequilibrium and/or imbalance but is unable to provide an accurate description; exacerbating activities include quick turns and bending over; Mary reports sensitivity to noise and bright lights and endorses fatigue since onset of symptoms; patient's condition is further complicated by history of seizures and migraine headache.     History of migraines: yes     Medical History:   Past Medical History:   Diagnosis Date    ADD (attention deficit disorder)     Anemia     Borderline personality disorder     Chronic headache     Depression     Fibromyalgia     GERD (gastroesophageal reflux disease)     Migraine headache     MALCOLM (obstructive sleep apnea)     PCOS (polycystic ovarian syndrome)     Pseudotumor cerebri syndrome     Schizoaffective disorder     Tourette's syndrome      Surgical History:   Mary Sky  has a past surgical history that includes Tonsillectomy; [place] tooth extraction; and Upper gastrointestinal endoscopy (in her childhood).    Medications:   Mary has a current medication list which includes the following  prescription(s): clonidine, famotidine, fluoxetine, meclizine, naproxen, ondansetron, ondansetron, oxycodone-acetaminophen, pantoprazole, topiramate, and valacyclovir, and the following Facility-Administered Medications: onabotulinumtoxina and onabotulinumtoxina.    Allergies:   Review of patient's allergies indicates:   Allergen Reactions    Chantix [varenicline] Hives      Imaging (MRI of brain on 12/04/2023): Normal MRI of the brain.     Prior Therapy: for knee pain  Social History: lives with her family in raised trailer (5 steps to enter)  Falls: none   Occupation: disabled  Prior Level of Function: independent  Current Level of Function: minimal difficulty with activities of daily living     Pain:  Current 4-5/10, worst 6-7/10, best 3/10   Location: bilateral neck   Description: Aching and Dull  Aggravating Factors: end range of motion   Easing Factors: rest    Pts goals: decrease symptoms      Objective     - Follows commands: 100% of time   - Speech deficits: within functional limits     Functional Mobility & ADLs:   Sit to stand: supervision     Mental status: alert, oriented to person, place, and time, normal mood, behavior, speech, dress, motor activity, and thought processes  Appearance: Casually dressed  Behavior:  calm and cooperative  Attention Span and Concentration:  Normal    Posture Alignment in sitting:   Head: forward head     Sensation: Light Touch: Impaired: intermittent numbness left hand and foot          Proprioception: Intact, Kinesthesia Intact         Visual/Auditory:   Tracking/Smooth Pursuits:Impaired: blurred vision in upper left quadrant   Saccades: Impaired: patient reported floaters with up/down movement   Modified Dynamic Visual Acuity Test: NT (see VOR)  Gaze Evoked: Intact  VOR: Impaired: increased lightheadedness, head pressure and nausea reported    Coordination:   - fine motor: within functional limits   - UE coordination: within functional limits    - LE coordination:   "within functional limits    ROM:   CERVICAL SPINE  Flexion 40 degrees (80-90 deg)  Extension 50 degrees (70-80 deg)  L side bend 35 degrees, R side bend 35 degrees (20-45 degrees)  L rotation 50 degrees, R rotation 50 degrees (70-90 degrees)  Are concurrent symptoms present with any of these movements = no    Modified VAS (Vertebral Artery Screen), in sitting (rotation, then extension):  R: NT  L: NT    RANGE OF MOTION--LOWER EXTREMITIES  (R) LE Hip: normal   Knee: normal   Ankle: normal    (L) LE: Hip: normal   Knee: normal   Ankle: normal    Strength: manual muscle test grades below     Lower Extremity Strength  Right LE  Left LE    Hip flexion:  4-/5 Hip flexion: 4-/5   Knee extension: 4/5 Knee extension: 4/5   Ankle dorsiflexion:  4/5 Ankle dorsiflexion: 4/5       RENEE SENSORY TESTING:  (P= Pass, F= Fail; note any sway; hold each position for 30")  Condition 6: (soft surface/feet together/eyes closed) = minimal sway  Condition 7: (Fakuda step test), measure distance varied from center starting position, > 30 deg deviation to either side indicates hypofunction of biased side = no change in direction after 50 steps    Gait Assessment:(if indicated)  - AD used: none  - Assistance: supervision   - Distance: 50 feet x 2    GAIT DEVIATIONS:  Mary displays the following deviations with ambulation: mild path deviation    Impairments contributing to deviations: impaired motor control, dysequilibrium     Endurance Deficit: minimal complaints of fatigue       POSITIONAL CANAL TESTING  Looking for nystagmus (slow phase followed by quick phase to the affected side for BPPV)    Clio Hallpike (posterior / CL anterior)   Right : n/a   Left: n/a  Horizontal Canals   Right: n/a   Left: n/a  Treatment Performed: n/a        Intake Outcome Measure for FOTO Vestibular Survey    Therapist reviewed FOTO scores for Mary Sky on 3/14/2024.   FOTO documents entered into [employer] - see Media section.    Intake Score: 22.8% disability   "       TREATMENT     Treatment Time In: 1505  Treatment Time Out: 1515  Total Treatment time separate from Evaluation: 10 minutes    Mary participated in neuromuscular re-education activities to assess: Balance and Vestibular function for 10 minutes. The following activities were included:    X 5 each seated smooth pursuits = side to side, up and down  X 5 each seated saccades = side to side, up and down  X 5 each seated VOR1 = side to side, up and down  X 20 seconds stand on AirEx = eyes closed  X 50 stepping in place = eyes closed      Home Exercises and Patient Education Provided    Education provided:   - proper therapeutic exercise technique  - treatment plan    Written Home Exercises Provided: to be provided at future appointment.      Assessment     Mary is a 28 y.o. female referred to outpatient Physical Therapy with a medical diagnosis of dizziness and giddiness. Pt presents to PT with the following impairments leading to her functional decline: lightheadedness, head pressure and nausea. Patient's condition likely has a central etiology.    Pt prognosis is Fair.   Pt will benefit from skilled outpatient Physical Therapy to address the deficits stated above and in the chart below, provide pt/family education, and to maximize pt's level of independence.     Plan of care discussed with patient: Yes  Pt's spiritual, cultural and educational needs considered and patient is agreeable to the plan of care and goals as stated below:     Anticipated Barriers for therapy: severity of symptoms    Medical Necessity is demonstrated by the following  History  Co-morbidities and personal factors that may impact the plan of care [x] LOW: no personal factors / co-morbidities  [] MODERATE: 1-2 personal factors / co-morbidities  [] HIGH: 3+ personal factors / co-morbidities    Moderate / High Support Documentation: history of depression     Examination  Body Structures and Functions, activity limitations and participation  restrictions that may impact the plan of care [] LOW: addressing 1-2 elements  [x] MODERATE: 3+ elements  [] HIGH: 4+ elements (please support below)    Moderate / High Support Documentation: balance, gait, central     Clinical Presentation [x] LOW: stable  [] MODERATE: Evolving  [] HIGH: Unstable     Decision Making/ Complexity Score: low       Goals:    Short Term Goals (3 Weeks):   Maintain patient's complaints of dizziness to less than 5/10 during performance of activities of daily living for independence of self care activities.  Patient to tolerate x 45 seconds full Romberg stance, eyes closed to improve upright tolerance.  Patient to begin adaptation home exercise program.     Long Term Goals (6 Weeks):   Patient to demonstrate competence with home exercise program to maintain therapeutic gains.  2.   Patient to ambulate 20 feet in less than 7 seconds to improve viviana/symmetry.  3.   Patient to report that bending over sometimes increases her problem.      Plan     Plan of care Certification: 3/14/2024 to 04/27/2024.    Outpatient Physical Therapy evaluation, plus 2 times weekly for 6 weeks to include the following interventions (starting week of 03/18/24): Gait Training, Manual Therapy, Moist Heat/ Ice, Neuromuscular Re-ed, Patient Education, Self Care, Therapeutic Activities, Therapeutic Exercise, and home exercise program .     Oli Ramos, PT

## 2024-03-18 ENCOUNTER — CLINICAL SUPPORT (OUTPATIENT)
Dept: REHABILITATION | Facility: HOSPITAL | Age: 29
End: 2024-03-18
Payer: MEDICARE

## 2024-03-18 DIAGNOSIS — R42 LIGHTHEADEDNESS: ICD-10-CM

## 2024-03-18 DIAGNOSIS — R42 DIZZINESS AND GIDDINESS: Primary | ICD-10-CM

## 2024-03-18 PROCEDURE — 97112 NEUROMUSCULAR REEDUCATION: CPT | Mod: PO

## 2024-03-18 NOTE — PROGRESS NOTES
[facility]   Physical Therapy Treatment Note      Name: Mary Etienne [facility]  Clinic Number: [id number]    Therapy Diagnosis:   Encounter Diagnoses   Name Primary?    Dizziness and giddiness Yes    Lightheadedness      Physician: Terri You NP    Visit Date: 3/18/2024    Physician Orders: physical therapy evaluation and treat; vestibular rehab therapy   Medical Diagnosis from Referral: dizziness and giddiness  Evaluation Date: 3/14/2024  Authorization Period Expiration: 03/07/2025  Plan of Care Expiration: 04/27/2024  Visit # / Visits authorized: 1/ 20     Time In: 1650  Time Out: 1729  Total Billable Time: 39 minutes     Precautions: Fall and history of seizures (per patient)      Subjective     Patient reports: no pain nor dizziness upon arrival.    She  does not have a home exercise program.  Response to previous treatment: no changes  Functional change: none    Pain: no complaints of pain at rest       Objective      Objective Measures updated at progress report unless specified.     Treatment     Mary received the treatments listed below:      neuromuscular re-education activities to improve: Balance and Central function for 39 minutes. The following activities were included:    X 5 sit to stand while holding target  X 5 lean over touch stool while holding target  X 5 standing bilateral head tilts while holding target*  X 45 seconds each full Romberg stance = eyes open/eyes closed*  X 28 standing two-square saccades (repeating random numbers) = side to side  X 28 each standing smooth pursuits (single target) = side to side, up and down  Provided patient with adaptation home exercise program - instructed to perform twice a day  X 15 each balance therapeutic exercise = heel raises/toe raises*, mini squats  X 1 minute stand on AirEx  X 20 feet balance gait = side stepping, backwards  X 15 alternating toe taps on 5-inch stool      *minimal difficulty      Patient Education and Home  Exercises       Education provided:   - proper therapeutic exercise technique  - continue home exercise program twice a day     Written Home Exercises Provided: yes. Exercises were reviewed and Mary was able to demonstrate them prior to the end of the session.  Mary demonstrated fair understanding of the education provided. See Electronic Medical Record under Patient Instructions for exercises provided during therapy sessions.      Assessment     Patient reported minimal symptom elevation after bilateral head tilt habituation exercise - none reported during remaining habituation exercises; no symptom elevation reported after adaptation exercises; cognitive task during standing saccades set at repeating random numbers; no loss of balance demonstrated during balance gait and alternating toe taps on stool; no sway noted while standing on AirEx; occasional loss of balance demonstrated during standing heel raises/toe raises.    Mary Is progressing fairly well towards her goals.   Patient prognosis is Fair.     Patient will continue to benefit from skilled outpatient physical therapy to address the deficits listed in the problem list box on initial evaluation, provide pt/family education and to maximize pt's level of independence in the home and community environment.     Patient's spiritual, cultural and educational needs considered and pt agreeable to plan of care and goals.     Anticipated barriers to physical therapy: severity of symptoms    Goals:     Short Term Goals (3 Weeks):   Maintain patient's complaints of dizziness to less than 5/10 during performance of activities of daily living for independence of self care activities. (MET)  Patient to tolerate x 45 seconds full Romberg stance, eyes closed to improve upright tolerance. (PART MET)  Patient to begin adaptation home exercise program. (NOT MET)     Long Term Goals (6 Weeks):   Patient to demonstrate competence with home exercise program to maintain  therapeutic gains. (NOT MET)  2.   Patient to ambulate 20 feet in less than 7 seconds to improve viviana/symmetry. (NOT MET)  3.   Patient to report that bending over sometimes increases her problem. (NOT MET)      Plan     Continue to progress balance and central training to patient's tolerance.      Oli Ramos, PT

## 2024-03-20 ENCOUNTER — HOSPITAL ENCOUNTER (OUTPATIENT)
Dept: PREADMISSION TESTING | Facility: HOSPITAL | Age: 29
Discharge: HOME | End: 2024-03-20

## 2024-03-20 DIAGNOSIS — Z01.818 PRE-OP TESTING: Primary | ICD-10-CM

## 2024-03-23 ENCOUNTER — HOSPITAL ENCOUNTER (EMERGENCY)
Facility: HOSPITAL | Age: 29
Discharge: HOME | End: 2024-03-23
Attending: STUDENT IN AN ORGANIZED HEALTH CARE EDUCATION/TRAINING PROGRAM
Payer: MEDICARE

## 2024-03-23 VITALS
OXYGEN SATURATION: 99 % | DIASTOLIC BLOOD PRESSURE: 78 MMHG | HEIGHT: 64 IN | BODY MASS INDEX: 39.78 KG/M2 | HEART RATE: 78 BPM | SYSTOLIC BLOOD PRESSURE: 121 MMHG | WEIGHT: 233 LBS | RESPIRATION RATE: 20 BRPM | TEMPERATURE: 98 F

## 2024-03-23 DIAGNOSIS — J20.9 ACUTE BRONCHITIS, UNSPECIFIED ORGANISM: Primary | ICD-10-CM

## 2024-03-23 DIAGNOSIS — R05.9 COUGH: ICD-10-CM

## 2024-03-23 LAB
INFLUENZA A, MOLECULAR: NEGATIVE
INFLUENZA B, MOLECULAR: NEGATIVE
SARS-COV-2 RDRP RESP QL NAA+PROBE: NEGATIVE
SPECIMEN SOURCE: NORMAL

## 2024-03-23 PROCEDURE — 87502 INFLUENZA DNA AMP PROBE: CPT | Performed by: NURSE PRACTITIONER

## 2024-03-23 PROCEDURE — U0002 COVID-19 LAB TEST NON-CDC: HCPCS | Performed by: NURSE PRACTITIONER

## 2024-03-23 PROCEDURE — 99283 EMERGENCY DEPT VISIT LOW MDM: CPT | Mod: 25

## 2024-03-23 NOTE — FIRST PROVIDER EVALUATION
" Emergency Department TeleTriage Encounter Note      CHIEF COMPLAINT    Chief Complaint   Patient presents with    Cough    Sore Throat       VITAL SIGNS   Initial Vitals [03/23/24 1241]   BP Pulse Resp Temp SpO2   121/78 78 20 98.3 °F (36.8 °C) 99 %      MAP       --            ALLERGIES    Review of patient's allergies indicates:   Allergen Reactions    Chantix [varenicline] Hives       PROVIDER TRIAGE NOTE  This is a teletriage evaluation of a 28 y.o. female presenting to the ED complaining of cough, congestion, and sore throat for one week. Reports that "everyone in the house" is sick. Reports negative COVID, flu, and strep at  on Wednesday.     Alert, no distress.     Initial orders will be placed and care will be transferred to an alternate provider when patient is roomed for a full evaluation. Any additional orders and the final disposition will be determined by that provider.         ORDERS  Labs Reviewed   INFLUENZA A & B BY MOLECULAR   SARS-COV-2 RNA AMPLIFICATION, QUAL       ED Orders (720h ago, onward)      Start Ordered     Status Ordering Provider    03/23/24 1249 03/23/24 1248  COVID-19 Rapid Screening  STAT         Ordered JENN FAULKNER    03/23/24 1247 03/23/24 1248  Influenza A & B by Molecular  Once         Ordered JENN FAULKNER              Virtual Visit Note: The provider triage portion of this emergency department evaluation and documentation was performed via [employer], a HIPAA-compliant telemedicine application, in concert with a tele-presenter in the room. A face to face patient evaluation with one of my colleagues will occur once the patient is placed in an emergency department room.      DISCLAIMER: This note was prepared with [employer] voice recognition transcription software. Garbled syntax, mangled pronouns, and other bizarre constructions may be attributed to that software system.    "

## 2024-03-23 NOTE — ED PROVIDER NOTES
"Encounter Date: 3/23/2024       History     Chief Complaint   Patient presents with    Cough    Sore Throat     Patient is a 28 y.o. female who presents to the ED 03/23/2024 cough and sore throat and congestion for several days. Pt's daughter is sick with similar symptoms. Pt denies any fever or other symptoms. She denies nausea, vomiting, SOB, CP. Past medical and surgical history noted below.          Review of patient's allergies indicates:   Allergen Reactions    Chantix [varenicline] Hives     Past Medical History:   Diagnosis Date    ADD (attention deficit disorder)     Anemia     Borderline personality disorder     Chronic headache     Depression     Fibromyalgia     GERD (gastroesophageal reflux disease)     Migraine headache     MALCOLM (obstructive sleep apnea)     PCOS (polycystic ovarian syndrome)     Pseudotumor cerebri syndrome     Schizoaffective disorder     Tourette's syndrome      Past Surgical History:   Procedure Laterality Date    TONSILLECTOMY      UPPER GASTROINTESTINAL ENDOSCOPY  in her childhood    "born with underdeveloped esophagus" & GERD per patient report    WISDOM TOOTH EXTRACTION       Family History   Problem Relation Age of Onset    Seizures Mother     Migraines Mother     Migraines Brother     Colon cancer Neg Hx     Colon polyps Neg Hx     Crohn's disease Neg Hx     Ulcerative colitis Neg Hx     Stomach cancer Neg Hx     Esophageal cancer Neg Hx      Social History     Tobacco Use    Smoking status: Former     Current packs/day: 1.00     Types: Cigarettes    Smokeless tobacco: Never   Substance Use Topics    Alcohol use: No    Drug use: No     Comment: patient states she quit mojo 3 months ago and marijuana 2 months ago     Review of Systems   Constitutional:  Negative for chills and fever.   HENT:  Positive for congestion and sore throat. Negative for ear discharge, ear pain, rhinorrhea and sinus pressure.    Respiratory:  Positive for cough. Negative for chest tightness and " shortness of breath.    Cardiovascular:  Negative for chest pain.   Gastrointestinal:  Negative for abdominal pain.   Genitourinary:  Negative for dysuria.   Musculoskeletal:  Negative for arthralgias and myalgias.   Skin:  Negative for rash and wound.   Neurological:  Negative for syncope.   Hematological:  Does not bruise/bleed easily.       Physical Exam     Initial Vitals [03/23/24 1241]   BP Pulse Resp Temp SpO2   121/78 78 20 98.3 °F (36.8 °C) 99 %      MAP       --         Physical Exam    Nursing note and vitals reviewed.  Constitutional: Vital signs are normal. She appears well-developed and well-nourished. She is not intubated.   HENT:   Head: Normocephalic and atraumatic.   Right Ear: External ear and ear canal normal. No drainage, swelling or tenderness. No mastoid tenderness.   Left Ear: External ear and ear canal normal. No drainage, swelling or tenderness. No mastoid tenderness.   Mouth/Throat: Uvula is midline and mucous membranes are normal. Posterior oropharyngeal erythema present. No posterior oropharyngeal edema or tonsillar abscesses.   Right TM with erythema and clear effusion.  No evidence of perforation.   Eyes: Pupils are equal, round, and reactive to light.   Neck: Neck supple.   Cardiovascular:  Normal rate, regular rhythm, normal heart sounds and intact distal pulses.     Exam reveals no gallop and no friction rub.       No murmur heard.  Pulmonary/Chest: No accessory muscle usage. No tachypnea. She is not intubated. No respiratory distress. She has no wheezes. She has rhonchi. She has no rales.   Abdominal: Abdomen is soft. Bowel sounds are normal. There is no abdominal tenderness.   Musculoskeletal:      Cervical back: Neck supple.     Neurological: She is alert and oriented to person, place, and time. She has normal strength.   Skin: Skin is warm, dry and intact.   Psychiatric: She has a normal mood and affect. Her speech is normal and behavior is normal.         ED Course    Procedures  Labs Reviewed   INFLUENZA A & B BY MOLECULAR   SARS-COV-2 RNA AMPLIFICATION, QUAL          Imaging Results              X-Ray Chest PA And Lateral (Final result)  Result time 03/23/24 15:14:08      Final result by Rhett Littlejohn Jr., MD (03/23/24 15:14:08)                   Impression:      No acute abnormality.      Electronically signed by: Rhett Littlejohn MD  Date:    03/23/2024  Time:    15:14               Narrative:    EXAMINATION:  XR CHEST PA AND LATERAL    CLINICAL HISTORY:  Cough, unspecified    TECHNIQUE:  PA and lateral views of the chest were performed.    COMPARISON:  Chest x-ray of June 6, 2018    FINDINGS:  The lungs are clear, with normal appearance of pulmonary vasculature and no pleural effusion or pneumothorax.    The cardiac silhouette is normal in size. The hilar and mediastinal contours are unremarkable.    Bones are intact.                                       Medications - No data to display  Medical Decision Making  Amount and/or Complexity of Data Reviewed  Radiology: ordered.         APC / Resident Notes:   Patient is a 28 y.o. female who presents to the ED 03/23/2024 who underwent emergent evaluation for cough. Postive sick contact in the home with similar symptoms. The patient appears to have a viral upper respiratory infection. CXR without acute findings and I do not think bacterial pneumonia or bronchitis. Rapid strep test negative. I do not think strep pharyngitis.  Influenze and COVID-19 testing negative.   Based upon the history and physical exam the patient does not appear to have a serious bacterial infection such as pneumonia, sepsis, otitis media, bacterial sinusitis, strep pharyngitis, parapharyngeal or peritonsillar abscess, meningitis.  Patient appears very well and I have given specific return precautions to the patient.  s.  The patient can take over the counter medications and does not appear to need antibiotics at this time.                              Medical Decision Making:   Differential Diagnosis:   Acute bronchitis  Strep pharyngitis  Pneumonia  Viral URI  AOM             Clinical Impression:  Final diagnoses:  [R05.9] Cough  [J20.9] Acute bronchitis, unspecified organism (Primary)          ED Disposition Condition    Discharge Stable          ED Prescriptions    None       Follow-up Information       Follow up With Specialties Details Why Contact Info Additional Information    [facility]  In 1 week  [address]  [place] [address]  [phone]       [facility] [facility] - ED Emergency Medicine  As needed, If symptoms worsen [address] [place] [address] [facility] [id number] 1st floor             Jackie Nguyen NP  03/24/24 1224       Jackie Nguyen NP  03/24/24 1405

## 2024-04-01 ENCOUNTER — CLINICAL SUPPORT (OUTPATIENT)
Dept: REHABILITATION | Facility: HOSPITAL | Age: 29
End: 2024-04-01
Payer: MEDICARE

## 2024-04-01 DIAGNOSIS — R42 LIGHTHEADEDNESS: ICD-10-CM

## 2024-04-01 DIAGNOSIS — R42 DIZZINESS AND GIDDINESS: Primary | ICD-10-CM

## 2024-04-01 PROCEDURE — 97110 THERAPEUTIC EXERCISES: CPT | Mod: PO

## 2024-04-01 NOTE — PROGRESS NOTES
[facility]   Physical Therapy Treatment Note      Name: Mary Etienne [facility]  Clinic Number: [id number]    Therapy Diagnosis:   Encounter Diagnoses   Name Primary?    Dizziness and giddiness Yes    Lightheadedness      Physician: Terri You NP    Visit Date: 4/1/2024    Physician Orders: physical therapy evaluation and treat; vestibular rehab therapy   Medical Diagnosis from Referral: dizziness and giddiness  Evaluation Date: 3/14/2024  Authorization Period Expiration: 12/31/2024  Plan of Care Expiration: 04/27/2024  Visit # / Visits authorized: 2/ 10     Time In: 1437  Time Out: 1520  Total Billable Time: 43 minutes     Precautions: Fall and history of seizures (per patient)      Subjective     Patient reports: minimal elevation of her headache today; denies dizziness; endorses that bending over tends to exacerbate her symptoms.    She was compliant with home exercise program.  Response to previous treatment: no changes  Functional change: none    Pain: no complaints of pain at rest       Objective      Objective Measures updated at progress report unless specified.     Treatment     Mary received the treatments listed below:      neuromuscular re-education activities to improve: Balance and Central function for 43 minutes. The following activities were included:    X 7 sit to stand while holding target*  X 7 lean over touch stool while holding target*  X 7 standing bilateral head tilts while holding target  X 45 seconds each 50% Romberg stance = eyes open/eyes closed*  X 20 standing two-square saccades (repeating random words) = side to side  X 30 seconds each standing smooth pursuits (single target) = side to side, up and down  X 15 each balance therapeutic exercise on AirEx = heel raises/toe raises*, mini squats  2 x 20 feet balance gait in stover = 360 degree turn midway, eyes closed*  2 x 15 feet floor ladder high stepping = forwards only  X 15 alternating toe taps on 4-inch  cones  X 10 stepping over 4-inch obstacle with turns*  X 15 self tennis ball tosses while standing on AirEx  X 15 ball catches while standing on AirEx      *minimal difficulty      Patient Education and Home Exercises       Education provided:   - proper therapeutic exercise technique  - continue home exercise program twice a day     Written Home Exercises Provided: Patient instructed to cont prior HEP.       Assessment     Patient was able to perform increased repetitions during all habituation exercises; minimal symptom elevation reported after sit to stand and leaning over habituation exercises - none reported during head tilt habituation exercise; cognitive task during standing saccades progressed to repeating random words; no symptom elevation reported after adaptation exercises; Romberg training progressed to 50% - minimal sway noted while eyes closed; minimal path deviation noted during gait with eyes closed; no loss of balance noted during floor ladder high stepping and alternating toe taps on cones; alternating toe taps progressed to use of 4-inch cones; minimal dizziness reported after stepping over obstacle with turns; no loss of balance demonstrated during ball toss and ball catch exercises.    Mary Is progressing fairly well towards her goals.   Patient prognosis is Fair.     Patient will continue to benefit from skilled outpatient physical therapy to address the deficits listed in the problem list box on initial evaluation, provide pt/family education and to maximize pt's level of independence in the home and community environment.     Patient's spiritual, cultural and educational needs considered and pt agreeable to plan of care and goals.     Anticipated barriers to physical therapy: severity of symptoms    Goals:     Short Term Goals (3 Weeks):   Maintain patient's complaints of dizziness to less than 5/10 during performance of activities of daily living for independence of self care activities.  (MET)  Patient to tolerate x 45 seconds full Romberg stance, eyes closed to improve upright tolerance. (PART MET)  Patient to begin adaptation home exercise program. (MET)     Long Term Goals (6 Weeks):   Patient to demonstrate competence with home exercise program to maintain therapeutic gains. (PART MET)  2.   Patient to ambulate 20 feet in less than 7 seconds to improve viviana/symmetry. (NOT MET)  3.   Patient to report that bending over sometimes increases her problem. (NOT MET)      Plan     Continue to progress balance and central training to patient's tolerance.      Oli Ramos, PT

## 2024-04-10 DIAGNOSIS — G45.9 TRANSIENT CEREBRAL ISCHEMIA, UNSPECIFIED TYPE: Primary | ICD-10-CM

## 2024-04-23 ENCOUNTER — HOSPITAL ENCOUNTER (EMERGENCY)
Facility: HOSPITAL | Age: 29
Discharge: HOME | End: 2024-04-23
Attending: EMERGENCY MEDICINE
Payer: MEDICARE

## 2024-04-23 VITALS
OXYGEN SATURATION: 98 % | SYSTOLIC BLOOD PRESSURE: 116 MMHG | HEART RATE: 72 BPM | BODY MASS INDEX: 41.2 KG/M2 | TEMPERATURE: 98 F | RESPIRATION RATE: 16 BRPM | DIASTOLIC BLOOD PRESSURE: 61 MMHG | WEIGHT: 240 LBS

## 2024-04-23 DIAGNOSIS — K80.20 SYMPTOMATIC CHOLELITHIASIS: Primary | ICD-10-CM

## 2024-04-23 LAB
ALBUMIN SERPL BCP-MCNC: 4.4 G/DL (ref 3.5–5.2)
ALP SERPL-CCNC: 78 U/L (ref 55–135)
ALT SERPL W/O P-5'-P-CCNC: 10 U/L (ref 10–44)
ANION GAP SERPL CALC-SCNC: 6 MMOL/L (ref 8–16)
AST SERPL-CCNC: 14 U/L (ref 10–40)
B-HCG UR QL: NEGATIVE
BASOPHILS # BLD AUTO: 0.02 K/UL (ref 0–0.2)
BASOPHILS NFR BLD: 0.2 % (ref 0–1.9)
BILIRUB SERPL-MCNC: 0.8 MG/DL (ref 0.1–1)
BILIRUB UR QL STRIP: NEGATIVE
BUN SERPL-MCNC: 12 MG/DL (ref 6–20)
CALCIUM SERPL-MCNC: 9.8 MG/DL (ref 8.7–10.5)
CHLORIDE SERPL-SCNC: 104 MMOL/L (ref 95–110)
CLARITY UR: CLEAR
CO2 SERPL-SCNC: 26 MMOL/L (ref 23–29)
COLOR UR: YELLOW
CREAT SERPL-MCNC: 0.6 MG/DL (ref 0.5–1.4)
CTP QC/QA: YES
DIFFERENTIAL METHOD BLD: ABNORMAL
EOSINOPHIL # BLD AUTO: 0 K/UL (ref 0–0.5)
EOSINOPHIL NFR BLD: 0.1 % (ref 0–8)
ERYTHROCYTE [DISTWIDTH] IN BLOOD BY AUTOMATED COUNT: 13.5 % (ref 11.5–14.5)
EST. GFR  (NO RACE VARIABLE): >60 ML/MIN/1.73 M^2
GLUCOSE SERPL-MCNC: 103 MG/DL (ref 70–110)
GLUCOSE UR QL STRIP: NEGATIVE
HCT VFR BLD AUTO: 43 % (ref 37–48.5)
HGB BLD-MCNC: 13.8 G/DL (ref 12–16)
HGB UR QL STRIP: NEGATIVE
IMM GRANULOCYTES # BLD AUTO: 0.05 K/UL (ref 0–0.04)
IMM GRANULOCYTES NFR BLD AUTO: 0.6 % (ref 0–0.5)
KETONES UR QL STRIP: NEGATIVE
LEUKOCYTE ESTERASE UR QL STRIP: NEGATIVE
LIPASE SERPL-CCNC: 18 U/L (ref 4–60)
LYMPHOCYTES # BLD AUTO: 2.5 K/UL (ref 1–4.8)
LYMPHOCYTES NFR BLD: 29 % (ref 18–48)
MCH RBC QN AUTO: 29.3 PG (ref 27–31)
MCHC RBC AUTO-ENTMCNC: 32.1 G/DL (ref 32–36)
MCV RBC AUTO: 91 FL (ref 82–98)
MONOCYTES # BLD AUTO: 0.7 K/UL (ref 0.3–1)
MONOCYTES NFR BLD: 7.6 % (ref 4–15)
NEUTROPHILS # BLD AUTO: 5.3 K/UL (ref 1.8–7.7)
NEUTROPHILS NFR BLD: 62.5 % (ref 38–73)
NITRITE UR QL STRIP: NEGATIVE
NRBC BLD-RTO: 0 /100 WBC
PH UR STRIP: 6 [PH] (ref 5–8)
PLATELET # BLD AUTO: 325 K/UL (ref 150–450)
PMV BLD AUTO: 9.7 FL (ref 9.2–12.9)
POTASSIUM SERPL-SCNC: 4.1 MMOL/L (ref 3.5–5.1)
PROT SERPL-MCNC: 7.8 G/DL (ref 6–8.4)
PROT UR QL STRIP: NEGATIVE
RBC # BLD AUTO: 4.71 M/UL (ref 4–5.4)
SODIUM SERPL-SCNC: 136 MMOL/L (ref 136–145)
SP GR UR STRIP: 1.02 (ref 1–1.03)
URN SPEC COLLECT METH UR: NORMAL
UROBILINOGEN UR STRIP-ACNC: NEGATIVE EU/DL
WBC # BLD AUTO: 8.53 K/UL (ref 3.9–12.7)

## 2024-04-23 PROCEDURE — 83690 ASSAY OF LIPASE: CPT | Performed by: EMERGENCY MEDICINE

## 2024-04-23 PROCEDURE — 85025 COMPLETE CBC W/AUTO DIFF WBC: CPT | Performed by: EMERGENCY MEDICINE

## 2024-04-23 PROCEDURE — 96374 THER/PROPH/DIAG INJ IV PUSH: CPT

## 2024-04-23 PROCEDURE — 81003 URINALYSIS AUTO W/O SCOPE: CPT | Performed by: EMERGENCY MEDICINE

## 2024-04-23 PROCEDURE — 96361 HYDRATE IV INFUSION ADD-ON: CPT

## 2024-04-23 PROCEDURE — 63600175 PHARM REV CODE 636 W HCPCS: Performed by: EMERGENCY MEDICINE

## 2024-04-23 PROCEDURE — 96375 TX/PRO/DX INJ NEW DRUG ADDON: CPT

## 2024-04-23 PROCEDURE — 25000003 PHARM REV CODE 250: Performed by: EMERGENCY MEDICINE

## 2024-04-23 PROCEDURE — 80053 COMPREHEN METABOLIC PANEL: CPT | Performed by: EMERGENCY MEDICINE

## 2024-04-23 PROCEDURE — 81025 URINE PREGNANCY TEST: CPT | Performed by: EMERGENCY MEDICINE

## 2024-04-23 PROCEDURE — 99285 EMERGENCY DEPT VISIT HI MDM: CPT | Mod: 25

## 2024-04-23 RX ORDER — ONDANSETRON HYDROCHLORIDE 2 MG/ML
4 INJECTION, SOLUTION INTRAVENOUS
Status: COMPLETED | OUTPATIENT
Start: 2024-04-23 | End: 2024-04-23

## 2024-04-23 RX ORDER — KETOROLAC TROMETHAMINE 30 MG/ML
15 INJECTION, SOLUTION INTRAMUSCULAR; INTRAVENOUS
Status: COMPLETED | OUTPATIENT
Start: 2024-04-23 | End: 2024-04-23

## 2024-04-23 RX ORDER — HYDROMORPHONE HYDROCHLORIDE 1 MG/ML
1 INJECTION, SOLUTION INTRAMUSCULAR; INTRAVENOUS; SUBCUTANEOUS
Status: COMPLETED | OUTPATIENT
Start: 2024-04-23 | End: 2024-04-23

## 2024-04-23 RX ORDER — ONDANSETRON 4 MG/1
4 TABLET, FILM COATED ORAL EVERY 8 HOURS PRN
Qty: 12 TABLET | Refills: 0 | Status: SHIPPED | OUTPATIENT
Start: 2024-04-23

## 2024-04-23 RX ADMIN — KETOROLAC TROMETHAMINE 15 MG: 30 INJECTION, SOLUTION INTRAMUSCULAR; INTRAVENOUS at 07:04

## 2024-04-23 RX ADMIN — HYDROMORPHONE HYDROCHLORIDE 1 MG: 1 INJECTION, SOLUTION INTRAMUSCULAR; INTRAVENOUS; SUBCUTANEOUS at 08:04

## 2024-04-23 RX ADMIN — ONDANSETRON 4 MG: 2 INJECTION INTRAMUSCULAR; INTRAVENOUS at 07:04

## 2024-04-23 RX ADMIN — SODIUM CHLORIDE 1000 ML: 9 INJECTION, SOLUTION INTRAVENOUS at 07:04

## 2024-04-23 NOTE — ED PROVIDER NOTES
"Encounter Date: 4/23/2024       History     Chief Complaint   Patient presents with    Abdominal Pain     C/o pain to RUQ and epigastric region with nausea. Hx of gall stones.      Patient with a history of gallstones.  Patient is scheduled to see a general surgeon tomorrow.  She reports repeat right upper quadrant abdominal pain.  She knows with any fatty food such as tater tots she will have right upper quadrant pain associated with nausea.  Patient with constant right upper quadrant pain since this morning.  Positive nausea no vomiting.  No diarrhea.  This is similar to previous symptomatic cholelithiasis but pain is worse today.  No fever chills.  No cough, chest pain shortness of breath.      Review of patient's allergies indicates:   Allergen Reactions    Chantix [varenicline] Hives     Past Medical History:   Diagnosis Date    ADD (attention deficit disorder)     Anemia     Borderline personality disorder     Chronic headache     Depression     Fibromyalgia     GERD (gastroesophageal reflux disease)     Migraine headache     MALCOLM (obstructive sleep apnea)     PCOS (polycystic ovarian syndrome)     Pseudotumor cerebri syndrome     Schizoaffective disorder     Tourette's syndrome      Past Surgical History:   Procedure Laterality Date    TONSILLECTOMY      UPPER GASTROINTESTINAL ENDOSCOPY  in her childhood    "born with underdeveloped esophagus" & GERD per patient report    WISDOM TOOTH EXTRACTION       Family History   Problem Relation Name Age of Onset    Seizures Mother      Migraines Mother      Migraines Brother      Colon cancer Neg Hx      Colon polyps Neg Hx      Crohn's disease Neg Hx      Ulcerative colitis Neg Hx      Stomach cancer Neg Hx      Esophageal cancer Neg Hx       Social History     Tobacco Use    Smoking status: Former     Current packs/day: 1.00     Types: Cigarettes    Smokeless tobacco: Never   Substance Use Topics    Alcohol use: No    Drug use: No     Comment: patient states she quit " mojo 3 months ago and marijuana 2 months ago     Review of Systems   Constitutional:  Negative for chills and fever.   HENT:  Negative for congestion.    Eyes:  Negative for visual disturbance.   Respiratory:  Negative for shortness of breath.    Cardiovascular:  Negative for chest pain and palpitations.   Gastrointestinal:  Positive for abdominal pain and nausea. Negative for vomiting.   Genitourinary:  Negative for dysuria.   Musculoskeletal:  Negative for joint swelling.   Neurological:  Negative for headaches.   Psychiatric/Behavioral:  Negative for confusion.        Physical Exam     Initial Vitals [04/23/24 0629]   BP Pulse Resp Temp SpO2   136/78 79 17 97.7 °F (36.5 °C) 98 %      MAP       --         Physical Exam    Nursing note and vitals reviewed.  Constitutional: She is not diaphoretic. No distress.   HENT:   Head: Normocephalic and atraumatic.   Eyes: Conjunctivae are normal.   Neck:   Normal range of motion.  Cardiovascular:  Normal rate.           Pulmonary/Chest: Breath sounds normal.   Abdominal: Abdomen is soft. Bowel sounds are normal.   Moderate right upper quadrant abdominal tenderness positive nursing signed.  No guarding or rebound.   Musculoskeletal:         General: Normal range of motion.      Cervical back: Normal range of motion.     Neurological: She is alert.   No gross deficits   Skin: No rash noted.   Psychiatric: She has a normal mood and affect.         ED Course   Procedures  Labs Reviewed   CBC W/ AUTO DIFFERENTIAL - Abnormal; Notable for the following components:       Result Value    Immature Granulocytes 0.6 (*)     Immature Grans (Abs) 0.05 (*)     All other components within normal limits   COMPREHENSIVE METABOLIC PANEL - Abnormal; Notable for the following components:    Anion Gap 6 (*)     All other components within normal limits   LIPASE   URINALYSIS, REFLEX TO URINE CULTURE    Narrative:     Specimen Source->Urine   POCT URINE PREGNANCY          Imaging Results               US Abdomen Limited (Final result)  Result time 04/23/24 08:19:37      Final result by Christiano Red MD (04/23/24 08:19:37)                   Impression:      1. Cholelithiasis, without other sonographic evidence of acute cholecystitis.  2. Otherwise negative abdominal ultrasound.      Electronically signed by: Christiano Red  Date:    04/23/2024  Time:    08:19               Narrative:    EXAMINATION:  US ABDOMEN LIMITED    CLINICAL HISTORY:  Right upper quadrant abdominal pain;    FINDINGS:  Comparison to prior exams.  The visualized pancreas has normal echotexture with no peripancreatic fluid collections. The liver is normal in size and echotexture, 17 cm in length, without focal lesions or intrahepatic biliary ductal dilatation.    The gallbladder contains multiple echogenic, shadowing mobile gallstones, without gallbladder wall thickening or pericholecystic fluid.  The common duct measures 3 mm in diameter.    The right kidney measures 10.1 cm in length, with normal parenchymal echotexture, and no echogenic calculi or hydronephrosis. The visualized abdominal aorta, IVC and main portal vein are normal. There is no ascites.                                       Medications   sodium chloride 0.9% bolus 1,000 mL 1,000 mL (0 mLs Intravenous Stopped 4/23/24 0838)   ondansetron injection 4 mg (4 mg Intravenous Given 4/23/24 0729)   ketorolac injection 15 mg (15 mg Intravenous Given 4/23/24 0730)   HYDROmorphone injection 1 mg (1 mg Intravenous Given 4/23/24 0843)     Medical Decision Making  Patient presents with right upper quadrant abdominal pain.  Differential diagnosis includes cholecystitis, biliary pancreatitis, symptomatic cholelithiasis.  Here CBC CMP lipase urinalysis unremarkable.  Ultrasound of gallbladder shows cholelithiasis no evidence of acute cholecystitis.  Here clinically patient is having repeated episodes of symptomatic cholelithiasis.  After Zofran Toradol and Dilaudid patient reexamined.   Pain is essentially resolved.  Patient is laying in bed comfortable.  I am able to palpate right upper quadrant with essentially no tenderness.  Patient does have general surgery appointment tomorrow.  Will discharge with Zofran.  Strict return precautions given.    Amount and/or Complexity of Data Reviewed  Labs: ordered. Decision-making details documented in ED Course.  Radiology: ordered. Decision-making details documented in ED Course.    Risk  Prescription drug management.                                      Clinical Impression:  Final diagnoses:  [K80.20] Symptomatic cholelithiasis (Primary)          ED Disposition Condition    Discharge Stable          ED Prescriptions       Medication Sig Dispense Start Date End Date Auth. Provider    ondansetron (ZOFRAN) 4 MG tablet Take 1 tablet (4 mg total) by mouth every 8 (eight) hours as needed for Nausea. 12 tablet 4/23/2024 -- Marbin Arimjo MD          Follow-up Information       Follow up With Specialties Details Why Contact Info Additional Information    Count includes the [facility] - Emergency Dept Emergency Medicine  If symptoms worsen [address]  [facility] [address]  [phone] 1st floor             Marbin Armijo MD  04/23/24 0903

## 2024-04-23 NOTE — ED TRIAGE NOTES
Pt c/o right upper abdominal pain that got worse this am, after eating tots last night. C/o nausea and dizziness. Pt reports she has hx of gallstones. Has appt with GI surgeon tomorrow. GCS 15.

## 2024-04-23 NOTE — DISCHARGE INSTRUCTIONS
Present immediately for any fever, uncontrolled abdominal pain, or any other problems.  Keep General surgery appointment tomorrow

## 2024-05-20 ENCOUNTER — DOCUMENTATION ONLY (OUTPATIENT)
Dept: REHABILITATION | Facility: HOSPITAL | Age: 29
End: 2024-05-20
Payer: MEDICARE

## 2024-05-20 NOTE — PROGRESS NOTES
[facility]  Physical Therapy Discharge Note    Name: Mary Sky  Clinic Number: [id number]    Therapy Diagnosis: No diagnosis found.  Physician: No ref. provider found    Physician Orders: physical therapy evaluation and treat; vestibular rehab therapy   Medical Diagnosis from Referral: dizziness and giddiness  Evaluation Date: 3/14/2024    Date of Last visit: 04/01/2024  Total Visits Received: 3      ASSESSMENT      Discharge reason: Patient has not attended therapy since 04/01/2024.    Goals:     Short Term Goals (3 Weeks):   Maintain patient's complaints of dizziness to less than 5/10 during performance of activities of daily living for independence of self care activities. (MET)  Patient to tolerate x 45 seconds full Romberg stance, eyes closed to improve upright tolerance. (PART MET)  Patient to begin adaptation home exercise program. (MET)     Long Term Goals (6 Weeks):   Patient to demonstrate competence with home exercise program to maintain therapeutic gains. (PART MET)  2.   Patient to ambulate 20 feet in less than 7 seconds to improve viviana/symmetry. (NOT MET)  3.   Patient to report that bending over sometimes increases her problem. (NOT MET      PLAN     This patient is discharged from Physical Therapy.      Oli Ramos, PT

## 2024-06-11 DIAGNOSIS — G45.9 TRANSIENT CEREBRAL ISCHEMIA, UNSPECIFIED TYPE: Primary | ICD-10-CM

## 2024-07-12 PROBLEM — K80.20 GALLSTONES: Status: ACTIVE | Noted: 2024-07-12

## 2024-07-14 ENCOUNTER — HOSPITAL ENCOUNTER (EMERGENCY)
Facility: HOSPITAL | Age: 29
Discharge: HOME | End: 2024-07-14
Attending: EMERGENCY MEDICINE
Payer: MEDICARE

## 2024-07-14 VITALS
DIASTOLIC BLOOD PRESSURE: 58 MMHG | HEIGHT: 64 IN | OXYGEN SATURATION: 100 % | SYSTOLIC BLOOD PRESSURE: 106 MMHG | BODY MASS INDEX: 41.86 KG/M2 | TEMPERATURE: 98 F | RESPIRATION RATE: 18 BRPM | WEIGHT: 245.19 LBS | HEART RATE: 64 BPM

## 2024-07-14 DIAGNOSIS — K59.00 CONSTIPATION IN FEMALE: ICD-10-CM

## 2024-07-14 DIAGNOSIS — R10.11 RIGHT UPPER QUADRANT ABDOMINAL PAIN: Primary | ICD-10-CM

## 2024-07-14 LAB
ALBUMIN SERPL BCP-MCNC: 4.2 G/DL (ref 3.5–5.2)
ALP SERPL-CCNC: 79 U/L (ref 55–135)
ALT SERPL W/O P-5'-P-CCNC: 28 U/L (ref 10–44)
ANION GAP SERPL CALC-SCNC: 8 MMOL/L (ref 8–16)
AST SERPL-CCNC: 23 U/L (ref 10–40)
B-HCG UR QL: NEGATIVE
BASOPHILS # BLD AUTO: 0.03 K/UL (ref 0–0.2)
BASOPHILS NFR BLD: 0.4 % (ref 0–1.9)
BILIRUB SERPL-MCNC: 0.8 MG/DL (ref 0.1–1)
BILIRUB UR QL STRIP: NEGATIVE
BUN SERPL-MCNC: 12 MG/DL (ref 6–20)
CALCIUM SERPL-MCNC: 9.1 MG/DL (ref 8.7–10.5)
CHLORIDE SERPL-SCNC: 107 MMOL/L (ref 95–110)
CLARITY UR: ABNORMAL
CO2 SERPL-SCNC: 24 MMOL/L (ref 23–29)
COLOR UR: YELLOW
CREAT SERPL-MCNC: 0.7 MG/DL (ref 0.5–1.4)
CTP QC/QA: YES
DIFFERENTIAL METHOD BLD: NORMAL
EOSINOPHIL # BLD AUTO: 0 K/UL (ref 0–0.5)
EOSINOPHIL NFR BLD: 0.4 % (ref 0–8)
ERYTHROCYTE [DISTWIDTH] IN BLOOD BY AUTOMATED COUNT: 13.5 % (ref 11.5–14.5)
EST. GFR  (NO RACE VARIABLE): >60 ML/MIN/1.73 M^2
GLUCOSE SERPL-MCNC: 95 MG/DL (ref 70–110)
GLUCOSE UR QL STRIP: NEGATIVE
HCT VFR BLD AUTO: 42.3 % (ref 37–48.5)
HGB BLD-MCNC: 13.7 G/DL (ref 12–16)
HGB UR QL STRIP: NEGATIVE
IMM GRANULOCYTES # BLD AUTO: 0.03 K/UL (ref 0–0.04)
IMM GRANULOCYTES NFR BLD AUTO: 0.4 % (ref 0–0.5)
KETONES UR QL STRIP: NEGATIVE
LEUKOCYTE ESTERASE UR QL STRIP: NEGATIVE
LIPASE SERPL-CCNC: 5 U/L (ref 4–60)
LYMPHOCYTES # BLD AUTO: 2.5 K/UL (ref 1–4.8)
LYMPHOCYTES NFR BLD: 32 % (ref 18–48)
MCH RBC QN AUTO: 29.7 PG (ref 27–31)
MCHC RBC AUTO-ENTMCNC: 32.4 G/DL (ref 32–36)
MCV RBC AUTO: 92 FL (ref 82–98)
MONOCYTES # BLD AUTO: 0.6 K/UL (ref 0.3–1)
MONOCYTES NFR BLD: 7.3 % (ref 4–15)
NEUTROPHILS # BLD AUTO: 4.7 K/UL (ref 1.8–7.7)
NEUTROPHILS NFR BLD: 59.5 % (ref 38–73)
NITRITE UR QL STRIP: NEGATIVE
NRBC BLD-RTO: 0 /100 WBC
PH UR STRIP: 6 [PH] (ref 5–8)
PLATELET # BLD AUTO: 304 K/UL (ref 150–450)
PMV BLD AUTO: 9.5 FL (ref 9.2–12.9)
POTASSIUM SERPL-SCNC: 3.7 MMOL/L (ref 3.5–5.1)
PROT SERPL-MCNC: 7.5 G/DL (ref 6–8.4)
PROT UR QL STRIP: ABNORMAL
RBC # BLD AUTO: 4.62 M/UL (ref 4–5.4)
SODIUM SERPL-SCNC: 139 MMOL/L (ref 136–145)
SP GR UR STRIP: >1.03 (ref 1–1.03)
URN SPEC COLLECT METH UR: ABNORMAL
UROBILINOGEN UR STRIP-ACNC: NEGATIVE EU/DL
WBC # BLD AUTO: 7.94 K/UL (ref 3.9–12.7)

## 2024-07-14 PROCEDURE — 25500020 PHARM REV CODE 255: Performed by: EMERGENCY MEDICINE

## 2024-07-14 PROCEDURE — 80053 COMPREHEN METABOLIC PANEL: CPT | Performed by: NURSE PRACTITIONER

## 2024-07-14 PROCEDURE — 99285 EMERGENCY DEPT VISIT HI MDM: CPT | Mod: 25

## 2024-07-14 PROCEDURE — 83690 ASSAY OF LIPASE: CPT | Performed by: NURSE PRACTITIONER

## 2024-07-14 PROCEDURE — 96374 THER/PROPH/DIAG INJ IV PUSH: CPT

## 2024-07-14 PROCEDURE — 96361 HYDRATE IV INFUSION ADD-ON: CPT

## 2024-07-14 PROCEDURE — 36415 COLL VENOUS BLD VENIPUNCTURE: CPT | Performed by: NURSE PRACTITIONER

## 2024-07-14 PROCEDURE — 63600175 PHARM REV CODE 636 W HCPCS

## 2024-07-14 PROCEDURE — 81003 URINALYSIS AUTO W/O SCOPE: CPT | Performed by: NURSE PRACTITIONER

## 2024-07-14 PROCEDURE — 81025 URINE PREGNANCY TEST: CPT | Performed by: NURSE PRACTITIONER

## 2024-07-14 PROCEDURE — 85025 COMPLETE CBC W/AUTO DIFF WBC: CPT | Performed by: NURSE PRACTITIONER

## 2024-07-14 PROCEDURE — 96375 TX/PRO/DX INJ NEW DRUG ADDON: CPT

## 2024-07-14 RX ORDER — SENNOSIDES 8.6 MG/1
2 TABLET ORAL DAILY
Qty: 14 TABLET | Refills: 0 | Status: SHIPPED | OUTPATIENT
Start: 2024-07-14 | End: 2024-07-21

## 2024-07-14 RX ORDER — MORPHINE SULFATE 4 MG/ML
4 INJECTION, SOLUTION INTRAMUSCULAR; INTRAVENOUS
Status: COMPLETED | OUTPATIENT
Start: 2024-07-14 | End: 2024-07-14

## 2024-07-14 RX ORDER — POLYETHYLENE GLYCOL 3350 17 G/17G
17 POWDER, FOR SOLUTION ORAL DAILY
Qty: 170 G | Refills: 0 | Status: SHIPPED | OUTPATIENT
Start: 2024-07-14 | End: 2024-07-24

## 2024-07-14 RX ORDER — ONDANSETRON 4 MG/1
4 TABLET, ORALLY DISINTEGRATING ORAL EVERY 8 HOURS PRN
Qty: 6 TABLET | Refills: 0 | Status: SHIPPED | OUTPATIENT
Start: 2024-07-14 | End: 2024-07-19 | Stop reason: SDUPTHER

## 2024-07-14 RX ORDER — ONDANSETRON HYDROCHLORIDE 2 MG/ML
4 INJECTION, SOLUTION INTRAVENOUS
Status: COMPLETED | OUTPATIENT
Start: 2024-07-14 | End: 2024-07-14

## 2024-07-14 RX ADMIN — IOHEXOL 100 ML: 350 INJECTION, SOLUTION INTRAVENOUS at 05:07

## 2024-07-14 RX ADMIN — SODIUM CHLORIDE, POTASSIUM CHLORIDE, SODIUM LACTATE AND CALCIUM CHLORIDE 1000 ML: 600; 310; 30; 20 INJECTION, SOLUTION INTRAVENOUS at 05:07

## 2024-07-14 RX ADMIN — MORPHINE SULFATE 4 MG: 4 INJECTION, SOLUTION INTRAMUSCULAR; INTRAVENOUS at 05:07

## 2024-07-14 RX ADMIN — ONDANSETRON 4 MG: 2 INJECTION INTRAMUSCULAR; INTRAVENOUS at 05:07

## 2024-07-14 NOTE — FIRST PROVIDER EVALUATION
Emergency Department TeleTriage Encounter Note      CHIEF COMPLAINT    Chief Complaint   Patient presents with    Abdominal Pain     Abd pain and nausea. Pt post-op Lap Choleo on Thursday. Endorses drainage from site and out of pain medicaiton    Nausea    Wound Check       VITAL SIGNS   Initial Vitals [07/14/24 1243]   BP Pulse Resp Temp SpO2   126/82 69 20 98.1 °F (36.7 °C) 96 %      MAP       --            ALLERGIES    Review of patient's allergies indicates:   Allergen Reactions    Chantix [varenicline] Hives       PROVIDER TRIAGE NOTE  Verbal consent for the teletriage evaluation was given by the patient at the start of the evaluation.  All efforts will be made to maintain patient's privacy during the evaluation.      This is a teletriage evaluation of a 29 y.o. female presenting to the ED with c/o abdominal pain with abd pain and nasuea; had lap july 3 days ago and out of RX pain meds (Dr. Calderón in [place]). Limited physical exam via telehealth: The patient is awake, alert, answering questions appropriately and is not in respiratory distress.  As the Teletriage provider, I performed an initial assessment and ordered appropriate labs and imaging studies, if any, to facilitate the patient's care once placed in the ED. Once a room is available, care and a full evaluation will be completed by an alternate ED provider.  Any additional orders and the final disposition will be determined by that provider.  All imaging and labs will not be followed-up by the Teletriage Team, including myself.          ORDERS  Labs Reviewed   CBC W/ AUTO DIFFERENTIAL   COMPREHENSIVE METABOLIC PANEL   URINALYSIS, REFLEX TO URINE CULTURE   POCT URINE PREGNANCY       ED Orders (720h ago, onward)      Start Ordered     Status Ordering Provider    07/14/24 1258 07/14/24 1257  Vital signs  Every 2 hours         Ordered OSWALDO CRENSHAW    07/14/24 1258 07/14/24 1257  Diet NPO  Diet effective now         Ordered OSWALDO CRENSHAW    07/14/24  1258 07/14/24 1257  Insert peripheral IV  Once         Ordered OSWALDO CRENSHAW    07/14/24 1258 07/14/24 1257  CBC W/ AUTO DIFFERENTIAL  STAT         Ordered OSWALDO CRENSHAW    07/14/24 1258 07/14/24 1257  Comp. Metabolic Panel  STAT         Ordered OSWALDO CRENSHAW    07/14/24 1258 07/14/24 1257  Urinalysis, Reflex to Urine Culture Urine, Clean Catch  STAT         Ordered OSWALDO CRENSHAW    07/14/24 1258 07/14/24 1257  POCT urine pregnancy  Once         Ordered OSWALDO CRENSHAW              Virtual Visit Note: The provider triage portion of this emergency department evaluation and documentation was performed via [employer], a HIPAA-compliant telemedicine application, in concert with a tele-presenter in the room. A face to face patient evaluation with one of my colleagues will occur once the patient is placed in an emergency department room.      DISCLAIMER: This note was prepared with [employer] voice recognition transcription software. Garbled syntax, mangled pronouns, and other bizarre constructions may be attributed to that software system.

## 2024-07-14 NOTE — ED PROVIDER NOTES
"Encounter Date: 2024       History     Chief Complaint   Patient presents with    Abdominal Pain     Abd pain and nausea. Pt post-op Lap Choleo on Thursday. Endorses drainage from site and out of pain medicaiton    Nausea    Wound Check     HPI    29-year-old female presenting to emergency department with right upper quadrant pain with associated nausea.  She has been experiencing this pain since she had a laparoscopic cholecystectomy on Friday, 2 days ago with Dr. Zambrano at [facility].  She reports that she has been taking her Norco 5s with some improvement of pain, but is now out of her medication.  She has also been having extreme nausea without vomiting.  She has not yet had a bowel movement.  She is passing gas.  Denies any difficulty with urination, denies fever or chills.  Her pain is worse with cough, bumps in the road, pressure.    Review of patient's allergies indicates:   Allergen Reactions    Chantix [varenicline] Hives     Past Medical History:   Diagnosis Date    ADHD (attention deficit hyperactivity disorder)     Anemia     resolved    Arthritis     wrists    Asperger syndrome     autism spectrum disorder    Chronic headache     Depression     Encounter for blood transfusion     Fibromyalgia     Gallstones 2024    GERD (gastroesophageal reflux disease)     Migraine headache     MALCOLM (obstructive sleep apnea)     resolved    Palpitations     PCOS (polycystic ovarian syndrome)     Pseudotumor cerebri syndrome     Seizures     SOB (shortness of breath)      Past Surgical History:   Procedure Laterality Date    APPENDECTOMY  2019     SECTION  2019    TONSILLECTOMY      UPPER GASTROINTESTINAL ENDOSCOPY  in her childhood    "born with underdeveloped esophagus" & GERD per patient report    WISDOM TOOTH EXTRACTION       Family History   Problem Relation Name Age of Onset    Mental illness Mother      Seizures Mother      Migraines Mother      Bipolar disorder Mother      Cerebral " palsy Father      Depression Brother          severe    Migraines Brother      Colon cancer Neg Hx      Colon polyps Neg Hx      Crohn's disease Neg Hx      Ulcerative colitis Neg Hx      Stomach cancer Neg Hx      Esophageal cancer Neg Hx       Social History     Tobacco Use    Smoking status: Every Day     Current packs/day: 1.00     Types: Cigarettes    Smokeless tobacco: Never    Tobacco comments:     2-3 cigaretes perday but smokes 0.5 cig per time; smoking 15 yrs/ always a light smoker but has cut back more   Substance Use Topics    Alcohol use: No    Drug use: No     Comment: patient states she quit mojo 3 months ago and marijuana 2 months ago     Review of Systems   Constitutional:  Positive for appetite change (decreased). Negative for fever.   Respiratory:  Negative for shortness of breath.    Cardiovascular:  Negative for chest pain.   Gastrointestinal:  Positive for abdominal pain and nausea. Negative for abdominal distention, blood in stool and vomiting.   Genitourinary:  Negative for difficulty urinating and dysuria.   Musculoskeletal:  Negative for back pain.   Skin:  Negative for rash.   Neurological:  Negative for headaches.   Psychiatric/Behavioral:  Negative for behavioral problems.        Physical Exam     Initial Vitals [07/14/24 1243]   BP Pulse Resp Temp SpO2   126/82 69 20 98.1 °F (36.7 °C) 96 %      MAP       --         Physical Exam    Nursing note and vitals reviewed.  Constitutional: She appears well-developed and well-nourished. She is not diaphoretic. No distress.   HENT:   Head: Normocephalic and atraumatic.   Dry mucous membranes   Eyes: Conjunctivae are normal. No scleral icterus.   Neck: Neck supple.   Normal range of motion.  Cardiovascular:  Normal rate, regular rhythm, normal heart sounds and intact distal pulses.           Pulmonary/Chest: Breath sounds normal. No respiratory distress. She has no wheezes. She has no rhonchi. She has no rales.   Abdominal: Abdomen is soft. She  exhibits no distension. There is abdominal tenderness (right upper quadrant).   Laparoscopic surgical scars on abdomen appear clean, dry, and intact without surrounding erythema, fluctuance or purulent drainage There is no rebound and no guarding.   Musculoskeletal:         General: No tenderness or edema. Normal range of motion.      Cervical back: Normal range of motion and neck supple.     Neurological: She is alert and oriented to person, place, and time. GCS score is 15. GCS eye subscore is 4. GCS verbal subscore is 5. GCS motor subscore is 6.   Skin: Skin is warm and dry. No rash noted. No erythema. No pallor.   Psychiatric: She has a normal mood and affect. Thought content normal.         ED Course   Procedures  Labs Reviewed   URINALYSIS, REFLEX TO URINE CULTURE - Abnormal; Notable for the following components:       Result Value    Appearance, UA Hazy (*)     Specific Gravity, UA >1.030 (*)     Protein, UA Trace (*)     All other components within normal limits    Narrative:     Specimen Source->Urine   CBC W/ AUTO DIFFERENTIAL   COMPREHENSIVE METABOLIC PANEL   LIPASE   LIPASE   POCT URINE PREGNANCY          Imaging Results              CT Abdomen Pelvis With IV Contrast NO Oral Contrast (Final result)  Result time 07/14/24 18:34:07      Final result by Ty Amaro MD (07/14/24 18:34:07)                   Impression:      Postoperative change of recent cholecystectomy.  Mild fat stranding and gas within the gallbladder fossa without discrete drainable fluid collection.    Small to moderate volume of pneumoperitoneum.  This is presumably relating to recent postoperative period, although perforated viscus not entirely excluded.  Clinical correlation advised.    Moderate degree of fat stranding and skin thickening epicentered about the umbilicus with associated small ill-defined fluid collection.  This may relate to recent postoperative change, although soft tissue infection not excluded.  Clinical  correlation advised.    New focal right basilar airspace opacity may reflect atelectasis, although early infectious process or aspiration not excluded.    Hepatomegaly.    Mild retained stool throughout the colon suggesting constipation.      Electronically signed by: Ty Amaro MD  Date:    07/14/2024  Time:    18:34               Narrative:    EXAMINATION:  CT ABDOMEN PELVIS WITH IV CONTRAST    CLINICAL HISTORY:  Abdominal pain, post-op;    TECHNIQUE:  Low dose axial images, sagittal and coronal reformations were obtained from the lung bases to the pubic symphysis following the IV administration of 100 mL of Omnipaque 350 .  Oral contrast was not given.    COMPARISON:  Ultrasound 04/23/2024, CT 07/14/2018    FINDINGS:  There is a stable 3-mm nodular opacity at the right lung base.  There is focal right basilar airspace opacity which may reflect atelectasis, although infectious process or aspiration not excluded.  The visualized portions of the heart appear normal.    The liver is enlarged measuring 22.7 cm.  No focal hepatic abnormality appreciated.  The portal vein is patent.  Patient has undergone interval recent cholecystectomy.  There is mild fat stranding and a few scattered foci of gas within the gallbladder fossa.  No discrete drainable fluid collection present.  No significant intra or extrahepatic biliary ductal dilatation.    There is a small to moderate volume of pneumoperitoneum present.  Additional small volume of gas is noted throughout the rectus musculature in the anterior abdominal wall subcutaneous soft tissues.    The stomach, spleen, pancreas, and adrenal glands demonstrate no acute abnormality.    The kidneys are normal in size and location and enhance symmetrically.  There is no evidence of hydronephrosis. The urinary bladder is unremarkable. The uterus demonstrates no significant abnormality.  Hypoattenuating appearance of the bilateral ovaries presumably reflects multiple small  follicles.  No significant free fluid in the pelvis.    The abdominal aorta is normal in course and caliber without significant atherosclerotic calcifications.    The visualized loops of small and large bowel show no evidence of obstruction or inflammation. There is retained stool throughout the colon suggesting mild constipation.  No CT evidence of acute appendicitis.  There is no portal venous gas or pelvic fluid collection.  There are scattered shotty small mesenteric and retroperitoneal lymph nodes.    The visualized osseous structures appear intact.    There is moderate soft tissue induration epicentered about the umbilicus with mild skin thickening.  There is a small ill-defined midline subcutaneous fluid collection measuring 2.6 x 2.1 cm (axial series 3, image 137) present.  Additional suspected port sites noted within the right lateral abdominal wall.  No discrete well-defined fluid collection identified throughout the remaining subcutaneous soft tissues.                                       Medications   morphine injection 4 mg (4 mg Intravenous Given 7/14/24 1724)   ondansetron injection 4 mg (4 mg Intravenous Given 7/14/24 1724)   lactated ringers bolus 1,000 mL (0 mLs Intravenous Stopped 7/14/24 1822)   iohexoL (OMNIPAQUE 350) injection 100 mL (100 mLs Intravenous Given 7/14/24 1749)     Medical Decision Making  29-year-old female presenting to emergency department for evaluation of right upper quadrant pain after she had a cholecystectomy 2 days ago.  She reports that she is out of her Norco pain medication which had only mildly helps her symptoms.  She is also complaining of nausea, and has not had a bowel movement since surgery.  She is passing gas.  Denies any fever or chills.    Vital signs upon arrival here stable, physical exam as detailed above.     Differential includes but not limited to:  Normal postop pain, postop hematoma, postop abscess, bowel obstruction.     Given that she had her  surgery of different hospital, and does not follow up here, we will get CT scan to ensure there is no postop complication.  We will give analgesia, antiemetics, fluids here.  We will obtain basic labs including a lipase.  We will reassess.    ED workup unremarkable, CT scan without signs of small-bowel obstruction, abscess level correlate with the ear exam.  Labs unremarkable.  We will discharge with Zofran, senna and MiraLax.  She will call her surgeon tomorrow to establish follow-up appointment.  Return precautions discussed, as well as importance of follow up.  She expressed understanding, agreeable with plan for discharge.  As explained to her that we can not re prescribe opioid abuse medications at this time, she can alternate Tylenol and Motrin.    Seen with Dr. Armijo,     Dereck Bragg MD  [employer] EM PGY3     Amount and/or Complexity of Data Reviewed  Independent Historian: parent  External Data Reviewed: notes.     Details: Reviewed H and P from 07/12 at [facility] where she was admitted for laparoscopic cholecystectomy  Labs: ordered. Decision-making details documented in ED Course.  Radiology: ordered. Decision-making details documented in ED Course.    Risk  OTC drugs.  Prescription drug management.              Attending Attestation:   Physician Attestation Statement for Resident:  As the supervising MD   Physician Attestation Statement: I have personally seen and examined this patient.   I agree with the above history.  -: Patient is 2 days status post laparoscopic cholecystectomy.  Patient reports no bowel movement since procedure and worsening right upper quadrant abdominal tenderness.  No fever chills.  Positive nausea no vomiting.   As the supervising MD I agree with the above PE.   -: Alert, sitting sitting up in bed with no apparent distress.  Abdomen is exam with laparoscopic incisions with no surrounding erythema.  Supraumbilical incision with slight tenderness with no drainage.   "There is mild right upper quadrant abdominal tenderness with no guarding or rebound.   As the supervising MD I agree with the above treatment, course, plan, and disposition.   -: Patient presents with postoperative pain.  CT obtained as well as laboratory evaluation.  CT with fluid collection to incisional site.  There is several postoperative changes.  No definite infectious etiology.  No obstruction.  Patient to follow with her surgeon tomorrow.  Treated for pain here.   I have reviewed and agree with the residents interpretation of the following: lab data and CT scans.  I have reviewed the following: records from a referring facility.                ED Course as of 07/14/24 2202   Sun Jul 14, 2024 1805 Comp. Metabolic Panel  No significant electrolyte abnormality, no CARL, no hepatobiliary injury pattern noted.  []   1805 CBC W/ AUTO DIFFERENTIAL  No leukocytosis or severe anemia.  []   1806 Lipase: 5  WNL []   1806 hCG Qualitative, Urine: Negative []   2005 CT Abdomen Pelvis With IV Contrast NO Oral Contrast  "Impression:     Postoperative change of recent cholecystectomy.  Mild fat stranding and gas within the gallbladder fossa without discrete drainable fluid collection.     Small to moderate volume of pneumoperitoneum.  This is presumably relating to recent postoperative period, although perforated viscus not entirely excluded.  Clinical correlation advised.     Moderate degree of fat stranding and skin thickening epicentered about the umbilicus with associated small ill-defined fluid collection.  This may relate to recent postoperative change, although soft tissue infection not excluded.  Clinical correlation advised.     New focal right basilar airspace opacity may reflect atelectasis, although early infectious process or aspiration not excluded.     Hepatomegaly.     Mild retained stool throughout the colon suggesting constipation."  []      ED Course User Index  [] Dereck Bragg MD       "                       Clinical Impression:  Final diagnoses:  [R10.11] Right upper quadrant abdominal pain (Primary)  [K59.00] Constipation in female          ED Disposition Condition    Discharge Stable          ED Prescriptions       Medication Sig Dispense Start Date End Date Auth. Provider    SENNA 8.6 mg tablet Take 2 tablets by mouth once daily. for 7 days 14 tablet 7/14/2024 7/21/2024 Dereck Bragg MD    polyethylene glycol (GLYCOLAX) 17 gram/dose powder Take 17 g by mouth once daily. for 10 days 170 g 7/14/2024 7/24/2024 Dereck Bragg MD    ondansetron (ZOFRAN-ODT) 4 MG TbDL Take 1 tablet (4 mg total) by mouth every 8 (eight) hours as needed. 6 tablet 7/14/2024 -- Dereck Bragg MD          Follow-up Information       Follow up With Specialties Details Why Contact Info Additional Information    [facility] - Emergency Dept Emergency Medicine Go to  As needed, If symptoms worsen [address] [facility] [id number] [phone] 1st floor    Dorota Zambrano MD General Surgery, Surgery Schedule an appointment as soon as possible for a visit in 2 days for follow up [address] [address]  [phone]                Dereck Bragg MD  Resident  07/14/24 2007       Marbin Armijo MD  07/14/24 3123

## 2024-07-15 NOTE — DISCHARGE INSTRUCTIONS
Return to the emergency department if you experience high fevers, severe pain, inability to eat or drink blood in your stool, shortness of breath, chest pain, or any other concerning symptoms    You should schedule a follow-up appointment with your surgeon as soon as possible.    Take the prescribed senna, MiraLax, and Zofran as directed for relief of your constipation and nausea.

## 2024-08-20 ENCOUNTER — HOSPITAL ENCOUNTER (EMERGENCY)
Facility: HOSPITAL | Age: 29
Discharge: HOME | End: 2024-08-20
Attending: EMERGENCY MEDICINE
Payer: MEDICARE

## 2024-08-20 VITALS
DIASTOLIC BLOOD PRESSURE: 68 MMHG | SYSTOLIC BLOOD PRESSURE: 112 MMHG | OXYGEN SATURATION: 100 % | HEART RATE: 63 BPM | TEMPERATURE: 98 F | RESPIRATION RATE: 17 BRPM | HEIGHT: 65 IN | BODY MASS INDEX: 40.82 KG/M2 | WEIGHT: 245 LBS

## 2024-08-20 DIAGNOSIS — E87.20 ACIDOSIS: ICD-10-CM

## 2024-08-20 DIAGNOSIS — R11.0 NAUSEA: Primary | ICD-10-CM

## 2024-08-20 DIAGNOSIS — R10.10 PAIN OF UPPER ABDOMEN: ICD-10-CM

## 2024-08-20 LAB
ALBUMIN SERPL BCP-MCNC: 4.1 G/DL (ref 3.5–5.2)
ALP SERPL-CCNC: 111 U/L (ref 55–135)
ALT SERPL W/O P-5'-P-CCNC: 17 U/L (ref 10–44)
ANION GAP SERPL CALC-SCNC: 12 MMOL/L (ref 8–16)
ANION GAP SERPL CALC-SCNC: 7 MMOL/L (ref 8–16)
AST SERPL-CCNC: 24 U/L (ref 10–40)
B-HCG UR QL: NEGATIVE
BASOPHILS # BLD AUTO: 0.02 K/UL (ref 0–0.2)
BASOPHILS NFR BLD: 0.2 % (ref 0–1.9)
BILIRUB SERPL-MCNC: 1.2 MG/DL (ref 0.1–1)
BILIRUB UR QL STRIP: NEGATIVE
BUN SERPL-MCNC: 10 MG/DL (ref 6–20)
BUN SERPL-MCNC: 13 MG/DL (ref 6–20)
CALCIUM SERPL-MCNC: 8.6 MG/DL (ref 8.7–10.5)
CALCIUM SERPL-MCNC: 9.7 MG/DL (ref 8.7–10.5)
CHLORIDE SERPL-SCNC: 108 MMOL/L (ref 95–110)
CHLORIDE SERPL-SCNC: 110 MMOL/L (ref 95–110)
CLARITY UR: CLEAR
CO2 SERPL-SCNC: 17 MMOL/L (ref 23–29)
CO2 SERPL-SCNC: 22 MMOL/L (ref 23–29)
COLOR UR: YELLOW
CREAT SERPL-MCNC: 0.7 MG/DL (ref 0.5–1.4)
CREAT SERPL-MCNC: 0.8 MG/DL (ref 0.5–1.4)
CREAT SERPL-MCNC: 0.9 MG/DL (ref 0.5–1.4)
DIFFERENTIAL METHOD BLD: NORMAL
EOSINOPHIL # BLD AUTO: 0.1 K/UL (ref 0–0.5)
EOSINOPHIL NFR BLD: 0.7 % (ref 0–8)
ERYTHROCYTE [DISTWIDTH] IN BLOOD BY AUTOMATED COUNT: 13.2 % (ref 11.5–14.5)
EST. GFR  (NO RACE VARIABLE): >60 ML/MIN/1.73 M^2
EST. GFR  (NO RACE VARIABLE): >60 ML/MIN/1.73 M^2
GLUCOSE SERPL-MCNC: 80 MG/DL (ref 70–110)
GLUCOSE SERPL-MCNC: 81 MG/DL (ref 70–110)
GLUCOSE UR QL STRIP: NEGATIVE
HCT VFR BLD AUTO: 44.7 % (ref 37–48.5)
HGB BLD-MCNC: 14.3 G/DL (ref 12–16)
HGB UR QL STRIP: NEGATIVE
IMM GRANULOCYTES # BLD AUTO: 0.04 K/UL (ref 0–0.04)
IMM GRANULOCYTES NFR BLD AUTO: 0.5 % (ref 0–0.5)
KETONES UR QL STRIP: NEGATIVE
LEUKOCYTE ESTERASE UR QL STRIP: NEGATIVE
LIPASE SERPL-CCNC: 14 U/L (ref 4–60)
LYMPHOCYTES # BLD AUTO: 2 K/UL (ref 1–4.8)
LYMPHOCYTES NFR BLD: 25.1 % (ref 18–48)
MCH RBC QN AUTO: 29 PG (ref 27–31)
MCHC RBC AUTO-ENTMCNC: 32 G/DL (ref 32–36)
MCV RBC AUTO: 91 FL (ref 82–98)
MONOCYTES # BLD AUTO: 0.6 K/UL (ref 0.3–1)
MONOCYTES NFR BLD: 7.7 % (ref 4–15)
NEUTROPHILS # BLD AUTO: 5.3 K/UL (ref 1.8–7.7)
NEUTROPHILS NFR BLD: 65.8 % (ref 38–73)
NITRITE UR QL STRIP: NEGATIVE
NRBC BLD-RTO: 0 /100 WBC
PH UR STRIP: 6 [PH] (ref 5–8)
PLATELET # BLD AUTO: 309 K/UL (ref 150–450)
PMV BLD AUTO: 9.7 FL (ref 9.2–12.9)
POTASSIUM SERPL-SCNC: 3.6 MMOL/L (ref 3.5–5.1)
POTASSIUM SERPL-SCNC: 4.1 MMOL/L (ref 3.5–5.1)
PROT SERPL-MCNC: 8.1 G/DL (ref 6–8.4)
PROT UR QL STRIP: NEGATIVE
RBC # BLD AUTO: 4.93 M/UL (ref 4–5.4)
SAMPLE: NORMAL
SODIUM SERPL-SCNC: 137 MMOL/L (ref 136–145)
SODIUM SERPL-SCNC: 139 MMOL/L (ref 136–145)
SP GR UR STRIP: 1.03 (ref 1–1.03)
URN SPEC COLLECT METH UR: NORMAL
UROBILINOGEN UR STRIP-ACNC: NEGATIVE EU/DL
WBC # BLD AUTO: 8.02 K/UL (ref 3.9–12.7)

## 2024-08-20 PROCEDURE — 25000003 PHARM REV CODE 250: Performed by: EMERGENCY MEDICINE

## 2024-08-20 PROCEDURE — 63600175 PHARM REV CODE 636 W HCPCS: Performed by: EMERGENCY MEDICINE

## 2024-08-20 PROCEDURE — 85025 COMPLETE CBC W/AUTO DIFF WBC: CPT | Performed by: EMERGENCY MEDICINE

## 2024-08-20 PROCEDURE — 82565 ASSAY OF CREATININE: CPT

## 2024-08-20 PROCEDURE — 81003 URINALYSIS AUTO W/O SCOPE: CPT | Performed by: EMERGENCY MEDICINE

## 2024-08-20 PROCEDURE — 81025 URINE PREGNANCY TEST: CPT | Performed by: EMERGENCY MEDICINE

## 2024-08-20 PROCEDURE — 80048 BASIC METABOLIC PNL TOTAL CA: CPT | Performed by: EMERGENCY MEDICINE

## 2024-08-20 PROCEDURE — 80053 COMPREHEN METABOLIC PANEL: CPT | Performed by: EMERGENCY MEDICINE

## 2024-08-20 PROCEDURE — 83690 ASSAY OF LIPASE: CPT | Performed by: EMERGENCY MEDICINE

## 2024-08-20 PROCEDURE — 99285 EMERGENCY DEPT VISIT HI MDM: CPT | Mod: 25

## 2024-08-20 PROCEDURE — 25500020 PHARM REV CODE 255: Performed by: EMERGENCY MEDICINE

## 2024-08-20 RX ORDER — SODIUM CHLORIDE 9 MG/ML
1000 INJECTION, SOLUTION INTRAVENOUS
Status: COMPLETED | OUTPATIENT
Start: 2024-08-20 | End: 2024-08-20

## 2024-08-20 RX ORDER — ONDANSETRON 8 MG/1
8 TABLET, ORALLY DISINTEGRATING ORAL 3 TIMES DAILY
COMMUNITY
Start: 2024-08-15

## 2024-08-20 RX ORDER — PROMETHAZINE HYDROCHLORIDE 25 MG/1
25 TABLET ORAL EVERY 6 HOURS PRN
Qty: 15 TABLET | Refills: 0 | Status: SHIPPED | OUTPATIENT
Start: 2024-08-20

## 2024-08-20 RX ORDER — PROMETHAZINE HYDROCHLORIDE 25 MG/1
25 TABLET ORAL EVERY 6 HOURS PRN
Qty: 15 TABLET | Refills: 0 | Status: SHIPPED | OUTPATIENT
Start: 2024-08-20 | End: 2024-08-20

## 2024-08-20 RX ORDER — DICYCLOMINE HYDROCHLORIDE 10 MG/1
10 CAPSULE ORAL 3 TIMES DAILY
COMMUNITY
Start: 2024-08-15

## 2024-08-20 RX ADMIN — SODIUM CHLORIDE 1000 ML: 9 INJECTION, SOLUTION INTRAVENOUS at 02:08

## 2024-08-20 RX ADMIN — PROMETHAZINE HYDROCHLORIDE 12.5 MG: 25 INJECTION INTRAMUSCULAR; INTRAVENOUS at 10:08

## 2024-08-20 RX ADMIN — PROMETHAZINE HYDROCHLORIDE 12.5 MG: 25 INJECTION INTRAMUSCULAR; INTRAVENOUS at 02:08

## 2024-08-20 RX ADMIN — IOHEXOL 100 ML: 350 INJECTION, SOLUTION INTRAVENOUS at 12:08

## 2024-08-20 NOTE — DISCHARGE INSTRUCTIONS
Clear liquid diet for 24 hours.  Take Phenergan if needed for nausea.  Follow up with the primary care physician.  Return to the ER if needed.

## 2024-08-20 NOTE — ED PROVIDER NOTES
"Encounter Date: 2024       History     Chief Complaint   Patient presents with    Nausea    Abdominal Pain     Seen at urgent care for same not any better, states decreased appetite     29-year-old female who has a history of ADHD, arthritis, Asperger's syndrome, fibromyalgia, depression, migraine headache, sleep apnea and who has had a cholecystectomy as well as appendectomy, now presents complaining of abdominal pain that began on the 12th of this month.  The patient describes the pain is rather diffuse in nature and dull and achy in character.  She did have diarrhea with the onset of symptoms but that has since stopped.  There are no factors will either exacerbate or alleviate her symptoms.  She did admit to eating last night.  No ill contacts.  In no unusual dietary intake.  Weight has been stable.  Patient denies alcohol use and smokes 1 cigarette daily.      Review of patient's allergies indicates:   Allergen Reactions    Chantix [varenicline] Hives     Past Medical History:   Diagnosis Date    ADHD (attention deficit hyperactivity disorder)     Anemia     resolved    Arthritis     wrists    Asperger syndrome     autism spectrum disorder    Chronic headache     Depression     Encounter for blood transfusion     Fibromyalgia     Gallstones 2024    GERD (gastroesophageal reflux disease)     Migraine headache     MALCOLM (obstructive sleep apnea)     resolved    Palpitations     PCOS (polycystic ovarian syndrome)     Pseudotumor cerebri syndrome     Seizures     SOB (shortness of breath)      Past Surgical History:   Procedure Laterality Date    APPENDECTOMY  2019     SECTION  2019    LAPAROSCOPIC CHOLECYSTECTOMY N/A 2024    Procedure: CHOLECYSTECTOMY, LAPAROSCOPIC;  Surgeon: Dorota Zambrano MD;  Location: [facility];  Service: General;  Laterality: N/A;    TONSILLECTOMY      UPPER GASTROINTESTINAL ENDOSCOPY  in her childhood    "born with underdeveloped esophagus" & GERD per patient report    " WISDOM TOOTH EXTRACTION       Family History   Problem Relation Name Age of Onset    Mental illness Mother      Seizures Mother      Migraines Mother      Bipolar disorder Mother      Cerebral palsy Father      Depression Brother          severe    Migraines Brother      Colon cancer Neg Hx      Colon polyps Neg Hx      Crohn's disease Neg Hx      Ulcerative colitis Neg Hx      Stomach cancer Neg Hx      Esophageal cancer Neg Hx       Social History     Tobacco Use    Smoking status: Every Day     Current packs/day: 1.00     Types: Cigarettes    Smokeless tobacco: Never    Tobacco comments:     2-3 cigaretes perday but smokes 0.5 cig per time; smoking 15 yrs/ always a light smoker but has cut back more   Substance Use Topics    Alcohol use: No    Drug use: No     Comment: patient states she quit mojo 3 months ago and marijuana 2 months ago     Review of Systems   Constitutional:  Positive for appetite change. Negative for activity change, chills, diaphoresis and fever.   HENT:  Negative for sore throat.    Respiratory:  Negative for shortness of breath.    Cardiovascular:  Negative for chest pain.   Gastrointestinal:  Positive for abdominal pain and nausea.   Genitourinary:  Positive for frequency. Negative for difficulty urinating and dysuria.         1 para 1   Musculoskeletal:  Negative for back pain.   Skin:  Negative for color change, pallor and rash.   Neurological:  Negative for weakness.   Hematological:  Does not bruise/bleed easily.   All other systems reviewed and are negative.      Physical Exam     Initial Vitals [24 0858]   BP Pulse Resp Temp SpO2   133/87 70 16 98.1 °F (36.7 °C) 100 %      MAP       --         Physical Exam    Vitals reviewed.  Constitutional: She appears well-developed and well-nourished. She is not diaphoretic. No distress.   HENT:   Head: Normocephalic and atraumatic.   Nose: Nose normal.   Mouth/Throat: Oropharynx is clear and moist. No oropharyngeal exudate.   Eyes:  Conjunctivae are normal. Pupils are equal, round, and reactive to light.   Neck: Neck supple. No JVD present.   Normal range of motion.  Cardiovascular:  Normal rate, regular rhythm, normal heart sounds and intact distal pulses.     Exam reveals no gallop and no friction rub.       No murmur heard.  Pulmonary/Chest: Breath sounds normal. No respiratory distress. She has no wheezes. She has no rhonchi. She has no rales. She exhibits no tenderness.   Abdominal: Abdomen is soft. Bowel sounds are normal. She exhibits no distension. There is no abdominal tenderness. There is no rebound and no guarding.   Musculoskeletal:         General: No tenderness or edema. Normal range of motion.      Cervical back: Normal range of motion and neck supple.     Lymphadenopathy:     She has no cervical adenopathy.   Neurological: She is alert and oriented to person, place, and time. She has normal strength. GCS score is 15. GCS eye subscore is 4. GCS verbal subscore is 5. GCS motor subscore is 6.   Skin: Skin is warm and dry. Capillary refill takes less than 2 seconds. No rash noted. No erythema. No pallor.   Psychiatric: She has a normal mood and affect. Her behavior is normal. Judgment and thought content normal.         ED Course   Procedures  Labs Reviewed   COMPREHENSIVE METABOLIC PANEL - Abnormal       Result Value    Sodium 137      Potassium 4.1      Chloride 108      CO2 17 (*)     Glucose 81      BUN 13      Creatinine 0.9      Calcium 9.7      Total Protein 8.1      Albumin 4.1      Total Bilirubin 1.2 (*)     Alkaline Phosphatase 111      AST 24      ALT 17      eGFR >60      Anion Gap 12     BASIC METABOLIC PANEL - Abnormal    Sodium 139      Potassium 3.6      Chloride 110      CO2 22 (*)     Glucose 80      BUN 10      Creatinine 0.7      Calcium 8.6 (*)     Anion Gap 7 (*)     eGFR >60.0     CBC W/ AUTO DIFFERENTIAL    WBC 8.02      RBC 4.93      Hemoglobin 14.3      Hematocrit 44.7      MCV 91      MCH 29.0      MCHC  32.0      RDW 13.2      Platelets 309      MPV 9.7      Immature Granulocytes 0.5      Gran # (ANC) 5.3      Immature Grans (Abs) 0.04      Lymph # 2.0      Mono # 0.6      Eos # 0.1      Baso # 0.02      nRBC 0      Gran % 65.8      Lymph % 25.1      Mono % 7.7      Eosinophil % 0.7      Basophil % 0.2      Differential Method Automated     URINALYSIS, REFLEX TO URINE CULTURE    Specimen UA Urine, Clean Catch      Color, UA Yellow      Appearance, UA Clear      pH, UA 6.0      Specific Gravity, UA 1.030      Protein, UA Negative      Glucose, UA Negative      Ketones, UA Negative      Bilirubin (UA) Negative      Occult Blood UA Negative      Nitrite, UA Negative      Urobilinogen, UA Negative      Leukocytes, UA Negative      Narrative:     Specimen Source->Urine   PREGNANCY TEST, URINE RAPID    Preg Test, Ur Negative      Narrative:     Specimen Source->Urine   LIPASE    Lipase 14     ISTAT CREATININE    POC Creatinine 0.8      Sample VENOUS     POCT CREATININE          Imaging Results              CT Abdomen Pelvis With IV Contrast NO Oral Contrast (Final result)  Result time 08/20/24 12:45:18      Final result by Nate Nelson MD (08/20/24 12:45:18)                   Impression:      No CT evidence of acute pathology involving the abdomen or pelvis.    Status post cholecystectomy and appendectomy.    Right-sided L5 spondylolysis.      Electronically signed by: Nate Nelson  Date:    08/20/2024  Time:    12:45               Narrative:    EXAMINATION:  CT ABDOMEN PELVIS WITH IV CONTRAST    CLINICAL HISTORY:  Abdominal pain, acute, nonlocalized;    TECHNIQUE:  Axial CT imaging obtained through the abdomen and pelvis after 100 mL Omnipaque 350.  Coronal and sagittal reformatted images.    CMS Mandated Quality Data-CT Radiation Dose-436    All CT scans at this facility dose modulation, iterative reconstruction, and or weight-based dosing when appropriate to reduce radiation dose to as low as reasonably  achievable.    COMPARISON:  CT abdomen and pelvis 07/14/2024    FINDINGS:  The lung bases are clear.  Bone window images show no acute or aggressive osseous abnormality.  Right-sided spondylolysis L5.    No focal hepatic lesion.  Gallbladder is absent.  No intrahepatic or extrahepatic bile duct dilation.  Spleen is unremarkable.  Small left upper quadrant splenule.  Pancreas is within normal limits.  No adrenal lesion.  No renal cystic or solid lesion.  Excreted contrast material in the renal pelves bilaterally which limits exam sensitivity for renal calculi.  No hydronephrosis.  Ureters are normal in caliber.  Urinary bladder is largely collapsed.    Stomach is unremarkable.  No evidence of small-bowel obstruction.  Appendix is absent.  No findings of colitis.    Uterus is unremarkable.  Bilateral ovarian follicles.    No free fluid or free air in the abdomen or pelvis.  No lymphadenopathy.                                       Medications   promethazine (PHENERGAN) 12.5 mg in 0.9% NaCl 50 mL IVPB (0 mg Intravenous Stopped 8/20/24 1127)   iohexoL (OMNIPAQUE 350) injection 100 mL (100 mLs Intravenous Given 8/20/24 1205)   0.9%  NaCl infusion (0 mLs Intravenous Stopped 8/20/24 1557)   promethazine (PHENERGAN) 12.5 mg in 0.9% NaCl 50 mL IVPB (0 mg Intravenous Stopped 8/20/24 1434)     Medical Decision Making  Amount and/or Complexity of Data Reviewed  Labs: ordered.  Radiology: ordered.    Risk  Prescription drug management.              Attending Attestation:             Attending ED Notes:   ED course and MDM:  This 29-year-old female who presented with complaints of having some diffuse abdominal pain with persistent nausea and no vomiting and who also had diarrhea for a couple days with the onset of symptoms and that too has not persisted, had screening labs obtained and reviewed.  Which lytes were reviewed only out whose CO2 of 17 The patient has a minimal elevation of the bilirubin at 1.3.  The rest of the LFTs  are normal.  Lipase is normal.  Urinalysis is normal.  CBC is normal.  The patient's CT scan of the abdomen and pelvis did not show any evidence of any abnormalities.  During the ED course patient was hydrated with normal saline and a repeat CO2  obtained afterwards.        After hydration the patient had a repeat BMP in the CO2 corrected to 22.  She will be able to be discharged at this time with Phenergan and recommended follow up with the primary physician.                             Clinical Impression:  Final diagnoses:  [R11.0] Nausea (Primary)  [E87.20] Acidosis  [R10.10] Pain of upper abdomen          ED Disposition Condition    Discharge Stable          ED Prescriptions       Medication Sig Dispense Start Date End Date Auth. Provider    promethazine (PHENERGAN) 25 MG tablet Take 1 tablet (25 mg total) by mouth every 6 (six) hours as needed for Nausea. 15 tablet 8/20/2024 -- Bassam Peña Jr., MD          Follow-up Information       Follow up With Specialties Details Why Contact Info    Terri You NP Family Medicine Schedule an appointment as soon as possible for a visit  As needed [address] [place] [id number]  [phone]               Bassam Peña Jr., MD  08/20/24 0848

## 2024-09-18 ENCOUNTER — HOSPITAL ENCOUNTER (OUTPATIENT)
Dept: RADIOLOGY | Facility: HOSPITAL | Age: 29
Discharge: HOME | End: 2024-09-18
Payer: MEDICARE

## 2024-09-18 DIAGNOSIS — J18.9 PNEUMONIA: ICD-10-CM

## 2024-09-18 DIAGNOSIS — J18.9 PNEUMONIA: Primary | ICD-10-CM

## 2024-09-18 PROCEDURE — 71046 X-RAY EXAM CHEST 2 VIEWS: CPT | Mod: 26,,, | Performed by: RADIOLOGY

## 2024-09-18 PROCEDURE — 71046 X-RAY EXAM CHEST 2 VIEWS: CPT | Mod: TC,PO

## 2025-02-09 ENCOUNTER — HOSPITAL ENCOUNTER (EMERGENCY)
Facility: HOSPITAL | Age: 30
Discharge: HOME | End: 2025-02-09
Attending: EMERGENCY MEDICINE
Payer: MEDICARE

## 2025-02-09 VITALS
DIASTOLIC BLOOD PRESSURE: 70 MMHG | SYSTOLIC BLOOD PRESSURE: 109 MMHG | HEART RATE: 82 BPM | HEIGHT: 65 IN | OXYGEN SATURATION: 99 % | TEMPERATURE: 98 F | RESPIRATION RATE: 18 BRPM | WEIGHT: 240 LBS | BODY MASS INDEX: 39.99 KG/M2

## 2025-02-09 DIAGNOSIS — N93.8 DYSFUNCTIONAL UTERINE BLEEDING: Primary | ICD-10-CM

## 2025-02-09 LAB
ALBUMIN SERPL BCP-MCNC: 4.1 G/DL (ref 3.5–5.2)
ALP SERPL-CCNC: 71 U/L (ref 55–135)
ALT SERPL W/O P-5'-P-CCNC: 10 U/L (ref 10–44)
ANION GAP SERPL CALC-SCNC: 7 MMOL/L (ref 8–16)
AST SERPL-CCNC: 12 U/L (ref 10–40)
B-HCG UR QL: NEGATIVE
BACTERIA #/AREA URNS HPF: NORMAL /HPF
BACTERIA GENITAL QL WET PREP: ABNORMAL
BASOPHILS # BLD AUTO: 0.04 K/UL (ref 0–0.2)
BASOPHILS NFR BLD: 0.3 % (ref 0–1.9)
BILIRUB SERPL-MCNC: 0.5 MG/DL (ref 0.1–1)
BILIRUB UR QL STRIP: NEGATIVE
BUN SERPL-MCNC: 19 MG/DL (ref 6–20)
CALCIUM SERPL-MCNC: 9.1 MG/DL (ref 8.7–10.5)
CAOX CRY URNS QL MICRO: NORMAL
CHLORIDE SERPL-SCNC: 111 MMOL/L (ref 95–110)
CLARITY UR: CLEAR
CLUE CELLS VAG QL WET PREP: ABNORMAL
CO2 SERPL-SCNC: 20 MMOL/L (ref 23–29)
COLOR UR: YELLOW
CREAT SERPL-MCNC: 0.7 MG/DL (ref 0.5–1.4)
CTP QC/QA: YES
DIFFERENTIAL METHOD BLD: ABNORMAL
EOSINOPHIL # BLD AUTO: 0.2 K/UL (ref 0–0.5)
EOSINOPHIL NFR BLD: 1.3 % (ref 0–8)
ERYTHROCYTE [DISTWIDTH] IN BLOOD BY AUTOMATED COUNT: 13.7 % (ref 11.5–14.5)
EST. GFR  (NO RACE VARIABLE): >60 ML/MIN/1.73 M^2
FILAMENT FUNGI VAG WET PREP-#/AREA: ABNORMAL
GLUCOSE SERPL-MCNC: 95 MG/DL (ref 70–110)
GLUCOSE UR QL STRIP: NEGATIVE
HCT VFR BLD AUTO: 42.3 % (ref 37–48.5)
HCV AB SERPL QL IA: NEGATIVE
HGB BLD-MCNC: 13.5 G/DL (ref 12–16)
HGB UR QL STRIP: ABNORMAL
HIV 1+2 AB+HIV1 P24 AG SERPL QL IA: NEGATIVE
IMM GRANULOCYTES # BLD AUTO: 0.08 K/UL (ref 0–0.04)
IMM GRANULOCYTES NFR BLD AUTO: 0.5 % (ref 0–0.5)
KETONES UR QL STRIP: NEGATIVE
LEUKOCYTE ESTERASE UR QL STRIP: NEGATIVE
LYMPHOCYTES # BLD AUTO: 1.4 K/UL (ref 1–4.8)
LYMPHOCYTES NFR BLD: 9 % (ref 18–48)
MCH RBC QN AUTO: 29 PG (ref 27–31)
MCHC RBC AUTO-ENTMCNC: 31.9 G/DL (ref 32–36)
MCV RBC AUTO: 91 FL (ref 82–98)
MICROSCOPIC COMMENT: NORMAL
MONOCYTES # BLD AUTO: 0.7 K/UL (ref 0.3–1)
MONOCYTES NFR BLD: 4.8 % (ref 4–15)
NEUTROPHILS # BLD AUTO: 12.7 K/UL (ref 1.8–7.7)
NEUTROPHILS NFR BLD: 84.1 % (ref 38–73)
NITRITE UR QL STRIP: NEGATIVE
NRBC BLD-RTO: 0 /100 WBC
PH UR STRIP: 6 [PH] (ref 5–8)
PLATELET # BLD AUTO: 314 K/UL (ref 150–450)
PMV BLD AUTO: 9.3 FL (ref 9.2–12.9)
POTASSIUM SERPL-SCNC: 4.3 MMOL/L (ref 3.5–5.1)
PROT SERPL-MCNC: 7.3 G/DL (ref 6–8.4)
PROT UR QL STRIP: NEGATIVE
RBC # BLD AUTO: 4.66 M/UL (ref 4–5.4)
RBC #/AREA URNS HPF: 2 /HPF (ref 0–4)
SODIUM SERPL-SCNC: 138 MMOL/L (ref 136–145)
SP GR UR STRIP: >1.03 (ref 1–1.03)
SPECIMEN SOURCE: ABNORMAL
SQUAMOUS #/AREA URNS HPF: 2 /HPF
T VAGINALIS GENITAL QL WET PREP: ABNORMAL
URN SPEC COLLECT METH UR: ABNORMAL
UROBILINOGEN UR STRIP-ACNC: NEGATIVE EU/DL
WBC # BLD AUTO: 15.08 K/UL (ref 3.9–12.7)
WBC #/AREA URNS HPF: 1 /HPF (ref 0–5)
WBC #/AREA VAG WET PREP: ABNORMAL
YEAST GENITAL QL WET PREP: ABNORMAL

## 2025-02-09 PROCEDURE — 99285 EMERGENCY DEPT VISIT HI MDM: CPT | Mod: 25

## 2025-02-09 PROCEDURE — 86803 HEPATITIS C AB TEST: CPT | Performed by: EMERGENCY MEDICINE

## 2025-02-09 PROCEDURE — 87389 HIV-1 AG W/HIV-1&-2 AB AG IA: CPT | Performed by: EMERGENCY MEDICINE

## 2025-02-09 PROCEDURE — 87591 N.GONORRHOEAE DNA AMP PROB: CPT | Performed by: NURSE PRACTITIONER

## 2025-02-09 PROCEDURE — 96374 THER/PROPH/DIAG INJ IV PUSH: CPT

## 2025-02-09 PROCEDURE — 80053 COMPREHEN METABOLIC PANEL: CPT | Performed by: NURSE PRACTITIONER

## 2025-02-09 PROCEDURE — 25500020 PHARM REV CODE 255: Performed by: EMERGENCY MEDICINE

## 2025-02-09 PROCEDURE — 63600175 PHARM REV CODE 636 W HCPCS: Performed by: EMERGENCY MEDICINE

## 2025-02-09 PROCEDURE — 85025 COMPLETE CBC W/AUTO DIFF WBC: CPT | Performed by: NURSE PRACTITIONER

## 2025-02-09 PROCEDURE — 87205 SMEAR GRAM STAIN: CPT | Performed by: EMERGENCY MEDICINE

## 2025-02-09 PROCEDURE — 87210 SMEAR WET MOUNT SALINE/INK: CPT | Performed by: EMERGENCY MEDICINE

## 2025-02-09 PROCEDURE — 81025 URINE PREGNANCY TEST: CPT | Performed by: NURSE PRACTITIONER

## 2025-02-09 PROCEDURE — 81001 URINALYSIS AUTO W/SCOPE: CPT | Performed by: NURSE PRACTITIONER

## 2025-02-09 PROCEDURE — 87086 URINE CULTURE/COLONY COUNT: CPT | Performed by: EMERGENCY MEDICINE

## 2025-02-09 PROCEDURE — 87081 CULTURE SCREEN ONLY: CPT | Performed by: EMERGENCY MEDICINE

## 2025-02-09 RX ORDER — CEFTRIAXONE 1 G/1
1 INJECTION, POWDER, FOR SOLUTION INTRAMUSCULAR; INTRAVENOUS
Status: COMPLETED | OUTPATIENT
Start: 2025-02-09 | End: 2025-02-09

## 2025-02-09 RX ADMIN — CEFTRIAXONE 1 G: 1 INJECTION, POWDER, FOR SOLUTION INTRAMUSCULAR; INTRAVENOUS at 05:02

## 2025-02-09 RX ADMIN — IOHEXOL 100 ML: 350 INJECTION, SOLUTION INTRAVENOUS at 02:02

## 2025-02-09 NOTE — ED PROVIDER NOTES
Encounter Date: 2025       History     Chief Complaint   Patient presents with    Vaginal Bleeding     Last 3-4 cycles, causing dizziness and nausea. Bleed x2-3 weeks, dark and heavy menses. Has implant     Abdominal Cramping     29-year-old female presents complaining of irregular heavy periods over the past several months.  Has had some abdominal cramping when these occur.  Does have and implanted upper extremity birth control device which has been present over the past 1-1/2 years.  Has had several episodes of missing periods after this was initially placed and several months ago.  The intermittent heavy bleeding with cramping started occurring after missing several periods.  Denies any abnormal vaginal discharge other than bleeding.  Denies lightheadedness syncope or near-syncope.  Denies dysuria frequency or urgency.  She is sexually active with 1 partner only.  Denies nausea vomiting or diarrhea.  Denies fever.  Denies any other problems or complaints.        Review of patient's allergies indicates:   Allergen Reactions    Chantix [varenicline] Hives     Past Medical History:   Diagnosis Date    ADHD (attention deficit hyperactivity disorder)     Anemia     resolved    Arthritis     wrists    Asperger syndrome     autism spectrum disorder    Chronic headache     Depression     Encounter for blood transfusion     Fibromyalgia     Gallstones 2024    GERD (gastroesophageal reflux disease)     Migraine headache     MALCOLM (obstructive sleep apnea)     resolved    Palpitations     PCOS (polycystic ovarian syndrome)     Pseudotumor cerebri syndrome     Seizures     SOB (shortness of breath)      Past Surgical History:   Procedure Laterality Date    APPENDECTOMY  2019     SECTION  2019    LAPAROSCOPIC CHOLECYSTECTOMY N/A 2024    Procedure: CHOLECYSTECTOMY, LAPAROSCOPIC;  Surgeon: Dorota Zambrano MD;  Location: [facility];  Service: General;  Laterality: N/A;    TONSILLECTOMY      UPPER  "GASTROINTESTINAL ENDOSCOPY  in her childhood    "born with underdeveloped esophagus" & GERD per patient report    WISDOM TOOTH EXTRACTION       Family History   Problem Relation Name Age of Onset    Mental illness Mother      Seizures Mother      Migraines Mother      Bipolar disorder Mother      Cerebral palsy Father      Depression Brother          severe    Migraines Brother      Colon cancer Neg Hx      Colon polyps Neg Hx      Crohn's disease Neg Hx      Ulcerative colitis Neg Hx      Stomach cancer Neg Hx      Esophageal cancer Neg Hx       Social History     Tobacco Use    Smoking status: Every Day     Current packs/day: 1.00     Types: Cigarettes    Smokeless tobacco: Never    Tobacco comments:     2-3 cigaretes perday but smokes 0.5 cig per time; smoking 15 yrs/ always a light smoker but has cut back more   Substance Use Topics    Alcohol use: No    Drug use: No     Comment: patient states she quit mojo 3 months ago and marijuana 2 months ago     Review of Systems   Constitutional: Negative.  Negative for activity change, appetite change, chills, fatigue and fever.   HENT: Negative.     Respiratory: Negative.  Negative for shortness of breath.    Cardiovascular: Negative.  Negative for chest pain, palpitations and leg swelling.   Gastrointestinal: Negative.  Negative for blood in stool, constipation, diarrhea, nausea and vomiting.   Genitourinary:  Positive for pelvic pain and vaginal bleeding. Negative for difficulty urinating, dyspareunia, dysuria, flank pain, frequency, genital sores, hematuria, urgency, vaginal discharge and vaginal pain.   Musculoskeletal: Negative.    Skin: Negative.    Neurological: Negative.  Negative for tremors, syncope, speech difficulty, weakness and light-headedness.   Hematological: Negative.    Psychiatric/Behavioral: Negative.     All other systems reviewed and are negative.      Physical Exam     Initial Vitals   BP Pulse Resp Temp SpO2   02/09/25 1122 02/09/25 1120 " 02/09/25 1120 02/09/25 1120 02/09/25 1120   115/78 80 16 98 °F (36.7 °C) 96 %      MAP       --                Physical Exam    Constitutional: She is cooperative. She does not appear ill. No distress.   HENT:   Head: Normocephalic and atraumatic.   Nose: Nose normal. Mouth/Throat: Uvula is midline, oropharynx is clear and moist and mucous membranes are normal.   Eyes: Conjunctivae, EOM and lids are normal. Pupils are equal, round, and reactive to light.   Neck: Trachea normal and phonation normal. Neck supple. No stridor present.   Normal range of motion.   Full passive range of motion without pain.     Cardiovascular:  Normal rate, regular rhythm, normal heart sounds, intact distal pulses and normal pulses.     Exam reveals no gallop, no distant heart sounds, no friction rub and no decreased pulses.       No murmur heard.  Pulmonary/Chest: Breath sounds normal. No respiratory distress. She has no wheezes. She has no rhonchi. She has no rales.   Abdominal: Abdomen is soft. Bowel sounds are normal. She exhibits no distension and no mass. There is abdominal tenderness in the suprapubic area.   Mild suprapubic tenderness with no rebound or guarding.   No right CVA tenderness.  No left CVA tenderness. There is no rebound, no guarding and negative Miller's sign. negative psoas sign and negative Rovsing's sign  Genitourinary: Uterus is not enlarged and not tender. Cervix exhibits no motion tenderness and no friability. Right adnexum displays no mass, no tenderness and no fullness. Left adnexum displays no mass, no tenderness and no fullness.    Vaginal bleeding present.      No vaginal discharge, erythema or tenderness.   There is bleeding in the vagina. No erythema or tenderness in the vagina.    No foreign body in the vagina.     Musculoskeletal:         General: No tenderness or edema. Normal range of motion.      Cervical back: Full passive range of motion without pain, normal range of motion and neck supple. No pain  with movement. Normal range of motion.      Thoracic back: Normal range of motion.      Lumbar back: Normal range of motion.      Comments: Pulses 2+ throughout, no extremity abnormalities     Neurological: She is alert and oriented to person, place, and time. She has normal strength. No cranial nerve deficit or sensory deficit. Gait normal.   No focal deficits   Skin: Skin is warm and dry. Capillary refill takes less than 2 seconds. No rash noted.   Psychiatric: She has a normal mood and affect. Her speech is normal and behavior is normal.         ED Course   Procedures  Labs Reviewed   CBC W/ AUTO DIFFERENTIAL - Abnormal       Result Value    WBC 15.08 (*)     RBC 4.66      Hemoglobin 13.5      Hematocrit 42.3      MCV 91      MCH 29.0      MCHC 31.9 (*)     RDW 13.7      Platelets 314      MPV 9.3      Immature Granulocytes 0.5      Gran # (ANC) 12.7 (*)     Immature Grans (Abs) 0.08 (*)     Lymph # 1.4      Mono # 0.7      Eos # 0.2      Baso # 0.04      nRBC 0      Gran % 84.1 (*)     Lymph % 9.0 (*)     Mono % 4.8      Eosinophil % 1.3      Basophil % 0.3      Differential Method Automated      Narrative:     Release to patient->Immediate   COMPREHENSIVE METABOLIC PANEL - Abnormal    Sodium 138      Potassium 4.3      Chloride 111 (*)     CO2 20 (*)     Glucose 95      BUN 19      Creatinine 0.7      Calcium 9.1      Total Protein 7.3      Albumin 4.1      Total Bilirubin 0.5      Alkaline Phosphatase 71      AST 12      ALT 10      eGFR >60.0      Anion Gap 7 (*)     Narrative:     Release to patient->Immediate   URINALYSIS, REFLEX TO URINE CULTURE - Abnormal    Specimen UA Urine, Clean Catch      Color, UA Yellow      Appearance, UA Clear      pH, UA 6.0      Specific Gravity, UA >1.030 (*)     Protein, UA Negative      Glucose, UA Negative      Ketones, UA Negative      Bilirubin (UA) Negative      Occult Blood UA 2+ (*)     Nitrite, UA Negative      Urobilinogen, UA Negative      Leukocytes, UA Negative       Narrative:     Specimen Source->Urine   CULTURE, URINE   VAGINAL SCREEN   CULTURE, GONOCOCCUS   C. TRACHOMATIS/N. GONORRHOEAE BY AMP DNA   URINALYSIS MICROSCOPIC    RBC, UA 2      WBC, UA 1      Bacteria Rare      Squam Epithel, UA 2      Ca Oxalate Amanda, UA Moderate      Microscopic Comment SEE COMMENT      Narrative:     Specimen Source->Urine   HEPATITIS C ANTIBODY   HIV 1 / 2 ANTIBODY   C. TRACHOMATIS/N. GONORRHOEAE BY AMP DNA   POCT URINE PREGNANCY    POC Preg Test, Ur Negative       Acceptable Yes            Imaging Results              CT Abdomen Pelvis With IV Contrast NO Oral Contrast (Final result)  Result time 02/09/25 14:22:28      Final result by Ziyad Green MD (02/09/25 14:22:28)                   Impression:      1. 2.2 cm right ovarian cyst.  2. Unilateral right L5 spondylolysis.      Electronically signed by: Ziyad Green  Date:    02/09/2025  Time:    14:22               Narrative:    EXAMINATION:  CT ABDOMEN PELVIS WITH IV CONTRAST    CLINICAL HISTORY:  LLQ abdominal pain;.    TECHNIQUE:  Post infusion axial images were obtained from the lung bases to the pubic symphysis 100 cc nonionic contrast was utilized for the examination.    COMPARISON:  August 2024    FINDINGS:  Lung bases are normal with exception of a small noncalcified granuloma in the right lower lobe laterally near the major fissure.    The liver has a normal appearance.  The gallbladder is surgically absent.  The spleen, pancreas, kidneys, and adrenal glands are normal.  The abdominal aorta is normal in caliber.    There is no pathologic bowel wall thickening or evidence of obstruction.  The appendix is absent.  No free air, free fluid, or lymphadenopathy is identified.    Images of the pelvis demonstrate a 2.2 cm right ovarian cyst.  Bowel structures and urinary bladder are normal.  There is no free fluid.    No acute osseous abnormalities.  Unilateral right-sided spondylolysis without  spondylolisthesis.                                       US Pelvis Complete Non OB (Final result)  Result time 02/09/25 14:04:40      Final result by Ziyad Green MD (02/09/25 14:04:40)                   Impression:      1. 2.7 cm simple right ovarian cyst.  2. No other significant findings.      Electronically signed by: Ziyad Green  Date:    02/09/2025  Time:    14:04               Narrative:    EXAMINATION:  US PELVIS COMPLETE NON OB    CLINICAL HISTORY:  pelvic  pain;    COMPARISON:  September 2023    FINDINGS:  Sonographic assessment of the pelvis was performed utilizing transabdominal and endovaginal technique.    The uterus measures 6.9 x 3.3 x 4.7 cm.  The endometrium measures 6 mm in thickness.  No uterine masses are identified.    2.7 cm simple cyst is noted in the right ovary.  The left ovary is normal in appearance.  Arterial flow is demonstrated bilaterally.                                       Medications   iohexoL (OMNIPAQUE 350) injection 100 mL (100 mLs Intravenous Given 2/9/25 1403)   cefTRIAXone injection 1 g (1 g Intravenous Given 2/9/25 1708)     Medical Decision Making  Labs and diagnostic imaging reviewed, CT scan obtained due to elevated white blood cell count and suprapubic tenderness.  No acute intra-abdominal abnormality.  Pelvic ultrasound with 2 cm ovarian cyst.  Pelvic exam with scant brownish appearing blood in vault, no active bleeding, cervix is normal with no erythema, friability and no cervical motion tenderness or adnexal tenderness noted.  Patient does report she has a history of PCOS.  Explained the importance of close outpatient evaluation with her gynecologist and she states she normally sees her primary care doctor for this.  Will refer to Gynecology.  There was scant bacteria in the urine although no nitrites or leukocytes.  Was given a dose of Rocephin in the emergency room and to follow with urine culture.  No current evidence to suggest PID.  Differential  diagnosis includes is not limited to PCOS/hormonal abnormality/heavy bleeding after amenorrhea/endometriosis/uterine cancer.  Have explained the importance of very close outpatient evaluation with Gynecology.  Symptomatic supportive care discussed, return precautions discussed in detail.  Patient voices understanding feels well and feels ready to go home.  Any incidental findings discussed and need for follow-up regarding these findings discussed.    Amount and/or Complexity of Data Reviewed  Labs: ordered. Decision-making details documented in ED Course.     Details: White blood cell count mildly elevated, no anemia.  Radiology: ordered.    Risk  Prescription drug management.               ED Course as of 02/09/25 1717   Sun Feb 09, 2025   1701 Sodium: 138 [AR]   1701 Potassium: 4.3 [AR]   1701 Chloride(!): 111 [AR]   1701 CO2(!): 20 [AR]   1701 Bacteria, UA: Rare [AR]   1701 WBC(!): 15.08 [AR]   1701 Hemoglobin: 13.5 [AR]   1701 Hematocrit: 42.3 [AR]   1701 Platelet Count: 314 [AR]   1701 hCG Qualitative, Urine: Negative [AR]   1701 Spec Grav UA(!): >1.030 [AR]   1701 Protein, UA: Negative [AR]   1701 Glucose, UA: Negative [AR]   1701 Ketones, UA: Negative [AR]   1701 Bilirubin (UA): Negative [AR]   1701 Blood, UA(!): 2+ [AR]   1701 NITRITE UA: Negative [AR]   1701 UROBILINOGEN UA: Negative [AR]   1701 Leukocyte Esterase, UA: Negative [AR]   1712 Sodium: 138 [AR]   1713 Potassium: 4.3 [AR]   1713 Chloride(!): 111 [AR]      ED Course User Index  [AR] Rochelle Palomares MD                           Clinical Impression:  Final diagnoses:  [N93.8] Dysfunctional uterine bleeding (Primary)          ED Disposition Condition    Discharge Stable          ED Prescriptions    None       Follow-up Information       Follow up With Specialties Details Why Contact Info    Terri You NP Family Medicine Schedule an appointment as soon as possible for a visit in 2 days  [address] [place] [address] [place] [address]  [phone]       Pearl Call MD Obstetrics and Gynecology Schedule an appointment as soon as possible for a visit in 3 days Or see a gynecologist as recommended by your primary care provider or according to your insurance plan. [address] [address]  [phone]               Rochelle Palomares MD  02/09/25 8513

## 2025-02-09 NOTE — FIRST PROVIDER EVALUATION
Emergency Department TeleTriage Encounter Note      CHIEF COMPLAINT    Chief Complaint   Patient presents with    Vaginal Bleeding     Last 3-4 cycles, causing dizziness and nausea. Bleed x2-3 weeks, dark and heavy menses. Has implant     Abdominal Cramping       VITAL SIGNS   Initial Vitals   BP Pulse Resp Temp SpO2   02/09/25 1122 02/09/25 1120 02/09/25 1120 02/09/25 1120 02/09/25 1120   115/78 80 16 98 °F (36.7 °C) 96 %      MAP       --                   ALLERGIES    Review of patient's allergies indicates:   Allergen Reactions    Chantix [varenicline] Hives       PROVIDER TRIAGE NOTE    29 year old female presents to the ER with complaints of 3 days of heavy vaginal bleeding with associated abdominal cramping. Has IUD in place. Also reports nasuea and lightheadedness.     AAOx3, respirations even and non- labored, stable vitals, normal coloration of skin, sitting upright in triage chair, appears in no acute distress.      ORDERS  Labs Reviewed   HEPATITIS C ANTIBODY   HIV 1 / 2 ANTIBODY       ED Orders (720h ago, onward)      Start Ordered     Status Ordering Provider    02/09/25 1123 02/09/25 1122  Hepatitis C Antibody  STAT         Ordered FERNANDA GRAYSON    02/09/25 1123 02/09/25 1122  HIV 1/2 Ag/Ab (4th Gen)  STAT         Ordered FERNANDA GRAYSON              Virtual Visit Note: The provider triage portion of this emergency department evaluation and documentation was performed via [email or web address], a HIPAA-compliant telemedicine application, in concert with a tele-presenter in the room. A face to face patient evaluation with one of my colleagues will occur once the patient is placed in an emergency department room.      DISCLAIMER: This note was prepared with [employer] voice recognition transcription software. Garbled syntax, mangled pronouns, and other bizarre constructions may be attributed to that software system.

## 2025-02-09 NOTE — ED NOTES
02/09/25 1252 02/09/25 1253   Vital Signs   Pulse 69 75   Heart Rate Source SpO2 SpO2   SpO2 98 % 98 %   /74 120/79   BP Location Left arm Left arm   Patient Position Sitting Standing

## 2025-02-09 NOTE — DISCHARGE INSTRUCTIONS
Please read and follow discharge instructions and return precautions.  Rest, avoid any strenuous activity, over exertion or overheating.  Keep well hydrated.  Alternate water and Pedialyte for hydration.  Pelvic rest--no douching, no tampons in no intercourse.  Important to follow up closely outpatient with your primary care provider as well as gynecology.  Return immediately if you develop new or worsening symptoms or if you have new problems or concerns.

## 2025-02-09 NOTE — ED NOTES
"C/LOW ABDOMEN PAIN AND PAINFUL THICK MENSTRUATION FOR 3 MONTHS. PRIVATE ROOM. EVEN AND NON LABORED RESPIRATIONS.  AIRWAY CLEAR.  PULSES REGULAR.  < 3" CAPILLARY REFILL. SKIN WDI.  MAEW.  NON DISTENDED OBESE ABDOMEN. ALERT, ORIENTED AND AMBULATORY. NAD AT THIS TIME.  CALL LIGHT IN REACH.  "

## 2025-02-11 LAB
BACTERIA UR CULT: NORMAL
BACTERIA UR CULT: NORMAL
CHLAMYDIA, AMPLIFIED DNA: NEGATIVE
N GONORRHOEAE, AMPLIFIED DNA: NEGATIVE

## 2025-02-13 ENCOUNTER — PATIENT MESSAGE (OUTPATIENT)
Dept: ADMINISTRATIVE | Facility: CLINIC | Age: 30
End: 2025-02-13
Payer: MEDICARE

## 2025-02-13 LAB
BACTERIA GENITAL AEROBE CULT: NORMAL
GRAM STN SPEC: NORMAL

## 2025-03-02 ENCOUNTER — HOSPITAL ENCOUNTER (EMERGENCY)
Facility: HOSPITAL | Age: 30
Discharge: HOME | End: 2025-03-02
Attending: STUDENT IN AN ORGANIZED HEALTH CARE EDUCATION/TRAINING PROGRAM
Payer: MEDICARE

## 2025-03-02 VITALS
HEART RATE: 75 BPM | SYSTOLIC BLOOD PRESSURE: 117 MMHG | DIASTOLIC BLOOD PRESSURE: 63 MMHG | HEIGHT: 65 IN | OXYGEN SATURATION: 96 % | WEIGHT: 239 LBS | RESPIRATION RATE: 16 BRPM | BODY MASS INDEX: 39.82 KG/M2 | TEMPERATURE: 98 F

## 2025-03-02 DIAGNOSIS — R07.9 CHEST PAIN: ICD-10-CM

## 2025-03-02 DIAGNOSIS — R05.1 ACUTE COUGH: ICD-10-CM

## 2025-03-02 DIAGNOSIS — J40 BRONCHITIS: ICD-10-CM

## 2025-03-02 DIAGNOSIS — J06.9 UPPER RESPIRATORY TRACT INFECTION, UNSPECIFIED TYPE: Primary | ICD-10-CM

## 2025-03-02 LAB
ALBUMIN SERPL BCP-MCNC: 4 G/DL (ref 3.5–5.2)
ALP SERPL-CCNC: 91 U/L (ref 55–135)
ALT SERPL W/O P-5'-P-CCNC: 14 U/L (ref 10–44)
ANION GAP SERPL CALC-SCNC: 5 MMOL/L (ref 8–16)
AST SERPL-CCNC: 15 U/L (ref 10–40)
B-HCG UR QL: NEGATIVE
BASOPHILS # BLD AUTO: 0.03 K/UL (ref 0–0.2)
BASOPHILS NFR BLD: 0.6 % (ref 0–1.9)
BILIRUB SERPL-MCNC: 0.4 MG/DL (ref 0.1–1)
BNP SERPL-MCNC: 29 PG/ML (ref 0–99)
BUN SERPL-MCNC: 14 MG/DL (ref 6–20)
CALCIUM SERPL-MCNC: 8.9 MG/DL (ref 8.7–10.5)
CHLORIDE SERPL-SCNC: 105 MMOL/L (ref 95–110)
CO2 SERPL-SCNC: 27 MMOL/L (ref 23–29)
CREAT SERPL-MCNC: 0.7 MG/DL (ref 0.5–1.4)
CTP QC/QA: YES
D DIMER PPP IA.FEU-MCNC: 0.19 MG/L FEU (ref 0–0.49)
DIFFERENTIAL METHOD BLD: ABNORMAL
EOSINOPHIL # BLD AUTO: 0.1 K/UL (ref 0–0.5)
EOSINOPHIL NFR BLD: 1.6 % (ref 0–8)
ERYTHROCYTE [DISTWIDTH] IN BLOOD BY AUTOMATED COUNT: 13.8 % (ref 11.5–14.5)
EST. GFR  (NO RACE VARIABLE): >60 ML/MIN/1.73 M^2
GLUCOSE SERPL-MCNC: 85 MG/DL (ref 70–110)
GROUP A STREP, MOLECULAR: NEGATIVE
HCT VFR BLD AUTO: 43.4 % (ref 37–48.5)
HGB BLD-MCNC: 13.6 G/DL (ref 12–16)
IMM GRANULOCYTES # BLD AUTO: 0.01 K/UL (ref 0–0.04)
IMM GRANULOCYTES NFR BLD AUTO: 0.2 % (ref 0–0.5)
INFLUENZA A, MOLECULAR: NEGATIVE
INFLUENZA B, MOLECULAR: NEGATIVE
LYMPHOCYTES # BLD AUTO: 2 K/UL (ref 1–4.8)
LYMPHOCYTES NFR BLD: 39.4 % (ref 18–48)
MAGNESIUM SERPL-MCNC: 1.9 MG/DL (ref 1.6–2.6)
MCH RBC QN AUTO: 28.3 PG (ref 27–31)
MCHC RBC AUTO-ENTMCNC: 31.3 G/DL (ref 32–36)
MCV RBC AUTO: 90 FL (ref 82–98)
MONOCYTES # BLD AUTO: 0.7 K/UL (ref 0.3–1)
MONOCYTES NFR BLD: 13.6 % (ref 4–15)
NEUTROPHILS # BLD AUTO: 2.3 K/UL (ref 1.8–7.7)
NEUTROPHILS NFR BLD: 44.6 % (ref 38–73)
NRBC BLD-RTO: 0 /100 WBC
PLATELET # BLD AUTO: 284 K/UL (ref 150–450)
PMV BLD AUTO: 9.4 FL (ref 9.2–12.9)
POTASSIUM SERPL-SCNC: 4.1 MMOL/L (ref 3.5–5.1)
PROT SERPL-MCNC: 7.3 G/DL (ref 6–8.4)
RBC # BLD AUTO: 4.81 M/UL (ref 4–5.4)
SARS-COV-2 RDRP RESP QL NAA+PROBE: NEGATIVE
SODIUM SERPL-SCNC: 137 MMOL/L (ref 136–145)
SPECIMEN SOURCE: NORMAL
TROPONIN I SERPL HS-MCNC: <2.3 PG/ML (ref 0–14.9)
TROPONIN I SERPL HS-MCNC: <2.3 PG/ML (ref 0–14.9)
WBC # BLD AUTO: 5.08 K/UL (ref 3.9–12.7)

## 2025-03-02 PROCEDURE — 84484 ASSAY OF TROPONIN QUANT: CPT

## 2025-03-02 PROCEDURE — 85379 FIBRIN DEGRADATION QUANT: CPT

## 2025-03-02 PROCEDURE — 99900031 HC PATIENT EDUCATION (STAT)

## 2025-03-02 PROCEDURE — 85025 COMPLETE CBC W/AUTO DIFF WBC: CPT

## 2025-03-02 PROCEDURE — 25000242 PHARM REV CODE 250 ALT 637 W/ HCPCS

## 2025-03-02 PROCEDURE — 87502 INFLUENZA DNA AMP PROBE: CPT

## 2025-03-02 PROCEDURE — 99285 EMERGENCY DEPT VISIT HI MDM: CPT | Mod: 25

## 2025-03-02 PROCEDURE — 80053 COMPREHEN METABOLIC PANEL: CPT

## 2025-03-02 PROCEDURE — 93010 ELECTROCARDIOGRAM REPORT: CPT | Mod: ,,, | Performed by: INTERNAL MEDICINE

## 2025-03-02 PROCEDURE — 87651 STREP A DNA AMP PROBE: CPT

## 2025-03-02 PROCEDURE — 94761 N-INVAS EAR/PLS OXIMETRY MLT: CPT

## 2025-03-02 PROCEDURE — 83735 ASSAY OF MAGNESIUM: CPT

## 2025-03-02 PROCEDURE — 96360 HYDRATION IV INFUSION INIT: CPT

## 2025-03-02 PROCEDURE — 87635 SARS-COV-2 COVID-19 AMP PRB: CPT

## 2025-03-02 PROCEDURE — 81025 URINE PREGNANCY TEST: CPT

## 2025-03-02 PROCEDURE — 83880 ASSAY OF NATRIURETIC PEPTIDE: CPT

## 2025-03-02 PROCEDURE — 25000003 PHARM REV CODE 250

## 2025-03-02 PROCEDURE — 94640 AIRWAY INHALATION TREATMENT: CPT

## 2025-03-02 PROCEDURE — 93005 ELECTROCARDIOGRAM TRACING: CPT | Performed by: INTERNAL MEDICINE

## 2025-03-02 RX ORDER — ALBUTEROL SULFATE 0.83 MG/ML
2.5 SOLUTION RESPIRATORY (INHALATION)
Status: COMPLETED | OUTPATIENT
Start: 2025-03-02 | End: 2025-03-02

## 2025-03-02 RX ORDER — ACETAMINOPHEN 500 MG
1000 TABLET ORAL
Status: COMPLETED | OUTPATIENT
Start: 2025-03-02 | End: 2025-03-02

## 2025-03-02 RX ORDER — BENZONATATE 100 MG/1
100 CAPSULE ORAL 3 TIMES DAILY PRN
Qty: 15 CAPSULE | Refills: 0 | Status: SHIPPED | OUTPATIENT
Start: 2025-03-02 | End: 2025-03-07

## 2025-03-02 RX ORDER — ALBUTEROL SULFATE 2.5 MG/.5ML
2.5 SOLUTION RESPIRATORY (INHALATION) EVERY 6 HOURS PRN
Qty: 28 EACH | Refills: 0 | Status: SHIPPED | OUTPATIENT
Start: 2025-03-02 | End: 2025-03-09

## 2025-03-02 RX ADMIN — ACETAMINOPHEN 1000 MG: 500 TABLET, FILM COATED ORAL at 06:03

## 2025-03-02 RX ADMIN — ALBUTEROL SULFATE 2.5 MG: 2.5 SOLUTION RESPIRATORY (INHALATION) at 07:03

## 2025-03-02 RX ADMIN — SODIUM CHLORIDE 1000 ML: 9 INJECTION, SOLUTION INTRAVENOUS at 08:03

## 2025-03-02 NOTE — Clinical Note
"Mary Hurleycarissa Sky was seen and treated in our emergency department on 3/2/2025.  She may return to work on 03/05/2025.       If you have any questions or concerns, please don't hesitate to call.      Caitlyn Otto NP"

## 2025-03-03 LAB
OHS QRS DURATION: 86 MS
OHS QTC CALCULATION: 452 MS

## 2025-03-03 NOTE — ED PROVIDER NOTES
"Encounter Date: 3/2/2025       History     Chief Complaint   Patient presents with    Cough    Fatigue    Sore Throat    Nausea     Patient is a 29 y.o. female with past medical history of fibromyalgia, GERD, seizures, pseudotumor cerebri who presents to ED via family for concern for cough and fatigue which began 5 day(s) ago.  Patient reports on Wednesday she has started with a cough and fatigue.  Patient reports she went to urgent care and was swab that has negative and was prescribed cough medication and prednisone.  Patient reports she has finished the medications and has not improved.  Patient endorses sore throat and nausea.  Patient denies associated vomiting or diarrhea.  Patient denies fever.  Patient reports chest pressure and heaviness and shortness of breath with coughing.  Patient reports she smokes approximally 3 cigarettes a day.  Patient is awake and alert in no acute distress.         Review of patient's allergies indicates:   Allergen Reactions    Chantix [varenicline] Hives     Past Medical History:   Diagnosis Date    ADHD (attention deficit hyperactivity disorder)     Anemia     resolved    Arthritis     wrists    Asperger syndrome     autism spectrum disorder    Chronic headache     Depression     Encounter for blood transfusion     Fibromyalgia     Gallstones 2024    GERD (gastroesophageal reflux disease)     Migraine headache     MALCOLM (obstructive sleep apnea)     resolved    Palpitations     PCOS (polycystic ovarian syndrome)     Pseudotumor cerebri syndrome     Seizures     SOB (shortness of breath)      Past Surgical History:   Procedure Laterality Date    APPENDECTOMY  2019     SECTION  2019    LAPAROSCOPIC CHOLECYSTECTOMY N/A 2024    Procedure: CHOLECYSTECTOMY, LAPAROSCOPIC;  Surgeon: Dorota Zambrano MD;  Location: [facility];  Service: General;  Laterality: N/A;    TONSILLECTOMY      UPPER GASTROINTESTINAL ENDOSCOPY  in her childhood    "born with underdeveloped " "esophagus" & GERD per patient report    WISDOM TOOTH EXTRACTION       Family History   Problem Relation Name Age of Onset    Mental illness Mother      Seizures Mother      Migraines Mother      Bipolar disorder Mother      Cerebral palsy Father      Depression Brother          severe    Migraines Brother      Colon cancer Neg Hx      Colon polyps Neg Hx      Crohn's disease Neg Hx      Ulcerative colitis Neg Hx      Stomach cancer Neg Hx      Esophageal cancer Neg Hx       Social History[1]  Review of Systems   Constitutional:  Negative for fever.   HENT:  Positive for congestion and sore throat. Negative for drooling, ear discharge, trouble swallowing and voice change.    Respiratory:  Positive for cough, chest tightness and shortness of breath. Negative for apnea, choking, wheezing and stridor.    Cardiovascular:  Positive for chest pain.   Gastrointestinal:  Positive for nausea. Negative for abdominal distention, abdominal pain, diarrhea and vomiting.   Musculoskeletal:  Negative for back pain, neck pain and neck stiffness.   Skin:  Negative for color change, pallor and rash.   Neurological: Negative.  Negative for weakness.   Hematological:  Does not bruise/bleed easily.   Psychiatric/Behavioral: Negative.         Physical Exam     Initial Vitals [03/02/25 1740]   BP Pulse Resp Temp SpO2   114/75 86 14 98 °F (36.7 °C) 95 %      MAP       --         Physical Exam    Nursing note and vitals reviewed.  Constitutional: She appears well-developed and well-nourished. She is not diaphoretic.  Non-toxic appearance. She does not have a sickly appearance. She does not appear ill. No distress.   HENT:   Head: Normocephalic and atraumatic.   Right Ear: Tympanic membrane, external ear and ear canal normal.   Left Ear: Tympanic membrane, external ear and ear canal normal.   Nose: Nose normal. Mouth/Throat: Uvula is midline and mucous membranes are normal. No trismus in the jaw. Posterior oropharyngeal erythema present. No " oropharyngeal exudate, posterior oropharyngeal edema or tonsillar abscesses.   Eyes: EOM are normal.   Neck:   Normal range of motion.  Cardiovascular:  Normal rate, regular rhythm, normal heart sounds and intact distal pulses.     Exam reveals no gallop and no friction rub.       No murmur heard.  Pulmonary/Chest: Breath sounds normal. No respiratory distress. She has no wheezes. She has no rhonchi. She has no rales. She exhibits no tenderness.   Musculoskeletal:         General: Normal range of motion.      Cervical back: Normal range of motion.     Neurological: She is alert and oriented to person, place, and time. She has normal strength. GCS score is 15. GCS eye subscore is 4. GCS verbal subscore is 5. GCS motor subscore is 6.   Skin: Skin is warm and dry. Capillary refill takes less than 2 seconds.   Psychiatric: She has a normal mood and affect. Her behavior is normal. Judgment and thought content normal.         ED Course   Procedures  Labs Reviewed   CBC W/ AUTO DIFFERENTIAL - Abnormal       Result Value    WBC 5.08      RBC 4.81      Hemoglobin 13.6      Hematocrit 43.4      MCV 90      MCH 28.3      MCHC 31.3 (*)     RDW 13.8      Platelets 284      MPV 9.4      Immature Granulocytes 0.2      Gran # (ANC) 2.3      Immature Grans (Abs) 0.01      Lymph # 2.0      Mono # 0.7      Eos # 0.1      Baso # 0.03      nRBC 0      Gran % 44.6      Lymph % 39.4      Mono % 13.6      Eosinophil % 1.6      Basophil % 0.6      Differential Method Automated     COMPREHENSIVE METABOLIC PANEL - Abnormal    Sodium 137      Potassium 4.1      Chloride 105      CO2 27      Glucose 85      BUN 14      Creatinine 0.7      Calcium 8.9      Total Protein 7.3      Albumin 4.0      Total Bilirubin 0.4      Alkaline Phosphatase 91      AST 15      ALT 14      eGFR >60.0      Anion Gap 5 (*)    GROUP A STREP, MOLECULAR    Group A Strep, Molecular Negative     INFLUENZA A AND B ANTIGEN    Influenza A, Molecular Negative      Influenza  B, Molecular Negative      Flu A & B Source Nasal swab      Narrative:     Specimen Source->Nasopharyngeal Swab   SARS-COV-2 RNA AMPLIFICATION, QUAL    SARS-CoV-2 RNA, Amplification, Qual Negative     MAGNESIUM    Magnesium 1.9     TROPONIN I HIGH SENSITIVITY    Troponin I High Sensitivity <2.3     TROPONIN I HIGH SENSITIVITY    Troponin I High Sensitivity <2.3     B-TYPE NATRIURETIC PEPTIDE    BNP 29     D DIMER, QUANTITATIVE    D-Dimer 0.19     POCT URINE PREGNANCY    POC Preg Test, Ur Negative       Acceptable Yes            Imaging Results              X-Ray Chest PA And Lateral (Final result)  Result time 03/02/25 20:15:46      Final result by Ernst Raza MD (03/02/25 20:15:46)                   Impression:      No acute findings      Electronically signed by: Ernst Raza  Date:    03/02/2025  Time:    20:15               Narrative:    EXAMINATION:  XR CHEST PA AND LATERAL    CLINICAL HISTORY:  Chest Pain;    TECHNIQUE:  PA and lateral views of the chest were performed.    COMPARISON:  09/18/2024    FINDINGS:  Lungs are clear. No focal consolidation. No pleural effusion. No pneumothorax. Normal heart size.                                       Medications   acetaminophen tablet 1,000 mg (1,000 mg Oral Given 3/2/25 1851)   albuterol nebulizer solution 2.5 mg (2.5 mg Nebulization Given 3/2/25 1933)   sodium chloride 0.9% bolus 1,000 mL 1,000 mL (0 mLs Intravenous Stopped 3/2/25 2113)     Medical Decision Making  MDM    Patient presents for evaluation of acute upper respiratory symptoms that poses a possible threat to life and/or bodily function.    Differential diagnosis included but not limited to flu, COVID, strep, pneumonia, upper viral respiratory illness, PE, cardiac equivalent, electrolyte abnormality, dehydration.  In the ED patient found to have acute clear lung sounds bilaterally with no increased work of breathing.  Patient has a erythematous posterior pharynx.  Patient  has normal TMs bilaterally.  Patient has moist mucous membranes and cap refill less than 2 seconds.  Patient is awake and alert and oriented x3.  Cranial nerves grossly intact.  Patient has normal range of motion in his neck without stiffness.  Patient is awake and alert in no acute distress.  Patient is nontoxic appearing.    Labs significant for negative COVID, negative influenza, negative strep, negative UPT, CBC without leukocytosis or significant anemia, CMP significant for anion gap 5, magnesium 1.9, BNP 29, D-dimer 0.19, two negative troponins.  Imaging significant for chest x-ray significant for no acute findings.    Discharge MDM  I discussed the patient presentation labs, ekg, X-rays, findings with ED attending Dr. Rivera who individually evaluated the patient.    Patient was managed in the ED with IV normal saline, albuterol nebulizer and oral Tylenol.    The response to treatment was good.  Patient's symptoms seem consistent with bronchitis and there does not indication for antibiotics at this time based off workup with the ED.  Patient is stable for discharge home with close follow up with primary care.  Patient given prescriptions for cough medication and albuterol nebulizers.  Patient was discharged in stable condition with close follow up with primary care.  Detailed return precautions discussed to return to the ED for any new or worsening concerns.  Patient verbalizes understanding.    NP uses Epic and voice recognition software prone to occasional and minor errors that may persist in the medical record.      Amount and/or Complexity of Data Reviewed  Labs: ordered. Decision-making details documented in ED Course.  Radiology: ordered. Decision-making details documented in ED Course.  ECG/medicine tests:  Decision-making details documented in ED Course.    Risk  OTC drugs.  Prescription drug management.               ED Course as of 03/03/25 0105   Sun Mar 02, 2025   5862 Group A Strep, Molecular:  Negative [MP]   1846 SARS-CoV-2 RNA, Amplification, Qual: Negative [MP]   1846 hCG Qualitative, Urine: Negative [MP]   1859 Influenza A, Molecular: Negative [MP]   1859 Influenza B, Molecular: Negative [MP]   1859 EKG 12-lead  EKG reviewed with my attending.  EKG significant for normal sinus rhythm, ventricular rate 74 beats per minute, no signs of occlusive MI [MP]   2006 D-Dimer: 0.19 [MP]   2006 Troponin I High Sensitivity: <2.3 [MP]   2006 BNP: 29 [MP]   2006 Magnesium : 1.9 [MP]   2040 X-Ray Chest PA And Lateral  Impression:     No acute findings   [MP]   2147 Troponin I High Sensitivity: <2.3 [MP]      ED Course User Index  [MP] Caitlyn Otto NP                           Clinical Impression:  Final diagnoses:  [R07.9] Chest pain  [J06.9] Upper respiratory tract infection, unspecified type (Primary)  [R05.1] Acute cough  [J40] Bronchitis          ED Disposition Condition    Discharge Stable          ED Prescriptions       Medication Sig Dispense Start Date End Date Auth. Provider    albuterol sulfate 2.5 mg/0.5 mL Nebu Take 2.5 mg by nebulization every 6 (six) hours as needed (shortness of breath, cough). Rescue 28 each 3/2/2025 3/9/2025 Caitlyn Otto NP    benzonatate (TESSALON) 100 MG capsule Take 1 capsule (100 mg total) by mouth 3 (three) times daily as needed for Cough. 15 capsule 3/2/2025 3/7/2025 Caitlyn Otto NP          Follow-up Information       Follow up With Specialties Details Why Contact Info Additional Information    Terri You NP Family Medicine Schedule an appointment as soon as possible for a visit  For recheck/continuing care [address] [facility] [address]  [phone]       [facility] - Emergency Dept Emergency Medicine  If symptoms worsen [address] [facility] [id number] [phone] 1st floor               [1]   Social History  Tobacco Use    Smoking status: Every Day     Current packs/day: 1.00     Types: Cigarettes    Smokeless  tobacco: Never    Tobacco comments:     2-3 cigaretes perday but smokes 0.5 cig per time; smoking 15 yrs/ always a light smoker but has cut back more   Substance Use Topics    Alcohol use: No    Drug use: No     Comment: patient states she quit mojo 3 months ago and marijuana 2 months ago        Caitlyn Otto NP  03/03/25 0105

## 2025-03-03 NOTE — DISCHARGE INSTRUCTIONS
Please follow up with the primary doctor as soon as possible for further evaluation and rechecked.  Please use the albuterol nebulizer every 6 hours as needed for shortness of breath or wheezing.  Please drink plenty of fluids such as water to stay hydrated.    Please return to the ED for fever, worsening chest pain, difficulty breathing or any new or worsening concerns.

## 2025-03-17 DIAGNOSIS — E66.01 MORBID (SEVERE) OBESITY DUE TO EXCESS CALORIES: ICD-10-CM

## 2025-03-17 DIAGNOSIS — E66.813 OBESITY, CLASS 3: Primary | ICD-10-CM

## 2025-03-20 ENCOUNTER — TELEPHONE (OUTPATIENT)
Dept: BARIATRICS | Facility: CLINIC | Age: 30
End: 2025-03-20
Payer: MEDICARE

## 2025-04-21 ENCOUNTER — OFFICE VISIT (OUTPATIENT)
Dept: BARIATRICS | Facility: CLINIC | Age: 30
End: 2025-04-21
Payer: MEDICARE

## 2025-04-21 VITALS
RESPIRATION RATE: 16 BRPM | HEIGHT: 65 IN | BODY MASS INDEX: 40.26 KG/M2 | DIASTOLIC BLOOD PRESSURE: 83 MMHG | HEART RATE: 83 BPM | WEIGHT: 241.63 LBS | TEMPERATURE: 98 F | SYSTOLIC BLOOD PRESSURE: 143 MMHG

## 2025-04-21 DIAGNOSIS — Z91.89 AT INCREASED RISK FOR CARDIOVASCULAR DISEASE: Primary | ICD-10-CM

## 2025-04-21 DIAGNOSIS — E28.2 PCOS (POLYCYSTIC OVARIAN SYNDROME): ICD-10-CM

## 2025-04-21 DIAGNOSIS — G47.33 OSA (OBSTRUCTIVE SLEEP APNEA): ICD-10-CM

## 2025-04-21 DIAGNOSIS — F50.819 BINGE EATING DISORDER, UNSPECIFIED SEVERITY: ICD-10-CM

## 2025-04-21 DIAGNOSIS — F17.200 SMOKER: ICD-10-CM

## 2025-04-21 DIAGNOSIS — E66.01 MORBID OBESITY: ICD-10-CM

## 2025-04-21 PROCEDURE — 99999 PR PBB SHADOW E&M-EST. PATIENT-LVL IV: CPT | Mod: PBBFAC,,, | Performed by: NURSE PRACTITIONER

## 2025-04-21 PROCEDURE — 3079F DIAST BP 80-89 MM HG: CPT | Mod: CPTII,S$GLB,, | Performed by: NURSE PRACTITIONER

## 2025-04-21 PROCEDURE — 3077F SYST BP >= 140 MM HG: CPT | Mod: CPTII,S$GLB,, | Performed by: NURSE PRACTITIONER

## 2025-04-21 PROCEDURE — 99205 OFFICE O/P NEW HI 60 MIN: CPT | Mod: S$GLB,,, | Performed by: NURSE PRACTITIONER

## 2025-04-21 PROCEDURE — 1160F RVW MEDS BY RX/DR IN RCRD: CPT | Mod: CPTII,S$GLB,, | Performed by: NURSE PRACTITIONER

## 2025-04-21 PROCEDURE — 1159F MED LIST DOCD IN RCRD: CPT | Mod: CPTII,S$GLB,, | Performed by: NURSE PRACTITIONER

## 2025-04-21 PROCEDURE — 3008F BODY MASS INDEX DOCD: CPT | Mod: CPTII,S$GLB,, | Performed by: NURSE PRACTITIONER

## 2025-04-21 RX ORDER — UBROGEPANT 100 MG/1
100 TABLET ORAL
COMMUNITY

## 2025-04-21 RX ORDER — CARIPRAZINE 3 MG/1
3 CAPSULE, GELATIN COATED ORAL
COMMUNITY

## 2025-04-21 RX ORDER — TOPIRAMATE 100 MG/1
100 TABLET, FILM COATED ORAL 2 TIMES DAILY
COMMUNITY

## 2025-04-21 RX ORDER — SEMAGLUTIDE 0.5 MG/.5ML
0.5 INJECTION, SOLUTION SUBCUTANEOUS WEEKLY
Qty: 2 ML | Refills: 0 | Status: SHIPPED | OUTPATIENT
Start: 2025-05-22 | End: 2025-04-24

## 2025-04-21 RX ORDER — ATOMOXETINE 25 MG/1
25 CAPSULE ORAL DAILY
COMMUNITY

## 2025-04-21 RX ORDER — SEMAGLUTIDE 0.25 MG/.5ML
0.25 INJECTION, SOLUTION SUBCUTANEOUS WEEKLY
Qty: 2 ML | Refills: 0 | Status: SHIPPED | OUTPATIENT
Start: 2025-04-21 | End: 2025-04-24

## 2025-04-21 RX ORDER — LEVETIRACETAM 750 MG/1
750 TABLET ORAL DAILY
COMMUNITY

## 2025-04-21 NOTE — PROGRESS NOTES
Initial Consult    Chief Complaint   Patient presents with    Consult    Obesity    Nutrition Counseling       History of Present Illness:  Patient is a 29 y.o. female who is referred for evaluation of surgical or medical treatment of morbid obesity. Her Body mass index is 40.2 kg/m². She has known comorbidities of depression, GERD, hypertension, menstrual changes, and obstructive sleep apnea. She has attended the bariatric seminar and is most interested in Medical Weight Loss.      Past attempts at weight loss include:   Unsupervised: calorie counting, high protein, vegan, keto, intermittent fasting   Supervised:  medication for PCOS/weight loss by MD  Diet pills: metformin and birth control pills in 2018  Exercise attempts: walk, sedentary    Weight history:   At current weight:  10 years  Obese for 15 years.  More than 35 pounds overweight for 15 years.  More than 100 pounds overweight for 10 years.  Started dieting at 17 years old.  Maximum weight reached: 249 lbs  Most weight lost was 10-20 lbs through walking and decrease volume food for 1 month.  She describes Her eating habits as snack, binge.    MALCOLM screening: Hx with sleep study in     Reflux screening: HX with no medication      Past Medical History:   Diagnosis Date    ADHD (attention deficit hyperactivity disorder)     Anemia     resolved    Arthritis     wrists    Asperger syndrome     autism spectrum disorder    Chronic headache     Depression     Encounter for blood transfusion     Fibromyalgia     Gallstones 2024    GERD (gastroesophageal reflux disease)     Migraine headache     MALCOLM (obstructive sleep apnea)     resolved    Palpitations     PCOS (polycystic ovarian syndrome)     Pseudotumor cerebri syndrome     Seizures     SOB (shortness of breath)      Past Surgical History:   Procedure Laterality Date    APPENDECTOMY  2019     SECTION  2019    LAPAROSCOPIC CHOLECYSTECTOMY N/A 2024    Procedure: CHOLECYSTECTOMY, LAPAROSCOPIC;   "Surgeon: Dorota Zambrano MD;  Location: [facility] OR;  Service: General;  Laterality: N/A;    TONSILLECTOMY      UPPER GASTROINTESTINAL ENDOSCOPY  in her childhood    "born with underdeveloped esophagus" & GERD per patient report    WISDOM TOOTH EXTRACTION       Family History   Problem Relation Name Age of Onset    Mental illness Mother      Seizures Mother      Migraines Mother      Bipolar disorder Mother      Cerebral palsy Father      Depression Brother          severe    Migraines Brother      Colon cancer Neg Hx      Colon polyps Neg Hx      Crohn's disease Neg Hx      Ulcerative colitis Neg Hx      Stomach cancer Neg Hx      Esophageal cancer Neg Hx       Social History[1]     Review of patient's allergies indicates:   Allergen Reactions    Chantix [varenicline] Hives    Wellbutrin [bupropion hcl] Other (See Comments)     Manic       Current Medications[2]      Chart review:  Primary Care Physician: Avelino      Lab review:  Most Recent Data:  CBC:   Lab Results   Component Value Date    WBC 5.08 03/02/2025    HGB 13.6 03/02/2025    HCT 43.4 03/02/2025     03/02/2025    MCV 90 03/02/2025    RDW 13.8 03/02/2025     BMP:   Lab Results   Component Value Date     03/02/2025    K 4.1 03/02/2025     03/02/2025    CO2 27 03/02/2025    BUN 14 03/02/2025    CREATININE 0.7 03/02/2025    GLU 85 03/02/2025    CALCIUM 8.9 03/02/2025    MG 1.9 03/02/2025     LFTs:   Lab Results   Component Value Date    PROT 7.3 03/02/2025    ALBUMIN 4.0 03/02/2025    BILITOT 0.4 03/02/2025    AST 15 03/02/2025    ALKPHOS 91 03/02/2025    ALT 14 03/02/2025     Coags:   Lab Results   Component Value Date    INR 1.0 07/31/2018     FLP: No results found for: "CHOL", "HDL", "LDLCALC", "TRIG", "CHOLHDL"  DM:   Lab Results   Component Value Date    HGBA1C 5.0 07/12/2024    CREATININE 0.7 03/02/2025     Thyroid:   Lab Results   Component Value Date    TSH 0.991 03/06/2018    FREET4 0.87 03/21/2016     Anemia:   Lab Results "   Component Value Date    IRON 42 11/01/2017    TIBC 511 (H) 11/01/2017    FERRITIN 11 (L) 11/01/2017    BCVWHQXN40 313 11/01/2017     Cardiac:   Lab Results   Component Value Date    BNP 29 03/02/2025     Urinalysis:   Lab Results   Component Value Date    LABURIN  02/09/2025     Multiple organisms isolated. None in predominance.  Repeat if    LABURIN clinically necessary. 02/09/2025    COLORU Yellow 02/09/2025    SPECGRAV >1.030 (A) 02/09/2025    NITRITE Negative 02/09/2025    KETONESU Negative 02/09/2025    UROBILINOGEN Negative 02/09/2025    WBCUA 1 02/09/2025         Radiology review:    No valid procedures specified.      Other Results:  EKG (my interpretation): normal sinus rhythm.    Most Recent EKG Results  Results for orders placed or performed during the hospital encounter of 03/02/25   EKG 12-lead    Collection Time: 03/02/25  6:47 PM   Result Value Ref Range    QRS Duration 86 ms    OHS QTC Calculation 452 ms    Narrative    Test Reason : R07.9,    Vent. Rate :  74 BPM     Atrial Rate :  74 BPM     P-R Int : 148 ms          QRS Dur :  86 ms      QT Int : 408 ms       P-R-T Axes :  29  62  49 degrees    QTcB Int : 452 ms    Normal sinus rhythm  Normal ECG  Confirmed by Akua Badillo ([id number]) on 3/3/2025 3:25:05 PM    Referred By: AAAREFERRAL SELF           Confirmed By: Akua Badillo       Most Recent Echocardiogram Results  Results for orders placed during the hospital encounter of 12/12/23    Echo    Interpretation Summary    Left Ventricle: The left ventricle is normal in size. Normal wall thickness. Normal wall motion. There is normal systolic function with a visually estimated ejection fraction of 60 - 65%. There is normal diastolic function.    Right Ventricle: Normal right ventricular cavity size. Wall thickness is normal. Right ventricle wall motion  is normal. Systolic function is normal.    Left Atrium: Left atrium is mildly dilated.    Mitral Valve: Mildly thickened anterior  "leaflet.      Most Recent Nuclear Stress Test Results  No results found for this or any previous visit.      Review of Systems:  Review of Systems   HENT:  Positive for dental problem.    Cardiovascular:  Positive for palpitations (hx of anxiety).   Gastrointestinal:  Positive for constipation and diarrhea.   Endocrine: Positive for polydipsia and polyuria.   Genitourinary:  Positive for menstrual problem.   Musculoskeletal:  Positive for neck pain.   Neurological:  Positive for headaches.        Hx of Asperger's    Psychiatric/Behavioral:  Positive for decreased concentration (hx od adhd) and suicidal ideas. The patient is nervous/anxious and is hyperactive.    All other systems reviewed and are negative.        Diet Education Discussed: Recommend high protein, low carb meals- mainly meats and vegetables.    Breakfast:  coffee with 2-3 pkg stevia, cup mandarin oranges, hash brown egg cheese,   Lunch:  beans & rice  Dinner:  beans & rice, spaghetti, gumbo  Snack: slice pecan pie  Water: 5-6 (16 oz)  Limit coke zero/no energy drinks  Reported portion control  Primarily cooks/shops    MVI:   none    Exercise: Recommend cardiovascular exercise, get HR over 100 for 20 minutes 3 times per week  Previously Walking to [employer] (20 minutes), 1x/wk       Physical:     Vital Signs (Most Recent)  Temp: 98.2 °F (36.8 °C) (04/21/25 1005)  Pulse: 83 (04/21/25 1005)  Resp: 16 (04/21/25 1005)  BP: (!) 143/83 (04/21/25 1005)  5' 5" (1.651 m)  109.6 kg (241 lb 9.6 oz)     Body comp:  Fat Percent:  45.8 %  Fat Mass:  110.6 lb  FFM:  131 lb  TBW: 36.2 lb  TBW %:  39.8 %  BMR: 1876 kcal    Wt Readings from Last 5 Encounters:   04/21/25 109.6 kg (241 lb 9.6 oz)   03/02/25 108.4 kg (239 lb)   02/09/25 108.9 kg (240 lb)   08/20/24 111.1 kg (245 lb)   07/26/24 111.2 kg (245 lb 2.4 oz)         Physical Exam:  Physical Exam  Vitals and nursing note reviewed.   Constitutional:       General: She is not in acute distress.     Appearance: Normal " appearance. She is obese. She is not ill-appearing or toxic-appearing.   HENT:      Head: Normocephalic and atraumatic.      Right Ear: External ear normal.      Left Ear: External ear normal.      Nose: Nose normal. No congestion or rhinorrhea.      Mouth/Throat:      Mouth: Mucous membranes are moist.      Pharynx: Oropharynx is clear.   Eyes:      General:         Right eye: No discharge.         Left eye: No discharge.      Conjunctiva/sclera: Conjunctivae normal.   Cardiovascular:      Rate and Rhythm: Normal rate and regular rhythm.      Pulses: Normal pulses.   Pulmonary:      Effort: Pulmonary effort is normal. No respiratory distress.   Musculoskeletal:         General: No swelling, tenderness, deformity or signs of injury. Normal range of motion.      Right lower leg: No edema.      Left lower leg: No edema.   Skin:     General: Skin is warm and dry.      Capillary Refill: Capillary refill takes less than 2 seconds.      Coloration: Skin is not jaundiced or pale.      Findings: No bruising, erythema or rash.   Neurological:      General: No focal deficit present.      Mental Status: She is alert and oriented to person, place, and time.      Motor: No weakness.      Gait: Gait normal.   Psychiatric:         Mood and Affect: Mood normal.         Behavior: Behavior normal.         Thought Content: Thought content normal.         Judgment: Judgment normal.         ASSESSMENT/PLAN:        1. Morbid obesity  semaglutide, weight loss, (WEGOVY) 0.25 mg/0.5 mL PnIj    semaglutide, weight loss, (WEGOVY) 0.5 mg/0.5 mL PnIj      2. MALCOLM (obstructive sleep apnea)  semaglutide, weight loss, (WEGOVY) 0.25 mg/0.5 mL PnIj    semaglutide, weight loss, (WEGOVY) 0.5 mg/0.5 mL PnIj      3. Smoker  Ambulatory referral/consult to Smoking Cessation Program      4. Binge eating disorder, unspecified severity  semaglutide, weight loss, (WEGOVY) 0.25 mg/0.5 mL PnIj    semaglutide, weight loss, (WEGOVY) 0.5 mg/0.5 mL PnIj      5. PCOS  (polycystic ovarian syndrome)  semaglutide, weight loss, (WEGOVY) 0.25 mg/0.5 mL PnIj    semaglutide, weight loss, (WEGOVY) 0.5 mg/0.5 mL PnIj          Plan:  Mary Sky has morbid obesity as their Body mass index is 40.2 kg/m². She would benefit from weight loss and has chosen Medical Weight Loss as the preferred method. She understands that this is a tool and lifestyle change will be necessary to keep weight off.     Behavorial/Counseling Plan for patient:  Falls reviewed with patient  Continue prescribed home medications  [place]ita Body Composition Scale results reviewed with patient  Discussed muscle vs fat loss  Do not exceed 1600 calories/day  I recommend the following therapeutic lifestyle changes:  Diet:   Transition to a low salt, primarily plant-based diet which emphasizes:  Increase fiber with vegetables, whole grains, legumes   Increase lean protein by eating beans, legumes, fish, poultry, eggs, bison  Good dairy choices for lean protein include greek yogurt (low sugar options) and cottage cheese  Replacing butter with healthy fats, such as olive oil and nuts  Using herbs and spices instead of salt to flavor foods   Limiting red meat to no more than a few times a month   Limit added sugars (sodas, fruit juices, fancy coffee drinks)  Limit processed foods   Do not skip meals  Ensure 60 gm/protein per day at minimum  Focus on small, frequesnt meals with eating high protein, low carb  Clear, protein powder added to water, typically 20 gm protein- brands like Isopure Fusion, Oath, Seeq  Unflavored protein, Isopure, can add 25g to foods   2. Exercise:   Initiation of a graded aerobic exercise plan (goal 30-45 minutes per day 4-5 times per week)  Patient encouraged to participate in low intensity strength exercising and if unfamiliar with strength and conditioning training, I recommend joining a gym with access to a  to further instruct the patient. Increase activity  Remain consistent with  exercise, including cardio and weights  3. Vitamins:   Start MVI      Medical Weight Loss- patient with limited options based on PMH  PO vs Injectables  PO  Topamax- currently regimen for headaches  IR vs ER Amphetamines- Phentermine, Phendimetrazine, Tenuate- contraindication with HX of anxiety and palpitations  Contrave- contraindication with HX of Manic episodes with Wellbutrin/Bupropion   Injectables  Discussed Wegovy vs Zepbound  PCP attempted Zepbound rx, prior auth by insurance denied. Patient attempted appeal and continually denied.  Discussed muscle vs fat loss on GLP1/GIP  Will attempt Medical Weight loss with Wegovy (Semaglutide) with increased CVD risk with central desposition of adipose tissue, obesity and hx of HTN. Patient with multiple contraindication to medications with hx of sz and binge eating disorder  Cardiovascular disease (CVD) mortality and morbidity has been shown to be elevated in individuals who are overweight, particularly with central deposition of adipose tissues.4 Abdominal obesity has been shown to be a risk factor for CVD worldwide.8 Obesity may be associated with hypertension, dyslipidemia, diabetes, or insulin resistance, and elevated levels of fibrinogen and C-reactive protein, all of which increase the risk of CVD events.9 (Relationships between Obesity and Cardiovascular Diseases in [place] and [place] - PMC )  GLP-1 in the brain plays an important role in appetite and glucose regulation, and reduced GLP-1 is associated with disordered eating behaviors like bingeing and purging. Targeting the GLP-1 system may therefore be a therapeutic approach for obesity and eating disorders. (GLP-1 receptor agonists: A novel pharmacotherapy for binge eating (Binge eating disorder and bulimia nervosa)? A systematic review - PMC )  Smoking Cessation- clinical studies indicate that GLP-1R agonists reduce nicotine withdrawal-induced hyperphagia and body weight gain, two major  obstacles to quitting smoking and achieving long-term abstinence in male and female smokers (Targeting GLP-1 receptors to reduce nicotine use disorder: Preclinical and clinical evidence - PMC )  Weight loss medications selection: Semaglutide (Wegovy)   Aware that will need prior authorization for medication, which can take some time  Aware medication not formulary at primary pharmacy and might have to change pharmacy if they do not carry it   Denies history or family history of Medullary Thyroid Cancer or Multiple endocrine neoplasia syndrome  Denies history of pancreatitis  Will start dosage at 0.25 mg subcutaneous for 4 weeks (weekly injections) Can continue to increase every 4 weeks with max dose of 2.4 mg   Nutritional Packet and education provided           [1]   Social History  Tobacco Use    Smoking status: Every Day     Current packs/day: 1.00     Types: Cigarettes    Smokeless tobacco: Never    Tobacco comments:     2-3 cigaretes perday but smokes 0.5 cig per time; smoking 15 yrs/ always a light smoker but has cut back more   Substance Use Topics    Alcohol use: No    Drug use: No     Comment: patient states she quit mojo 3 months ago and marijuana 2 months ago   [2]   Current Outpatient Medications   Medication Sig Dispense Refill    atomoxetine (STRATTERA) 25 MG capsule Take 25 mg by mouth once daily.      cariprazine (VRAYLAR) 3 mg Cap Take 3 mg by mouth.      etonogestreL (NEXPLANON) 68 mg Impl subdermal device 68 mg by Implant route once.      fremanezumab-vfrm (AJOVY AUTOINJECTOR SUBQ) Inject into the skin every 30 days.      levETIRAcetam (KEPPRA) 750 MG Tab Take 750 mg by mouth once daily.      metoprolol succinate (TOPROL-XL) 25 MG 24 hr tablet Take 12.5 mg by mouth once daily.      topiramate (TOPAMAX) 100 MG tablet Take 100 mg by mouth 2 (two) times daily.      ubrogepant (UBRELVY) 100 mg tablet Take 100 mg by mouth as needed for Migraine. If symptoms persist or return, may repeat dose after 2  hours. Maximum: 200 mg per 24 hours      albuterol sulfate 2.5 mg/0.5 mL Nebu Take 2.5 mg by nebulization every 6 (six) hours as needed (shortness of breath, cough). Rescue 28 each 0    levETIRAcetam (KEPPRA) 500 MG Tab Take 500 mg by mouth 2 (two) times daily.      ondansetron (ZOFRAN-ODT) 8 MG TbDL Take 8 mg by mouth 3 (three) times daily.      promethazine (PHENERGAN) 25 MG tablet Take 1 tablet (25 mg total) by mouth every 6 (six) hours as needed for Nausea. 15 tablet 0    semaglutide, weight loss, (WEGOVY) 0.25 mg/0.5 mL PnIj Inject 0.25 mg into the skin once a week. 2 mL 0    [START ON 5/22/2025] semaglutide, weight loss, (WEGOVY) 0.5 mg/0.5 mL PnIj Inject 0.5 mg into the skin once a week. 2 mL 0     Current Facility-Administered Medications   Medication Dose Route Frequency Provider Last Rate Last Admin    onabotulinumtoxina injection 200 Units  200 Units Intramuscular Q90 Days Terri Power, SEVERIANO

## 2025-04-22 ENCOUNTER — PATIENT MESSAGE (OUTPATIENT)
Dept: BARIATRICS | Facility: CLINIC | Age: 30
End: 2025-04-22
Payer: MEDICARE

## 2025-04-23 ENCOUNTER — PATIENT MESSAGE (OUTPATIENT)
Dept: BARIATRICS | Facility: CLINIC | Age: 30
End: 2025-04-23
Payer: MEDICARE

## 2025-04-23 DIAGNOSIS — E28.2 PCOS (POLYCYSTIC OVARIAN SYNDROME): Primary | ICD-10-CM

## 2025-04-23 DIAGNOSIS — E66.01 MORBID OBESITY: ICD-10-CM

## 2025-04-23 DIAGNOSIS — G47.33 OSA (OBSTRUCTIVE SLEEP APNEA): ICD-10-CM

## 2025-04-23 DIAGNOSIS — F50.819 BINGE EATING DISORDER, UNSPECIFIED SEVERITY: ICD-10-CM

## 2025-04-24 RX ORDER — SEMAGLUTIDE 0.25 MG/.5ML
0.25 INJECTION, SOLUTION SUBCUTANEOUS WEEKLY
Qty: 2 ML | Refills: 0 | Status: SHIPPED | OUTPATIENT
Start: 2025-04-24 | End: 2025-05-22

## 2025-04-24 RX ORDER — SEMAGLUTIDE 0.5 MG/.5ML
0.5 INJECTION, SOLUTION SUBCUTANEOUS WEEKLY
Qty: 2 ML | Refills: 0 | Status: SHIPPED | OUTPATIENT
Start: 2025-05-23 | End: 2025-06-20

## 2025-04-28 RX ORDER — PHENTERMINE HYDROCHLORIDE 37.5 MG/1
18.75 TABLET ORAL
Qty: 15 TABLET | Refills: 1 | Status: SHIPPED | OUTPATIENT
Start: 2025-04-28 | End: 2025-06-27

## 2025-04-29 RX ORDER — METFORMIN HYDROCHLORIDE 500 MG/1
500 TABLET, EXTENDED RELEASE ORAL NIGHTLY
Qty: 90 TABLET | Refills: 3 | Status: SHIPPED | OUTPATIENT
Start: 2025-04-29 | End: 2026-04-29

## 2025-06-10 DIAGNOSIS — E66.01 MORBID OBESITY: ICD-10-CM

## 2025-06-10 DIAGNOSIS — E28.2 PCOS (POLYCYSTIC OVARIAN SYNDROME): ICD-10-CM

## 2025-06-11 RX ORDER — METFORMIN HYDROCHLORIDE 500 MG/1
500 TABLET, EXTENDED RELEASE ORAL NIGHTLY
Qty: 90 TABLET | Refills: 3 | Status: SHIPPED | OUTPATIENT
Start: 2025-06-11 | End: 2026-06-11

## 2025-06-16 ENCOUNTER — HOSPITAL ENCOUNTER (EMERGENCY)
Facility: HOSPITAL | Age: 30
Discharge: HOME | End: 2025-06-16
Attending: EMERGENCY MEDICINE
Payer: MEDICARE

## 2025-06-16 VITALS
DIASTOLIC BLOOD PRESSURE: 87 MMHG | WEIGHT: 236 LBS | HEART RATE: 91 BPM | SYSTOLIC BLOOD PRESSURE: 124 MMHG | TEMPERATURE: 99 F | BODY MASS INDEX: 39.32 KG/M2 | RESPIRATION RATE: 17 BRPM | OXYGEN SATURATION: 98 % | HEIGHT: 65 IN

## 2025-06-16 DIAGNOSIS — K04.7 DENTAL ABSCESS: Primary | ICD-10-CM

## 2025-06-16 LAB
B-HCG UR QL: NEGATIVE
CTP QC/QA: YES

## 2025-06-16 PROCEDURE — 81025 URINE PREGNANCY TEST: CPT | Performed by: EMERGENCY MEDICINE

## 2025-06-16 PROCEDURE — 25000003 PHARM REV CODE 250: Performed by: EMERGENCY MEDICINE

## 2025-06-16 PROCEDURE — 63600175 PHARM REV CODE 636 W HCPCS: Mod: TB,JZ | Performed by: EMERGENCY MEDICINE

## 2025-06-16 PROCEDURE — 99284 EMERGENCY DEPT VISIT MOD MDM: CPT | Mod: 25

## 2025-06-16 PROCEDURE — 96372 THER/PROPH/DIAG INJ SC/IM: CPT | Performed by: EMERGENCY MEDICINE

## 2025-06-16 RX ORDER — HYDROCODONE BITARTRATE AND ACETAMINOPHEN 5; 325 MG/1; MG/1
1 TABLET ORAL
Refills: 0 | Status: COMPLETED | OUTPATIENT
Start: 2025-06-16 | End: 2025-06-16

## 2025-06-16 RX ORDER — NAPROXEN 500 MG/1
500 TABLET ORAL 2 TIMES DAILY WITH MEALS
Qty: 30 TABLET | Refills: 0 | Status: SHIPPED | OUTPATIENT
Start: 2025-06-16

## 2025-06-16 RX ORDER — KETOROLAC TROMETHAMINE 30 MG/ML
30 INJECTION, SOLUTION INTRAMUSCULAR; INTRAVENOUS ONCE
Status: DISCONTINUED | OUTPATIENT
Start: 2025-06-16 | End: 2025-06-16

## 2025-06-16 RX ORDER — HYDROCODONE BITARTRATE AND ACETAMINOPHEN 5; 325 MG/1; MG/1
1 TABLET ORAL EVERY 4 HOURS PRN
Qty: 12 TABLET | Refills: 0 | Status: SHIPPED | OUTPATIENT
Start: 2025-06-16

## 2025-06-16 RX ORDER — CLINDAMYCIN HYDROCHLORIDE 150 MG/1
300 CAPSULE ORAL 4 TIMES DAILY
Qty: 80 CAPSULE | Refills: 0 | Status: SHIPPED | OUTPATIENT
Start: 2025-06-16 | End: 2025-06-26

## 2025-06-16 RX ORDER — KETOROLAC TROMETHAMINE 30 MG/ML
30 INJECTION, SOLUTION INTRAMUSCULAR; INTRAVENOUS ONCE
Status: COMPLETED | OUTPATIENT
Start: 2025-06-16 | End: 2025-06-16

## 2025-06-16 RX ADMIN — KETOROLAC TROMETHAMINE 30 MG: 30 INJECTION, SOLUTION INTRAMUSCULAR; INTRAVENOUS at 06:06

## 2025-06-16 RX ADMIN — HYDROCODONE BITARTRATE AND ACETAMINOPHEN 1 TABLET: 5; 325 TABLET ORAL at 06:06

## 2025-06-16 NOTE — ED PROVIDER NOTES
"Encounter Date: 2025       History     Chief Complaint   Patient presents with    Dental Problem     Patient presents complaining of dental pain and discomfort.  Symptoms have been present since Saturday.  Patient has history of root canal on the same side as the pain and discomfort.  No fever or chills.  At the worst symptoms are moderate.  She has tried ibuprofen without relief.      Review of patient's allergies indicates:   Allergen Reactions    Chantix [varenicline] Hives    Wellbutrin [bupropion hcl] Other (See Comments)     Manic     Past Medical History:   Diagnosis Date    ADHD (attention deficit hyperactivity disorder)     Anemia     resolved    Arthritis     wrists    Asperger syndrome     autism spectrum disorder    Chronic headache     Depression     Encounter for blood transfusion     Fibromyalgia     Gallstones 2024    GERD (gastroesophageal reflux disease)     Migraine headache     MALCOLM (obstructive sleep apnea)     resolved    Palpitations     PCOS (polycystic ovarian syndrome)     Pseudotumor cerebri syndrome     Seizures     SOB (shortness of breath)      Past Surgical History:   Procedure Laterality Date    APPENDECTOMY  2019     SECTION  2019    LAPAROSCOPIC CHOLECYSTECTOMY N/A 2024    Procedure: CHOLECYSTECTOMY, LAPAROSCOPIC;  Surgeon: Dorota Zambrano MD;  Location: [facility];  Service: General;  Laterality: N/A;    TONSILLECTOMY      UPPER GASTROINTESTINAL ENDOSCOPY  in her childhood    "born with underdeveloped esophagus" & GERD per patient report    WISDOM TOOTH EXTRACTION       Family History   Problem Relation Name Age of Onset    Mental illness Mother      Seizures Mother      Migraines Mother      Bipolar disorder Mother      Cerebral palsy Father      Depression Brother          severe    Migraines Brother      Colon cancer Neg Hx      Colon polyps Neg Hx      Crohn's disease Neg Hx      Ulcerative colitis Neg Hx      Stomach cancer Neg Hx      Esophageal cancer " Neg Hx       Social History[1]  Review of Systems   All other systems reviewed and are negative.      Physical Exam     Initial Vitals [06/16/25 0423]   BP Pulse Resp Temp SpO2   124/87 91 17 98.5 °F (36.9 °C) 98 %      MAP       --         Physical Exam    Nursing note and vitals reviewed.  Constitutional: She appears well-developed and well-nourished.   Pleasant, polite   HENT:   Head: Normocephalic and atraumatic.   The oropharynx is clear.  There is no elevation of the tongue.  Patient is without any airway compromise.  There indeed is a cracked tooth with cavity with associated swelling to the left maxillary side.  It is mild.  It is exquisitely tender.  It is tooth number 13, 2nd bicuspid premolar   Eyes: EOM are normal.   Neck: Neck supple.   Normal range of motion.  Pulmonary/Chest: No respiratory distress.   Musculoskeletal:      Cervical back: Normal range of motion and neck supple.     Neurological: She is alert and oriented to person, place, and time.   Skin: Skin is warm and dry. Capillary refill takes less than 2 seconds.   Psychiatric: She has a normal mood and affect. Her behavior is normal. Judgment and thought content normal.         ED Course   Procedures  Labs Reviewed   POCT URINE PREGNANCY          Imaging Results    None          Medications   HYDROcodone-acetaminophen 5-325 mg per tablet 1 tablet (1 tablet Oral Given 6/16/25 0638)   ketorolac injection 30 mg (30 mg Intramuscular Given 6/16/25 0638)     Medical Decision Making  Patient presenting with dental pain and discomfort    Considerations include cavity, abscess    Patient without any airway compromise with mild swelling to the cheek good candidate for outpatient treatment.  Established she will need close follow up with dentist.  Also gave resources for [facility] Clinic at [facility].  Also reached out to the  to help establish appointment for her.  Patient was given analgesia in the emergency department and antibiotics were sent to  pharmacy.  Patient was discharged in stable condition.  Detailed return precautions discussed.    Amount and/or Complexity of Data Reviewed  Labs: ordered.    Risk  Prescription drug management.                                      Clinical Impression:  Final diagnoses:  [K04.7] Dental abscess (Primary)          ED Disposition Condition    Discharge Stable          ED Prescriptions       Medication Sig Dispense Start Date End Date Auth. Provider    clindamycin (CLEOCIN) 150 MG capsule Take 2 capsules (300 mg total) by mouth 4 (four) times daily. for 10 days 80 capsule 6/16/2025 6/26/2025 Alonso Terrell MD    HYDROcodone-acetaminophen (NORCO) 5-325 mg per tablet Take 1 tablet by mouth every 4 (four) hours as needed. 12 tablet 6/16/2025 -- Alonso Terrell MD    naproxen (NAPROSYN) 500 MG tablet Take 1 tablet (500 mg total) by mouth 2 (two) times daily with meals. 30 tablet 6/16/2025 -- Alonso Terrell MD          Follow-up Information       Follow up With Specialties Details Why Contact [facility]  Schedule an appointment as soon as possible for a visit   [address] [place] [address]    hospitalsu  Schedule an appointment as soon as possible for a visit   [facility]: Please call [phone], fax [phone] or email at [email or web address] to refer a patient.    [facility] : Self-referral Clinic:  To schedule an appointment, call [phone].                   [1]   Social History  Tobacco Use    Smoking status: Every Day     Current packs/day: 1.00     Types: Cigarettes    Smokeless tobacco: Never    Tobacco comments:     2-3 cigaretes perday but smokes 0.5 cig per time; smoking 15 yrs/ always a light smoker but has cut back more   Substance Use Topics    Alcohol use: No    Drug use: No     Comment: patient states she quit mojo 3 months ago and marijuana 2 months ago        Alonso Terrell MD  06/16/25 4531

## 2025-06-16 NOTE — PLAN OF CARE
06/16/25 0932   Discharge Reassessment   Assessment Type Discharge Planning Reassessment   Did the patient's condition or plan change since previous assessment? Yes   Post-Acute Status   Hospital Resources/Appts/Education Provided Appointments scheduled and added to AVS   Discharge Delays None known at this time     Pt is schedule with [facility] on 6/19/2025 @ 2:30 PM

## 2025-06-17 NOTE — ED NOTES
6/17/2025- Pt presents to ED to resolve an issue with her medication. Pt was seen on 6/16/2025 by Dr. Terrell. 3 prescriptions were called in to the [facility] on [facility]. One of the prescriptions was for [place]. Pharmacy told pt they are out of the Norco medication and do not know when they will be getting any in. Pt came to the ED to try and have this resolved. I contacted the pharmacy on [facility] and spoke with the pharmacist about transferring the prescription to another [facility]'s location but was told they could not do that. Dr. Núñez was consulted and pt chart was reviewed. Dr. Núñez wrote a hand written prescription matching the orders Dr. Terrell wrote the day before to help the pt get her medication. Pt was given the hand written prescription and told to bring to a pharmacy of her choice.